# Patient Record
Sex: FEMALE | Race: BLACK OR AFRICAN AMERICAN | Employment: OTHER | URBAN - METROPOLITAN AREA
[De-identification: names, ages, dates, MRNs, and addresses within clinical notes are randomized per-mention and may not be internally consistent; named-entity substitution may affect disease eponyms.]

---

## 2017-05-30 ENCOUNTER — HOSPITAL ENCOUNTER (OUTPATIENT)
Dept: GENERAL RADIOLOGY | Age: 57
Discharge: HOME | End: 2017-05-30
Payer: MEDICAID

## 2017-05-30 DIAGNOSIS — J44.9 COPD (CHRONIC OBSTRUCTIVE PULMONARY DISEASE) (HCC): ICD-10-CM

## 2017-05-30 PROCEDURE — 71020 XR CHEST PA LAT: CPT

## 2017-10-18 ENCOUNTER — HOSPITAL ENCOUNTER (OUTPATIENT)
Dept: GENERAL RADIOLOGY | Age: 57
Discharge: HOME | End: 2017-10-18
Attending: INTERNAL MEDICINE
Payer: MEDICAID

## 2017-10-18 ENCOUNTER — HOSPITAL ENCOUNTER (OUTPATIENT)
Dept: MAMMOGRAPHY | Age: 57
Discharge: HOME | End: 2017-10-18
Attending: INTERNAL MEDICINE
Payer: MEDICAID

## 2017-10-18 DIAGNOSIS — Z12.31 VISIT FOR SCREENING MAMMOGRAM: ICD-10-CM

## 2017-10-18 DIAGNOSIS — Z78.0 MENOPAUSE: ICD-10-CM

## 2017-10-18 PROCEDURE — 77080 DXA BONE DENSITY AXIAL: CPT

## 2017-10-18 PROCEDURE — 77067 SCR MAMMO BI INCL CAD: CPT

## 2018-07-27 ENCOUNTER — HOSPITAL ENCOUNTER (EMERGENCY)
Age: 58
Discharge: HOME | End: 2018-07-27
Attending: EMERGENCY MEDICINE
Payer: MEDICAID

## 2018-07-27 ENCOUNTER — APPOINTMENT (OUTPATIENT)
Dept: GENERAL RADIOLOGY | Age: 58
End: 2018-07-27
Attending: EMERGENCY MEDICINE
Payer: MEDICAID

## 2018-07-27 VITALS
SYSTOLIC BLOOD PRESSURE: 198 MMHG | OXYGEN SATURATION: 98 % | BODY MASS INDEX: 33.86 KG/M2 | TEMPERATURE: 97.8 F | WEIGHT: 184 LBS | DIASTOLIC BLOOD PRESSURE: 97 MMHG | HEIGHT: 62 IN | RESPIRATION RATE: 17 BRPM | HEART RATE: 68 BPM

## 2018-07-27 DIAGNOSIS — M54.42 ACUTE LEFT-SIDED LOW BACK PAIN WITH LEFT-SIDED SCIATICA: Primary | ICD-10-CM

## 2018-07-27 PROCEDURE — 99283 EMERGENCY DEPT VISIT LOW MDM: CPT

## 2018-07-27 PROCEDURE — 72100 X-RAY EXAM L-S SPINE 2/3 VWS: CPT

## 2018-07-27 PROCEDURE — 74011250637 HC RX REV CODE- 250/637: Performed by: EMERGENCY MEDICINE

## 2018-07-27 RX ORDER — GABAPENTIN 100 MG/1
100 CAPSULE ORAL 3 TIMES DAILY
Qty: 30 CAP | Refills: 0 | Status: SHIPPED | OUTPATIENT
Start: 2018-07-27 | End: 2018-08-06

## 2018-07-27 RX ORDER — ACETAMINOPHEN 500 MG
1000 TABLET ORAL
Qty: 50 TAB | Refills: 0 | Status: ON HOLD | OUTPATIENT
Start: 2018-07-27 | End: 2020-08-27 | Stop reason: CLARIF

## 2018-07-27 RX ORDER — NAPROXEN 500 MG/1
500 TABLET ORAL 2 TIMES DAILY WITH MEALS
Qty: 20 TAB | Refills: 0 | Status: SHIPPED | OUTPATIENT
Start: 2018-07-27 | End: 2018-08-06

## 2018-07-27 RX ORDER — GABAPENTIN 100 MG/1
100 CAPSULE ORAL
Status: COMPLETED | OUTPATIENT
Start: 2018-07-27 | End: 2018-07-27

## 2018-07-27 RX ORDER — NAPROXEN 250 MG/1
500 TABLET ORAL
Status: COMPLETED | OUTPATIENT
Start: 2018-07-27 | End: 2018-07-27

## 2018-07-27 RX ORDER — TRAMADOL HYDROCHLORIDE 50 MG/1
50 TABLET ORAL
Qty: 8 TAB | Refills: 0 | Status: SHIPPED | OUTPATIENT
Start: 2018-07-27 | End: 2018-10-28

## 2018-07-27 RX ORDER — HYDROCODONE BITARTRATE AND ACETAMINOPHEN 5; 325 MG/1; MG/1
1 TABLET ORAL
Status: COMPLETED | OUTPATIENT
Start: 2018-07-27 | End: 2018-07-27

## 2018-07-27 RX ADMIN — HYDROCODONE BITARTRATE AND ACETAMINOPHEN 1 TABLET: 5; 325 TABLET ORAL at 13:45

## 2018-07-27 RX ADMIN — GABAPENTIN 100 MG: 100 CAPSULE ORAL at 13:45

## 2018-07-27 RX ADMIN — NAPROXEN 500 MG: 250 TABLET ORAL at 13:45

## 2018-07-27 NOTE — ED PROVIDER NOTES
HPI Comments: Saintclair Lawyer is a 61 y.o. Female with c/o left low back pain that started few days ago and now had progressed to left leg pain worse with sitting, movement. No swelling, fcs, saddle anesthesia, nvd. Did have fall onto back few months ago for which she was not seen. Taking otc meds without relief. Constant sharp stabbing pain The history is provided by the patient. Past Medical History:  
Diagnosis Date  Bipolar disorder ([facility] [address]  Diabetes ([facility] [address]  DJD (degenerative joint disease)  Essential hypertension, benign  GERD (gastroesophageal reflux disease)  Hypertension  RELL (iron deficiency anemia)  Morbid obesity ([address]  RAD (reactive airway disease)  Sleep apnea  SOB (shortness of breath) Past Surgical History:  
Procedure Laterality Date [address] Fibroids Family History:  
Problem Relation Age of Onset  Heart Attack Father  Breast Cancer Maternal Aunt  Heart Disease Mother Social History Social History  Marital status:  Spouse name: N/A  
 Number of children: N/A  
 Years of education: N/A Occupational History  Not on file. Social History Main Topics  Smoking status: Never Smoker  Smokeless tobacco: Never Used  Alcohol use Yes Comment: occ  Drug use: Yes Special: Marijuana Comment: \"every now and then. \"  Sexual activity: Not on file Other Topics Concern  Not on file Social History Narrative ALLERGIES: Review of patient's allergies indicates no known allergies. Review of Systems Constitutional: Negative for fever. HENT: Negative for sore throat. Eyes: Negative for visual disturbance. Respiratory: Negative for shortness of breath. Cardiovascular: Negative for chest pain. Gastrointestinal: Negative for abdominal pain. Endocrine: Negative for polyuria. Genitourinary: Negative for difficulty urinating. Musculoskeletal: Positive for back pain, gait problem and myalgias. Skin: Negative for rash and wound. Allergic/Immunologic: Negative for immunocompromised state. Neurological: Negative for syncope. Psychiatric/Behavioral: Positive for sleep disturbance. Vitals:  
 07/27/18 1302 BP: (!) 198/97 Pulse: 68 Resp: 17 Temp: 97.8 °F (36.6 °C) SpO2: 98% Weight: 83.5 kg (184 lb) Height: 5' 2\" (1.575 m) Physical Exam  
Constitutional: She is oriented to person, place, and time. She appears well-developed and well-nourished. She appears distressed (appears uncomfortable). HENT:  
Head: Normocephalic and atraumatic. Right Ear: External ear normal.  
Left Ear: External ear normal.  
Nose: Nose normal.  
Mouth/Throat: Uvula is midline, oropharynx is clear and moist and mucous membranes are normal.  
Eyes: Conjunctivae are normal. No scleral icterus. Neck: Neck supple. Cardiovascular: Normal rate, regular rhythm, normal heart sounds and intact distal pulses. Pulmonary/Chest: Effort normal and breath sounds normal.  
Abdominal: Soft. There is no tenderness. Musculoskeletal: She exhibits no edema. Lumbar back: She exhibits decreased range of motion, tenderness, pain and spasm. She exhibits no bony tenderness, no swelling, no edema, no deformity, no laceration and normal pulse. Back: 
 
Left leg with no weakness, foot drop. Sensation intact tl. Nl pulses. Neurological: She is alert and oriented to person, place, and time. Gait normal.  
Skin: Skin is warm and dry. She is not diaphoretic. Psychiatric: Her behavior is normal.  
Nursing note and vitals reviewed. [facility] 
 
 
ED Course Procedures Vitals: 
Patient Vitals for the past 12 hrs: 
 Temp Pulse Resp BP SpO2  
07/27/18 1302 97.8 °F (36.6 °C) 68 17 (!) 198/97 98 % Medications ordered:  
Medications HYDROcodone-acetaminophen (NORCO) 5-325 mg per tablet 1 Tab (1 Tab Oral Given 7/27/18 1345) naproxen (NAPROSYN) tablet 500 mg (500 mg Oral Given 7/27/18 1345)  
gabapentin (NEURONTIN) capsule 100 mg (100 mg Oral Given 7/27/18 1345) Lab findings: 
No results found for this or any previous visit (from the past 12 hour(s)). EKG interpretation by ED Physician: X-Ray, CT or other radiology findings or impressions: XR SPINE LUMB 2 OR 3 V    (Results Pending)  
nothing acute per my interp Progress notes, Consult notes or additional Procedure notes:  
Doubt need for other testing. C/w radicular leg pain. Doubt infection, vascular I have discussed with patient and/or family/sig other the results, interpretation of any imaging if performed, suspected diagnosis and treatment plan to include instructions regarding the diagnoses listed to which understanding was expressed with all questions answered Reevaluation of patient:  
stable Disposition: 
Diagnosis: 1. Acute left-sided low back pain with left-sided sciatica Disposition: home Follow-up Information Follow up With Details Comments Contact Info Leandra Chaparro MD Schedule an appointment as soon as possible for a visit  [address] [place] [id number] [phone] [facility] EMERGENCY DEPT  If symptoms worsen [address] 
[phone] Patient's Medications Start Taking ACETAMINOPHEN (TYLENOL EXTRA STRENGTH) 500 MG TABLET    Take 2 Tabs by mouth every six (6) hours as needed for Pain. GABAPENTIN (NEURONTIN) 100 MG CAPSULE    Take 1 Cap by mouth three (3) times daily for 10 days. NAPROXEN (NAPROSYN) 500 MG TABLET    Take 1 Tab by mouth two (2) times daily (with meals) for 10 days. TRAMADOL (ULTRAM) 50 MG TABLET    Take 1 Tab by mouth every six (6) hours as needed for Pain. Max Daily Amount: 200 mg. Continue Taking ALBUTEROL (PROAIR HFA) 90 MCG/ACTUATION INHALER    Take 2 Puffs by inhalation every four (4) hours as needed for Wheezing. ALBUTEROL-IPRATROPIUM (DUO-NEB) 2.5 MG-0.5 MG/3 ML NEBULIZER SOLUTION    3 mL by Nebulization route once. DESLORATADINE (CLARINEX) 5 MG TABLET    Take 5 mg by mouth daily. EPINEPHRINE (EPIPEN) 0.3 MG/0.3 ML (1:1,000) INJECTION    0.3 mg by SubCUTAneous route once as needed. FERROUS SULFATE 325 MG (65 MG IRON) TABLET    Take 325 mg by mouth Daily (before breakfast). FLUTICASONE (FLONASE) 50 MCG/ACTUATION NASAL SPRAY    1 [place] by Both Nostrils route two (2) times a day. FLUTICASONE-SALMETEROL (ADVAIR DISKUS) 250-50 MCG/DOSE DISKUS INHALER    Take 2 Puffs by inhalation every twelve (12) hours. GLIPIZIDE (GLUCOTROL) 10 MG TABLET    Take 10 mg by mouth two (2) times a day. GUAIFENESIN SR (MUCINEX) 600 MG SR TABLET    Take 1 Tab by mouth every twelve (12) hours. HYDROCHLOROTHIAZIDE 12.5 MG TABLET    Take 12.5 mg by mouth daily. METFORMIN (GLUCOPHAGE) 500 MG TABLET    Take 1,000 mg by mouth two (2) times daily (with meals). MONTELUKAST (SINGULAIR) 10 MG TABLET    Take 10 mg by mouth daily. OMEPRAZOLE (PRILOSEC) 20 MG CAPSULE    Take 20 mg by mouth daily. TIOTROPIUM (SPIRIVA WITH HANDIHALER) 18 MCG INHALATION CAPSULE    Take 1 Cap by inhalation daily. VALSARTAN (DIOVAN) 160 MG TABLET    Take 160 mg by mouth daily. These Medications have changed No medications on file Stop Taking DEXTROMETHORPHAN-GUAIFENESIN (ROBITUSSIN-DM)  MG/5 ML SYRUP    Take 10 mL by mouth every six (6) hours as needed for Cough. HYDROCODONE-ACETAMINOPHEN (NORCO) 5-325 MG PER TABLET    Take 1-2 tablets PO every 4-6 hours as needed for pain control. If over the counter ibuprofen or acetaminophen was suggested, then only take the vicodin for pain not well controlled with the over the counter medication. IBUPROFEN (MOTRIN) 800 MG TABLET    Take 1 Tab by mouth every eight (8) hours as needed for Pain.

## 2018-10-28 ENCOUNTER — HOSPITAL ENCOUNTER (EMERGENCY)
Age: 58
Discharge: HOME | End: 2018-10-28
Attending: EMERGENCY MEDICINE | Admitting: EMERGENCY MEDICINE
Payer: MEDICAID

## 2018-10-28 VITALS
SYSTOLIC BLOOD PRESSURE: 160 MMHG | HEIGHT: 62 IN | HEART RATE: 70 BPM | TEMPERATURE: 98.2 F | BODY MASS INDEX: 35.15 KG/M2 | OXYGEN SATURATION: 100 % | DIASTOLIC BLOOD PRESSURE: 66 MMHG | WEIGHT: 191 LBS | RESPIRATION RATE: 18 BRPM

## 2018-10-28 DIAGNOSIS — M54.42 LEFT-SIDED LOW BACK PAIN WITH LEFT-SIDED SCIATICA, UNSPECIFIED CHRONICITY: ICD-10-CM

## 2018-10-28 DIAGNOSIS — M54.50 ACUTE EXACERBATION OF CHRONIC LOW BACK PAIN: Primary | ICD-10-CM

## 2018-10-28 DIAGNOSIS — G89.29 ACUTE EXACERBATION OF CHRONIC LOW BACK PAIN: Primary | ICD-10-CM

## 2018-10-28 PROCEDURE — 74011250637 HC RX REV CODE- 250/637: Performed by: PHYSICIAN ASSISTANT

## 2018-10-28 PROCEDURE — 99283 EMERGENCY DEPT VISIT LOW MDM: CPT

## 2018-10-28 PROCEDURE — 96372 THER/PROPH/DIAG INJ SC/IM: CPT

## 2018-10-28 PROCEDURE — 74011250636 HC RX REV CODE- 250/636: Performed by: PHYSICIAN ASSISTANT

## 2018-10-28 RX ORDER — GABAPENTIN 100 MG/1
100 CAPSULE ORAL 3 TIMES DAILY
Qty: 30 CAP | Refills: 0 | Status: ON HOLD | OUTPATIENT
Start: 2018-10-28 | End: 2020-08-27 | Stop reason: CLARIF

## 2018-10-28 RX ORDER — PREDNISONE 10 MG/1
TABLET ORAL
Qty: 21 TAB | Refills: 0 | Status: ON HOLD | OUTPATIENT
Start: 2018-10-28 | End: 2020-09-04 | Stop reason: CLARIF

## 2018-10-28 RX ORDER — METHOCARBAMOL 500 MG/1
500 TABLET, FILM COATED ORAL 4 TIMES DAILY
Qty: 20 TAB | Refills: 0 | Status: SHIPPED | OUTPATIENT
Start: 2018-10-28 | End: 2018-11-02

## 2018-10-28 RX ORDER — HYDROCODONE BITARTRATE AND ACETAMINOPHEN 5; 325 MG/1; MG/1
1 TABLET ORAL
Status: COMPLETED | OUTPATIENT
Start: 2018-10-28 | End: 2018-10-28

## 2018-10-28 RX ORDER — KETOROLAC TROMETHAMINE 30 MG/ML
15 INJECTION, SOLUTION INTRAMUSCULAR; INTRAVENOUS
Status: COMPLETED | OUTPATIENT
Start: 2018-10-28 | End: 2018-10-28

## 2018-10-28 RX ADMIN — KETOROLAC TROMETHAMINE 15 MG: 30 INJECTION, SOLUTION INTRAMUSCULAR at 15:15

## 2018-10-28 RX ADMIN — HYDROCODONE BITARTRATE AND ACETAMINOPHEN 1 TABLET: 5; 325 TABLET ORAL at 15:15

## 2018-10-28 NOTE — ED NOTES
PT discharged home with family member to drive. Pt educated on new prescriptions and follow up care. Pt stating understanding.

## 2018-10-28 NOTE — ED PROVIDER NOTES
EMERGENCY DEPARTMENT HISTORY AND PHYSICAL EXAM 
 
3:00 PM 
 
 
Date: 10/28/2018 Patient Name: Shelia Diez [facility] History of Presenting Illness Chief Complaint Patient presents with  Back Pain History Provided By: Patient Chief Complaint: Back Pain Duration:  2 days ago Timing:  Progressive Location: Lumbar back Quality: N/A Severity: 8 out of 10 Modifying Factors:  Denies other recent falls or injuries. Reports taking Aleve, Lidocaine patches, and 2 Gabapentin, which mildly alleviated the pain. Associated Symptoms: Reports pain radiating down to the left hip and left lower extremity. Denies other associated symptoms, such as incontinence, and numbness. Additional History (Context): Doug Sebastian is a 61 y.o. female with PMHx of HTN, GERD, RAD, DJD, Shortness of breath, DM, and Iron deficiency anemia who presents with lumbar back pain, radiating down to her Left hip and LLE onset 2 days ago in the evening. Patient states the pain was brought on while lying on the couch. States 2 days ago she also performed some heavy lifting with boxes, and tables. Denies other recent falls or injuries. Reports taking Aleve, Lidocaine patches, and 2 Gabapentin, which mildly alleviated the pain. Denies other associated symptoms, such as incontinence, saddle anesthesia and numbness. Patient states that she recently followed-up with a Neurology, who diagnosed her with Sciatica. States this presentation feels typical of her Sciatica. PCP: Capo Amaya MD 
 
Current Outpatient Medications Medication Sig Dispense Refill  gabapentin (NEURONTIN) 100 mg capsule Take 1 Cap by mouth three (3) times daily. 30 Cap 0  
 methocarbamol (ROBAXIN) 500 mg tablet Take 1 Tab by mouth four (4) times daily for 5 days. 20 Tab 0  predniSONE (STERAPRED DS) 10 mg dose pack Take as prescribed until finished.  21 Tab 0  
 acetaminophen (TYLENOL EXTRA STRENGTH) 500 mg tablet Take 2 Tabs by mouth every six (6) hours as needed for Pain. 50 Tab 0  
 albuterol (PROAIR HFA) 90 mcg/actuation inhaler Take 2 Puffs by inhalation every four (4) hours as needed for Wheezing.  desloratadine (CLARINEX) 5 mg tablet Take 5 mg by mouth daily.  glipiZIDE (GLUCOTROL) 10 mg tablet Take 10 mg by mouth two (2) times a day.  fluticasone (FLONASE) 50 mcg/actuation nasal spray 1 [place] by Both Nostrils route two (2) times a day.  tiotropium (SPIRIVA WITH HANDIHALER) 18 mcg inhalation capsule Take 1 Cap by inhalation daily.  fluticasone-salmeterol (ADVAIR DISKUS) 250-50 mcg/dose diskus inhaler Take 2 Puffs by inhalation every twelve (12) hours.  montelukast (SINGULAIR) 10 mg tablet Take 10 mg by mouth daily.  EPINEPHrine (EPIPEN) 0.3 mg/0.3 mL (1:1,000) injection 0.3 mg by SubCUTAneous route once as needed.  Hydrochlorothiazide 12.5 mg tablet Take 12.5 mg by mouth daily.  metFORMIN (GLUCOPHAGE) 500 mg tablet Take 1,000 mg by mouth two (2) times daily (with meals).  ferrous sulfate 325 mg (65 mg iron) tablet Take 325 mg by mouth Daily (before breakfast).  valsartan (DIOVAN) 160 mg tablet Take 160 mg by mouth daily.  omeprazole (PRILOSEC) 20 mg capsule Take 20 mg by mouth daily.  albuterol-ipratropium (DUO-NEB) 2.5 mg-0.5 mg/3 ml nebulizer solution 3 mL by Nebulization route once. Past History Past Medical History: 
Past Medical History:  
Diagnosis Date  Bipolar disorder ([facility] [address]  Diabetes ([facility] [address]  DJD (degenerative joint disease)  Essential hypertension, benign  GERD (gastroesophageal reflux disease)  Hypertension  RELL (iron deficiency anemia)  Morbid obesity ([facility] [address]  RAD (reactive airway disease)  Sleep apnea  SOB (shortness of breath) Past Surgical History: 
Past Surgical History:  
Procedure Laterality Date [address] Fibroids Family History: 
Family History Problem Relation Age of Onset  Heart Attack Father  Breast Cancer Maternal Aunt  Heart Disease Mother Social History: 
Social History Tobacco Use  Smoking status: Never Smoker  Smokeless tobacco: Never Used Substance Use Topics  Alcohol use: Yes Comment: occ  Drug use: Yes Types: Marijuana Comment: \"every now and then. \" Allergies: 
No Known Allergies Review of Systems Review of Systems Constitutional: Negative for chills and fever. HENT: Negative for ear pain, rhinorrhea and sore throat. Eyes: Negative for pain and redness. Respiratory: Negative for cough and shortness of breath. Cardiovascular: Negative for chest pain. Gastrointestinal: Negative for abdominal pain, constipation, diarrhea, nausea and vomiting. Genitourinary: Negative for dysuria. (-) Incontinence Musculoskeletal: Positive for arthralgias (Left hip pain ), back pain and myalgias (Left lower extremity pain ). Skin: Negative. Neurological: Negative for dizziness, weakness, light-headedness, numbness and headaches. Psychiatric/Behavioral: Negative. All other systems reviewed and are negative. Physical Exam  
 
Visit Vitals /66 (BP 1 Location: Right arm, BP Patient Position: Sitting) Pulse 70 Temp 98.2 °F (36.8 °C) Resp 18 Ht 5' 2\" (1.575 m) Wt 86.6 kg (191 lb) SpO2 100% Breastfeeding? No  
BMI 34.93 kg/m² Physical Exam  
Constitutional: She is oriented to person, place, and time. She appears well-developed and well-nourished. No distress. HENT:  
Head: Normocephalic and atraumatic. Right Ear: Tympanic membrane, external ear and ear canal normal.  
Left Ear: Tympanic membrane, external ear and ear canal normal.  
Nose: Nose normal.  
Mouth/Throat: Oropharynx is clear and moist and mucous membranes are normal.  
Eyes: Conjunctivae and EOM are normal. Pupils are equal, round, and reactive to light. Neck: Normal range of motion. Neck supple. Cardiovascular: Normal rate, regular rhythm, normal heart sounds and intact distal pulses. Exam reveals no gallop and no friction rub. No murmur heard. Pulmonary/Chest: Effort normal and breath sounds normal. No respiratory distress. She has no wheezes. She has no rales. Abdominal: Soft. Bowel sounds are normal. There is no tenderness. Musculoskeletal: Normal range of motion. BACK:   
Lower left paralumbar reproducible tenderness to palpation. +left SLR. 
(-) deformity, swelling, erythema, skin changes, midline tenderness or crepitus. (-) step off. FROM at the waist. Full sensation to deep and light palpation bilaterally. (-) foot drop Neurological: She is alert and oriented to person, place, and time. Skin: Skin is warm and dry. She is not diaphoretic. Psychiatric: She has a normal mood and affect. Her behavior is normal. Judgment and thought content normal.  
Nursing note and vitals reviewed. Diagnostic Study Results Labs - No results found for this or any previous visit (from the past 12 hour(s)). Radiologic Studies - No orders to display Medical Decision Making I am the first provider for this patient. I reviewed the vital signs, available nursing notes, past medical history, past surgical history, family history and social history. Vital Signs-Reviewed the patient's vital signs. Pulse Oximetry Analysis -  100% on room air (Interpretation) Records Reviewed: Nursing Notes, Old Medical Records, Previous Radiology Studies and Triage notes  (Time of Review: 3:00 PM) ED Course: Progress Notes, Reevaluation, and Consults: 
Stable Provider Notes (Medical Decision Making): DDx: muscular pain/spasm, sciatica, spinal stenosis, arthritis, DJD, spondylitis, Fx (traumatic, compression, etc.) Well-appearing pt with c/o low back pain, exam is benign, NVI, sensorimotor intact, stable gait, no trauma, no indicators of back emergencies, no need for imaging, will dc with supportive care. Based upon the patients presentation with noted HPI and PE, along with the work up done in the emergency department, I believe that the patient is having lower paralumbar myofascial strain (lower back strain). PROGRESS: stable and improved Plan/discharge instructions: 
Apply heat or cool compresses (whichever provides better relief). Start doing gentle exercises and stretches as tolerated. Drink plenty of fluids. Finish Prednisone as directed. Take robaxin for muscular discomfort or spasms as needed as directed. Note that this medicine may cause drowsiness. Take the gabapentin as prescribed. Take Tylenol/Acetaminophen (every 4-6 hours) and/or Motrin/Ibuprofen/Advil (every 6-8 hours) or Naprosyn/Naproxyn/Aleve for fever or pain as needed. Return if any concerns or worsening condition(s). Your primary doctor in 2 days. Pt results have been reviewed with the patient and any family present. They have been counseled regarding diagnosis, treatment, and plan. They verbally convey understanding and agreement of the signs, symptoms, diagnosis, treatment and prognosis and additionally agrees to follow up as discussed. They also agree with the care-plan and convey that all of their questions have been answered. I have also provided discharge instructions for them that include: educational information regarding their diagnosis and treatment, and list of reasons why they would want to return to the ED prior to their follow-up appointment, should their condition change. Melody Quiroz PA-C Diagnosis Clinical Impression: 1. Acute exacerbation of chronic low back pain 2. Left-sided low back pain with left-sided sciatica, unspecified chronicity Disposition: Discharge to home Follow-up Information Follow up With Specialties Details Why Contact Info [facility] EMERGENCY DEPT Emergency Medicine  As needed, If symptoms worsen [address] 
[phone] Anand Kaiser MD Internal Medicine Go in 2 days  [facility] [address] [facility] [id number] [phone] Medication List  
  
START taking these medications   
gabapentin 100 mg capsule Commonly known as:  NEURONTIN Take 1 Cap by mouth three (3) times daily. methocarbamol 500 mg tablet Commonly known as:  ROBAXIN Take 1 Tab by mouth four (4) times daily for 5 days. predniSONE 10 mg dose pack Commonly known as:  STERAPRED DS Take as prescribed until finished. CONTINUE taking these medications   
acetaminophen 500 mg tablet Commonly known as:  Any [place] [facility] Take 2 Tabs by mouth every six (6) hours as needed for Pain. ADVAIR DISKUS 250-50 mcg/dose diskus inhaler Generic drug:  fluticasone-salmeterol 
  
albuterol-ipratropium 2.5 mg-0.5 mg/3 ml Nebu Commonly known as:  DUO-NEB 
  
CLARINEX 5 mg tablet Generic drug:  desloratadine DIOVAN 160 mg tablet Generic drug:  valsartan EPINEPHrine 0.3 mg/0.3 mL injection Commonly known as:  EPIPEN 
  
ferrous sulfate 325 mg (65 mg iron) tablet FLONASE 50 mcg/actuation nasal spray Generic drug:  fluticasone 
  
glipiZIDE 10 mg tablet Commonly known as:  GLUCOTROL 
  
hydroCHLOROthiazide 12.5 mg tablet Commonly known as:  HYDRODIURIL 
  
metFORMIN 500 mg tablet Commonly known as:  GLUCOPHAGE PriLOSEC 20 mg capsule Generic drug:  omeprazole PROAIR HFA 90 mcg/actuation inhaler Generic drug:  albuterol SINGULAIR 10 mg tablet Generic drug:  montelukast 
  
SPIRIVA WITH HANDIHALER 18 mcg inhalation capsule Generic drug:  tiotropium STOP taking these medications   
traMADol 50 mg tablet Commonly known as:  ULTRAM 
  
  
  
Where to Get Your Medications Information about where to get these medications is not yet available Ask your nurse or doctor about these medications · gabapentin 100 mg capsule · methocarbamol 500 mg tablet · predniSONE 10 mg dose pack 
  
 
_______________________________ Attestations: 
Scribe Attestation Sergo Lua acting as a scribe for and in the presence of Soheila Jean Baptiste October 28, 2018 at 3:00 PM 
    
Provider Attestation:     
I personally performed the services described in the documentation, reviewed the documentation, as recorded by the scribe in my presence, and it accurately and completely records my words and actions. October 28, 2018 at 3:00 PM - Melba Sylvester PA-C   
_______________________________

## 2019-03-07 ENCOUNTER — HOSPITAL ENCOUNTER (OUTPATIENT)
Dept: LAB | Age: 59
Discharge: HOME | End: 2019-03-07

## 2019-03-07 LAB — XX-LABCORP SPECIMEN COL,LCBCF: NORMAL

## 2019-03-07 PROCEDURE — 99001 SPECIMEN HANDLING PT-LAB: CPT

## 2019-07-22 ENCOUNTER — HOSPITAL ENCOUNTER (OUTPATIENT)
Dept: INFUSION THERAPY | Age: 59
Discharge: HOME | End: 2019-07-22
Payer: MEDICAID

## 2019-07-22 VITALS
RESPIRATION RATE: 18 BRPM | SYSTOLIC BLOOD PRESSURE: 159 MMHG | HEART RATE: 55 BPM | OXYGEN SATURATION: 96 % | DIASTOLIC BLOOD PRESSURE: 63 MMHG | TEMPERATURE: 97.1 F

## 2019-07-22 PROCEDURE — 96372 THER/PROPH/DIAG INJ SC/IM: CPT

## 2019-07-22 PROCEDURE — 74011250636 HC RX REV CODE- 250/636: Performed by: INTERNAL MEDICINE

## 2019-07-22 RX ADMIN — OMALIZUMAB 75 MG: 75 INJECTION, SOLUTION SUBCUTANEOUS at 13:40

## 2019-07-22 RX ADMIN — OMALIZUMAB 150 MG: 150 INJECTION, SOLUTION SUBCUTANEOUS at 13:40

## 2019-07-22 NOTE — PROGRESS NOTES
[facility] Progress Note    Date: 2019    Name: Nico Paredes    MRN: [id number]         : 1960     Xolair Injection every 2 weeks      Ms. Julia Miller arrived to [facility] at 1320. Ms. Julia Miller was assessed and education was provided. Ms. Nasim Snyder vitals were reviewed. Visit Vitals  /63 (BP 1 Location: Right arm, BP Patient Position: Sitting)   Pulse (!) 55   Temp 97.1 °F (36.2 °C)   Resp 18   SpO2 96%   Breastfeeding? No     Xolair 150 mg was administered as ordered SQ in patient's Right upper arm ( right). Xolair 75 mg was administered as ordered SQ in patient's Left upper arm ( left). Total 225 mg    Patient previously received this injection therefore, declined observation. Ms. Julia Miller tolerated well without complaints. Ms. Julia Miller was discharged from [place] [address] in stable condition at 1345. She is to return on 2019 at 0830 for her next appointment.     Martha Mitchell RN  2019

## 2019-08-05 ENCOUNTER — HOSPITAL ENCOUNTER (OUTPATIENT)
Dept: INFUSION THERAPY | Age: 59
Discharge: HOME | End: 2019-08-05
Payer: MEDICAID

## 2019-08-19 ENCOUNTER — HOSPITAL ENCOUNTER (OUTPATIENT)
Dept: INFUSION THERAPY | Age: 59
Discharge: HOME | End: 2019-08-19
Payer: MEDICAID

## 2019-08-22 ENCOUNTER — HOSPITAL ENCOUNTER (OUTPATIENT)
Dept: INFUSION THERAPY | Age: 59
Discharge: HOME | End: 2019-08-22
Payer: MEDICAID

## 2019-08-22 VITALS
HEART RATE: 62 BPM | SYSTOLIC BLOOD PRESSURE: 172 MMHG | TEMPERATURE: 98.1 F | DIASTOLIC BLOOD PRESSURE: 98 MMHG | RESPIRATION RATE: 18 BRPM | OXYGEN SATURATION: 97 %

## 2019-08-22 PROCEDURE — 96372 THER/PROPH/DIAG INJ SC/IM: CPT

## 2019-08-22 PROCEDURE — 74011250636 HC RX REV CODE- 250/636: Performed by: INTERNAL MEDICINE

## 2019-08-22 RX ADMIN — OMALIZUMAB 75 MG: 75 INJECTION, SOLUTION SUBCUTANEOUS at 13:31

## 2019-08-22 RX ADMIN — OMALIZUMAB 150 MG: 150 INJECTION, SOLUTION SUBCUTANEOUS at 13:31

## 2019-09-03 ENCOUNTER — APPOINTMENT (OUTPATIENT)
Dept: INFUSION THERAPY | Age: 59
End: 2019-09-03
Payer: MEDICAID

## 2019-09-05 ENCOUNTER — HOSPITAL ENCOUNTER (OUTPATIENT)
Dept: INFUSION THERAPY | Age: 59
Discharge: HOME | End: 2019-09-05
Payer: MEDICAID

## 2019-09-05 VITALS
HEART RATE: 62 BPM | OXYGEN SATURATION: 96 % | DIASTOLIC BLOOD PRESSURE: 79 MMHG | SYSTOLIC BLOOD PRESSURE: 196 MMHG | RESPIRATION RATE: 18 BRPM | TEMPERATURE: 98 F

## 2019-09-05 PROCEDURE — 96372 THER/PROPH/DIAG INJ SC/IM: CPT

## 2019-09-05 PROCEDURE — 74011250636 HC RX REV CODE- 250/636: Performed by: INTERNAL MEDICINE

## 2019-09-05 RX ADMIN — OMALIZUMAB 75 MG: 75 INJECTION, SOLUTION SUBCUTANEOUS at 10:13

## 2019-09-05 RX ADMIN — OMALIZUMAB 150 MG: 150 INJECTION, SOLUTION SUBCUTANEOUS at 10:13

## 2019-09-05 NOTE — PROGRESS NOTES
[facility]IC Progress Note    Date: 2019    Name: Jhonatan Kowalski    MRN: [id number]         : 1960     Xolair Injection every 2 weeks      Ms. Julia Miller arrived to [facility] at 1005. Ms. Julia Miller was assessed and education was provided. Ms. Tyrone Scales vitals were reviewed. Visit Vitals  Pulse 62   Temp 98 °F (36.7 °C)   Resp 18   SpO2 96%     Xolair 150 mg was administered as ordered SQ in patient's Left upper arm. Xolair 75 mg was administered as ordered SQ in patient's Right upper arm. Total 225 mg    Ms. Mahmood tolerated well without complaints. Ms. Julia Miller was discharged from [place] [address] in stable condition at 1020. She is to return on 2019 at 1400 for her next appointment.     Valerie Conley RN  2019  1020

## 2019-10-03 ENCOUNTER — HOSPITAL ENCOUNTER (OUTPATIENT)
Dept: INFUSION THERAPY | Age: 59
Discharge: HOME | End: 2019-10-03
Payer: MEDICAID

## 2019-10-03 VITALS
RESPIRATION RATE: 18 BRPM | HEART RATE: 59 BPM | DIASTOLIC BLOOD PRESSURE: 68 MMHG | SYSTOLIC BLOOD PRESSURE: 185 MMHG | TEMPERATURE: 96.9 F

## 2019-10-03 PROCEDURE — 96372 THER/PROPH/DIAG INJ SC/IM: CPT

## 2019-10-03 PROCEDURE — 74011250636 HC RX REV CODE- 250/636: Performed by: INTERNAL MEDICINE

## 2019-10-03 RX ADMIN — OMALIZUMAB 150 MG: 150 INJECTION, SOLUTION SUBCUTANEOUS at 14:28

## 2019-10-03 RX ADMIN — OMALIZUMAB 75 MG: 75 INJECTION, SOLUTION SUBCUTANEOUS at 14:28

## 2019-10-17 ENCOUNTER — HOSPITAL ENCOUNTER (OUTPATIENT)
Dept: INFUSION THERAPY | Age: 59
Discharge: HOME | End: 2019-10-17
Payer: MEDICAID

## 2019-10-17 VITALS
TEMPERATURE: 97 F | SYSTOLIC BLOOD PRESSURE: 177 MMHG | DIASTOLIC BLOOD PRESSURE: 72 MMHG | RESPIRATION RATE: 18 BRPM | OXYGEN SATURATION: 99 %

## 2019-10-17 PROCEDURE — 96372 THER/PROPH/DIAG INJ SC/IM: CPT

## 2019-10-17 PROCEDURE — 74011250636 HC RX REV CODE- 250/636: Performed by: INTERNAL MEDICINE

## 2019-10-17 RX ADMIN — OMALIZUMAB 150 MG: 150 INJECTION, SOLUTION SUBCUTANEOUS at 14:07

## 2019-10-17 RX ADMIN — OMALIZUMAB 75 MG: 75 INJECTION, SOLUTION SUBCUTANEOUS at 14:07

## 2019-10-17 NOTE — PROGRESS NOTES
[facility] Progress Note    Date: 2019    Name: Rita Barragan    MRN: [id number]         : 1960     Xolair Injection every 2 weeks      Ms. Julia Miller arrived to [facility] at 1400. Ms. Julia Miller was assessed and education was provided. Ms. Etter Kanner vitals were reviewed. Visit Vitals  /72 (BP 1 Location: Right arm, BP Patient Position: Sitting)   Temp 97 °F (36.1 °C)   Resp 18   SpO2 99%     Xolair 150 mg was administered as ordered SQ in patient's Left upper arm. Xolair 75 mg was administered as ordered SQ in patient's Left upper arm. Total 225 mg    Ms. Mahmood tolerated well without complaints. Ms. Julia Miller was discharged from [place] [address] in stable condition at 1410. She is to return on 10/31/2019 at 1500 for her next appointment.     Dari Roth RN  2019  1410

## 2019-10-31 ENCOUNTER — HOSPITAL ENCOUNTER (OUTPATIENT)
Dept: INFUSION THERAPY | Age: 59
Discharge: HOME | End: 2019-10-31
Payer: MEDICAID

## 2019-11-07 PROBLEM — J45.50 SEVERE PERSISTENT ASTHMA: Status: ACTIVE | Noted: 2019-11-07

## 2019-11-08 DIAGNOSIS — J45.50 SEVERE PERSISTENT ASTHMA, UNSPECIFIED WHETHER COMPLICATED: ICD-10-CM

## 2019-11-22 ENCOUNTER — HOSPITAL ENCOUNTER (OUTPATIENT)
Dept: INFUSION THERAPY | Age: 59
Discharge: HOME | End: 2019-11-22
Payer: MEDICAID

## 2019-11-22 VITALS
SYSTOLIC BLOOD PRESSURE: 187 MMHG | TEMPERATURE: 97 F | DIASTOLIC BLOOD PRESSURE: 82 MMHG | OXYGEN SATURATION: 96 % | RESPIRATION RATE: 18 BRPM | HEART RATE: 61 BPM

## 2019-11-22 DIAGNOSIS — J45.50 SEVERE PERSISTENT ASTHMA, UNSPECIFIED WHETHER COMPLICATED: Primary | ICD-10-CM

## 2019-11-22 DIAGNOSIS — J45.50 SEVERE PERSISTENT ASTHMA, UNSPECIFIED WHETHER COMPLICATED: ICD-10-CM

## 2019-11-22 PROCEDURE — 74011250636 HC RX REV CODE- 250/636: Performed by: NURSE PRACTITIONER

## 2019-11-22 PROCEDURE — 96372 THER/PROPH/DIAG INJ SC/IM: CPT

## 2019-11-22 RX ADMIN — OMALIZUMAB 75 MG: 75 INJECTION, SOLUTION SUBCUTANEOUS at 15:54

## 2019-11-22 RX ADMIN — OMALIZUMAB 150 MG: 150 INJECTION, SOLUTION SUBCUTANEOUS at 15:52

## 2019-11-22 NOTE — PROGRESS NOTES
[facility] Progress Note    Date: 2019    Name: Chico Randolph    MRN: [id number]         : 1960     Xolair Injection every 2 weeks    Ms. Julia Miller arrived to [facility] at [phone]. Ms. Julia Miller was assessed and education was provided. Ms. Kirk Leonard vitals were reviewed. Visit Vitals  /82 (BP 1 Location: Left arm, BP Patient Position: Sitting)   Pulse 61   Temp 97 °F (36.1 °C)   Resp 18   SpO2 96%     Xolair 150 mg was administered as ordered SQ in patient's Right upper arm. Xolair 75 mg was administered as ordered SQ in patient's Left upper arm. Total 225 mg    Ms. Mahmood tolerated well without complaints. Patient armband removed and shredded. Ms. Julia Miller was discharged from [place] [address] in stable condition at 1555. She is to return on 19 at [facility] for her next appointment.     Stefani Lauren RN  2019  1412

## 2019-11-29 DIAGNOSIS — J45.50 SEVERE PERSISTENT ASTHMA, UNSPECIFIED WHETHER COMPLICATED: ICD-10-CM

## 2019-12-11 ENCOUNTER — HOSPITAL ENCOUNTER (OUTPATIENT)
Dept: MAMMOGRAPHY | Age: 59
Discharge: HOME | End: 2019-12-11
Attending: INTERNAL MEDICINE
Payer: MEDICAID

## 2019-12-11 ENCOUNTER — HOSPITAL ENCOUNTER (OUTPATIENT)
Dept: GENERAL RADIOLOGY | Age: 59
Discharge: HOME | End: 2019-12-11
Attending: INTERNAL MEDICINE
Payer: MEDICAID

## 2019-12-11 DIAGNOSIS — Z78.0 MENOPAUSE: ICD-10-CM

## 2019-12-11 DIAGNOSIS — Z12.31 VISIT FOR SCREENING MAMMOGRAM: ICD-10-CM

## 2019-12-11 PROCEDURE — 77080 DXA BONE DENSITY AXIAL: CPT

## 2019-12-11 PROCEDURE — 77063 BREAST TOMOSYNTHESIS BI: CPT

## 2019-12-12 ENCOUNTER — HOSPITAL ENCOUNTER (OUTPATIENT)
Dept: INFUSION THERAPY | Age: 59
Discharge: HOME | End: 2019-12-12
Payer: MEDICAID

## 2019-12-12 VITALS — TEMPERATURE: 97.1 F | HEART RATE: 65 BPM | RESPIRATION RATE: 18 BRPM | OXYGEN SATURATION: 99 %

## 2019-12-12 DIAGNOSIS — J45.50 SEVERE PERSISTENT ASTHMA, UNSPECIFIED WHETHER COMPLICATED: Primary | ICD-10-CM

## 2019-12-12 PROCEDURE — 74011250636 HC RX REV CODE- 250/636: Performed by: NURSE PRACTITIONER

## 2019-12-12 PROCEDURE — 96372 THER/PROPH/DIAG INJ SC/IM: CPT

## 2019-12-12 RX ADMIN — OMALIZUMAB 150 MG: 150 INJECTION, SOLUTION SUBCUTANEOUS at 11:42

## 2019-12-12 RX ADMIN — OMALIZUMAB 75 MG: 75 INJECTION, SOLUTION SUBCUTANEOUS at 11:42

## 2019-12-12 NOTE — PROGRESS NOTES
[facility] Progress Note    Date: 2019    Name: Lyle Auguste    MRN: [id number]         : 1960     Xolair Injection every 2 weeks    Ms. Julia Miller arrived to [facility] at 1135. Ms. Julia Miller was assessed and education was provided. Ms. Yoana Hartman vitals were reviewed. Visit Vitals  Pulse 65   Temp 97.1 °F (36.2 °C)   Resp 18   SpO2 99%     Xolair 150 mg was administered as ordered SQ in patient's Right upper arm. Xolair 75 mg was administered as ordered SQ in patient's Left upper arm. Total 225 mg    Ms. Mahmood tolerated well without complaints. Patient armband removed and shredded. Ms. Julia Miller was discharged from [place] [address] in stable condition at 1150. She is to return on 19 at 1000 for her next appointment.     López Barrera RN  2019  1150

## 2019-12-13 DIAGNOSIS — J45.50 SEVERE PERSISTENT ASTHMA, UNSPECIFIED WHETHER COMPLICATED: ICD-10-CM

## 2019-12-16 ENCOUNTER — HOSPITAL ENCOUNTER (OUTPATIENT)
Dept: MAMMOGRAPHY | Age: 59
Discharge: HOME | End: 2019-12-16
Attending: INTERNAL MEDICINE
Payer: MEDICAID

## 2019-12-16 ENCOUNTER — HOSPITAL ENCOUNTER (OUTPATIENT)
Dept: ULTRASOUND IMAGING | Age: 59
Discharge: HOME | End: 2019-12-16
Attending: INTERNAL MEDICINE
Payer: MEDICAID

## 2019-12-16 DIAGNOSIS — R92.8 ABNORMALITY OF LEFT BREAST ON SCREENING MAMMOGRAM: ICD-10-CM

## 2019-12-16 DIAGNOSIS — R92.2 INCONCLUSIVE MAMMOGRAM: ICD-10-CM

## 2019-12-16 PROCEDURE — 77061 BREAST TOMOSYNTHESIS UNI: CPT

## 2019-12-16 PROCEDURE — 76642 ULTRASOUND BREAST LIMITED: CPT

## 2019-12-26 ENCOUNTER — HOSPITAL ENCOUNTER (OUTPATIENT)
Dept: INFUSION THERAPY | Age: 59
End: 2019-12-26
Payer: MEDICAID

## 2019-12-27 DIAGNOSIS — J45.50 SEVERE PERSISTENT ASTHMA, UNSPECIFIED WHETHER COMPLICATED: ICD-10-CM

## 2020-01-09 ENCOUNTER — HOSPITAL ENCOUNTER (OUTPATIENT)
Dept: INFUSION THERAPY | Age: 60
Discharge: HOME | End: 2020-01-09
Payer: MEDICAID

## 2020-01-09 VITALS — OXYGEN SATURATION: 100 % | TEMPERATURE: 97.2 F | RESPIRATION RATE: 18 BRPM | HEART RATE: 55 BPM

## 2020-01-09 DIAGNOSIS — J45.50 SEVERE PERSISTENT ASTHMA, UNSPECIFIED WHETHER COMPLICATED: Primary | ICD-10-CM

## 2020-01-09 PROCEDURE — 74011250636 HC RX REV CODE- 250/636: Performed by: INTERNAL MEDICINE

## 2020-01-09 PROCEDURE — 96372 THER/PROPH/DIAG INJ SC/IM: CPT

## 2020-01-09 RX ADMIN — OMALIZUMAB 150 MG: 150 INJECTION, SOLUTION SUBCUTANEOUS at 12:19

## 2020-01-09 RX ADMIN — OMALIZUMAB 75 MG: 75 INJECTION, SOLUTION SUBCUTANEOUS at 12:19

## 2020-01-09 NOTE — PROGRESS NOTES
[facility] Progress Note    Date: 2020    Name: Brice Mena    MRN: [id number]         : 1960     Xolair Injection every 2 weeks    Ms. Julia Miller arrived to [facility] at 1210. Ms. Julia Miller was assessed and education was provided. Ms. Chris Tony vitals were reviewed. Visit Vitals  Pulse (!) 55   Temp 97.2 °F (36.2 °C)   Resp 18   SpO2 100%     Xolair 150 mg was administered as ordered SQ in patient's Right upper arm. Xolair 75 mg was administered as ordered SQ in patient's Left upper arm. Total 225 mg    Ms. Mahmood tolerated well without complaints. Patient armband removed and shredded. Ms. Julia Miller was discharged from [place] [address] in stable condition at 1225. She is to return on 2020 at 36 for her next appointment.     Sabrina Sandoval RN  2020  1225

## 2020-01-10 DIAGNOSIS — J45.50 SEVERE PERSISTENT ASTHMA, UNSPECIFIED WHETHER COMPLICATED: ICD-10-CM

## 2020-01-23 ENCOUNTER — HOSPITAL ENCOUNTER (OUTPATIENT)
Dept: INFUSION THERAPY | Age: 60
Discharge: HOME | End: 2020-01-23
Payer: MEDICAID

## 2020-01-23 VITALS
DIASTOLIC BLOOD PRESSURE: 67 MMHG | TEMPERATURE: 96.9 F | RESPIRATION RATE: 18 BRPM | SYSTOLIC BLOOD PRESSURE: 150 MMHG | OXYGEN SATURATION: 100 % | HEART RATE: 61 BPM

## 2020-01-23 DIAGNOSIS — J45.50 SEVERE PERSISTENT ASTHMA, UNSPECIFIED WHETHER COMPLICATED: Primary | ICD-10-CM

## 2020-01-23 PROCEDURE — 96372 THER/PROPH/DIAG INJ SC/IM: CPT

## 2020-01-23 PROCEDURE — 74011250636 HC RX REV CODE- 250/636: Performed by: INTERNAL MEDICINE

## 2020-01-23 RX ADMIN — OMALIZUMAB 75 MG: 75 INJECTION, SOLUTION SUBCUTANEOUS at 11:25

## 2020-01-23 RX ADMIN — OMALIZUMAB 150 MG: 150 INJECTION, SOLUTION SUBCUTANEOUS at 11:25

## 2020-01-23 NOTE — PROGRESS NOTES
[facility] Progress Note    Date: 2020    Name: Tish House    MRN: [id number]         : 1960     Xolair Injection every 2 weeks    Ms. Julia Miller arrived to [facility] at 1120. Ms. Julia Miller was assessed and education was provided. Ms. Lewis Body vitals were reviewed. Visit Vitals  /67 (BP 1 Location: Left arm, BP Patient Position: Sitting)   Pulse 61   Temp 96.9 °F (36.1 °C)   Resp 18   SpO2 100%     Xolair 150 mg was administered as ordered SQ in patient's Right upper arm. Xolair 75 mg was administered as ordered SQ in patient's Left upper arm. Total 225 mg    Ms. Mahmood tolerated well without complaints. Patient armband removed and shredded. Ms. Julia Miller was discharged from [place] [address] in stable condition at 1130. She is to return on 2020 at 36 for her next appointment.     Sharon Nash RN  2020  1130

## 2020-01-24 DIAGNOSIS — J45.50 SEVERE PERSISTENT ASTHMA, UNSPECIFIED WHETHER COMPLICATED: ICD-10-CM

## 2020-02-06 ENCOUNTER — HOSPITAL ENCOUNTER (OUTPATIENT)
Dept: INFUSION THERAPY | Age: 60
Discharge: HOME | End: 2020-02-06
Payer: MEDICAID

## 2020-02-06 VITALS
SYSTOLIC BLOOD PRESSURE: 192 MMHG | HEART RATE: 63 BPM | RESPIRATION RATE: 18 BRPM | OXYGEN SATURATION: 97 % | TEMPERATURE: 97.2 F | DIASTOLIC BLOOD PRESSURE: 77 MMHG

## 2020-02-06 DIAGNOSIS — J45.50 SEVERE PERSISTENT ASTHMA, UNSPECIFIED WHETHER COMPLICATED: Primary | ICD-10-CM

## 2020-02-06 DIAGNOSIS — J45.50 SEVERE PERSISTENT ASTHMA, UNSPECIFIED WHETHER COMPLICATED: ICD-10-CM

## 2020-02-06 PROCEDURE — 74011250636 HC RX REV CODE- 250/636: Performed by: INTERNAL MEDICINE

## 2020-02-06 PROCEDURE — 96372 THER/PROPH/DIAG INJ SC/IM: CPT

## 2020-02-06 RX ADMIN — OMALIZUMAB 75 MG: 75 INJECTION, SOLUTION SUBCUTANEOUS at 11:58

## 2020-02-06 RX ADMIN — OMALIZUMAB 150 MG: 150 INJECTION, SOLUTION SUBCUTANEOUS at 11:58

## 2020-02-06 NOTE — PROGRESS NOTES
[facility] Progress Note    Date: 2020    Name: Rita Barragan    MRN: [id number]         : 1960     Xolair Injection every 2 weeks    Ms. Julia Miller arrived to [facility] at 1150. Ms. Julia Miller was assessed and education was provided. Ms. Etter Kanner vitals were reviewed. Visit Vitals  /77 (BP 1 Location: Right arm, BP Patient Position: Sitting)   Pulse 63   Temp 97.2 °F (36.2 °C)   Resp 18   SpO2 97%     Xolair 150 mg was administered as ordered SQ in patient's Right upper arm. Xolair 75 mg was administered as ordered SQ in patient's Left upper arm. Total 225 mg    Ms. Mahmood tolerated well without complaints. Patient armband removed and shredded. Ms. Julia Miller was discharged from [place] [address] in stable condition at 1200. She is to return on 2020 at 36 for her next appointment.     Dari Roth RN  2020  1200

## 2020-02-20 ENCOUNTER — HOSPITAL ENCOUNTER (OUTPATIENT)
Dept: INFUSION THERAPY | Age: 60
End: 2020-02-20
Payer: MEDICAID

## 2020-02-20 DIAGNOSIS — J45.50 SEVERE PERSISTENT ASTHMA, UNSPECIFIED WHETHER COMPLICATED: ICD-10-CM

## 2020-03-05 ENCOUNTER — HOSPITAL ENCOUNTER (OUTPATIENT)
Dept: INFUSION THERAPY | Age: 60
Discharge: HOME | End: 2020-03-05
Payer: MEDICAID

## 2020-03-05 VITALS — OXYGEN SATURATION: 97 % | TEMPERATURE: 97.1 F | RESPIRATION RATE: 18 BRPM | HEART RATE: 59 BPM

## 2020-03-05 DIAGNOSIS — J45.50 SEVERE PERSISTENT ASTHMA, UNSPECIFIED WHETHER COMPLICATED: Primary | ICD-10-CM

## 2020-03-05 DIAGNOSIS — J45.50 SEVERE PERSISTENT ASTHMA, UNSPECIFIED WHETHER COMPLICATED: ICD-10-CM

## 2020-03-05 PROCEDURE — 74011250636 HC RX REV CODE- 250/636: Performed by: INTERNAL MEDICINE

## 2020-03-05 PROCEDURE — 96372 THER/PROPH/DIAG INJ SC/IM: CPT

## 2020-03-05 RX ADMIN — OMALIZUMAB 75 MG: 75 INJECTION, SOLUTION SUBCUTANEOUS at 11:57

## 2020-03-05 RX ADMIN — OMALIZUMAB 150 MG: 150 INJECTION, SOLUTION SUBCUTANEOUS at 11:57

## 2020-03-05 NOTE — PROGRESS NOTES
[facility] Progress Note    Date: 2020    Name: Maddi Meredith    MRN: [id number]         : 1960     Xolair Injection every 2 weeks    Ms. Julia Miller arrived to [facility] at 1150. Ms. Julia Miller was assessed and education was provided. Ms. Chey Snyder vitals were reviewed. Visit Vitals  Pulse (!) 59   Temp 97.1 °F (36.2 °C)   Resp 18   SpO2 97%     Xolair 150 mg was administered as ordered SQ in patient's Right upper arm. Xolair 75 mg was administered as ordered SQ in patient's Left upper arm. Total 225 mg    Ms. Mahmood tolerated well without complaints. Patient armband removed and shredded. Ms. Julia Miller was discharged from [place] [address] in stable condition at 1200. She is to return on 3/19/2020 at 1100 for her next appointment.     Jennifer Kang RN  2020  1200

## 2020-03-19 ENCOUNTER — HOSPITAL ENCOUNTER (OUTPATIENT)
Dept: INFUSION THERAPY | Age: 60
Discharge: HOME | End: 2020-03-19
Payer: MEDICAID

## 2020-03-19 VITALS
HEART RATE: 62 BPM | OXYGEN SATURATION: 96 % | SYSTOLIC BLOOD PRESSURE: 149 MMHG | RESPIRATION RATE: 18 BRPM | DIASTOLIC BLOOD PRESSURE: 73 MMHG | TEMPERATURE: 99.4 F

## 2020-03-19 DIAGNOSIS — J45.50 SEVERE PERSISTENT ASTHMA, UNSPECIFIED WHETHER COMPLICATED: Primary | ICD-10-CM

## 2020-03-19 DIAGNOSIS — J45.50 SEVERE PERSISTENT ASTHMA, UNSPECIFIED WHETHER COMPLICATED: ICD-10-CM

## 2020-03-19 PROCEDURE — 96372 THER/PROPH/DIAG INJ SC/IM: CPT

## 2020-03-19 PROCEDURE — 74011250636 HC RX REV CODE- 250/636: Performed by: INTERNAL MEDICINE

## 2020-03-19 RX ADMIN — OMALIZUMAB 75 MG: 75 INJECTION, SOLUTION SUBCUTANEOUS at 15:33

## 2020-03-19 RX ADMIN — OMALIZUMAB 150 MG: 150 INJECTION, SOLUTION SUBCUTANEOUS at 15:32

## 2020-03-19 NOTE — PROGRESS NOTES
[facility] Progress Note    Date: 2020    Name: Annette Ko    MRN: [id number]         : 1960     Xolair Injection every 2 weeks    Ms. Jluia Miller arrived to [facility] at 1525. Ms. Julia Miller was assessed and education was provided. Ms. Julita Anderson vitals were reviewed. Visit Vitals  /73 (BP 1 Location: Left arm, BP Patient Position: Sitting)   Pulse 62   Temp 99.4 °F (37.4 °C)   Resp 18   SpO2 96%     Xolair 150 mg was administered as ordered SQ in patient's Right upper arm. Xolair 75 mg was administered as ordered SQ in patient's Left upper arm. Total 225 mg    Ms. Mahmood tolerated well without complaints. Patient armband removed and shredded. Ms. Julia Miller was discharged from [place] [address] in stable condition at 32 61 16. She is to return on 2020 at 1130 for her next appointment.     Deneen Dow  2020  1200

## 2020-04-02 DIAGNOSIS — J45.50 SEVERE PERSISTENT ASTHMA, UNSPECIFIED WHETHER COMPLICATED: ICD-10-CM

## 2020-04-16 ENCOUNTER — HOSPITAL ENCOUNTER (OUTPATIENT)
Dept: INFUSION THERAPY | Age: 60
Discharge: HOME | End: 2020-04-16
Payer: MEDICAID

## 2020-04-16 VITALS
OXYGEN SATURATION: 98 % | HEART RATE: 61 BPM | SYSTOLIC BLOOD PRESSURE: 158 MMHG | DIASTOLIC BLOOD PRESSURE: 79 MMHG | TEMPERATURE: 97.6 F | RESPIRATION RATE: 18 BRPM

## 2020-04-16 DIAGNOSIS — J45.50 SEVERE PERSISTENT ASTHMA, UNSPECIFIED WHETHER COMPLICATED: ICD-10-CM

## 2020-04-16 DIAGNOSIS — J45.50 SEVERE PERSISTENT ASTHMA, UNSPECIFIED WHETHER COMPLICATED: Primary | ICD-10-CM

## 2020-04-16 PROCEDURE — 96372 THER/PROPH/DIAG INJ SC/IM: CPT

## 2020-04-16 PROCEDURE — 74011250636 HC RX REV CODE- 250/636: Performed by: INTERNAL MEDICINE

## 2020-04-16 RX ADMIN — OMALIZUMAB 150 MG: 150 INJECTION, SOLUTION SUBCUTANEOUS at 15:35

## 2020-04-16 RX ADMIN — OMALIZUMAB 75 MG: 75 INJECTION, SOLUTION SUBCUTANEOUS at 15:35

## 2020-04-16 NOTE — PROGRESS NOTES
[facility] Progress Note    Date: 2020    Name: Han Ortiz    MRN: [id number]         : 1960     Xolair Injection every 2 weeks    Ms. Julia Miller arrived to [facility] at 0. Ms. Julia Miller was assessed and education was provided. Ms. Agustina Capps vitals were reviewed. Visit Vitals  /79 (BP 1 Location: Right arm, BP Patient Position: Sitting)   Pulse 61   Temp 97.6 °F (36.4 °C)   Resp 18   SpO2 98%   Breastfeeding No     Xolair 150 mg was administered as ordered SQ in patient's Right upper arm. Xolair 75 mg was administered as ordered SQ in patient's Left upper arm. Total 225 mg    Ms. Mahmood tolerated well without complaints. Patient armband removed and shredded. Ms. Julia Miller was discharged from [place] [address] in stable condition at 1540. She is to return on 2020 at 1130 for her next appointment.     Beryl Parikh RN  2020  1540

## 2020-04-30 DIAGNOSIS — J45.50 SEVERE PERSISTENT ASTHMA, UNSPECIFIED WHETHER COMPLICATED: ICD-10-CM

## 2020-05-14 ENCOUNTER — HOSPITAL ENCOUNTER (OUTPATIENT)
Dept: INFUSION THERAPY | Age: 60
Discharge: HOME | End: 2020-05-14
Payer: MEDICAID

## 2020-05-14 VITALS
OXYGEN SATURATION: 99 % | RESPIRATION RATE: 18 BRPM | TEMPERATURE: 97.5 F | SYSTOLIC BLOOD PRESSURE: 221 MMHG | DIASTOLIC BLOOD PRESSURE: 82 MMHG | HEART RATE: 56 BPM

## 2020-05-14 DIAGNOSIS — J45.50 SEVERE PERSISTENT ASTHMA, UNSPECIFIED WHETHER COMPLICATED: Primary | ICD-10-CM

## 2020-05-14 DIAGNOSIS — J45.50 SEVERE PERSISTENT ASTHMA, UNSPECIFIED WHETHER COMPLICATED: ICD-10-CM

## 2020-05-14 PROCEDURE — 96372 THER/PROPH/DIAG INJ SC/IM: CPT

## 2020-05-14 PROCEDURE — 74011250636 HC RX REV CODE- 250/636: Performed by: NURSE PRACTITIONER

## 2020-05-14 RX ADMIN — OMALIZUMAB 150 MG: 150 INJECTION, SOLUTION SUBCUTANEOUS at 11:38

## 2020-05-14 RX ADMIN — OMALIZUMAB 75 MG: 75 INJECTION, SOLUTION SUBCUTANEOUS at 11:38

## 2020-05-14 NOTE — PROGRESS NOTES
[facility] Progress Note    Date: May 14, 2020    Name: Darylene Gilmore    MRN: [id number]         : 1960     Xolair Injection every 2 weeks    Ms. Julia Miller arrived to [facility] at 1130. Ms. Julia Miller was assessed and education was provided. Ms. Kenyatta Jackson vitals were reviewed. Visit Vitals  BP (!) 221/82 (BP 1 Location: Left arm, BP Patient Position: Sitting)   Pulse (!) 56   Temp 97.5 °F (36.4 °C)   Resp 18   SpO2 99%     Xolair 150 mg was administered as ordered SQ in patient's Right upper arm. Xolair 75 mg was administered as ordered SQ in patient's Left upper arm. Total 225 mg    Ms. Mahmood tolerated well without complaints. Patient armband removed and shredded. Ms. Julia Miller was discharged from [place] [address] in stable condition at 1145. She is to return on 2020 at 1130 for her next appointment.     Cayden Jones RN  May 14, 2020  1143

## 2020-05-28 ENCOUNTER — HOSPITAL ENCOUNTER (OUTPATIENT)
Dept: INFUSION THERAPY | Age: 60
Discharge: HOME | End: 2020-05-28
Payer: MEDICAID

## 2020-05-28 VITALS
HEART RATE: 62 BPM | DIASTOLIC BLOOD PRESSURE: 83 MMHG | RESPIRATION RATE: 18 BRPM | TEMPERATURE: 97.2 F | OXYGEN SATURATION: 99 % | SYSTOLIC BLOOD PRESSURE: 192 MMHG

## 2020-05-28 DIAGNOSIS — J45.50 SEVERE PERSISTENT ASTHMA, UNSPECIFIED WHETHER COMPLICATED: ICD-10-CM

## 2020-05-28 DIAGNOSIS — J45.50 SEVERE PERSISTENT ASTHMA, UNSPECIFIED WHETHER COMPLICATED: Primary | ICD-10-CM

## 2020-05-28 PROCEDURE — 74011250636 HC RX REV CODE- 250/636: Performed by: NURSE PRACTITIONER

## 2020-05-28 PROCEDURE — 96372 THER/PROPH/DIAG INJ SC/IM: CPT

## 2020-05-28 RX ADMIN — OMALIZUMAB 75 MG: 75 INJECTION, SOLUTION SUBCUTANEOUS at 11:38

## 2020-05-28 RX ADMIN — OMALIZUMAB 150 MG: 75 INJECTION, SOLUTION SUBCUTANEOUS at 11:37

## 2020-06-11 ENCOUNTER — HOSPITAL ENCOUNTER (OUTPATIENT)
Dept: INFUSION THERAPY | Age: 60
Discharge: HOME | End: 2020-06-11
Payer: MEDICAID

## 2020-06-11 VITALS
TEMPERATURE: 98.5 F | DIASTOLIC BLOOD PRESSURE: 72 MMHG | RESPIRATION RATE: 18 BRPM | OXYGEN SATURATION: 100 % | HEART RATE: 57 BPM | SYSTOLIC BLOOD PRESSURE: 173 MMHG

## 2020-06-11 DIAGNOSIS — J45.50 SEVERE PERSISTENT ASTHMA, UNSPECIFIED WHETHER COMPLICATED: ICD-10-CM

## 2020-06-11 DIAGNOSIS — J45.50 SEVERE PERSISTENT ASTHMA, UNSPECIFIED WHETHER COMPLICATED: Primary | ICD-10-CM

## 2020-06-11 PROCEDURE — 74011250636 HC RX REV CODE- 250/636: Performed by: NURSE PRACTITIONER

## 2020-06-11 PROCEDURE — 96372 THER/PROPH/DIAG INJ SC/IM: CPT

## 2020-06-11 RX ADMIN — OMALIZUMAB 75 MG: 75 INJECTION, SOLUTION SUBCUTANEOUS at 11:51

## 2020-06-11 RX ADMIN — OMALIZUMAB 150 MG: 150 INJECTION, SOLUTION SUBCUTANEOUS at 11:51

## 2020-06-11 NOTE — PROGRESS NOTES
[facility] Progress Note    Date: 2020    Name: Karen Blackwell    MRN: [id number]         : 1960     Xolair Injection every 2 weeks    Ms. Julia Miller arrived to [facility] at 0911 34 [phone] 33. Ms. Julia Miller was assessed and education was provided. Ms. Sulma Alvarado vitals were reviewed. Visit Vitals  /72 (BP 1 Location: Left arm, BP Patient Position: Sitting)   Pulse (!) 57   Temp 98.5 °F (36.9 °C)   Resp 18   SpO2 100%     Xolair 150 mg was administered as ordered SQ in patient's Right upper arm. Xolair 75 mg was administered as ordered SQ in patient's Left upper arm. Total 225 mg    Ms. Mahmood tolerated well without complaints. Patient armband removed and shredded. Ms. Julia Miller was discharged from [place] [address] in stable condition at 1155. She is to return on 2020 at 1330 for her next appointment.     Abdullahi Carl RN  2020  1155

## 2020-06-25 ENCOUNTER — HOSPITAL ENCOUNTER (OUTPATIENT)
Dept: INFUSION THERAPY | Age: 60
Discharge: HOME | End: 2020-06-25
Payer: MEDICAID

## 2020-06-25 VITALS
TEMPERATURE: 97.2 F | SYSTOLIC BLOOD PRESSURE: 172 MMHG | DIASTOLIC BLOOD PRESSURE: 75 MMHG | OXYGEN SATURATION: 100 % | RESPIRATION RATE: 18 BRPM | HEART RATE: 61 BPM

## 2020-06-25 DIAGNOSIS — J45.50 SEVERE PERSISTENT ASTHMA, UNSPECIFIED WHETHER COMPLICATED: ICD-10-CM

## 2020-06-25 DIAGNOSIS — J45.50 SEVERE PERSISTENT ASTHMA, UNSPECIFIED WHETHER COMPLICATED: Primary | ICD-10-CM

## 2020-06-25 PROCEDURE — 74011250636 HC RX REV CODE- 250/636: Performed by: NURSE PRACTITIONER

## 2020-06-25 PROCEDURE — 96372 THER/PROPH/DIAG INJ SC/IM: CPT

## 2020-06-25 RX ADMIN — OMALIZUMAB 150 MG: 150 INJECTION, SOLUTION SUBCUTANEOUS at 13:31

## 2020-06-25 RX ADMIN — OMALIZUMAB 75 MG: 75 INJECTION, SOLUTION SUBCUTANEOUS at 13:31

## 2020-06-25 NOTE — PROGRESS NOTES
[facility] Progress Note    Date: 2020    Name: Tish House    MRN: [id number]         : 1960     Xolair Injection every 2 weeks    Ms. Julia Miller arrived to [facility] at 1325. Ms. Julia Miller was assessed and education was provided. Ms. Lewis Body vitals were reviewed. Visit Vitals  /75 (BP 1 Location: Left arm, BP Patient Position: Sitting)   Pulse 61   Temp 97.2 °F (36.2 °C)   Resp 18   SpO2 100%     Xolair 150 mg was administered as ordered SQ in patient's Right upper arm. Xolair 75 mg was administered as ordered SQ in patient's Left upper arm. Total 225 mg    Ms. Mahmood tolerated well without complaints. Patient armband removed and shredded. Ms. Julia Miller was discharged from [place] [address] in stable condition at 1335. She is to return on 2020 at 1130 for her next appointment.     Sharon Nash RN  2020  1335

## 2020-07-09 ENCOUNTER — HOSPITAL ENCOUNTER (OUTPATIENT)
Dept: INFUSION THERAPY | Age: 60
Discharge: HOME | End: 2020-07-09
Payer: MEDICAID

## 2020-07-09 VITALS
DIASTOLIC BLOOD PRESSURE: 72 MMHG | HEART RATE: 66 BPM | SYSTOLIC BLOOD PRESSURE: 159 MMHG | TEMPERATURE: 98.3 F | OXYGEN SATURATION: 100 % | RESPIRATION RATE: 18 BRPM

## 2020-07-09 DIAGNOSIS — J45.50 SEVERE PERSISTENT ASTHMA, UNSPECIFIED WHETHER COMPLICATED: ICD-10-CM

## 2020-07-09 DIAGNOSIS — J45.50 SEVERE PERSISTENT ASTHMA, UNSPECIFIED WHETHER COMPLICATED: Primary | ICD-10-CM

## 2020-07-09 PROCEDURE — 96372 THER/PROPH/DIAG INJ SC/IM: CPT

## 2020-07-09 PROCEDURE — 74011250636 HC RX REV CODE- 250/636: Performed by: NURSE PRACTITIONER

## 2020-07-09 RX ADMIN — OMALIZUMAB 150 MG: 150 INJECTION, SOLUTION SUBCUTANEOUS at 11:43

## 2020-07-09 RX ADMIN — OMALIZUMAB 75 MG: 75 INJECTION, SOLUTION SUBCUTANEOUS at 11:43

## 2020-07-09 NOTE — PROGRESS NOTES
[facility] Progress Note    Date: 2020    Name: Jose Angel Hernandez    MRN: [id number]         : 1960     Xolair Injection every 2 weeks    Ms. Julia Miller arrived to [facility] at 1135. Ms. Julia Miller was assessed and education was provided. Ms. Stubbs Cons vitals were reviewed. Visit Vitals  /72 (BP 1 Location: Left arm, BP Patient Position: Sitting)   Pulse 66   Temp 98.3 °F (36.8 °C)   Resp 18   SpO2 100%   Breastfeeding No     Xolair 150 mg was administered as ordered SQ in patient's left upper arm. Xolair 75 mg was administered as ordered SQ in patient's right upper arm. Total 225 mg    Ms. Mahmood tolerated well without complaints. Patient armband removed and shredded. Ms. Julia Miller was discharged from [place] [address] in stable condition at 1145. She is to return on 2020 at 1100 for her next appointment.     Juliana Daugherty RN  2020  1145

## 2020-07-23 ENCOUNTER — HOSPITAL ENCOUNTER (OUTPATIENT)
Dept: INFUSION THERAPY | Age: 60
Discharge: HOME | End: 2020-07-23
Payer: MEDICAID

## 2020-07-23 VITALS
SYSTOLIC BLOOD PRESSURE: 140 MMHG | HEART RATE: 57 BPM | OXYGEN SATURATION: 100 % | TEMPERATURE: 97.5 F | DIASTOLIC BLOOD PRESSURE: 65 MMHG | RESPIRATION RATE: 18 BRPM

## 2020-07-23 DIAGNOSIS — J45.50 SEVERE PERSISTENT ASTHMA, UNSPECIFIED WHETHER COMPLICATED: Primary | ICD-10-CM

## 2020-07-23 PROCEDURE — 96372 THER/PROPH/DIAG INJ SC/IM: CPT

## 2020-07-23 PROCEDURE — 74011250636 HC RX REV CODE- 250/636: Performed by: NURSE PRACTITIONER

## 2020-07-23 RX ADMIN — OMALIZUMAB 75 MG: 75 INJECTION, SOLUTION SUBCUTANEOUS at 13:26

## 2020-07-23 RX ADMIN — OMALIZUMAB 150 MG: 150 INJECTION, SOLUTION SUBCUTANEOUS at 13:26

## 2020-07-23 NOTE — PROGRESS NOTES
[facility] Progress Note    Date: 2020    Name: Wanda Self    MRN: [id number]         : 1960     Xolair Injection every 2 weeks    Ms. Julia Miller arrived to [facility] at 1320. Ms. Julia Miller was assessed and education was provided. Ms. Villareal Stamp vitals were reviewed. Visit Vitals  /65 (BP 1 Location: Left arm, BP Patient Position: Sitting)   Pulse (!) 57   Temp 97.5 °F (36.4 °C)   Resp 18   SpO2 100%     Xolair 150 mg was administered as ordered SQ in patient's left upper arm. Xolair 75 mg was administered as ordered SQ in patient's right upper arm. Total 225 mg    Ms. Mahmood tolerated well without complaints. Patient armband removed and shredded. Ms. Julia Miller was discharged from [place] [address] in stable condition at 1330. She is to return on 2020 at 36 for her next appointment.     Adams Pryor RN  2020  1330

## 2020-08-06 ENCOUNTER — HOSPITAL ENCOUNTER (OUTPATIENT)
Dept: INFUSION THERAPY | Age: 60
Discharge: HOME | End: 2020-08-06
Payer: MEDICAID

## 2020-08-06 VITALS
TEMPERATURE: 97.2 F | OXYGEN SATURATION: 100 % | HEART RATE: 73 BPM | RESPIRATION RATE: 18 BRPM | SYSTOLIC BLOOD PRESSURE: 136 MMHG | DIASTOLIC BLOOD PRESSURE: 72 MMHG

## 2020-08-06 DIAGNOSIS — J45.50 SEVERE PERSISTENT ASTHMA, UNSPECIFIED WHETHER COMPLICATED: Primary | ICD-10-CM

## 2020-08-06 PROCEDURE — 74011250636 HC RX REV CODE- 250/636: Performed by: NURSE PRACTITIONER

## 2020-08-06 PROCEDURE — 96372 THER/PROPH/DIAG INJ SC/IM: CPT

## 2020-08-06 RX ADMIN — OMALIZUMAB 75 MG: 75 INJECTION, SOLUTION SUBCUTANEOUS at 11:49

## 2020-08-06 RX ADMIN — OMALIZUMAB 150 MG: 150 INJECTION, SOLUTION SUBCUTANEOUS at 11:49

## 2020-08-20 ENCOUNTER — HOSPITAL ENCOUNTER (OUTPATIENT)
Dept: INFUSION THERAPY | Age: 60
End: 2020-08-20
Payer: MEDICAID

## 2020-08-26 ENCOUNTER — HOSPITAL ENCOUNTER (INPATIENT)
Age: 60
LOS: 9 days | Discharge: HOME | DRG: 058 | End: 2020-09-04
Attending: INTERNAL MEDICINE | Admitting: INTERNAL MEDICINE
Payer: MEDICAID

## 2020-08-26 DIAGNOSIS — Z20.822 COVID-19 RULED OUT BY LABORATORY TESTING: ICD-10-CM

## 2020-08-26 DIAGNOSIS — Z78.9 IMPAIRED MOBILITY AND ADLS: ICD-10-CM

## 2020-08-26 DIAGNOSIS — G47.33 OBSTRUCTIVE SLEEP APNEA ON CPAP: Chronic | ICD-10-CM

## 2020-08-26 DIAGNOSIS — J30.2 SEASONAL ALLERGIC RHINITIS, UNSPECIFIED TRIGGER: Chronic | ICD-10-CM

## 2020-08-26 DIAGNOSIS — E78.00 PURE HYPERCHOLESTEROLEMIA: Chronic | ICD-10-CM

## 2020-08-26 DIAGNOSIS — D50.9 IRON DEFICIENCY ANEMIA, UNSPECIFIED IRON DEFICIENCY ANEMIA TYPE: Chronic | ICD-10-CM

## 2020-08-26 DIAGNOSIS — M15.9 PRIMARY OSTEOARTHRITIS INVOLVING MULTIPLE JOINTS: Chronic | ICD-10-CM

## 2020-08-26 DIAGNOSIS — Z99.89 OBSTRUCTIVE SLEEP APNEA ON CPAP: Chronic | ICD-10-CM

## 2020-08-26 DIAGNOSIS — Z86.010 HISTORY OF COLON POLYPS: ICD-10-CM

## 2020-08-26 DIAGNOSIS — F32.A DEPRESSION, UNSPECIFIED DEPRESSION TYPE: Chronic | ICD-10-CM

## 2020-08-26 DIAGNOSIS — J44.9 CHRONIC OBSTRUCTIVE PULMONARY DISEASE, UNSPECIFIED COPD TYPE (HCC): Chronic | ICD-10-CM

## 2020-08-26 DIAGNOSIS — I63.9 ACUTE ISCHEMIC STROKE (HCC): Primary | ICD-10-CM

## 2020-08-26 DIAGNOSIS — Z79.899 ON STATIN THERAPY DUE TO RISK OF FUTURE CARDIOVASCULAR EVENT: ICD-10-CM

## 2020-08-26 DIAGNOSIS — Z79.82 CURRENT USE OF ASPIRIN: ICD-10-CM

## 2020-08-26 DIAGNOSIS — M54.32 SCIATICA OF LEFT SIDE: ICD-10-CM

## 2020-08-26 DIAGNOSIS — Z78.0 MENOPAUSE: Chronic | ICD-10-CM

## 2020-08-26 DIAGNOSIS — Z74.09 IMPAIRED MOBILITY AND ADLS: ICD-10-CM

## 2020-08-26 DIAGNOSIS — G81.94 LEFT HEMIPARESIS (HCC): ICD-10-CM

## 2020-08-26 DIAGNOSIS — E11.9 TYPE 2 DIABETES MELLITUS WITHOUT COMPLICATION, WITH LONG-TERM CURRENT USE OF INSULIN (HCC): Chronic | ICD-10-CM

## 2020-08-26 DIAGNOSIS — F31.9 BIPOLAR AFFECTIVE DISORDER, REMISSION STATUS UNSPECIFIED (HCC): Chronic | ICD-10-CM

## 2020-08-26 DIAGNOSIS — I11.9 HYPERTENSIVE HEART DISEASE WITHOUT HEART FAILURE: Chronic | ICD-10-CM

## 2020-08-26 DIAGNOSIS — Z79.4 TYPE 2 DIABETES MELLITUS WITHOUT COMPLICATION, WITH LONG-TERM CURRENT USE OF INSULIN (HCC): Chronic | ICD-10-CM

## 2020-08-26 DIAGNOSIS — Z79.01 ON CLOPIDOGREL THERAPY: ICD-10-CM

## 2020-08-26 DIAGNOSIS — J45.50 SEVERE PERSISTENT ASTHMA, UNSPECIFIED WHETHER COMPLICATED: ICD-10-CM

## 2020-08-26 DIAGNOSIS — K21.9 GASTROESOPHAGEAL REFLUX DISEASE, ESOPHAGITIS PRESENCE NOT SPECIFIED: Chronic | ICD-10-CM

## 2020-08-26 DIAGNOSIS — E66.9 OBESITY, CLASS II, BMI 35-39.9: Chronic | ICD-10-CM

## 2020-08-26 LAB — GLUCOSE BLD STRIP.AUTO-MCNC: 175 MG/DL (ref 70–110)

## 2020-08-26 PROCEDURE — 74011250637 HC RX REV CODE- 250/637: Performed by: INTERNAL MEDICINE

## 2020-08-26 PROCEDURE — 74011636637 HC RX REV CODE- 636/637: Performed by: INTERNAL MEDICINE

## 2020-08-26 PROCEDURE — 82962 GLUCOSE BLOOD TEST: CPT

## 2020-08-26 PROCEDURE — 74011250636 HC RX REV CODE- 250/636: Performed by: INTERNAL MEDICINE

## 2020-08-26 PROCEDURE — 65310000000 HC RM PRIVATE REHAB

## 2020-08-26 PROCEDURE — 74011000250 HC RX REV CODE- 250: Performed by: INTERNAL MEDICINE

## 2020-08-26 RX ORDER — INSULIN GLARGINE 100 [IU]/ML
20 INJECTION, SOLUTION SUBCUTANEOUS
Status: DISCONTINUED | OUTPATIENT
Start: 2020-08-26 | End: 2020-08-30

## 2020-08-26 RX ORDER — INSULIN LISPRO 100 [IU]/ML
INJECTION, SOLUTION INTRAVENOUS; SUBCUTANEOUS
Status: DISCONTINUED | OUTPATIENT
Start: 2020-08-27 | End: 2020-09-04 | Stop reason: HOSPADM

## 2020-08-26 RX ORDER — MONTELUKAST SODIUM 10 MG/1
10 TABLET ORAL
Status: DISCONTINUED | OUTPATIENT
Start: 2020-08-26 | End: 2020-09-04 | Stop reason: HOSPADM

## 2020-08-26 RX ORDER — DEXTROSE 50 % IN WATER (D50W) INTRAVENOUS SYRINGE
25-50 AS NEEDED
Status: DISCONTINUED | OUTPATIENT
Start: 2020-08-26 | End: 2020-09-04 | Stop reason: HOSPADM

## 2020-08-26 RX ORDER — ACETAMINOPHEN 325 MG/1
650 TABLET ORAL
Status: DISCONTINUED | OUTPATIENT
Start: 2020-08-26 | End: 2020-09-04 | Stop reason: HOSPADM

## 2020-08-26 RX ORDER — HEPARIN SODIUM 5000 [USP'U]/ML
5000 INJECTION, SOLUTION INTRAVENOUS; SUBCUTANEOUS EVERY 8 HOURS
Status: DISCONTINUED | OUTPATIENT
Start: 2020-08-26 | End: 2020-09-04 | Stop reason: HOSPADM

## 2020-08-26 RX ORDER — PANTOPRAZOLE SODIUM 20 MG/1
20 TABLET, DELAYED RELEASE ORAL
Status: DISCONTINUED | OUTPATIENT
Start: 2020-08-27 | End: 2020-09-04 | Stop reason: HOSPADM

## 2020-08-26 RX ORDER — FENOFIBRATE 145 MG/1
145 TABLET, COATED ORAL DAILY
Status: DISCONTINUED | OUTPATIENT
Start: 2020-08-27 | End: 2020-09-04 | Stop reason: HOSPADM

## 2020-08-26 RX ORDER — BISACODYL 5 MG
10 TABLET, DELAYED RELEASE (ENTERIC COATED) ORAL
Status: DISCONTINUED | OUTPATIENT
Start: 2020-08-26 | End: 2020-09-04 | Stop reason: HOSPADM

## 2020-08-26 RX ORDER — HYDROCHLOROTHIAZIDE 12.5 MG/1
12.5 CAPSULE ORAL DAILY
Status: DISCONTINUED | OUTPATIENT
Start: 2020-08-27 | End: 2020-09-04 | Stop reason: HOSPADM

## 2020-08-26 RX ORDER — LANOLIN ALCOHOL/MO/W.PET/CERES
1 CREAM (GRAM) TOPICAL
Status: DISCONTINUED | OUTPATIENT
Start: 2020-08-27 | End: 2020-09-04 | Stop reason: HOSPADM

## 2020-08-26 RX ORDER — IPRATROPIUM BROMIDE 0.5 MG/2.5ML
0.5 SOLUTION RESPIRATORY (INHALATION)
Status: DISCONTINUED | OUTPATIENT
Start: 2020-08-26 | End: 2020-09-04 | Stop reason: HOSPADM

## 2020-08-26 RX ORDER — AMLODIPINE BESYLATE 10 MG/1
10 TABLET ORAL DAILY
Status: DISCONTINUED | OUTPATIENT
Start: 2020-08-27 | End: 2020-09-04 | Stop reason: HOSPADM

## 2020-08-26 RX ORDER — MAGNESIUM SULFATE 100 %
4 CRYSTALS MISCELLANEOUS AS NEEDED
Status: DISCONTINUED | OUTPATIENT
Start: 2020-08-26 | End: 2020-09-04 | Stop reason: HOSPADM

## 2020-08-26 RX ORDER — MELATONIN
2000 DAILY
Status: DISCONTINUED | OUTPATIENT
Start: 2020-08-27 | End: 2020-09-04 | Stop reason: HOSPADM

## 2020-08-26 RX ORDER — CLOPIDOGREL BISULFATE 75 MG/1
75 TABLET ORAL
Status: DISCONTINUED | OUTPATIENT
Start: 2020-08-27 | End: 2020-09-04 | Stop reason: HOSPADM

## 2020-08-26 RX ORDER — ATORVASTATIN CALCIUM 40 MG/1
40 TABLET, FILM COATED ORAL DAILY
Status: DISCONTINUED | OUTPATIENT
Start: 2020-08-27 | End: 2020-09-04 | Stop reason: HOSPADM

## 2020-08-26 RX ORDER — VALSARTAN 160 MG/1
320 TABLET ORAL DAILY
Status: DISCONTINUED | OUTPATIENT
Start: 2020-08-27 | End: 2020-09-04 | Stop reason: HOSPADM

## 2020-08-26 RX ORDER — ALBUTEROL SULFATE 0.83 MG/ML
2.5 SOLUTION RESPIRATORY (INHALATION)
Status: DISCONTINUED | OUTPATIENT
Start: 2020-08-26 | End: 2020-09-04 | Stop reason: HOSPADM

## 2020-08-26 RX ORDER — GUAIFENESIN 100 MG/5ML
81 LIQUID (ML) ORAL
Status: DISCONTINUED | OUTPATIENT
Start: 2020-08-27 | End: 2020-09-04 | Stop reason: HOSPADM

## 2020-08-26 RX ADMIN — IPRATROPIUM BROMIDE 0.5 MG: 0.5 SOLUTION RESPIRATORY (INHALATION) at 21:46

## 2020-08-26 RX ADMIN — MONTELUKAST 10 MG: 10 TABLET, FILM COATED ORAL at 21:45

## 2020-08-26 RX ADMIN — INSULIN GLARGINE 20 UNITS: 100 INJECTION, SOLUTION SUBCUTANEOUS at 21:45

## 2020-08-26 RX ADMIN — HEPARIN SODIUM 5000 UNITS: 5000 INJECTION INTRAVENOUS; SUBCUTANEOUS at 21:46

## 2020-08-27 LAB
ANION GAP SERPL CALC-SCNC: 3 MMOL/L (ref 3–18)
BASOPHILS # BLD: 0 K/UL (ref 0–0.1)
BASOPHILS NFR BLD: 0 % (ref 0–2)
BUN SERPL-MCNC: 18 MG/DL (ref 7–18)
BUN/CREAT SERPL: 24 (ref 12–20)
CALCIUM SERPL-MCNC: 9.3 MG/DL (ref 8.5–10.1)
CHLORIDE SERPL-SCNC: 106 MMOL/L (ref 100–111)
CO2 SERPL-SCNC: 32 MMOL/L (ref 21–32)
CREAT SERPL-MCNC: 0.75 MG/DL (ref 0.6–1.3)
DIFFERENTIAL METHOD BLD: ABNORMAL
EOSINOPHIL # BLD: 0.2 K/UL (ref 0–0.4)
EOSINOPHIL NFR BLD: 2 % (ref 0–5)
ERYTHROCYTE [DISTWIDTH] IN BLOOD BY AUTOMATED COUNT: 13.1 % (ref 11.6–14.5)
GLUCOSE BLD STRIP.AUTO-MCNC: 117 MG/DL (ref 70–110)
GLUCOSE BLD STRIP.AUTO-MCNC: 136 MG/DL (ref 70–110)
GLUCOSE BLD STRIP.AUTO-MCNC: 186 MG/DL (ref 70–110)
GLUCOSE BLD STRIP.AUTO-MCNC: 96 MG/DL (ref 70–110)
GLUCOSE SERPL-MCNC: 106 MG/DL (ref 74–99)
HCT VFR BLD AUTO: 35.7 % (ref 35–45)
HGB BLD-MCNC: 11.5 G/DL (ref 12–16)
LYMPHOCYTES # BLD: 1.6 K/UL (ref 0.9–3.6)
LYMPHOCYTES NFR BLD: 22 % (ref 21–52)
MAGNESIUM SERPL-MCNC: 1.7 MG/DL (ref 1.6–2.6)
MCH RBC QN AUTO: 28.6 PG (ref 24–34)
MCHC RBC AUTO-ENTMCNC: 32.2 G/DL (ref 31–37)
MCV RBC AUTO: 88.8 FL (ref 74–97)
MONOCYTES # BLD: 0.8 K/UL (ref 0.05–1.2)
MONOCYTES NFR BLD: 11 % (ref 3–10)
NEUTS SEG # BLD: 4.7 K/UL (ref 1.8–8)
NEUTS SEG NFR BLD: 65 % (ref 40–73)
PLATELET # BLD AUTO: 244 K/UL (ref 135–420)
PMV BLD AUTO: 10.4 FL (ref 9.2–11.8)
POTASSIUM SERPL-SCNC: 3.8 MMOL/L (ref 3.5–5.5)
RBC # BLD AUTO: 4.02 M/UL (ref 4.2–5.3)
SODIUM SERPL-SCNC: 141 MMOL/L (ref 136–145)
WBC # BLD AUTO: 7.3 K/UL (ref 4.6–13.2)

## 2020-08-27 PROCEDURE — 65310000000 HC RM PRIVATE REHAB

## 2020-08-27 PROCEDURE — 82962 GLUCOSE BLOOD TEST: CPT

## 2020-08-27 PROCEDURE — 97112 NEUROMUSCULAR REEDUCATION: CPT

## 2020-08-27 PROCEDURE — 80048 BASIC METABOLIC PNL TOTAL CA: CPT

## 2020-08-27 PROCEDURE — 83735 ASSAY OF MAGNESIUM: CPT

## 2020-08-27 PROCEDURE — 85025 COMPLETE CBC W/AUTO DIFF WBC: CPT

## 2020-08-27 PROCEDURE — 97535 SELF CARE MNGMENT TRAINING: CPT

## 2020-08-27 PROCEDURE — 97530 THERAPEUTIC ACTIVITIES: CPT

## 2020-08-27 PROCEDURE — 74011000250 HC RX REV CODE- 250: Performed by: INTERNAL MEDICINE

## 2020-08-27 PROCEDURE — 97165 OT EVAL LOW COMPLEX 30 MIN: CPT

## 2020-08-27 PROCEDURE — 96125 COGNITIVE TEST BY HC PRO: CPT

## 2020-08-27 PROCEDURE — 96105 ASSESSMENT OF APHASIA: CPT

## 2020-08-27 PROCEDURE — 92522 EVALUATE SPEECH PRODUCTION: CPT

## 2020-08-27 PROCEDURE — 97162 PT EVAL MOD COMPLEX 30 MIN: CPT

## 2020-08-27 PROCEDURE — 36415 COLL VENOUS BLD VENIPUNCTURE: CPT

## 2020-08-27 PROCEDURE — 97116 GAIT TRAINING THERAPY: CPT

## 2020-08-27 PROCEDURE — 74011250637 HC RX REV CODE- 250/637: Performed by: INTERNAL MEDICINE

## 2020-08-27 PROCEDURE — 74011636637 HC RX REV CODE- 636/637: Performed by: INTERNAL MEDICINE

## 2020-08-27 PROCEDURE — 74011250636 HC RX REV CODE- 250/636: Performed by: INTERNAL MEDICINE

## 2020-08-27 RX ADMIN — MONTELUKAST 10 MG: 10 TABLET, FILM COATED ORAL at 21:25

## 2020-08-27 RX ADMIN — IPRATROPIUM BROMIDE 0.5 MG: 0.5 SOLUTION RESPIRATORY (INHALATION) at 09:19

## 2020-08-27 RX ADMIN — IPRATROPIUM BROMIDE 0.5 MG: 0.5 SOLUTION RESPIRATORY (INHALATION) at 16:21

## 2020-08-27 RX ADMIN — PANTOPRAZOLE SODIUM 20 MG: 20 TABLET, DELAYED RELEASE ORAL at 09:14

## 2020-08-27 RX ADMIN — HEPARIN SODIUM 5000 UNITS: 5000 INJECTION INTRAVENOUS; SUBCUTANEOUS at 21:26

## 2020-08-27 RX ADMIN — ATORVASTATIN CALCIUM 40 MG: 40 TABLET, FILM COATED ORAL at 09:15

## 2020-08-27 RX ADMIN — CLOPIDOGREL BISULFATE 75 MG: 75 TABLET ORAL at 16:20

## 2020-08-27 RX ADMIN — INSULIN GLARGINE 20 UNITS: 100 INJECTION, SOLUTION SUBCUTANEOUS at 21:26

## 2020-08-27 RX ADMIN — FERROUS SULFATE TAB 325 MG (65 MG ELEMENTAL FE) 325 MG: 325 (65 FE) TAB at 09:15

## 2020-08-27 RX ADMIN — VALSARTAN 320 MG: 160 TABLET, FILM COATED ORAL at 09:15

## 2020-08-27 RX ADMIN — FENOFIBRATE 145 MG: 145 TABLET ORAL at 11:13

## 2020-08-27 RX ADMIN — CHOLECALCIFEROL (VITAMIN D3) 25 MCG (1,000 UNIT) TABLET 2 TABLET: TABLET at 09:14

## 2020-08-27 RX ADMIN — HYDROCHLOROTHIAZIDE 12.5 MG: 12.5 CAPSULE ORAL at 09:17

## 2020-08-27 RX ADMIN — ASPIRIN 81 MG CHEWABLE TABLET 81 MG: 81 TABLET CHEWABLE at 09:15

## 2020-08-27 RX ADMIN — HEPARIN SODIUM 5000 UNITS: 5000 INJECTION INTRAVENOUS; SUBCUTANEOUS at 06:17

## 2020-08-27 RX ADMIN — IPRATROPIUM BROMIDE 0.5 MG: 0.5 SOLUTION RESPIRATORY (INHALATION) at 13:12

## 2020-08-27 RX ADMIN — IPRATROPIUM BROMIDE 0.5 MG: 0.5 SOLUTION RESPIRATORY (INHALATION) at 21:29

## 2020-08-27 RX ADMIN — HEPARIN SODIUM 5000 UNITS: 5000 INJECTION INTRAVENOUS; SUBCUTANEOUS at 13:59

## 2020-08-27 RX ADMIN — AMLODIPINE BESYLATE 10 MG: 10 TABLET ORAL at 09:14

## 2020-08-27 NOTE — H&P
[facility] PHYSICAL REHABILITATION  [address] [place] [address], [address]     INPATIENT REHABILITATION  HISTORY AND PHYSICAL  (Post Admission Physician Evaluation)    Name: Chico Randolph CSN: [id number]   Age: 60 y.o. MRN: [id number]   Sex: female Admit Date: 8/26/2020     PCP: Dr. Stephany Saleem      Primary Rehab Impairment Category (MARCOS): Stroke    Impairment Group Label: Left body involvement (right brain)    Etiologic Diagnosis: Acute Ischemic Stroke (late acute/early subacute infarcts in the right centrum semiovale extending to the superior frontal lobe cortex) with residual left hemiparesis      Subjective:     Patient seen and examined. History of the Present Illness: The patient is a 53-year-old, right-handed, obese, Black female with multiple medical comorbidities who was admitted to [facility] on 8/21/2020 due to left-sided weakness. The patient was apparently well until the morning of admission, as she woke up and tried to get out of bed, she fell to the floor due to weakness of the left arm and left leg. She denied any head trauma or loss of consciousness. Her fiance called 911 and on 8/21/2020, the patient was brought to the [facility] Emergency Department for further evaluation. The patient was seen and examined by Emergency Medicine (Dr. Kristina Soto). Excerpt (History and Neurological) from the ED Provider Note by Dr. Kp Richard:  \"60year-old female presenting with the chief complaint of leg pain. However, in discussion with the patient, she does not really have any pain -however, she is complaining of paresthesias and weakness of both her left upper and left lower extremity. The patient is a difficult historian, and is not exactly clear when the symptoms began. The patient does endorse some pain and weakness in her left leg last night which she states was her sciatica. It is unclear whether this is typical of her sciatica presentation. However, she does state that she noticed left upper extremity weakness this morning at approximately 930 which is new to her. No changes in vision. No difficulty speaking. No loss of consciousness or altered mental status. Neurological:   Mental Status: She is alert and oriented to person, place, and time. Sensory: Sensation is intact. Motor: Motor function is intact. Comments: Patient alert and oriented x3. Left upper extremity and left lower extremity with 2 out of 5 strength. Unable to elevate on her own and unable to keep elevated, with significant drift and effort. Endorsing intact sensation throughout body. Right side with no focal neuro deficits. Visual fields intact by confrontation. Finger to nose and heel to shin normal on right - not tested on left due to weakness. See below for NIHSS stroke scale. \"    CT scan of the head (8/21/2020) showed no acute intracranial findings; of note, MRI is more sensitive for detecting acute infarct. Acute Stroke Alert [facility] (Dr. Edwige Rust) evaluated the patient and intravenous tPA was not recommended. The patient was admitted under the service of the [address] [place] [address] (Dr. Jeramie Diehl). Excerpt (History of Present Illness and NEURO) from the H&P by Dr. Jeramie Diehl:  Becky Araujo is a 60 y.o. female with PMHx as noted below who is presented with chief complaint of left-sided weakness  According to the patient, she woke up this morning with left upper extremity weakness and also noted left lower extremity weakness last night and she thought this is due to sciatica. Stroke alert was called TPA was not administered due to not in window. Patient denies any obvious chest pain. No visual complaints or speech difficulty    NEURO: Patient have dense hemiplegia on the left side,, with 1 x 5 power at her hand and ankle area.  Gait is not tested no facial droop\"    Patient was started on Aspirin 325 mg PO once daily and Atorvastatin 40 mg PO once daily. Unfractionated heparin was used for DVT prophylaxis. Lipid profile (8/19/2020) showed , , HDL 50,     HbA1c (8/19/2020) = 8.1     HbA1c (8/20/2020) = 8.0    2D echocardiogram (8/22/2020) showed EF 65%; mild left ventricular hypertrophy; normal diastolic function; negative bubble study at rest and after Valsalva. MRI of the brain (8/24/2020) showed a small volume of late acute/early subacute infarcts in the right centrum semiovale extending to the superior frontal lobe cortex. MRA of the head (8/24/2020) showed:  1. Severe A2 RACA stenosis which could be caused be atherosclerotic plaque, focal dissection or nonocclusive thromboembolus. 2. Moderate bilateral PCA stenoses. MRA of the neck (8/24/2020) showed: 1. Limited visualization of certain segments due to motion artifact. 2. Extracranial carotid arteries are patent and without evidence of a hemodynamically significant stenosis. 3. Dominant RVA is patent and without evidence of stenosis. 4. Congenitally hypoplastic but patent LVA. On 8/24/2020, Aspirin dose was decreased from 325 mg to 81 mg PO once daily and patient was started on Clopidogrel 75 mg PO once daily. Blood pressure was controlled with Amlodipine, Hydrochlorothiazide and VAlsartan. Blood glucose was controlled with Humulin 70/30 and an Insulin sldiing scale SC TID AC and q HS. TERRENCE-CoV-2 (Abbott m2000) (collected 8/25/2020, resulted 8/26/2020):  Not detected    Excerpt (Neuro and Hospital Course) from the Discharge Summary by Dr. Greg Balderrama:  \"Neuro: alert, oriented, affect appropriate LLE 1/5    Hospital Course: Debbie Sylvester is a 60 y.o. female with PMHx as noted below who is presented with chief complaint of left-sided weakness  According to the patient, she woke up this morning with left upper extremity weakness and also noted left lower extremity weakness last night and she thought this is due to sciatica. Stroke alert was called TPA was not administered due to not in window. Patient denies any obvious chest pain. No visual complaints or speech difficulty    Pending investigations/labs for PCP to follow: none    Follow up with PCP/specialists for:PCP and neurology    Patient felt to have achieved maximal medical benefit from this hospital stay. Explained the discharge plan in detail. she understands and verbalizes understanding. sh3 agrees with discharge plan. she is clinically stable for discharge. Patient will be discharged in stable condition to IPR with the instructions and medications as noted below which were reviewed with the patient at time of discharge. \"    The patient had remained hemodynamically stable but due to the abovementioned neurologic deficit, the patient was noted to have impaired mobility and ADLs. Patient was felt to be a good candidate for acute inpatient rehabilitation. Upon evaluation by Physical Therapy and Occupational Therapy, the patient was recommended for acute inpatient rehabilitation. The patient was discharged and was subsequently admitted to the [facility] for Physical Rehabilitation for intensive rehabilitation to help recover strength, function and mobility. Past Medical History:  Past Medical History:   Diagnosis Date    Acute ischemic stroke ([address] 8/21/2020    Acute Ischemic Stroke (late acute/early subacute infarcts in the right centrum semiovale extending to the superior frontal lobe cortex) with residual left hemiparesis    Bipolar disorder ([address]     Chronic obstructive pulmonary disease (COPD) ([address]     COVID-19 ruled out by laboratory testing 8/26/2020    SARS-CoV-2 (Abbott m2000) (collected 8/25/2020, resulted 8/26/2020):  Not detected     Current use of aspirin 8/24/2020    Depression     Gastroesophageal reflux disease     History of colon polyps     Hypertensive heart disease without heart failure     2D echocardiogram (8/22/2020) showed EF 65%; mild left ventricular hypertrophy; normal diastolic function; negative bubble study at rest and after Valsalva    Iron deficiency anemia     Left hemiparesis ([address] 2020    Menopause     Obesity, Class II, BMI 35-39.9     Obstructive sleep apnea on CPAP     On clopidogrel therapy 2020    On statin therapy due to risk of future cardiovascular event 2020    On Atorvastatin    Pure hypercholesterolemia 2020    Lipid profile (2020) showed , , HDL 50,     Sciatica of left side     Seasonal allergic rhinitis     Severe persistent asthma 2019    Type 2 diabetes mellitus, with long-term current use of insulin ([facility])     HbA1c (2020) = 8.1; HbA1c (2020) = 8.0       Past Surgical History:  Past Surgical History:   Procedure Laterality Date    HX HYSTERECTOMY  2000    Fibroids       Allergies:  No Known Allergies      Social History: The patient is , lives with her fiance in a 1-story apartment with a 1-step entry in [facility], [facility]. She drinks beer or Lesli on special occasions. She last smoked marijuana on 2020. She denied any tobacco or intravenous drug use. Family History: Both parents are   Family History   Problem Relation Age of Onset    Heart Attack Father     Breast Cancer Maternal Aunt     Heart Disease Mother     Stroke Neg Hx        Home Medications (from the H&P dated 2020 prepared by Dr. Arie Schultz): Not specified    Home Medications ( [x] Verbally confirmed with patient    [] From medication bottles brought by patient     [] From list provided by patient): Insulin Asp Prt-Insulin Aspart (NOVOLOG MIX 70/30 FLEXPEN) 100 unit/mL (70-30) SC insulin pen Inject 10 Units beneath the skin twice daily before meals.     albuterol (PROVENTIL,VENTOLIN,PROAIR) 90 mcg/actuation INH HFAA inhaler Take 1-2 Puffs inhaled by mouth Every 4 Hours As Needed for Wheezing or Shortness of Breath (wheeze). Albuterol Sulfate (PROVENTIL) 0.63 mg/3 mL INH NEBU Take 0.63 mg inhaled by mouth Every 4 Hours As Needed. amLODIPine (NORVASC) 10 mg PO TABS Take 10 mg by Mouth Once a Day. Cholecalciferol, Vitamin D3, (VITAMIN D-3) 2,000 unit PO TABS Take by Mouth Once a Day. cloNIDine HCl (CATAPRESS) 0.1 mg PO TABS TAKE 1 TABLET BY MOUTH TWICE A DAY  Refills: 3     EPINEPHrine (EPIPEN) 0.3 mg/0.3 mL Inj AtIn INJECT 1 PEN INTO MUSCLE AS NEEDED FOR ALLERGIC REACTION     Fenofibrate Nanocrystallized (TRICOR) 145 mg PO TABS Take 145 mg by Mouth Once a Day. ferrous sulfate (FEOSOL) 325 mg (65 mg Iron) PO TABS Take 325 mg by Mouth Once a Day. gabapentin (NEURONTIN) 100 mg PO CAPS TAKE 1 CAPSULE BY MOUTH EVERY 8 HOURS AS NEEDED FOR PAIN     ipratropium (ATROVENT) 42 mcg (0.06 %) NA [place] 2 SPRAY(S) EACH NOSTRIL 3 TIMES A DAY     liraglutide (VICTOZA 3-JORDANA) 0.6 mg/0.1 mL (18 mg/3 mL) SC Pen Injector Inject 1.8 mg beneath the skin Once a Day. [prescribed by Endocrinologist on the day before she was admitted to the hospital so patient states she had not yet had the chance to start it]    montelukast (SINGULAIR) 10 mg PO TABS Take 10 mg by Mouth Every Night at Bedtime. omalizumab (XOLAIR) 150 mg SC SOLR Inject 150 mg beneath the skin Every 14 Days. omeprazole (PRILOSEC) 20 mg PO CPDR Take 20 mg by Mouth. SPIRIVA RESPIMAT 2.5 mcg/actuation INH Mist INHALE 2 PUFFS BY MOUTH DAILY     Valsartan-Hydrochlorothiazide (DIOVAN HCT) 320-12.5 mg PO TABS Take 1 Tab by Mouth Once a Day. BD INSULIN PEN NEEDLE UF 31 gauge x 5/16\" Misc Ndle USE DIRECTED TWICE DAILY  Qty: 200 Each, Refills: 3   Comments: REQUEST REFILLS     !! Blood Sugar Diagnostic (TRUE METRIX GLUCOSE TEST STRIP) Misc STRP 1 Each by Misc. (Non-Drug; Combo Route) route 3 Times Daily. Qty: 100 Strip, Refills: 3   Associated Diagnoses: Uncontrolled type 2 diabetes mellitus with complication ([facility]     ! ! Blood-Glucose Meter (TRUE METRIX GLUCOSE METER) Misc MISC 1 Each by Misc. (Non-Drug; Combo Route) route 3 Times Daily. True metrix blood glucose meter kit  Qty: 1 Each, Refills: 0   Associated Diagnoses: Uncontrolled type 2 diabetes mellitus with complication ([facility]     ! ! Blood-Glucose Meter (TRUE METRIX GLUCOSE METER) Misc MISC 1 Each by Misc. (Non-Drug; Combo Route) route twice daily before meals. Pt to test bid  Qty: 1 Each, Refills: 0   Associated Diagnoses: Uncontrolled type 2 diabetes mellitus with complication, unspecified long term insulin use status     !! Insulin Pen Needles, Disposable, (SARABJIT PEN NEEDLE) 32 gauge x 5/32\" Misc Ndle Inject 1 Each beneath the skin Twice Daily. Qty: 200 Each, Refills: 3   Associated Diagnoses: Uncontrolled type 2 diabetes mellitus with complication ([facility]     ! ! Insulin Pen Needles, Disposable, 32 gauge x 5/32\" Misc Ndle Inject 1 Each beneath the skin 3 Times Daily. Qty: 100 Each, Refills: 3   Associated Diagnoses: Uncontrolled type 2 diabetes mellitus with complication ([facility]    ! ! lancets (TRUEPLUS LANCETS) 33 gauge Misc MISC 1 Each by Misc. (Non-Drug; Combo Route) route Twice Daily. Trueplus lancets 33 gauge  Qty: 200 Each, Refills: 3     !! lancets 33 gauge Misc MISC 1 EACH BY MISC.(NON-DRUG COMBO ROUTE) ROUTE 3 TIMES DAILY. TRUEPLUS LANCETS  Qty: 300 Each, Refills: 3   Associated Diagnoses: Uncontrolled type 2 diabetes mellitus with complication ([facility]      ! ! TRUE METRIX GLUCOSE TEST STRIP Misc STRP 1 EACH BY MISC.(NON-DRUG COMBO ROUTE) ROUTE 3 TIMES DAILY. Qty: 200 Strip, Refills: 3   Associated Diagnoses: Uncontrolled type 2 diabetes mellitus with complication (HCC)       Transfer Medications (from the Discharge Summary prepared by Dr. James Ac):  Current Discharge Medication List     START taking these medications   Details   aspirin 81 mg PO CHEW Take 1 Tab by Mouth Once a Day. Qty:     atorvastatin (LIPITOR) 40 mg PO TABS Take 1 Tab by Mouth Every Night at Bedtime.   Qty:     clopidogreL (PLAVIX) 75 mg PO TABS Take 1 Tab by Mouth Once a Day. Qty:       CONTINUE these medications which have CHANGED   Details   Insulin Asp Prt-Insulin Aspart (NOVOLOG MIX 70/30 FLEXPEN) 100 unit/mL (70-30) SC insulin pen Inject 10 Units beneath the skin twice daily before meals. Qty: 4 Box, Refills: 3   Associated Diagnoses: Uncontrolled type 2 diabetes mellitus with complication ([facility]       CONTINUE these medications which have NOT CHANGED   Details   albuterol (PROVENTIL,VENTOLIN,PROAIR) 90 mcg/actuation INH HFAA inhaler Take 1-2 Puffs inhaled by mouth Every 4 Hours As Needed for Wheezing or Shortness of Breath (wheeze). Qty: 1 Inhaler, Refills: PRN     Albuterol Sulfate (PROVENTIL) 0.63 mg/3 mL INH NEBU Take 0.63 mg inhaled by mouth Every 4 Hours As Needed. amLODIPine (NORVASC) 10 mg PO TABS Take 10 mg by Mouth Once a Day. BD INSULIN PEN NEEDLE UF 31 gauge x 5/16\" Misc Ndle USE DIRECTED TWICE DAILY  Qty: 200 Each, Refills: 3   Comments: REQUEST REFILLS     !! Blood Sugar Diagnostic (TRUE METRIX GLUCOSE TEST STRIP) Misc STRP 1 Each by Misc. (Non-Drug; Combo Route) route 3 Times Daily. Qty: 100 Strip, Refills: 3   Associated Diagnoses: Uncontrolled type 2 diabetes mellitus with complication ([facility]     ! ! Blood-Glucose Meter (TRUE METRIX GLUCOSE METER) Misc MISC 1 Each by Misc. (Non-Drug; Combo Route) route 3 Times Daily. True metrix blood glucose meter kit  Qty: 1 Each, Refills: 0   Associated Diagnoses: Uncontrolled type 2 diabetes mellitus with complication ([facility]     ! ! Blood-Glucose Meter (TRUE METRIX GLUCOSE METER) Misc MISC 1 Each by Misc. (Non-Drug; Combo Route) route twice daily before meals. Pt to test bid  Qty: 1 Each, Refills: 0   Associated Diagnoses: Uncontrolled type 2 diabetes mellitus with complication, unspecified long term insulin use status     Cholecalciferol, Vitamin D3, (VITAMIN D-3) 2,000 unit PO TABS Take by Mouth Once a Day.      cloNIDine HCl (CATAPRESS) 0.1 mg PO TABS TAKE 1 TABLET BY MOUTH TWICE A DAY  Refills: 3     EPINEPHrine (EPIPEN) 0.3 mg/0.3 mL Inj AtIn INJECT 1 PEN INTO MUSCLE AS NEEDED FOR ALLERGIC REACTION     Fenofibrate Nanocrystallized (TRICOR) 145 mg PO TABS Take 145 mg by Mouth Once a Day. ferrous sulfate (FEOSOL) 325 mg (65 mg Iron) PO TABS Take 325 mg by Mouth Once a Day. gabapentin (NEURONTIN) 100 mg PO CAPS TAKE 1 CAPSULE BY MOUTH EVERY 8 HOURS AS NEEDED FOR PAIN     !! Insulin Pen Needles, Disposable, (SARABJIT PEN NEEDLE) 32 gauge x 5/32\" Misc Ndle Inject 1 Each beneath the skin Twice Daily. Qty: 200 Each, Refills: 3   Associated Diagnoses: Uncontrolled type 2 diabetes mellitus with complication ([address]     ! ! Insulin Pen Needles, Disposable, 32 gauge x 5/32\" Misc Ndle Inject 1 Each beneath the skin 3 Times Daily. Qty: 100 Each, Refills: 3   Associated Diagnoses: Uncontrolled type 2 diabetes mellitus with complication (HCC)     ipratropium (ATROVENT) 42 mcg (0.06 %) NA [place] 2 SPRAY(S) EACH NOSTRIL 3 TIMES A DAY     !! lancets (TRUEPLUS LANCETS) 33 gauge Misc MISC 1 Each by Misc. (Non-Drug; Combo Route) route Twice Daily. Trueplus lancets 33 gauge  Qty: 200 Each, Refills: 3     !! lancets 33 gauge Misc MISC 1 EACH BY MISC.(NON-DRUG COMBO ROUTE) ROUTE 3 TIMES DAILY. TRUEPLUS LANCETS  Qty: 300 Each, Refills: 3   Associated Diagnoses: Uncontrolled type 2 diabetes mellitus with complication (HCC)     liraglutide (VICTOZA 3-JORDANA) 0.6 mg/0.1 mL (18 mg/3 mL) SC Pen Injector Inject 1.8 mg beneath the skin Once a Day. Qty: 9 mL, Refills: 3   Associated Diagnoses: Uncontrolled type 2 diabetes mellitus with complication (HCC)     montelukast (SINGULAIR) 10 mg PO TABS Take 10 mg by Mouth Every Night at Bedtime. omalizumab (XOLAIR) 150 mg SC SOLR Inject 150 mg beneath the skin Every 14 Days. omeprazole (PRILOSEC) 20 mg PO CPDR Take 20 mg by Mouth.      SPIRIVA RESPIMAT 2.5 mcg/actuation INH Mist INHALE 2 PUFFS BY MOUTH DAILY     !! TRUE METRIX GLUCOSE TEST STRIP [facility] STRP 1 EACH BY MISC.(NON-DRUG COMBO ROUTE) ROUTE 3 TIMES DAILY. Qty: 200 Strip, Refills: 3   Associated Diagnoses: Uncontrolled type 2 diabetes mellitus with complication (HCC)     Valsartan-Hydrochlorothiazide (DIOVAN HCT) 320-12.5 mg PO TABS Take 1 Tab by Mouth Once a Day. !! - Potential duplicate medications found. Please discuss with provider. Review Of Systems:   CONSTITUTIONAL: No weight loss. EYES: No blurred vision and no eye discharge. ENT: No nasal discharge. No ear pain. CARDIOVASCULAR: No chest pain and no diaphoresis. RESPIRATORY: No cough, no hemoptysis. GI: No vomiting, no diarrhea   : No urinary frequency and no dysuria. MUSCULOSKELETAL: No muscle pains. SKIN: No rashes. NEURO: As above. ENDOCRINE: No polyphagia and no polydipsia. HEMATOLOGY: No bleeding tendencies. Objective:     Vital Signs:  Patient Vitals for the past 24 hrs:   BP Temp Pulse Resp SpO2   08/27/20 0753 140/75 97.1 °F (36.2 °C) 64 20 99 %   08/26/20 2202 155/77 97.3 °F (36.3 °C) 66 18 97 %        There is no height or weight on file to calculate BMI. Physical Examination:  GENERAL SURVEY: Patient is awake, alert, oriented x 3, sitting comfortably on the chair, not in acute respiratory distress. HEENT: pink palpebral conjunctivae, anicteric sclerae, no nasoaural discharge, moist oral mucosa  NECK: supple, no jugular venous distention, no palpable lymph nodes  CHEST/LUNGS: symmetrical chest expansion, good air entry, clear breath sounds  HEART: adynamic precordium, good S1 S2, no S3, regular rhythm, no murmurs  ABDOMEN: obese, bowel sounds appreciated, soft, non-tender  EXTREMITIES: pink nailbeds, no edema, full and equal pulses, no calf tenderness   NEUROLOGICAL EXAM: The patient is awake, alert and oriented x3, able to answer questions fairly appropriately, able to follow 1 and 2 step commands. Able to tell time from the wall clock. Cranial nerves II-XII are grossly intact. No gross sensory deficit. Motor strength is 5/5 on the RUE and RLE, 4- to 4/5 on the LUE, 2+/5 on the LLE.       Current Medications:  Current Facility-Administered Medications   Medication Dose Route Frequency    acetaminophen (TYLENOL) tablet 650 mg  650 mg Oral Q4H PRN    bisacodyL (DULCOLAX) tablet 10 mg  10 mg Oral Q48H PRN    heparin (porcine) injection 5,000 Units  5,000 Units SubCUTAneous Q8H    insulin lispro (HUMALOG) injection   SubCUTAneous TIDAC    aspirin chewable tablet 81 mg  81 mg Oral DAILY WITH BREAKFAST    clopidogreL (PLAVIX) tablet 75 mg  75 mg Oral DAILY WITH DINNER    atorvastatin (LIPITOR) tablet 40 mg  40 mg Oral DAILY    albuterol (PROVENTIL VENTOLIN) nebulizer solution 2.5 mg  2.5 mg Nebulization Q4H PRN    amLODIPine (NORVASC) tablet 10 mg  10 mg Oral DAILY    cholecalciferol (VITAMIN D3) (1000 Units /25 mcg) tablet 2 Tab  2,000 Units Oral DAILY    fenofibrate nanocrystallized (TRICOR) tablet 145 mg  145 mg Oral DAILY    ferrous sulfate tablet 325 mg  1 Tab Oral DAILY WITH BREAKFAST    montelukast (SINGULAIR) tablet 10 mg  10 mg Oral QHS    pantoprazole (PROTONIX) tablet 20 mg  20 mg Oral ACB    ipratropium (ATROVENT) 0.02 % nebulizer solution 0.5 mg  0.5 mg Nebulization QID RT    valsartan (DIOVAN) tablet 320 mg  320 mg Oral DAILY    insulin glargine (LANTUS) injection 20 Units  20 Units SubCUTAneous QHS    glucose chewable tablet 16 g  4 Tab Oral PRN    glucagon (GLUCAGEN) injection 1 mg  1 mg IntraMUSCular PRN    dextrose (D50W) injection syrg 12.5-25 g  25-50 mL IntraVENous PRN    hydroCHLOROthiazide (MICROZIDE) capsule 12.5 mg  12.5 mg Oral DAILY       Functional Assessment:     Occupational Therapy   Prior Level of Function  Pre-Admission Screen  Post-Admission Evaluation   Eating   Independent Eating   Minimal Assist Eating  Functional Level: 5   Grooming   Independent Grooming   Minimal Assist Grooming  Functional Level: 5   Upper Body Dressing   Independent Upper Body Dressing Minimal Assist Upper Body Dressing  Functional Level: 5   Lower Body Dressing   Independent Lower Body Dressing   Maximal Assist Lower Body Dressing  Functional Level: 5   Bladder Management   Independent Bladder Management   Supervision Toileting  Functional Level: 0   Bowel Management   Independent Bowel Management   Supervision      Physical Therapy   Prior Level of Function  Pre-Admission Screen  Post-Admission Evaluation   Ambulation   Independent Ambulation   Dependent Gait  Amount of Assistance: 3 (Moderate assistance)  Distance (ft): 18 Feet (ft)(without AD as per PLOF, 25 ft trial with RW min A)  Assistive Device: (HHA initially, RW subsequent trials)   Bed Mobility   Independent Bed Mobility   Minimal Assist Bed/Mat Mobility  Rolling Right : 5 (Supervision)  Rolling Left : 5 (Supervision)  Supine to Sit : 5 (Supervision)  Sit to Supine : 5 (Supervision)   Supine to Sit   Independent Supine to Sit   Minimal Assist Bed/Mat Mobility  Rolling Right : 5 (Supervision)  Rolling Left : 5 (Supervision)  Supine to Sit : 5 (Supervision)  Sit to Supine : 5 (Supervision)   Sit to Stand   Independent Sit to Stand   Minimal Assist Bed/Mat Mobility  Rolling Right : 5 (Supervision)  Rolling Left : 5 (Supervision)  Supine to Sit : 5 (Supervision)  Sit to Supine : 5 (Supervision)   Bed/Chair Transfers   Independent Bed/Chair Transfers   Minimal Assist Transfers  Transfer Type:  Other  Other: stand step without AD as per PLOF  Transfer Assistance : 4 (Minimal assistance)  Sit to Stand Assistance: Contact guard assistance  Car Transfers: Minimum assistance(without AD as per PLOF)  Car Type: car transfer simulator   Toilet Transfers   Independent Toilet Transfers   Minimal Assist Toilet Transfers  Toilet Transfer Score: 0     Speech and Language Pathology  Post-Admission Evaluation     Comprehension (Native Language)  Primary Mode of Comprehension: Auditory  Score: 6     Expression (Native Language)  Primary Mode of Expression: Verbal  Score: 6     Social Interaction/Pragmatics  Score: 6     Problem Solving  Score: 5     Memory  Score: 5       Legend:   7 - Independent   6 - Modified Independent   5 - Standby Assistance / Supervision / Set-up   4 - Minimum Assistance / Contact Guard Assistance   3 - Moderate Assistance   2 - Maximum Assistance   1 - Total Assistance / Dependent       Labs on Admission:  Recent Results (from the past 24 hour(s))   GLUCOSE, POC    Collection Time: 08/26/20  9:44 PM   Result Value Ref Range    Glucose (POC) 175 (H) 70 - 110 mg/dL   CBC WITH AUTOMATED DIFF    Collection Time: 08/27/20  5:30 AM   Result Value Ref Range    WBC 7.3 4.6 - 13.2 K/uL    RBC 4.02 (L) 4.20 - 5.30 M/uL    HGB 11.5 (L) 12.0 - 16.0 g/dL    HCT 35.7 35.0 - 45.0 %    MCV 88.8 74.0 - 97.0 FL    MCH 28.6 24.0 - 34.0 PG    MCHC 32.2 31.0 - 37.0 g/dL    RDW 13.1 11.6 - 14.5 %    PLATELET 280 807 - 009 K/uL    MPV 10.4 9.2 - 11.8 FL    NEUTROPHILS 65 40 - 73 %    LYMPHOCYTES 22 21 - 52 %    MONOCYTES 11 (H) 3 - 10 %    EOSINOPHILS 2 0 - 5 %    BASOPHILS 0 0 - 2 %    ABS. NEUTROPHILS 4.7 1.8 - 8.0 K/UL    ABS. LYMPHOCYTES 1.6 0.9 - 3.6 K/UL    ABS. MONOCYTES 0.8 0.05 - 1.2 K/UL    ABS. EOSINOPHILS 0.2 0.0 - 0.4 K/UL    ABS.  BASOPHILS 0.0 0.0 - 0.1 K/UL    DF AUTOMATED     MAGNESIUM    Collection Time: 08/27/20  5:30 AM   Result Value Ref Range    Magnesium 1.7 1.6 - 2.6 mg/dL   METABOLIC PANEL, BASIC    Collection Time: 08/27/20  5:30 AM   Result Value Ref Range    Sodium 141 136 - 145 mmol/L    Potassium 3.8 3.5 - 5.5 mmol/L    Chloride 106 100 - 111 mmol/L    CO2 32 21 - 32 mmol/L    Anion gap 3 3.0 - 18 mmol/L    Glucose 106 (H) 74 - 99 mg/dL    BUN 18 7.0 - 18 MG/DL    Creatinine 0.75 0.6 - 1.3 MG/DL    BUN/Creatinine ratio 24 (H) 12 - 20      GFR est AA >60 >60 ml/min/1.73m2    GFR est non-AA >60 >60 ml/min/1.73m2    Calcium 9.3 8.5 - 10.1 MG/DL   GLUCOSE, POC    Collection Time: 08/27/20  7:49 AM   Result Value Ref Range Glucose (POC) 136 (H) 70 - 110 mg/dL   GLUCOSE, POC    Collection Time: 08/27/20 12:10 PM   Result Value Ref Range    Glucose (POC) 117 (H) 70 - 110 mg/dL       Estimated Glomerular Filtration Rate:  On admission, estimated GFR based on a Creatinine of 0.75 mg/dl:   Using CKD-EPI = 100.4 mL/min/1.73m2   Using MDRD = 101.4 mL/min/1.73m2      Assessment:     Primary Rehabilitation Diagnosis  1. Impaired Mobility and ADLs  2. Acute Ischemic Stroke (late acute/early subacute infarcts in the right centrum semiovale extending to the superior frontal lobe cortex) with residual left hemiparesis    Comorbidities   Hypertensive heart disease without heart failure I11.9    Gastroesophageal reflux disease K21.9    Obstructive sleep apnea on CPAP G47.33, Z99.89    Primary osteoarthritis involving multiple joints M89.49    Chronic obstructive pulmonary disease (COPD) (formerly [facility]) J44.9    Severe persistent asthma J45.50    Obesity, Class II, BMI 35-39.9 E66.9    Depression F32.9    Seasonal allergic rhinitis J30.2    History of colon polyps Z86.010    Pure hypercholesterolemia E78.00    Type 2 diabetes mellitus, with long-term current use of insulin (formerly [facility]) E11.9, Z79.4    COVID-19 ruled out by laboratory testing Z03.818    Menopause Z78.0    Iron deficiency anemia D50.9    Bipolar disorder (formerly [facility]) F31.9    Current use of aspirin Z79.82    On clopidogrel therapy Z79.01    On statin therapy due to risk of future cardiovascular event Z79.899    Sciatica of left side M54.32       Willingness to participate in the program: Good      Rehabilitation Potential: Good      Plan:     1. Medical Issues being followed closely:    [x]  Fall and safety precautions     []  Wound Care     [x]  Bowel and Bladder Function     [x]  Fluid Electrolyte and Nutrition Balance     []  Pain Control      2.  Issues that 24 hour rehabilitation nursing is following:    [x]  Fall and safety precautions     []  Wound Care     [x]  Bowel and Bladder Function     [x]  Fluid Electrolyte and Nutrition Balance     []  Pain Control      [x]  Assistance with and education on in-room safety with transfers to and from the bed, wheelchair, toilet and shower. 3. Acute rehabilitation plan of care:    [x]  Patient to be evaluated and treated by:           [x]  Physical Therapy           [x]  Occupational Therapy           [x]  Speech Therapy     []  Hold Rehab until further notice     5. Medications:    [x]  MAR Reviewed     [x]  Continue Present Medications     6. DVT Prophylaxis:      []  Lovenox     [x]  Unfractionated Heparin     []  Coumadin     []  NOAC     []  MARTHA Stockings     []  Sequential Compression Device     []  None     7. Code status    [x]  Full code     []  Partial code     []  Do not intubate     []  Do not resuscitate     8. Rehabilitation program and expectations from patient, as well as medical issues discussed with the patient. REHABILITATION PLAN:    1. The patient is being admitted to a comprehensive acute inpatient rehabilitation program consisting of at least 180 minutes a day, 5 out of 7 days a week of  combined physical, occupational and speech therapy, and close supervision by a physician with special training and experience in rehabilitation medicine. 2. The patient's prognosis for significant practical improvement within a reasonable period of time appears to be good. 3. The estimated length of stay is 14 days. 4. The patient/family has a good understanding of our discharge process. The patient has potential to make improvement and is in need of at least two of the following multidisciplinary therapies including but not limited to physical, occupational, speech, nutritional services, wound care, prosthetics and orthotics. The patient is expected to be able to return to home with home health therapy and family support.   5. Given the patient's multiple co-morbidities and risk for further medical complications, rehabilitation services could not be safely provided at a lower level of care such as a skilled nursing facility. 6. Physical therapy for therapeutic exercise, progressive mobility, gait training, transfer training, bed mobility training, patient and family education, and wheelchair mobility training. Physical therapy goals to address - extremity function, range of motion, balance, safety awareness, independence in transfers, activity tolerance, independence in bed mobility, and independence in ambulation. 7. Occupational therapy for self-care home management, transfer training, therapeutic exercise, activity, wheelchair mobility training. Occupational therapy goals to address - extremity function, cognition, balance, activity tolerance, independence in functional transfers, range of motion, safety awareness, independence in ADL  and independence in home management skills. 8. Speech and Language Pathology for cognitive training, dysphagia therapy, speech and language communication therapy. Goals for speech therapy to address cognition, expression of language, comprehension, safety awareness and safe swallow. 9. Specialized 24 hour rehabilitation nursing care for bowel and bladder retraining, disease management, pain management, pressure ulcer prevention and management per policy, education on pressure relief techniques, embolism prevention, nutrition management, hydration management, transfer training and   medication distribution. 10. Nutrition and Dietary services will be obtained for assessment of adequate calorie needs, hydration and calorie counts as appropriate. 11. Therapeutic recreation for leisure skills. 15. Rehab psychology for coping skills. 15. Social work services for patient and family counseling and safe discharge planning. 14. I will be in charge of the inpatient rehab program. Full details of the inpatient rehab program will be outlined in the initial team conference.        REHABILITATION GOALS: Improve functional and activities of daily living skills in order to return back to independent living with family support. MEDICAL PLAN:  > Acute Ischemic Stroke (late acute/early subacute infarcts in the right centrum semiovale extending to the superior frontal lobe cortex) with residual left hemiparesis   > Dual antiplatelet therapy (DAPT) was started 8/24/2020   > Duration of DAPT not specified on discharge summary   > Will defer duration of DAPT to Neurology upon outpatient follow up    > Aspirin 81 mg PO once daily with breakfast   > Atorvastatin 40 mg PO once daily   > Clopidogrel 75 mg PO once daily with dinner     > Hypertensive heart disease without heart failure   > 2D echocardiogram (8/22/2020) showed EF 65%; mild left ventricular hypertrophy; normal diastolic function; negative bubble study at rest and after Valsalva   > Amlodipine 10 mg PO once daily (9AM)   > Hydrochlorothiazide 12.5 mg PO once daily (9AM)   > Valsartan 320 mg PO once daily (9AM)    > Pure hypercholesterolemia   > Lipid profile (8/19/2020) showed , , HDL 50,    > Atorvastatin 40 mg PO once daily   > Fenofibrate 145 mg PO once daily    > Type 2 diabetes mellitus, with long term current use of insulin   > HbA1c (8/19/2020) = 8.1; HbA1c (8/20/2020) = 8.0   > Insulin glargine 20 units SC q HS   > Insulin lispro sliding scale SC TID AC only    > History of wound culture positive for MRSA (3/27/2012)   > Nasal swab to rule out MRSA colonization   > Contact precautions     > COVID-19 ruled out by laboratory testing   > TERRENCE-CoV-2 ([facility]ott m2000) (collected 8/25/2020, resulted 8/26/2020): Not detected    > Analgesia   > Acetaminophen 650 mg PO q 4 hr PRN for pain level less than 5/10    > Diet:   > Specifications: Cardiac, diabetic, consistent carb 1800 kcal, no concentrated sweets   > Solids (consistency): Regular    > Liquids (consistency): Thin    > Fluid restriction: None      PRECAUTIONS:   1.  Safety/fall precautions. 2. Deep venous thrombosis precautions. 3. Aspiration precautions. POTENTIAL BARRIERS TO DISCHARGE: Risk for falls. RELEVANT CHANGES SINCE PREADMISSION SCREENING: I have compared the patients medical and functional status at the time of the pre-admission screening and on this post-admission evaluation. The preadmission screen and findings from therapy evaluations both support my post admission physician evaluation, deeming this patient to be an appropriate candidate for the IRF. The patient requires multidisciplinary treatment, physician oversight and intensive therapy not provided at a lower level of care. By signing this document, I acknowledge that I have personally performed a full physical examination on this patient within 24 hours of admission to this inpatient rehabilitation facility and have determined the patient to be able to tolerate the above course of treatment at an intensive level for a reasonable period of time. I will be completing a detailed individualized plan of care for this patient by day #4 of the patients stay based upon the Pre-Admission Screen, the Post-Admission Evaluation, and the therapy evaluations.       Signed:    Marcus Haynes MD    August 27, 2020

## 2020-08-27 NOTE — PROGRESS NOTES
Problem: Mobility Impaired (Adult and Pediatric)  Goal: *Therapy Goal (Edit Goal, Insert Text)  Description: Physical Therapy Short Term Goals  Initiated 2020 and to be accomplished within 7 day(s) on 9/3/2020:  1. Patient will move from supine to sit and sit to supine , scoot up and down, and roll side to side in bed with modified independence. 2.  Patient will transfer from bed to chair and chair to bed with supervision/set-up using the least restrictive device. 3.  Patient will perform sit to stand with supervision/set-up. 4.  Patient will ambulate with SBA for 100 feet with the least restrictive device, cues for neutral knee extension during midstance phase of gait <25% of the time. 5.  Patient will ascend/descend 1 curb step with appropriate AD with CGA. Physical Therapy Long Term Goals  Initiated 2020 and to be accomplished within 14 day(s) on 9/10/2020  1. Patient will move from supine to sit and sit to supine , scoot up and down, and roll side to side in bed with independence. 2.  Patient will transfer from bed to chair and chair to bed with modified independence using the least restrictive device. 3.  Patient will perform sit to stand with modified independence. 4.  Patient will ambulate with modified independence for 150 feet with the least restrictive device. 5.  Patient will ascend/descend 1 curb step with SBA, using appropriate AD. Outcome: Progressing Towards Goal   PHYSICAL THERAPY EVALUATION    Patient: Kristy Fish (78 y.o. female)  Date: 2020  Diagnosis: Acute ischemic stroke [facility]) [I63.9] Acute ischemic stroke [facility])  Precautions: Fall  Chart, physical therapy assessment, plan of care and goals were reviewed. Time in:1045  Time out:1215    Patient seen for: Balance activities;Gait training;Patient education;Transfer training; Wheelchair mobility    Pain:  Patient denied pain during evaluation/treatment.      Patient identified with name and : yes    SUBJECTIVE:     Patient stated ok, I have been hopping around on my right foot.  Patient educated on importance of calling for staff to assist/supervise whenever mobilizing to ensure safety with all mobility. Patient verbalized understanding. Patient's Goal for Physical Therapy: Patient indicating that she wants to be able to balance and walk better so that she could be at home by herself when her fiance is at work. OBJECTIVE DATA SUMMARY:     Past Medical History:   Diagnosis Date    Acute ischemic stroke ([facility] [address] 8/21/2020    Acute Ischemic Stroke (late acute/early subacute infarcts in the right centrum semiovale extending to the superior frontal lobe cortex) with residual left hemiparesis    Bipolar disorder ([address]     Chronic obstructive pulmonary disease (COPD) ([facility] [address]     COVID-19 ruled out by laboratory testing 8/26/2020    SARS-CoV-2 ([facility]ott m2000) (collected 8/25/2020, resulted 8/26/2020):  Not detected     Current use of aspirin 8/24/2020    Depression     Gastroesophageal reflux disease     History of colon polyps     Hypertensive heart disease without heart failure     2D echocardiogram (8/22/2020) showed EF 65%; mild left ventricular hypertrophy; normal diastolic function; negative bubble study at rest and after Valsalva    Iron deficiency anemia     Left hemiparesis ([address] 8/21/2020    Menopause     Obesity, Class II, BMI 35-39.9     Obstructive sleep apnea on CPAP     On clopidogrel therapy 8/24/2020    On statin therapy due to risk of future cardiovascular event 8/24/2020    On Atorvastatin    Primary osteoarthritis involving multiple joints     Pure hypercholesterolemia 8/19/2020    Lipid profile (8/19/2020) showed , , HDL 50,     Seasonal allergic rhinitis     Severe persistent asthma 11/07/2019    Type 2 diabetes mellitus, without long-term current use of insulin ([facility])     HbA1c (8/19/2020) = 8.1; HbA1c (8/20/2020) = 8.0     Past Surgical History:   Procedure Laterality Date    HX HYSTERECTOMY  2000    Fibroids       Problem List:    Decreased strength Left LE  [x]     Decreased strength trunk/core  [x]     Decreased AROM   [x]     Decreased PROM  []    Decreased endurance  [x]     Decreased balance sitting  []     Decreased balance standing  [x]     Pain   []     Slow ambulation velocity  [x]    Decreased coordination  [x]    Decreased safety awareness  [x]      Functional Limitations:   Decreased independence with bed mobility  [x]     Decreased independence with functional transfers  [x]     Decreased independence with ambulation  [x]     Decreased independence with stair negotiation  [x]       Previous Functional Level: Patient was independent with bed mobility, transfers and gait prior to her stroke, reporting that she was driving and 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 Lives in a first floor apartment with her fimayelin, who works at [employer] 6 days/week. States that her daughter lives locally and provides support as well.      Home Environment: Home Environment: Apartment  # Steps to Enter: 1  Rails to Enter: No  One/Two Story Residence: One story  Living Alone: No  Support Systems: Patel, daughter  Patient Expects to be Discharged to[de-identified] Apartment  Current DME Used/Available at Home: None  Tub or Shower Type: Tub    Barriers to Learning/Limitations: yes;  physical  Compensate with: visual, verbal, tactile, kinesthetic cues/model         Outcome Measures: Escobar Balance Scale: 27/56     MMT Initial Assessment   Right Lower Extremity Left Lower Extremity   Hip Flexion 4+ 2-   Knee Extension 5 2   Knee Flexion 5 2+   Ankle Dorsiflexion 5 2   0/5 No palpable muscle contraction  1/5 Palpable muscle contraction, no joint movement  2-/5 Less than full range of motion in gravity eliminated position  2/5 Able to complete full range of motion in gravity eliminated position  2+/5 Able to initiate movement against gravity  3-/5 More than half but not full range of motion against gravity  3/5 Able to complete full range of motion against gravity  3+/5 Completes full range of motion against gravity with minimal resistance  4-/5 Completes full range of motion against gravity with minimal-moderate resistance  4/5 Completes full range of motion against gravity with moderate resistance  4+/5 Completes full range of motion against gravity with moderate-maximum resistance  5/5 Completes full range of motion against gravity with maximum resistance        GROSS ASSESSMENT Initial Assessment 8/27/2020   AROM Grossly decreased, non-functional(Right LE WFL, limited left LE due to weakness)   Strength Grossly decreased, non-functional(Right LE WFL, left LE non-functional)   Coordination (left LE limited by weakness)   Tone Abnormal(decreased left LE)   Sensation Impaired(left LE limited at medial lower leg, foot)   PROM Within functional limits       POSTURE Initial Assessment 8/27/2020   Posture (WDL) Exceptions to [facility]   Posture Assessment Forward head;Rounded shoulders in sitting and standing       BALANCE Initial Assessment 8/27/2020    Sitting - Static: Good (unsupported)  Sitting - Dynamic: Good (unsupported)  Standing - Static: Fair  Standing - Dynamic : Impaired  Other (comment): Escobar Balance Scale: 27/56(see separate document for details; high falls risk)       BED/CHAIR/WHEELCHAIR TRANSFERS Initial Assessment 8/27/2020   Rolling Right 5 (Supervision)   Rolling Left 5 (Supervision)   Supine to Sit 5 (Supervision)   Sit to Stand Contact guard assistance   Sit to Supine 5 (Supervision)   Transfer Assist Score 4 (Minimal assistance)   Transfer Type Other   Comments  Patient with decreased time during single limb support left LE due to weakness, observed to have slight knee buckling and loss of balance needing Cg/min A to perform.     Car Transfer Minimum assistance(without AD as per PLOF)   Car Type car transfer simulator       WHEELCHAIR MOBILITY/MANAGEMENT Initial Assessment 8/27/2020   Able to Propel 170 feet   Assist Level Patient performing W/C mobility with moderate assistance due to weaker propulsion with left UE leading to veering of path. Curbs/ramps assistance required 0 (Not tested)   Wheelchair set up assistance required 3 (Moderate assistance)   Wheelchair management Manages left brake, Manages right brake(needing assist with legrests)   Comments Patient using bilat UE to propel, unable to reach floor in standard height chair with bilat feet. WALKING INDEPENDENCE Initial Assessment 8/27/2020   Assistive device (HHA initially, RW subsequent trials)   Ambulation assistance - level surface 3 (Moderate assistance)   Distance 18 Feet (ft)(without AD as per PLOF, 25 ft trial with RW min A)   Comments  Performing 2 additional gait bouts with RW as patient unable to perform safe, functional gait pattern without AD. Needing min A for gait with RW for 25 ft, later in session x 55 ft with min A, verbal and tactile cues for maintaining more neutral knee extension to avoid hyperextension during midstance phase of gait. Ambulation assistance - unlevel surface (not tested due to safety concern)       GAIT Initial Assessment 8/27/2020   Gait Description (WDL) Exceptions to WDL   Gait Abnormalities Decreased step clearance; Step to gait;Trunk sway increased;Hip Hike(left knee hyperextension midstance, slow gait)       STEPS/STAIRS Initial Assessment 8/27/2020   Steps/Stairs ambulated 7   Rail Use Both   Assistance Level 4 (Minimal assistance)(leading with right LE ascending stairs)   Comments  Needing min A to safely negotiate stairs with step to pattern. Curbs/Ramps (not tested due to safety concern)       ASSESSMENT :  Based on the objective data described above, the patient presents with impaired LE strength, particularly left LE, impaired functional endurance, impaired standing balance leading to difficulty performing safe functional mobility.  Escobar Balance Scale score indicating increased potential for falls risk. Patient will benefit from skilled intervention to address the above impairments. Patient's rehabilitation potential is considered to be Excellent  Factors which may influence rehabilitation potential include:   []         None noted  []         Mental ability/status  [x]         Medical condition  [x]         Home/family situation and support systems  [x]         Safety awareness  []         Pain tolerance/management  []         Other:      PLAN :  See STG and LTG above. Order received from MD for physical therapy services and chart reviewed. Pt to be seen 5 times per week for 3 hours of total therapy per day for 2 weeks. Thank you for the referral.    Pt would benefit from skilled physical therapy in order to improve independent functional mobility within the home. Interventions may include range of motion (AROM, PROM B LE/trunk), motor function (B LE/trunk strengthening/coordination), activity tolerance (vitals, oxygen saturation levels), bed mobility training, balance activities, gait training (progressive ambulation program), wheelchair management and functional transfer training. Discharge Recommendations: Home Health vs Outpatient pending progress  Further Equipment Recommendations for Discharge: RW currently required; will continue to assess       Activity Tolerance:   Good, needing intermittent seated rests with standing activities. After treatment:   Patient left seated in wheelchair, call button within reach. COMMUNICATION/EDUCATION:   [x]         Fall prevention education was provided and the patient/caregiver indicated understanding. [x]         Patient/family have participated as able in goal setting and plan of care. [x]         Patient/family agree to work toward stated goals and plan of care. []         Patient understands intent and goals of therapy, but is neutral about his/her participation.   []         Patient is unable to participate in goal setting and plan of care.     Thank you for this referral.  See Ralph, PT  8/27/2020

## 2020-08-27 NOTE — PROGRESS NOTES
Problem: Self Care Deficits Care Plan (Adult)  Goal: *Therapy Goal (Edit Goal, Insert Text)  Description: Occupational Therapy Goals   Short term = Long Term Goals  Initiated 2020 and to be accomplished within 2 week(s) 08/10/2020  1. Pt will perform self-feeding with I.   2. Pt will perform grooming with I.  3. Pt will perform UB bathing with Mod I.  4. Pt will perform LB bathing with Mod I.  5. Pt will perform tub/shower transfer with Mod I.   6. Pt will perform UB dressing with I.  7. Pt will perform LB dressing with I.  8. Pt will perform toileting task with mod I.   9. Pt will perform toilet transfer with mod I. Outcome: Progressing Towards Goal  OCCUPATIONAL THERAPY EVALUATION    Patient: Daniel Russell 60 y.o. Date: 2020  Primary Diagnosis: Acute ischemic stroke [facility]) [I63.9]    Patient identified with name and : yes    Past Medical History:   Past Medical History:   Diagnosis Date    Acute ischemic stroke ([address] 2020    Acute Ischemic Stroke (late acute/early subacute infarcts in the right centrum semiovale extending to the superior frontal lobe cortex) with residual left hemiparesis    Bipolar disorder ([address]     Chronic obstructive pulmonary disease (COPD) ([address]     COVID-19 ruled out by laboratory testing 2020    SARS-CoV-2 (Abbott m2000) (collected 2020, resulted 2020):  Not detected     Current use of aspirin 2020    Depression     Gastroesophageal reflux disease     History of colon polyps     Hypertensive heart disease without heart failure     2D echocardiogram (2020) showed EF 65%; mild left ventricular hypertrophy; normal diastolic function; negative bubble study at rest and after Valsalva    Iron deficiency anemia     Left hemiparesis ([address] 2020    Menopause     Obesity, Class II, BMI 35-39.9     Obstructive sleep apnea on CPAP     On clopidogrel therapy 2020    On statin therapy due to risk of future cardiovascular event 8/24/2020    On Atorvastatin    Primary osteoarthritis involving multiple joints     Pure hypercholesterolemia 8/19/2020    Lipid profile (8/19/2020) showed , , HDL 50,     Seasonal allergic rhinitis     Type 2 diabetes mellitus, without long-term current use of insulin (HCC)     HbA1c (8/19/2020) = 8.1; HbA1c (8/20/2020) = 8.0     Precautions: contact (pending MRSA results), fall    Time In: 0730  Time Out[de-identified] 0900    Pain:  Pt reports 0/10 pain or discomfort prior to treatment. Pt reports 0/10 pain or discomfort post treatment. SUBJECTIVE:   Patient stated I have been working my arm to get back as much as I can.     OBJECTIVE DATA SUMMARY:   9-hole peg test: Right = 22 secs Left = 27    [x]  Right hand dominant   []  Left hand dominant    Therapy Diagnosis:   Difficulty with ADLs  [x]     Difficulty with functional transfers  [x]     Difficulty with ambulation  [x]     Difficulty with IADLs  [x]       Problem List:    Decreased strength B UE  [x]     Decreased strength trunk/core  [x]     Decreased AROM   []     Decreased endurance  [x]     Decreased balance sitting  [x]     Decreased balance standing  [x]     Pain   []     Decreased PROM  []       Functional Limitations:   Decreased independence with ADL  []     Decreased independence with functional transfers  []     Decreased independence with ambulation  []     Decreased independence with IADL  []       Previous Functional Level: Pre-Morbid Level of Function: independent     Home Environment: Home Situation  Home Environment: Private residence  # Steps to Enter: 0  One/Two Story Residence: One story  Living Alone: No  Support Systems: Friends \ neighbors  Patient Expects to be Discharged to[de-identified] Private residence  Current DME Used/Available at Home: None  Tub or Shower Type: Tub    Barriers to Learning/Limitations: None  Compensate with: visual, verbal, tactile, kinesthetic cues/model    Outcome Measures: MMT Initial Assessment   Right Upper Extremity  Left Upper Extremity    UE AROM WFL; MMT 5/5  WFL; MMT 4-/5     45lbs 60lbs   0/5 No palpable muscle contraction  1/5 Palpable muscle contraction, no joint movement  2-/5 Less than full range of motion in gravity eliminated position  2/5 Able to complete full range of motion in gravity eliminated position  2+/5 Able to initiate movement against gravity  3-/5 More than half but not full range of motion against gravity  3/5 Able to complete full range of motion against gravity  3+/5 Completes full range of motion against gravity with minimal resistance  4-/5 Completes full range of motion against gravity with minimal resistance  4/5 Completes full range of motion against gravity with moderate resistance  5/5 Completes full range of motion against gravity with maximum resistance    Sensation: intact  Coordination: intact    FIM SCORES Initial Assessment   Bladder - level of assist     Bladder - accident frequency score     Bowel - level of assist     Bowel - accident frequency score     Please see IRC Interdisciplinary Eval: Coordination/Balance Section for details regarding FIM score description.     COGNITION/PERCEPTION Initial Assessment   Reading Status Literate   Writing Status (literate)   Arousal/Alertness Generalized responses   Orientation Level Oriented X4   Visual Fields (intact)   Praxis Intact   Body Scheme Appears intact     COMPREHENSION MODE Initial Assessment   Primary Mode of Comprehension Auditory   Hearing Aide None   Corrective Lenses Reading glasses   Score 6     EXPRESSION Initial Assessment   Primary Mode of Expression Verbal   Score 6   Comments       SOCIAL INTERACTION/PRAGMATICS Initial Assessment   Score 6   Comments       PROBLEM SOLVING Initial Assessment   Score 5   Comments       MEMORY Initial Assessment   Score 5   Comments       EATING Initial Assessment   Functional Level 5   Comments       GROOMING Initial Assessment Functional Level 5    Oral Hygiene FIM:5   Tasks completed by patient Brushed teeth, Washed face, Washed hands   Comments       BATHING Initial Assessment   Functional Level 4   Body parts patient bathed Abdomen, Arm, left, Arm, right, Buttocks, Chest, Lower leg and foot, left, Lower leg and foot, right, Judith area, Thigh, left, Thigh, right   Comments       TUB/SHOWER TRANSFER INDEPENDENCE Initial Assessment   Score 0   Comments       UPPER BODY DRESSING/UNDRESSING Initial Assessment   Functional Level 5   Items applied/Steps completed Pullover (4 steps)   Comments         LOWER BODY DRESSING/UNDRESSING Initial Assessment   Functional Level 5    Sock and/or Shoe Management FIM:5   Items applied/Steps completed Elastic waist pants (3 steps), Sock, left (1 step), Sock, right (1 step), Underpants (3 steps)   Comments       TOILETING Initial Assessment   Functional Level 0   Comments       TOILET TRANSFER INDEPENDENCE Initial Assessment   Transfer score 0   Comments       INSTRUMENTAL ADL Initial Assessment (PLOF)   Meal preparation Independent   Homemaking Independent   Medicine Management Independent   Financial Management Independent        ASSESSMENT :  Based on the objective data described above, the patient presents with deficits in strength as evidenced by  testing and steadying assist needed for lower body dressing and bathing. Pt would benefit from in depth therapy to facilitate returning to work at [employer]. Pt would benefit from skilled occupational therapy in order to improve independent functional mobility/ADLs,/IADLs within the home. Interventions may include range of motion (AROM, PROM B UE), motor function (B UE/ strengthening/coordination), activity tolerance (vitals, oxygen saturation levels), balance training, ADL/IADL training and functional transfer training. Please see IRC;  Interdisciplinary Eval, Care Plan, and Patient Education for further information regarding occupational therapy examination and plan of care. PLAN :  Recommendations and Planned Interventions:  [x]               Self Care Training                   [x]        Therapeutic Activities  [x]               Functional Mobility Training    []        Cognitive Retraining  [x]               Therapeutic Exercises            [x]        Endurance Activities  [x]               Balance Training                     [x]        Neuromuscular Re-Education  []               Visual/Perceptual Training      [x]   Home Safety Training  [x]               Patient Education                    [x]        Family Training/Education  []               Other (comment):    Frequency/Duration: Patient will be followed by occupational therapy 1-2 times per day/4-7 days per week to address goals. Discharge Recommendations: Outpatient  Further Equipment Recommendations for Discharge: bedside commode, transfer bench and walker     Please refer to the flow sheet for vital signs taken during this treatment. After treatment:   [x] Patient left in no apparent distress sitting up in chair  [] Patient left in no apparent distress in bed  [x] Call bell left within reach  [] Nursing notified  [] Caregiver present  [] Bed alarm activated    COMMUNICATION/EDUCATION:   [x] Home safety education was provided and the patient/caregiver indicated understanding. [] Patient/family have participated as able in goal setting and plan of care. [] Patient/family agree to work toward stated goals and plan of care. [] Patient understands intent and goals of therapy, but is neutral about his/her participation. [] Patient is unable to participate in goal setting and plan of care. Order received from MD for occupational therapy services and chart reviewed. Pt to be seen 5 times per week for 3 hours of total therapy per day for 2 weeks.   Thank you for the referral.    Thank you for this referral.  [facility]

## 2020-08-27 NOTE — CONSULTS
Comprehensive Nutrition Assessment    Type and Reason for Visit: Initial, Consult(general nutrition management and supplements)    Nutrition Recommendations/Plan:   - Update food preferences      Nutrition Assessment:  Pt reported good appetite and meal intake PTA and since admission. Tolerating diet. Food preferences discussed. Denied having any concerns at time of visit    Malnutrition Assessment:  Malnutrition Status:  No malnutrition        Nutrition History and Allergies: Pt reported good appetite and meal intake PTA. No recent changes in weight PTA. No known food allergies      Estimated Daily Nutrient Needs:  Energy (kcal):  2336-5851  Protein (g):  79-99       Fluid (ml/day):  1 mL/kcal    Nutrition Related Findings:  BM 8/26. No edema. taking vitamin D3, ferrous sulfate      Wounds:    None       Current Nutrition Therapies:   DIET CARDIAC Regular; Consistent Carb 1800kcal; No Conc.  Sweets    Anthropometric Measures:  · Height:  5' 2\" (157.5 cm)  · Current Body Wt:  99.3 kg (219 lb)(per pt report - from 8/20/2020)   · Ideal Body Wt:  110 lbs:  199.1 %    · BMI Category:  Obese class 3 (BMI 40.0 or greater)       Nutrition Diagnosis:   No nutrition diagnosis at this time        Nutrition Interventions:   Food and/or Nutrient Delivery: Continue current diet, Vitamin supplement, Mineral supplement  Nutrition Education and Counseling: Education not indicated  Coordination of Nutrition Care: Continued inpatient monitoring    Goals:  PO nutrition intake of >75% of patient's estimated nutrition needs within next 7 days       Nutrition Monitoring and Evaluation:   Food/Nutrient Intake Outcomes: Food and nutrient intake, Vitamin/mineral intake  Physical Signs/Symptoms Outcomes: Meal time behavior, Biochemical data    Discharge Planning:    No discharge needs at this time     Electronically signed by Jackson Benz RD on 8/27/2020 at 4:53 PM    Contact:  Pager [phone]

## 2020-08-27 NOTE — ROUTINE PROCESS
[facility]    Bedside and Verbal shift change report given to Colt (oncoming nurse) by Roxana Bob RN (offgoing nurse). Report included the following information SBAR, Kardex, MAR and Recent Results. Situation:   Code Status: Full Code   Hospital Day: 1   Problem List:   Hospital Problems  Date Reviewed: 8/27/2020          Codes Class Noted POA    Sciatica of left side ICD-10-CM: M54.32  ICD-9-CM: 724.3  Unknown Yes        Type 2 diabetes mellitus, with long-term current use of insulin (HCC) (Chronic) ICD-10-CM: E11.9, Z79.4  ICD-9-CM: 250.00, V58.67  Unknown Yes    Overview Signed 8/26/2020 11:46 PM by Vanesa Xavier MD     HbA1c (8/19/2020) = 8.1; HbA1c (8/20/2020) = 8.0             COVID-19 ruled out by laboratory testing ICD-10-CM: Z03.818  ICD-9-CM: V71.83  8/26/2020 Yes    Overview Signed 8/26/2020 11:47 PM by Vanesa Xavier MD     SARS-CoV-2 ([facility]bbott m2000) (collected 8/25/2020, resulted 8/26/2020):  Not detected              Current use of aspirin ICD-10-CM: Z79.82  ICD-9-CM: V58.66  8/24/2020 Yes        On clopidogrel therapy ICD-10-CM: Z79.01  ICD-9-CM: V58.61  8/24/2020 Yes        On statin therapy due to risk of future cardiovascular event ICD-10-CM: Z79.899  ICD-9-CM: V58.69  8/24/2020 Yes    Overview Signed 8/26/2020 11:50 PM by Vanesa Xavier MD     On Atorvastatin             * (Principal) Acute ischemic stroke [facility]) ICD-10-CM: I63.9  ICD-9-CM: 434.91  8/21/2020 Yes    Overview Signed 8/26/2020 11:35 PM by Vanesa Xavier MD     Acute Ischemic Stroke (late acute/early subacute infarcts in the right centrum semiovale extending to the superior frontal lobe cortex) with residual left hemiparesis             Left hemip[address]is ([facility] [address] ICD-10-CM: G81.94  ICD-9-CM: 342.90  8/21/2020 Yes        Impaired mobility and ADLs ICD-10-CM: Z74.09, Z78.9  ICD-9-CM: V49.89  8/21/2020 Yes        Pure hypercholesterolemia (Chronic) ICD-10-CM: E78.00  ICD-9-CM: 272.0  8/19/2020 Yes Overview Signed 8/26/2020 11:45 PM by Heron Armenta MD     Lipid profile (8/19/2020) showed , , HDL 50,              Hypertensive heart disease without heart failure (Chronic) ICD-10-CM: I11.9  ICD-9-CM: 402.90  Unknown Yes    Overview Signed 8/26/2020 11:38 PM by Heron Armenta MD     2D echocardiogram (8/22/2020) showed EF 65%; mild left ventricular hypertrophy; normal diastolic function; negative bubble study at rest and after Valsalva                   Background:   Past Medical History:   Past Medical History:   Diagnosis Date    Acute ischemic stroke [address] [address] 8/21/2020    Acute Ischemic Stroke (late acute/early subacute infarcts in the right centrum semiovale extending to the superior frontal lobe cortex) with residual left hemiparesis    Bipolar disorder ([facility] [address]     Chronic obstructive pulmonary disease (COPD) ([facility] [address]     COVID-19 ruled out by laboratory testing 8/26/2020    SARS-CoV-2 ([facility]ott m2000) (collected 8/25/2020, resulted 8/26/2020):  Not detected     Current use of aspirin 8/24/2020    Depression     Gastroesophageal reflux disease     History of colon polyps     Hypertensive heart disease without heart failure     2D echocardiogram (8/22/2020) showed EF 65%; mild left ventricular hypertrophy; normal diastolic function; negative bubble study at rest and after Valsalva    Iron deficiency anemia     Left hemiparesis ([facility] [address] 8/21/2020    Menopause     Obesity, Class II, BMI 35-39.9     Obstructive sleep apnea on CPAP     On clopidogrel therapy 8/24/2020    On statin therapy due to risk of future cardiovascular event 8/24/2020    On Atorvastatin    Pure hypercholesterolemia 8/19/2020    Lipid profile (8/19/2020) showed , , HDL 50,     Sciatica of left side     Seasonal allergic rhinitis     Severe persistent asthma 11/07/2019    Type 2 diabetes mellitus, with long-term current use of insulin (HCC)     HbA1c (8/19/2020) = 8.1; HbA1c (8/20/2020) = 8.0 Assessment:   Changes in Assessment throughout shift: none     Patient has central line: no Reasons if yes: n/a  Insertion date:n/a Last dressing date:n/a   Patient has Barbosa Cath: no Reasons if yes: n/a   Insertion date:n/a  Shift barbosa care completed: n/a     Last Vitals:     Vitals:    08/26/20 2202 08/27/20 0753 08/27/20 1514 08/27/20 1645   BP: 155/77 140/75 156/80    Pulse: 66 64 65    Resp: 18 20 18    Temp: 97.3 °F (36.3 °C) 97.1 °F (36.2 °C) 97.7 °F (36.5 °C)    SpO2: 97% 99% 95%    Height:    5' 2\" (1.575 m)        PAIN    Pain Assessment    Pain Intensity 1: 0 (08/27/20 1603) Pain Intensity 1: 2 (12/29/14 1105)      Pain Location 1: Abdomen      Pain Intervention(s) 1: Medication (see MAR)  Patient Stated Pain Goal: 0 Patient Stated Pain Goal: 0  o Intervention effective:N/A no complaints of pain   o Other actions taken for pain: rest     Skin Assessment  Skin color    Condition/Temperature    Integrity    Turgor    Weekly Pressure Ulcer Documentation  Pressure  Injury Documentation: No Pressure Injury Noted-Pressure Ulcer Prevention Initiated  Wound Prevention & Protection Methods  Orientation of wound Orientation of Wound Prevention: Posterior  Location of Prevention Location of Wound Prevention: Sacrum/Coccyx  Dressing Present Dressing Present : No  Dressing Status    Wound Offloading Wound Offloading (Prevention Methods): Bed, pressure reduction mattress     INTAKE/OUPUT  Date 08/26/20 0700 - 08/27/20 0659 08/27/20 0700 - 08/28/20 0659   Shift 5215-9392 7700-1472 24 Hour Total 6147-0936 0475-7110 24 Hour Total   INTAKE   P.O.    480  480     P. O.    480  480   Shift Total    480  480   OUTPUT   Urine           Urine Occurrence(s)  3 x 3 x 2 x  2 x   Stool           Stool Occurrence(s)  0 x 0 x 1 x  1 x   Shift Total         NET    480  480   Weight (kg)             Recommendations:  1. Patient needs and requests: toileting    2. Pending tests/procedures: labs     3.  Functional Level/Equipment: 1 person assist/W/C    HEALS Safety Check    A safety check occurred in the patient's room between off going nurse and oncoming nurse listed above. The safety check included the below items  Area Items   H  High Alert Medications - Verify all high alert medication drips (heparin, PCA, etc.)   E  Equipment - Suction is set up for ALL patients (with dwayne)  - Red plugs utilized for all equipment (IV pumps, etc.)  - WOWs wiped down at end of shift.  - Room stocked with oxygen, suction, and other unit-specific supplies   A  Alarms - Bed alarm is set for fall risk patients  - Ensure chair alarm is in place and activated if patient is up in a chair   L  Lines - Check IV for any infiltration  - Harrison bag is empty if patient has a Harrison   - Tubing and IV bags are labeled   S  Safety   - Room is clean, patient is clean, and equipment is clean. - Hallways are clear from equipment besides carts. - Fall bracelet on for fall risk patients  - Ensure room is clear and free of clutter  - Suction is set up for ALL patients (with dwayne)  - Hallways are clear from equipment besides carts.    - Isolation precautions followed, supplies available outside room, sign posted     Merary Shaikh, RN

## 2020-08-27 NOTE — PROGRESS NOTES
Escobar Balance Scale    Patient: Brice Mena (52 y.o. female)  Date: 8/27/2020  Diagnosis: Acute ischemic stroke [facility]) [I63.9] Acute ischemic stroke [facility])  Precautions:    Evaluator: Deandra Mcclure, PT    1. Sitting to standing  INSTRUCTIONS: Please stand up. Try not to use your hand for support. [x] 4 able to stand without using hands and stabilize independently  [] 3 able to stand independently using hands  [] 2 able to stand using hands after several tries  [] 1 needs minimal aid to stand or stabilize  [] 0 needs moderate or maximal assist to stand    2. Standing unsupported  INSTRUCTIONS: Please stand for two minutes without holding on.    [] 4 able to stand safely for two minutes without holding on  [x] 3 able to stand for two minutes with supervision  [] 2 able to stand 30 seconds unsupported  [] 1 needs several tries to stand 30 seconds unsupported  [] 0 unable to stand 30 seconds unsupported    If a subject is able to stand 2 minutes unsupported, score full points for sitting unsupported. Proceed to item #4.    3. Sitting with back unsupported but feet supported on floor or on a stool  INSTRUCTIONS: Please sit with arms folded for 2 minutes    [x] 4 able to sit safely and securely for 2 minutes  [] 3 able to sit 2 minutes under supervision  [] 2 able to sit 30 seconds  [] 1 able to sit 10 seconds  [] 0 unable to sit without support 10 seconds    4. Standing to sitting  INSTRUCTIONS: Please sit down  [] 4 sits safely with minimal use of hands  [] 3 controls decent by using hands  [x] 2 uses back of legs against chair to control descent  [] 1 sits independently but has uncontrolled descent  [] 0 needs assist to sit    5. Transfers  INSTRUCTIONS: Arrange chair(s) for pivot transfer. Ask subject to transfer one way toward a seat with armrests and one way toward a seat without armrests. You may use two chairs (one with and one without armrests) or a bed and a chair.   [] 4 able to transfer safely with minor use of hands  [] 3 able to transfer safely definite need of hands  [] 2 able to tranfers with verbal cuing and/or supervision  [x] 1 needs one person to assist  [] 0 needs two people to assist or supervise to be safe    6. Standing unsupported with eyes closed  INSTRUCTIONS: Please close your eyes and stand still for 10 seconds. [x] 4 able to stand for 10 seconds safely  [] 3 able to stand for 10 seconds with supervision  [] 2 able to stand 3 seconds  [] 1 unable to keep eyes closed 3 seconds but stays safely  [] 0 needs help to keep from falling    7. Standing unsupported with feet together  INSTRUCTIONS: Place your feet together and stand without holding on  [] 4 able to place feet together independently and stand 1 minute safely  [x] 3 able to place feet together independently and stand 1 minute with supervison  [] 2 able to place feet together independently but unable to hold for 30 seconds  [] 1 needs help to attain position but able to stand 15 seconds feet together  [] 0 needs help to attain position and unable to hold for 15 seconds    8. Reaching forward with outstretched arm while standing  INSTRUCTIONS: Lift arm to 90 degrees. Stretch out your fingers and reach forward as far as you can. (Examiner places a ruler at the end of fingertips when arm is at 90 degrees. Fingers should not touch the ruler while reaching forward. The recorded measure is the distance forward that the fingers reach while the subject is in the most forward lean position. When possible, ask subject to use both arms when reaching to avoid rotation of the trunk.)  [] 4 can reach forward confidently 25cm (10 inches)  [x] 3 can reach forward 12 cm (5 inches)  [] 2 can reach forward 5 cm (2 inches)  [] 1 reaches forward but needs supervision  [] 0 loses balance while trying/requires external support    9.   object from the floor from a standing position  INSTRUCTIONS:  the shoe/slipper, which is place in front of your feet  [] 4 able to  slipper safely and easily  [x] 3 able to  slipper but needs supervision  [] 2 unable to  but reaches 2-5 cm(1-2 inches) from slipper and keeps balance independently   [] 1 unable to  and needs supervision while trying  [] 0 unable to try/needs assist to keep from losing balance or falling    10. Turning to look behind over left and right shoulders while standing  INSTRUCTIONS: Turn to look directly behind you over toward the left shoulder. Repeat to the right. Examiner may pick and object to look at directly behind the subject to encourage a better twist turn. [] 4 looks behind from both sides and weight shifts well  [] 3 looks behind one side only other side shows less weight shift  [] 2 turns sideways only but maintains balance  [x] 1 needs supervision when turning  [] 0 needs assist to keep from losing balance or falling    11. Turn 360 degrees  INSTRUCTIONS: turn complete around in a full [place]. Pause. Then turn a full [place] in the other direction  [] 4 able to turn 360 degrees safely in 4 seconds or less  [] 3 able to turn 360 degrees safely one side on 4 seconds or less  [] 2 able to turn 360 degrees safely but slowly  [] 1 needs close supervision or verbal cuing  [x] 0 needs assistance while turning    12. Place alternate foot on step or stool while standing unsupported  INSTRUCTIONS: Place each foot alternately on the step/stool. Continue until each foot has touch the step/stool four times. [] 4 able to stand independently and safely and complete 8 steps in 20 seconds  [] 3 able to stand independently and complete 8 steps >20 seconds  [] 2 able to complete 4 steps without aid with supervision  [] 1 able to complete >2 steps needs minimal assist  [x] 0 needs assist to keep from falling/unable to try    13. Standing unsupported one foot in front  INSTRUCTIONS: (DEMONSTRATE TO SUBJECT) Place one foot directly in front of the other.  If you feel that you cannot place your foot directly in front, try to stop far enough ahead that the heel of your forward foot is ahead of the toes of the other foot. (To score 3 points, the length of the step should exceed the length of the other foot and the width of the stand should approximate the subject's normal stride width). [] 4 able to place foot tandem independently and hold 30 seconds  [] 3 able to place the foot ahead independently and hold 30 seconds  [x] 2 able to take small step independently and hold 30 seconds  [] 1 needs help to step but can hold 15 seconds  [] 0 loses balance while stepping or standing    14.  Standing on one leg  INSTRUCTIONS: stand on one leg as long as you can without holding on  [] 4 able to lift leg independently and hold >10 seconds  [] 3 able to lift leg independently and hold 5-10 seconds  [] 2 able to lift leg independently and hold for >3 seconds  [] 1 tries to lift leg unable to hold 3 seconds but remains standing independently  [x] 0 unable to try needs assist to prevent fall  __27__ TOTAL SCORE (Maximum =56)

## 2020-08-27 NOTE — PROGRESS NOTES
Speech LAnguage Pathology evaluation    Patient: Michaela Evans (14 y.o. female)  Date: 8/27/2020  Primary Diagnosis: Acute ischemic stroke [facility]) [I63.9]       Precautions:   Fall  Time in: 0930  Time Out:  1030    SUBJECTIVE:   Patient stated I think I am doing just fine\". OBJECTIVE:     Past Medical History:   Diagnosis Date    Acute ischemic stroke ([facility] [address] 8/21/2020    Acute Ischemic Stroke (late acute/early subacute infarcts in the right centrum semiovale extending to the superior frontal lobe cortex) with residual left hemiparesis    Bipolar disorder ([facility] [address]     [facility]     COVID-19 ruled out by laboratory testing 8/26/2020    SARS-CoV-2 ([facility] m2000) (collected 8/25/2020, resulted 8/26/2020):  Not detected     Current use of aspirin 8/24/2020    Depression     Gastroesophageal reflux disease     History of colon polyps     Hypertensive heart disease without heart failure     2D echocardiogram (8/22/2020) showed EF 65%; mild left ventricular hypertrophy; normal diastolic function; negative bubble study at rest and after Valsalva    Iron deficiency anemia     Left hemiparesis ([facility] [address] 8/21/2020    Menopause     Obesity, Class II, BMI 35-39.9     Obstructive sleep apnea on CPAP     On clopidogrel therapy 8/24/2020    On statin therapy due to risk of future cardiovascular event 8/24/2020    On Atorvastatin    Primary osteoarthritis involving multiple joints     Pure hypercholesterolemia 8/19/2020    Lipid profile (8/19/2020) showed , , HDL 50,     Seasonal allergic rhinitis     Severe persistent asthma 11/07/2019    Type 2 diabetes mellitus, without long-term current use of insulin (HCC)     HbA1c (8/19/2020) = 8.1; HbA1c (8/20/2020) = 8.0     Past Surgical History:   Procedure Laterality Date    HX HYSTERECTOMY  2000    Fibroids     Prior Level of Function/Home Situation: Independent  Home Situation  Home Environment: Apartment(first floor)  # Steps to Enter: 1  Rails to Enter: No  One/Two Story Residence: One story  Living Alone: No  Support Systems: Spouse/Significant Other/Partner  Patient Expects to be Discharged to[de-identified] Apartment  Current DME Used/Available at Home: None  Tub or Shower Type: Tub  Mental Status:  Neurologic State: Alert  Orientation Level: Oriented X4  Cognition: Appropriate decision making, Decreased attention/concentration, Follows commands  Perception: Appears intact  Perseveration: No perseveration noted  Safety/Judgement: Fall prevention  Motor Speech:  Oral-Motor Structure/Motor Speech  Face: No impairment  Labial: No impairment  Dentition: Intact  Oral Hygiene: WFL  Lingual: No impairment  Velum: No impairment  Mandible: No impairment  Intelligibility: No impairment  Intonation: WFL  Rate: WFL  Prosody: WFL  Overall Impairment Severity: None  Language Comprehension and Expression:  Auditory Comprehension  Auditory Impairment: No   Hearing Aid: None  Verbal Expression  Primary Mode of Expression: Verbal  Initiation: No impairment  Naming: No impairment  Sentence Formulation: No impairment  Conversation: No impairment  Overall Impairment: None  Reading Comprehension  Visual Impairment: No impairment  Pre-Morbid Reading Status: Literate  Scanning/Tracking : No impairment  Words : Yes  Sentence: No Impairment  Paragraph : No impairment  Oral Reading: No impairment  Overall Impairment Severity: None  Written Expression  Pre-Morbid Dominant Hand: Right  Pre-Morbid Writing Skills: WFL  Legibility : No impairment  Dictation : No impairment  Formulation: No impairment  Overall Impairment Severity: None  Neuro-Linguistics:  Verbal Reasoning Tasks: No Impairment  Verbal Problem Solving: No Impairment  Verbal Organization: No Impairment  Thought Organization: WFL  Mathematical: (DNT)  Memory: No Impairment   Attention : Impaired(Patieneeded occasional repetition of items.)  Pragmatics:  Pragmatics Impairment: No impairment  Voice:  Patient's voice quality is WFL for her age and gender. Pain:  Pain Scale 1: Numeric (0 - 10)  Pain Intensity 1: 0     After treatment:   [x]              Patient left in no apparent distress sitting up in chair  []              Patient left in no apparent distress in bed  [x]              Call bell left within reach  []              Nursing notified  []              Caregiver present  []              Bed alarm activated    ASSESSMENT:   Based on the objective data described below, the patient presents with occasional inattention requiring repetition of stimuli for success. Patient will benefit from skilled intervention to address the above impairments. Patients rehabilitation potential is considered to be Good  Factors which may influence rehabilitation potential include:   []              None noted  []              Mental ability/status  []              Medical condition  [x]              Home/family situation and support systems  [x]              Safety awareness  []              Pain tolerance/management  []              Other:     PLAN:   Recommendations and Planned Interventions:  SLP will not actively follow as patient demonstrates functional speech, language and cognitive skills with the exception of intermittent inattention to task. Frequency/Duration: Patient will be followed by speech-language pathology one time for evaluation. Discharge Recommendations: Outpatient    Estimated LOS: 1 week    COMMUNICATION/EDUCATION:   [x]  Patient/family have participated as able in goal setting and plan of care. []  Patient/family agree to work toward stated goals and plan of care. []  Patient understands intent and goals of therapy, but is neutral about his/her participation. []  Patient is unable to participate in goal setting and plan of care.     Thank you for this referral.  Zeus Bowman SLP  Time Calculation: 60 mins

## 2020-08-27 NOTE — REHAB NOTE
SHIFT CHANGE NOTE FOR [facility]    Bedside and Verbal shift change report given to Reji FloresRN (oncoming nurse) by Sumi Ritchie RN (offgoing nurse). Report included the following information SBAR, Kardex, MAR and Recent Results. Situation:   Code Status: Full Code   Reason for Admission: CVA  Hospital Day: 1   Problem List:   Hospital Problems  Date Reviewed: 4/7/2015          Codes Class Noted POA    Type 2 diabetes mellitus, without long-term current use of insulin ([facility] [address] (Chronic) ICD-10-CM: E11.9  ICD-9-CM: 250.00  Unknown Yes    Overview Signed 8/26/2020 11:46 PM by Zeus Shah MD     HbA1c (8/19/2020) = 8.1; HbA1c (8/20/2020) = 8.0             COVID-19 ruled out by laboratory testing ICD-10-CM: Z03.818  ICD-9-CM: V71.83  8/26/2020 Yes    Overview Signed 8/26/2020 11:47 PM by Zeus Shah MD     SARS-CoV-2 ([facility] m[facility]) (collected 8/25/2020, resulted 8/26/2020):  Not detected              Current use of aspirin ICD-10-CM: Z79.82  ICD-9-CM: V58.66  8/24/2020 Yes        On clopidogrel therapy ICD-10-CM: Z79.01  ICD-9-CM: V58.61  8/24/2020 Yes        On statin therapy due to risk of future cardiovascular event ICD-10-CM: Z79.899  ICD-9-CM: V58.69  8/24/2020 Yes    Overview Signed 8/26/2020 11:50 PM by Zeus Shah MD     On Atorvastatin             * (Principal) Acute ischemic stroke [facility]) ICD-10-CM: I63.9  ICD-9-CM: 434.91  8/21/2020 Yes    Overview Signed 8/26/2020 11:35 PM by Zeus Shah MD     Acute Ischemic Stroke (late acute/early subacute infarcts in the right centrum semiovale extending to the superior frontal lobe cortex) with residual left hemiparesis             Left hemiparesis ([facility] [address] ICD-10-CM: G81.94  ICD-9-CM: 342.90  8/21/2020 Yes        Impaired mobility and ADLs ICD-10-CM: Z74.09, Z78.9  ICD-9-CM: V49.89  8/21/2020 Yes        Pure hypercholesterolemia (Chronic) ICD-10-CM: E78.00  ICD-9-CM: 272.0  8/19/2020 Yes    Overview Signed 8/26/2020 11:45 PM by Zeus Shah MD     Lipid profile (8/19/2020) showed , , HDL 50,              Hypertensive heart disease without heart failure (Chronic) ICD-10-CM: I11.9  ICD-9-CM: 402.90  Unknown Yes    Overview Signed 8/26/2020 11:38 PM by Brooke Snell MD     2D echocardiogram (8/22/2020) showed EF 65%; mild left ventricular hypertrophy; normal diastolic function; negative bubble study at rest and after Valsalva                   Background:   Past Medical History:   Past Medical History:   Diagnosis Date    Acute ischemic stroke ([facility] 8/21/2020    Acute Ischemic Stroke (late acute/early subacute infarcts in the right centrum semiovale extending to the superior frontal lobe cortex) with residual left hemiparesis    Bipolar disorder ([facility]     Chronic obstructive pulmonary disease (COPD) ([facility]     COVID-19 ruled out by laboratory testing 8/26/2020    SARS-CoV-2 ([facility] m[facility]) (collected 8/25/2020, resulted 8/26/2020):  Not detected     Current use of aspirin 8/24/2020    Depression     Gastroesophageal reflux disease     History of colon polyps     Hypertensive heart disease without heart failure     2D echocardiogram (8/22/2020) showed EF 65%; mild left ventricular hypertrophy; normal diastolic function; negative bubble study at rest and after Valsalva    Iron deficiency anemia     Left hemiparesis ([facility] [address] 8/21/2020    Menopause     Obesity, Class II, BMI 35-39.9     Obstructive sleep apnea on CPAP     On clopidogrel therapy 8/24/2020    On statin therapy due to risk of future cardiovascular event 8/24/2020    On Atorvastatin    Primary osteoarthritis involving multiple joints     Pure hypercholesterolemia 8/19/2020    Lipid profile (8/19/2020) showed , , HDL 50,     Seasonal allergic rhinitis     Type 2 diabetes mellitus, without long-term current use of insulin (HCC)     HbA1c (8/19/2020) = 8.1; HbA1c (8/20/2020) = 8.0      Patient taking anticoagulants yes    Patient has a defibrillator: no Assessment:   Changes in Assessment throughout shift: NONE NOTED     Patient has central line: no Reasons if yes: N/A  Insertion date:N/A Last dressing date:N/A   Patient has Harrison Cath: no Reasons if yes: Insertion date:N/A     Last Vitals:     Vitals:    08/26/20 2202   BP: 155/77   Pulse: 66   Resp: 18   Temp: 97.3 °F (36.3 °C)   SpO2: 97%        PAIN    Pain Assessment    Pain Intensity 1: 0 (08/27/20 0400) Pain Intensity 1: 2 (12/29/14 1105)      Pain Location 1: Abdomen      Pain Intervention(s) 1: Medication (see MAR)  Patient Stated Pain Goal: 0 Patient Stated Pain Goal: 0  o Intervention effective: no    o Other actions taken for pain: N/A     Skin Assessment  Skin color    Condition/Temperature    Integrity    Turgor    Weekly Pressure Ulcer Documentation  Pressure  Injury Documentation: No Pressure Injury Noted-Pressure Ulcer Prevention Initiated  Wound Prevention & Protection Methods  Orientation of wound    Location of Prevention    Dressing Present    Dressing Status    Wound Offloading       INTAKE/OUPUT  Date 08/26/20 0700 - 08/27/20 0659 08/27/20 0700 - 08/28/20 0659   Shift 5312-6791 6519-0953 24 Hour Total 6056-9946 9797-0865 24 Hour Total   INTAKE   Shift Total         OUTPUT   Urine           Urine Occurrence(s)  3 x 3 x      Stool           Stool Occurrence(s)  0 x 0 x      Shift Total         NET         Weight (kg)             Recommendations:  1. Patient needs and requests: TOILETNG    2. Diet: Diabetic    3. Pending tests/procedures: labs     4. Functional Level/Equipment: wheelchair    5. Estimated Discharge Date: TBD Posted on Whiteboard in Patients Room: no     HEALS Safety Check    A safety check occurred in the patient's room between off going nurse and oncoming nurse listed above.     The safety check included the below items  Area Items   H  High Alert Medications - Verify all high alert medication drips (heparin, PCA, etc.)   E  Equipment - Suction is set up for ALL patients (with yanker)  - Red plugs utilized for all equipment (IV pumps, etc.)  - WOWs wiped down at end of shift.  - Room stocked with oxygen, suction, and other unit-specific supplies   A  Alarms - Bed alarm is set for fall risk patients  - Ensure chair alarm is in place and activated if patient is up in a chair   L  Lines - Check IV for any infiltration  - Harrison bag is empty if patient has a Harrison   - Tubing and IV bags are labeled   S  Safety   - Room is clean, patient is clean, and equipment is clean. - Hallways are clear from equipment besides carts. - Fall bracelet on for fall risk patients  - Ensure room is clear and free of clutter  - Suction is set up for ALL patients (with dwayne)  - Hallways are clear from equipment besides carts.    - Isolation precautions followed, supplies available outside room, sign posted

## 2020-08-28 LAB
BACTERIA SPEC CULT: NORMAL
BACTERIA SPEC CULT: NORMAL
GLUCOSE BLD STRIP.AUTO-MCNC: 101 MG/DL (ref 70–110)
GLUCOSE BLD STRIP.AUTO-MCNC: 168 MG/DL (ref 70–110)
GLUCOSE BLD STRIP.AUTO-MCNC: 247 MG/DL (ref 70–110)
GLUCOSE BLD STRIP.AUTO-MCNC: 78 MG/DL (ref 70–110)
GLUCOSE BLD STRIP.AUTO-MCNC: 93 MG/DL (ref 70–110)
SERVICE CMNT-IMP: NORMAL

## 2020-08-28 PROCEDURE — 97530 THERAPEUTIC ACTIVITIES: CPT

## 2020-08-28 PROCEDURE — 65310000000 HC RM PRIVATE REHAB

## 2020-08-28 PROCEDURE — 74011250637 HC RX REV CODE- 250/637: Performed by: INTERNAL MEDICINE

## 2020-08-28 PROCEDURE — 97110 THERAPEUTIC EXERCISES: CPT

## 2020-08-28 PROCEDURE — 74011000250 HC RX REV CODE- 250: Performed by: INTERNAL MEDICINE

## 2020-08-28 PROCEDURE — 97542 WHEELCHAIR MNGMENT TRAINING: CPT

## 2020-08-28 PROCEDURE — 74011636637 HC RX REV CODE- 636/637: Performed by: INTERNAL MEDICINE

## 2020-08-28 PROCEDURE — 97535 SELF CARE MNGMENT TRAINING: CPT

## 2020-08-28 PROCEDURE — 82962 GLUCOSE BLOOD TEST: CPT

## 2020-08-28 PROCEDURE — 97112 NEUROMUSCULAR REEDUCATION: CPT

## 2020-08-28 PROCEDURE — 97116 GAIT TRAINING THERAPY: CPT

## 2020-08-28 PROCEDURE — 74011250636 HC RX REV CODE- 250/636: Performed by: INTERNAL MEDICINE

## 2020-08-28 RX ORDER — MINOXIDIL 2.5 MG/1
2.5 TABLET ORAL EVERY 12 HOURS
Status: DISCONTINUED | OUTPATIENT
Start: 2020-08-28 | End: 2020-08-30

## 2020-08-28 RX ORDER — METFORMIN HYDROCHLORIDE 500 MG/1
500 TABLET, EXTENDED RELEASE ORAL
Status: DISCONTINUED | OUTPATIENT
Start: 2020-08-28 | End: 2020-09-04 | Stop reason: HOSPADM

## 2020-08-28 RX ADMIN — MONTELUKAST 10 MG: 10 TABLET, FILM COATED ORAL at 21:22

## 2020-08-28 RX ADMIN — CHOLECALCIFEROL (VITAMIN D3) 25 MCG (1,000 UNIT) TABLET 2 TABLET: TABLET at 08:32

## 2020-08-28 RX ADMIN — HEPARIN SODIUM 5000 UNITS: 5000 INJECTION INTRAVENOUS; SUBCUTANEOUS at 06:32

## 2020-08-28 RX ADMIN — AMLODIPINE BESYLATE 10 MG: 10 TABLET ORAL at 08:33

## 2020-08-28 RX ADMIN — FERROUS SULFATE TAB 325 MG (65 MG ELEMENTAL FE) 325 MG: 325 (65 FE) TAB at 08:33

## 2020-08-28 RX ADMIN — ASPIRIN 81 MG CHEWABLE TABLET 81 MG: 81 TABLET CHEWABLE at 08:32

## 2020-08-28 RX ADMIN — FENOFIBRATE 145 MG: 145 TABLET ORAL at 09:28

## 2020-08-28 RX ADMIN — ATORVASTATIN CALCIUM 40 MG: 40 TABLET, FILM COATED ORAL at 08:32

## 2020-08-28 RX ADMIN — MINOXIDIL 2.5 MG: 2.5 TABLET ORAL at 21:22

## 2020-08-28 RX ADMIN — VALSARTAN 320 MG: 160 TABLET, FILM COATED ORAL at 08:32

## 2020-08-28 RX ADMIN — IPRATROPIUM BROMIDE 0.5 MG: 0.5 SOLUTION RESPIRATORY (INHALATION) at 12:42

## 2020-08-28 RX ADMIN — INSULIN LISPRO 2 UNITS: 100 INJECTION, SOLUTION INTRAVENOUS; SUBCUTANEOUS at 17:07

## 2020-08-28 RX ADMIN — HYDROCHLOROTHIAZIDE 12.5 MG: 12.5 CAPSULE ORAL at 08:33

## 2020-08-28 RX ADMIN — PANTOPRAZOLE SODIUM 20 MG: 20 TABLET, DELAYED RELEASE ORAL at 06:34

## 2020-08-28 RX ADMIN — HEPARIN SODIUM 5000 UNITS: 5000 INJECTION INTRAVENOUS; SUBCUTANEOUS at 14:19

## 2020-08-28 RX ADMIN — INSULIN GLARGINE 20 UNITS: 100 INJECTION, SOLUTION SUBCUTANEOUS at 21:22

## 2020-08-28 RX ADMIN — IPRATROPIUM BROMIDE 0.5 MG: 0.5 SOLUTION RESPIRATORY (INHALATION) at 21:22

## 2020-08-28 RX ADMIN — IPRATROPIUM BROMIDE 0.5 MG: 0.5 SOLUTION RESPIRATORY (INHALATION) at 15:57

## 2020-08-28 RX ADMIN — CLOPIDOGREL BISULFATE 75 MG: 75 TABLET ORAL at 17:07

## 2020-08-28 RX ADMIN — IPRATROPIUM BROMIDE 0.5 MG: 0.5 SOLUTION RESPIRATORY (INHALATION) at 08:42

## 2020-08-28 RX ADMIN — HEPARIN SODIUM 5000 UNITS: 5000 INJECTION INTRAVENOUS; SUBCUTANEOUS at 21:22

## 2020-08-28 NOTE — ROUTINE PROCESS
SHIFT CHANGE NOTE FOR [facility]    Bedside and Verbal shift change report given to Colt SANTACRUZ (oncoming nurse) by Wild Sandoval (offgoing nurse). Report included the following information SBAR, Kardex, MAR and Recent Results. Situation:   Code Status: Full Code   Hospital Day: 2   Problem List:   Hospital Problems  Date Reviewed: 8/28/2020          Codes Class Noted POA    Sciatica of left side ICD-10-CM: M54.32  ICD-9-CM: 724.3  Unknown Yes        Type 2 diabetes mellitus, with long-term current use of insulin (HCC) (Chronic) ICD-10-CM: E11.9, Z79.4  ICD-9-CM: 250.00, V58.67  Unknown Yes    Overview Signed 8/26/2020 11:46 PM by Christian Carpio MD     HbA1c (8/19/2020) = 8.1; HbA1c (8/20/2020) = 8.0             COVID-19 ruled out by laboratory testing ICD-10-CM: Z03.818  ICD-9-CM: V71.83  8/26/2020 Yes    Overview Signed 8/26/2020 11:47 PM by Christian Carpio MD     SARS-CoV-2 (Abbott m2000) (collected 8/25/2020, resulted 8/26/2020):  Not detected              Current use of aspirin ICD-10-CM: Z79.82  ICD-9-CM: V58.66  8/24/2020 Yes        On clopidogrel therapy ICD-10-CM: Z79.01  ICD-9-CM: V58.61  8/24/2020 Yes        On statin therapy due to risk of future cardiovascular event ICD-10-CM: Z79.899  ICD-9-CM: V58.69  8/24/2020 Yes    Overview Signed 8/26/2020 11:50 PM by Christian Carpio MD     On Atorvastatin             * (Principal) Acute ischemic stroke [facility]) ICD-10-CM: I63.9  ICD-9-CM: 434.91  8/21/2020 Yes    Overview Signed 8/26/2020 11:35 PM by Christian Carpio MD     Acute Ischemic Stroke (late acute/early subacute infarcts in the right centrum semiovale extending to the superior frontal lobe cortex) with residual left hemiparesis             Left hemiparesis ([facility] [address] ICD-10-CM: G81.94  ICD-9-CM: 342.90  8/21/2020 Yes        Impaired mobility and ADLs ICD-10-CM: Z74.09, Z78.9  ICD-9-CM: V49.89  8/21/2020 Yes        Pure hypercholesterolemia (Chronic) ICD-10-CM: E78.00  ICD-9-CM: 272.0  8/19/2020 Yes    Overview Signed 8/26/2020 11:45 PM by Lizy Kendrick MD     Lipid profile (8/19/2020) showed , , HDL 50,              Hypertensive heart disease without heart failure (Chronic) ICD-10-CM: I11.9  ICD-9-CM: 402.90  Unknown Yes    Overview Signed 8/26/2020 11:38 PM by Lizy Kendrick MD     2D echocardiogram (8/22/2020) showed EF 65%; mild left ventricular hypertrophy; normal diastolic function; negative bubble study at rest and after Valsalva                   Background:   Past Medical History:   Past Medical History:   Diagnosis Date    Acute ischemic stroke ([facility] [address] 8/21/2020    Acute Ischemic Stroke (late acute/early subacute infarcts in the right centrum semiovale extending to the superior frontal lobe cortex) with residual left hemiparesis    Bipolar disorder ([facility] [address]     [facility]     COVID-19 ruled out by laboratory testing 8/26/2020    SARS-CoV-2 (Abbott m2000) (collected 8/25/2020, resulted 8/26/2020):  Not detected     Current use of aspirin 8/24/2020    Depression     Gastroesophageal reflux disease     History of colon polyps     Hypertensive heart disease without heart failure     2D echocardiogram (8/22/2020) showed EF 65%; mild left ventricular hypertrophy; normal diastolic function; negative bubble study at rest and after Valsalva    Iron deficiency anemia     Left hemiparesis ([facility] [address] 8/21/2020    Menopause     Obesity, Class II, BMI 35-39.9     Obstructive sleep apnea on CPAP     On clopidogrel therapy 8/24/2020    On statin therapy due to risk of future cardiovascular event 8/24/2020    On Atorvastatin    Pure hypercholesterolemia 8/19/2020    Lipid profile (8/19/2020) showed , , HDL 50,     Sciatica of left side     Seasonal allergic rhinitis     Severe persistent asthma 11/07/2019    Type 2 diabetes mellitus, with long-term current use of insulin (HCC)     HbA1c (8/19/2020) = 8.1; HbA1c (8/20/2020) = 8.0 Assessment:   Changes in Assessment throughout shift: none     Patient has central line: no Reasons if yes: n/a  Insertion date:n/a Last dressing date:n/a   Patient has Barbosa Cath: no Reasons if yes: n/a   Insertion date:n/a  Shift barbosa care completed: n/a     Last Vitals:     Vitals:    08/27/20 1645 08/27/20 2130 08/28/20 0801 08/28/20 1601   BP:  171/74 164/86 158/77   Pulse:  60 67 (!) 58   Resp:  18 20 18   Temp:  97 °F (36.1 °C) 98.8 °F (37.1 °C) 97.2 °F (36.2 °C)   SpO2:  96% 98% 97%   Height: 5' 2\" (1.575 m)           PAIN    Pain Assessment    Pain Intensity 1: 0 (08/28/20 1601) Pain Intensity 1: 2 (12/29/14 1105)      Pain Location 1: Abdomen      Pain Intervention(s) 1: Medication (see MAR)  Patient Stated Pain Goal: 0 Patient Stated Pain Goal: 0  o Intervention effective:N/A no complaints of pain   o Other actions taken for pain: rest     Skin Assessment  Skin color    Condition/Temperature    Integrity    Turgor    Weekly Pressure Ulcer Documentation  Pressure  Injury Documentation: No Pressure Injury Noted-Pressure Ulcer Prevention Initiated  Wound Prevention & Protection Methods  Orientation of wound Orientation of Wound Prevention: Posterior  Location of Prevention Location of Wound Prevention: Buttocks, Sacrum/Coccyx  Dressing Present Dressing Present : No  Dressing Status    Wound Offloading Wound Offloading (Prevention Methods): Bed, pressure redistribution/air, Repositioning     INTAKE/OUPUT  Date 08/27/20 1900 - 08/28/20 0659 08/28/20 0700 - 08/29/20 0659   Shift 4331-1208 24 Hour Total 3155-3680 2056-9324 24 Hour Total   INTAKE   P.O.  480        P. O.  480      Shift Total  480      OUTPUT   Urine          Urine Occurrence(s) 2 x 5 x 4 x  4 x   Stool          Stool Occurrence(s) 0 x 1 x 0 x  0 x   Shift Total        NET  480      Weight (kg)            Recommendations:  1. Patient needs and requests: toileting    2. Pending tests/procedures: labs     3.  Functional Level/Equipment: 1 person assist/W/C    HEALS Safety Check    A safety check occurred in the patient's room between off going nurse and oncoming nurse listed above. The safety check included the below items  Area Items   H  High Alert Medications - Verify all high alert medication drips (heparin, PCA, etc.)   E  Equipment - Suction is set up for ALL patients (with dwayne)  - Red plugs utilized for all equipment (IV pumps, etc.)  - WOWs wiped down at end of shift.  - Room stocked with oxygen, suction, and other unit-specific supplies   A  Alarms - Bed alarm is set for fall risk patients  - Ensure chair alarm is in place and activated if patient is up in a chair   L  Lines - Check IV for any infiltration  - Harrison bag is empty if patient has a Harrison   - Tubing and IV bags are labeled   S  Safety   - Room is clean, patient is clean, and equipment is clean. - Hallways are clear from equipment besides carts. - Fall bracelet on for fall risk patients  - Ensure room is clear and free of clutter  - Suction is set up for ALL patients (with dwayne)  - Hallways are clear from equipment besides carts.    - Isolation precautions followed, supplies available outside room, sign posted     Tawanna Francisco

## 2020-08-28 NOTE — PROGRESS NOTES
Spoke with patient and given permission to call her saran Sebastian to schedule caregiver education prior to discharge. Called and left  for Fish to return call.     Matti Santana, LINDSAYS

## 2020-08-28 NOTE — PROGRESS NOTES
Problem: Self Care Deficits Care Plan (Adult)  Goal: *Therapy Goal (Edit Goal, Insert Text)  Description: Occupational Therapy Goals   Short term = Long Term Goals  Initiated 2020 and to be accomplished within 2 week(s) 08/10/2020  1. Pt will perform self-feeding with I.   2. Pt will perform grooming with I.  3. Pt will perform UB bathing with Mod I.  4. Pt will perform LB bathing with Mod I.  5. Pt will perform tub/shower transfer with Mod I.   6. Pt will perform UB dressing with I.  7. Pt will perform LB dressing with I.  8. Pt will perform toileting task with mod I.   9. Pt will perform toilet transfer with mod I. Outcome: Progressing Towards Goal  Goal: Interventions  Outcome: Progressing Towards Goal     Problem: Patient Education: Go to Patient Education Activity  Goal: Patient/Family Education  Outcome: Progressing Towards Goal   OCCUPATIONAL THERAPY TREATMENT    Patient: Sheralyn Gowers   60 y.o. Patient identified with name and : Yes    Date: 2020    First Tx Session  Time In: 36  Time Out:1050     Diagnosis: Acute ischemic stroke [facility]) [I63.9]   Precautions: Fall  Chart, occupational therapy assessment, plan of care, and goals were reviewed. Pain:  Pt reports no pain at this time. SUBJECTIVE:   Patient stated  I ready to go home. \"    OBJECTIVE DATA SUMMARY:     THERAPEUTIC ACTIVITY Daily Assessment    Practiced self-propulsion from patient's room to therapy room in order to increase independence and performance during task. Static standing activity CGA ~ 5 mins x 2 trials with one hand release in order to increase standing balance and tolerance needed for ADLS. Item retrieval within blue T-Putty in order to increase hand dexterity and  strength needed for manipulation of buttons with UB ADLS.        THERAPEUTIC EXERCISE Daily Assessment    UB strengthening with 2#, 4 sets x 20 reps in order to increase functional activity tolerance and UB muscle strength needed for ADLs   UB restorator x 10 mins in order to increase functional activity tolerance needed for ADLS. W/C push ups x 10 with increased time 2/2 fatigue. TOILETING Daily Assessment    Toileting  Toileting Assistance (FIM Score): (CGA)  Cues: Verbal cues provided  Adaptive Equipment: Elevated seat;Grab bars     MOBILITY/TRANSFERS Daily Assessment     STS transfer CGA   Toilet- CGA with grab bar       ASSESSMENT:  Pt demonstrated good activity tolerance and increasing L UE strength for functional mobility and self cares. Pt demonstrated increased (I) with toilet transfer and making steady progress toward goals. Progression toward goals:  [x]          Improving appropriately and progressing toward goals  []          Improving slowly and progressing toward goals  []          Not making progress toward goals and plan of care will be adjusted     PLAN:  Patient continues to benefit from skilled intervention to address the above impairments. Continue treatment per established plan of care. Discharge Recommendations:  HH vs Outpatient   Further Equipment Recommendations for Discharge:  TBA pending progress     Activity Tolerance:  Good, pt is motivated to return home. Estimated LOS:  2 weeks    Please refer to the flowsheet for vital signs taken during this treatment. After treatment:   [x]  Patient left in no apparent distress sitting up in chair awaiting PT   []  Patient left in no apparent distress in bed  []  Call bell left within reach  [x]  Nursing notified  []  Caregiver present  [x]  Chair alarm activated    COMMUNICATION/EDUCATION:   [] Home safety education was provided and the patient/caregiver indicated understanding. [x] Patient/family have participated as able in goal setting and plan of care. [] Patient/family agree to work toward stated goals and plan of care. [] Patient understands intent and goals of therapy, but is neutral about his/her participation.   [] Patient is unable to participate in goal setting and plan of care.       Nico Rodas

## 2020-08-28 NOTE — PROGRESS NOTES
Problem: Mobility Impaired (Adult and Pediatric)  Goal: *Therapy Goal (Edit Goal, Insert Text)  Description: Physical Therapy Short Term Goals  Initiated 8/27/2020 and to be accomplished within 7 day(s) on 9/3/2020:  1. Patient will move from supine to sit and sit to supine , scoot up and down, and roll side to side in bed with modified independence. 2.  Patient will transfer from bed to chair and chair to bed with supervision/set-up using the least restrictive device. 3.  Patient will perform sit to stand with supervision/set-up. 4.  Patient will ambulate with SBA for 100 feet with the least restrictive device, cues for neutral knee extension during midstance phase of gait <25% of the time. 5.  Patient will ascend/descend 1 curb step with appropriate AD with CGA. Physical Therapy Long Term Goals  Initiated 8/27/2020 and to be accomplished within 14 day(s) on 9/10/2020  1. Patient will move from supine to sit and sit to supine , scoot up and down, and roll side to side in bed with independence. 2.  Patient will transfer from bed to chair and chair to bed with modified independence using the least restrictive device. 3.  Patient will perform sit to stand with modified independence. 4.  Patient will ambulate with modified independence for 150 feet with the least restrictive device. 5.  Patient will ascend/descend 1 curb step with SBA, using appropriate AD. Outcome: Progressing Towards Goal   PHYSICAL THERAPY TREATMENT    Patient: Claudeen Aschoff (60 y.o. female)  Date: 8/28/2020  Diagnosis: Acute ischemic stroke [facility]) [I63.9] Acute ischemic stroke [facility])  Precautions: Fall  Chart, physical therapy assessment, plan of care and goals were reviewed. Time In:1100  Time Out:1203  Time In: 1333  Time out: 1400    Patient seen for: Balance activities;Gait training;Patient education; Therapeutic exercise; Wheelchair mobility;Transfer training    Pain:  Patient reporting sharp pain in buttocks intermittently, alleviated with gentle glut/piriformis stretching; treatment modified. Patient identified with name and : yes    SUBJECTIVE:      Patient agreeable to treatment. Motivated to participate; needing cues for slowing down and for consistently calling for staff assist whenever needing to transfer or mobilize. OBJECTIVE DATA SUMMARY:    Objective:     TRANSFERS Daily Assessment     Transfer Type: Other  Other: stand step with RW  Transfer Assistance : 4 (Contact guard assistance)  Sit to Stand Assistance: Contact guard assistance(CG/SBA with intermittent tactile cues for anterior trunk)    Patient performing sit<->stand reps from 18\" height mat, emphasis on using bilat LE, symmetrical WB with bilat UE at distal thighs to improve functional LE strength. GAIT Daily Assessment    Gait Description (WDL) Exceptions to WDL    Gait Abnormalities Decreased step clearance(left LE hyperextension at times )    Assistive device Gait belt;Walker, rolling    Ambulation assistance - level surface 4 (Minimal assistance)    Distance 78 Feet (ft)    Comments Patient performing gait with RW, cues for maintaining neutral knee extension and avoiding hyperextension. Also cues for smaller step length for better ability to control knee hyperextension. Reminders for heel strike at initial contact and staying in middle of RW for safety.        BALANCE Daily Assessment     Sitting - Static: Good (unsupported)  Sitting - Dynamic: Good (unsupported)  Standing - Static: Fair  Standing - Dynamic : Impaired        WHEELCHAIR MOBILITY Daily Assessment     Able to Propel (ft): 180 feet  Functional Level: 4  Curbs/Ramps Assist Required (FIM Score): 0 (Not tested)  Wheelchair Setup Assist Required : 4 (Minimal assistance)  Wheelchair Management: Manages left brake;Manages right brake        Neuro Re-Education:  Performing staggered stance right LE up on 4\" step for increased weight acceptance left LE and for reinforcement of neutral knee extension position. Performing 30-40 sec bouts x 4 reps without UE support of RW. Needing UE support when lifting right LE up/down step. Also performing toe touches to 4\" step left LE 3 x 10 reps to improve hip flex with ankle DF for ease of stairs negotiation. Therapeutic exercise: Performing hamstring curls 3 x 10 reps red theraband to improve knee flexion strength to improve knee control during gait to avoid knee hyperextension. ASSESSMENT:  Patient would continue to benefit from skilled PT to improve ability to perform transfers and gait. Recommend ongoing neuro re-ed and balance training as well as aggressive hamstring strengthening to improve safety with gait and reduce knee hyperextension. Progression toward goals:  [x]      Improving appropriately and progressing toward goals  []      Improving slowly and progressing toward goals  []      Not making progress toward goals and plan of care will be adjusted      PLAN:  Patient continues to benefit from skilled intervention to address the above impairments. Continue treatment per established plan of care. Discharge Recommendations:  Home Health  Further Equipment Recommendations for Discharge:  rolling walker currently needed; to be further assessed      Estimated LOS: 1-2 weeks    Activity Tolerance:   Good, needing intermittent rests. After treatment:   Patient left seated in bathroom after first session with handoff to nurse; left seated edge of bed with call button within reach after second session.        Juan Dela Cruz, PT

## 2020-08-28 NOTE — REHAB NOTE
[facility] PHYSICAL REHABILITATION  [address] [place] [address]  OVERALL PLAN OF CARE    Name: Shannan Gonzalez CSN: [id number]   Age: 60 y.o. MRN: [id number]   Sex: female Admit Date: 8/26/2020     Primary Rehabilitation Diagnosis  1. Impaired Mobility and ADLs  2. Acute Ischemic Stroke (late acute/early subacute infarcts in the right centrum semiovale extending to the superior frontal lobe cortex) with residual left hemiparesis     Comorbidities   Hypertensive heart disease without heart failure I11.9    Gastroesophageal reflux disease K21.9    Obstructive sleep apnea on CPAP G47.33, Z99.89    Primary osteoarthritis involving multiple joints M89.49    Chronic obstructive pulmonary disease (COPD) ([facility]) J44.9    Severe persistent asthma J45.50    Obesity, Class II, BMI 35-39.9 E66.9    Depression F32.9    Seasonal allergic rhinitis J30.2    History of colon polyps Z86.010    Pure hypercholesterolemia E78.00    Type 2 diabetes mellitus, with long-term current use of insulin ([facility]) E11.9, Z79.4    COVID-19 ruled out by laboratory testing Z03.818    Menopause Z78.0    Iron deficiency anemia D50.9    Bipolar disorder ([facility]) F31.9    Current use of aspirin Z79.82    On clopidogrel therapy Z79.01    On statin therapy due to risk of future cardiovascular event Z79.899    Sciatica of left side M54.32        ANTICIPATED INTERVENTIONS THAT SUPPORT THE MEDICAL NECESSITY OF THIS ADMISSION:    I. Physical Therapy              A. Intensity: 1.5 hour per day              B. Frequency: 5 times per week              C. Duration: 2 weeks              D. Long Term Goals:    1. Patient will move from supine to sit and sit to supine , scoot up and down, and roll side to side in bed with independence. 2.  Patient will transfer from bed to chair and chair to bed with modified independence using the least restrictive device.     3.  Patient will perform sit to stand with modified independence. 4.  Patient will ambulate with modified independence for 150 feet with the least restrictive device. 5.  Patient will ascend/descend 1 curb step with SBA, using appropriate AD. E. Interventions: Interventions may include range of motion (AROM, PROM B LE/trunk), motor function (B LE/trunk strengthening/coordination), activity tolerance (vitals, oxygen saturation levels), bed mobility training, balance activities, gait training (progressive ambulation program), wheelchair management and functional transfer training. II. Occupational Therapy  21 . Intensity: 1.5 hour per day              B. Frequency: 5 times per week              C. Duration: 2 weeks              D. Long Term Goals:    1. Pt will perform self-feeding with I.     2. Pt will perform grooming with I.    3. Pt will perform UB bathing with Mod I.    4. Pt will perform LB bathing with Mod I.    5. Pt will perform tub/shower transfer with Mod I.     6. Pt will perform UB dressing with I.    7. Pt will perform LB dressing with I.    8. Pt will perform toileting task with mod I.     9. Pt will perform toilet transfer with mod I.   E. Interventions: Interventions may include range of motion (AROM, PROM B UE), motor function (B UE/ strengthening/coordination), activity tolerance (vitals, oxygen saturation levels), balance training, ADL/IADL training and functional transfer training. PHYSICIAN'S ASSESSMENT OF FINDINGS:    Are the established goals sufficient for achieving the optimal level of function? [x]  Yes      []  No    What changes would you recommend to the goals as written? None      Are the interventions noted sufficient for achieving the optimal level of function? [x]  Yes      []  No    What changes would you recommend to the interventions noted?  If therapy staff is unable to provide 3 hr of total therapy per day in 5 days due to medical issues or decreased patient tolerance, may modify treatment schedule to 15 hr/week.       Estimated length of stay: 2 weeks      Medical rehabilitation prognosis:    []  Excellent     [x]  Good     []  Fair     []  Guarded       Discharge Destination:     [x]  Home     []  [address]      []  Arnaud [facility]auel     []  [facility]     []  [place]     []  Other:       Signed:    Carmen Roach MD    August 28, 2020
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
## 2020-08-28 NOTE — PROGRESS NOTES
Pt is a 60year old female admitted to ARU for a CVA. Pt is alert and oriented, alone in the room. Pt states that she lives with her fiance, Sherlyn Weems, in a first floor apartment with 1 small step to enter without handrails. Pt states that she has a tub shower. Pt states that prior to admission she was able to self care and denies the use of DME. Pt states that she does not have history with home health, outpatient therapy or SNF. Pt states that her daughter, Ellie Bell ([phone]), is her POA and NOK contact. Pt further notes her fiance, Sherlyn Weems ([phone]), and her son, Rahel Lara ([phone]), as contacts. Pt states that her fiance, children and friends are able to assist her 24/7 at dc if needed. Pt states that she has Carla Carey as insurance. Sw reviewed insurance updates, dc planning and team conference. Pt states understanding and denies questions at this time. Sw will follow.

## 2020-08-28 NOTE — PROGRESS NOTES
[x] Psychology  [] Social Work [] Recreational Therapy    INTERVENTION  UNITS/TIME OF SERVICE   Assessment  August 28, 2020   Supportive Counseling    Orientation    Discharge Planning    Resource Linkage              Progress/Current Status    Patient is a very vivacious, once  and now Kamila noonan,\" unemployed (though catering part-time), from full time employment and receiving [address] (both medical and mental health),  female. She resides with saran in [place] in first floor apartment on one level with one step to enter. Patient has four children: two residing in [place], [place], another in [place] and a fourth in [facility]. Patient reports that partner is currently completing last round of chemotherapy for Stage 3 colon cancer and may be limited, in the near term, in his ability to assist, but willing. Patient obviously has limited insight about parameters of stroke occurrence and recovery and will therefore benefit from stroke education, especially in order to identify realistic goals for herself in recovery and thereby minimize frustration. Patient has history of mental health services but is not currently using psychotropic medication for stabilization purposes. She carries diagnosis for Bipolar disorder with Depression and chose to discontinue Seroquel, as last Rx by her PCP in April, 2020. Patient insists that she is highly motivated to participate in therapy program and will be monitored for any emergent, emotional and/or behavioral difficulties while on ARU.     Boom Marie, THE [facility] 8/28/2020 8:35 AM

## 2020-08-28 NOTE — PROGRESS NOTES
[facility] PHYSICAL REHABILITATION  [address] [place] [address], [phone]     INPATIENT REHABILITATION  DAILY PROGRESS NOTE     Date: 8/28/2020    Name: Wanda Self Age / Sex: 60 y.o. / female   CSN: [id number] MRN: [id number]   [facility] Date: 8/26/2020 Length of Stay: 2 days     Primary Rehab Diagnosis: Impaired Mobility and ADLs secondary to Acute Ischemic Stroke (late acute/early subacute infarcts in the right centrum semiovale extending to the superior frontal lobe cortex) with residual left hemiparesis      Subjective:     Patient seen and examined. Blood pressure fairly controlled. Blood glucose controlled. Patient's Complaint:   No significant medical complaints    Pain Control: no current joint or muscle symptoms, essentially pain-free      Objective:     Vital Signs:  Patient Vitals for the past 24 hrs:   BP Temp Pulse Resp SpO2 Height   08/28/20 0801 164/86 98.8 °F (37.1 °C) 67 20 98 % --   08/27/20 2130 171/74 97 °F (36.1 °C) 60 18 96 % --   08/27/20 1645 -- -- -- -- -- 5' 2\" (1.575 m)   08/27/20 1514 156/80 97.7 °F (36.5 °C) 65 18 95 % --        Physical Examination:  GENERAL SURVEY: Patient is awake, alert, oriented x 3, sitting comfortably on the chair, not in acute respiratory distress. HEENT: pink palpebral conjunctivae, anicteric sclerae, no nasoaural discharge, moist oral mucosa  NECK: supple, no jugular venous distention, no palpable lymph nodes  CHEST/LUNGS: symmetrical chest expansion, good air entry, clear breath sounds  HEART: adynamic precordium, good S1 S2, no S3, regular rhythm, no murmurs  ABDOMEN: obese, bowel sounds appreciated, soft, non-tender  EXTREMITIES: pink nailbeds, no edema, full and equal pulses, no calf tenderness   NEUROLOGICAL EXAM: The patient is awake, alert and oriented x3, able to answer questions fairly appropriately, able to follow 1 and 2 step commands. Able to tell time from the wall clock. Cranial nerves II-XII are grossly intact.   No gross sensory deficit.   Motor strength is 5/5 on the RUE and RLE, 4- to 4/5 on the LUE, 2+/5 on the LLE.       Current Medications:  Current Facility-Administered Medications   Medication Dose Route Frequency    acetaminophen (TYLENOL) tablet 650 mg  650 mg Oral Q4H PRN    bisacodyL (DULCOLAX) tablet 10 mg  10 mg Oral Q48H PRN    heparin (porcine) injection 5,000 Units  5,000 Units SubCUTAneous Q8H    insulin lispro (HUMALOG) injection   SubCUTAneous TIDAC    aspirin chewable tablet 81 mg  81 mg Oral DAILY WITH BREAKFAST    clopidogreL (PLAVIX) tablet 75 mg  75 mg Oral DAILY WITH DINNER    atorvastatin (LIPITOR) tablet 40 mg  40 mg Oral DAILY    albuterol (PROVENTIL VENTOLIN) nebulizer solution 2.5 mg  2.5 mg Nebulization Q4H PRN    amLODIPine (NORVASC) tablet 10 mg  10 mg Oral DAILY    cholecalciferol (VITAMIN D3) (1000 Units /25 mcg) tablet 2 Tab  2,000 Units Oral DAILY    fenofibrate nanocrystallized (TRICOR) tablet 145 mg  145 mg Oral DAILY    ferrous sulfate tablet 325 mg  1 Tab Oral DAILY WITH BREAKFAST    montelukast (SINGULAIR) tablet 10 mg  10 mg Oral QHS    pantoprazole (PROTONIX) tablet 20 mg  20 mg Oral ACB    ipratropium (ATROVENT) 0.02 % nebulizer solution 0.5 mg  0.5 mg Nebulization QID RT    valsartan (DIOVAN) tablet 320 mg  320 mg Oral DAILY    insulin glargine (LANTUS) injection 20 Units  20 Units SubCUTAneous QHS    glucose chewable tablet 16 g  4 Tab Oral PRN    glucagon (GLUCAGEN) injection 1 mg  1 mg IntraMUSCular PRN    dextrose (D50W) injection syrg 12.5-25 g  25-50 mL IntraVENous PRN    hydroCHLOROthiazide (MICROZIDE) capsule 12.5 mg  12.5 mg Oral DAILY       Allergies:  No Known Allergies    Lab/Data Review:  Recent Results (from the past 24 hour(s))   GLUCOSE, POC    Collection Time: 08/27/20  4:27 PM   Result Value Ref Range    Glucose (POC) 96 70 - 110 mg/dL   GLUCOSE, POC    Collection Time: 08/27/20  8:53 PM   Result Value Ref Range    Glucose (POC) 186 (H) 70 - 110 mg/dL   GLUCOSE, POC    Collection Time: 08/28/20  7:44 AM   Result Value Ref Range    Glucose (POC) 78 70 - 110 mg/dL   GLUCOSE, POC    Collection Time: 08/28/20  8:07 AM   Result Value Ref Range    Glucose (POC) 101 70 - 110 mg/dL   GLUCOSE, POC    Collection Time: 08/28/20 12:07 PM   Result Value Ref Range    Glucose (POC) 93 70 - 110 mg/dL       Estimated Glomerular Filtration Rate:  On admission, estimated GFR based on a Creatinine of 0.75 mg/dl:              Using CKD-EPI = 100.4 mL/min/1.73m2              Using MDRD = 101.4 mL/min/1.73m2      Assessment:     Primary Rehabilitation Diagnosis  1. Impaired Mobility and ADLs  2. Acute Ischemic Stroke (late acute/early subacute infarcts in the right centrum semiovale extending to the superior frontal lobe cortex) with residual left hemiparesis     Comorbidities   Hypertensive heart disease without heart failure I11.9    Gastroesophageal reflux disease K21.9    Obstructive sleep apnea on CPAP G47.33, Z99.89    Primary osteoarthritis involving multiple joints M89.49    Chronic obstructive pulmonary disease (COPD) ([facility]) J44.9    Severe persistent asthma J45.50    Obesity, Class II, BMI 35-39.9 E66.9    Depression F32.9    Seasonal allergic rhinitis J30.2    History of colon polyps Z86.010    Pure hypercholesterolemia E78.00    Type 2 diabetes mellitus, with long-term current use of insulin ([facility]) E11.9, Z79.4    COVID-19 ruled out by laboratory testing Z03.818    Menopause Z78.0    Iron deficiency anemia D50.9    Bipolar disorder ([facility]) F31.9    Current use of aspirin Z79.82    On clopidogrel therapy Z79.01    On statin therapy due to risk of future cardiovascular event Z79.899    Sciatica of left side M54.32        Plan:     1. Justification for continued stay: Good progression towards established rehabilitation goals.     2. Medical Issues being followed closely:    [x]  Fall and safety precautions     []  Wound Care     [x]  Bowel and Bladder Function     [x]  Fluid Electrolyte and Nutrition Balance     []  Pain Control      3. Issues that 24 hour rehabilitation nursing is following:    [x]  Fall and safety precautions     []  Wound Care     [x]  Bowel and Bladder Function     [x]  Fluid Electrolyte and Nutrition Balance     []  Pain Control      [x]  Assistance with and education on in-room safety with transfers to and from the bed, wheelchair, toilet and shower. 4. Acute rehabilitation plan of care:    [x]  Continue current care and rehab. [x]  Physical Therapy           [x]  Occupational Therapy           [x]  Speech Therapy     []  Hold Rehab until further notice     5. Medications:    [x]  MAR Reviewed     [x]  Continue Present Medications     6. DVT Prophylaxis:      []  Lovenox     [x]  Unfractionated Heparin     []  Coumadin     []  NOAC     []  MARTHA Stockings     []  Sequential Compression Device     []  None     7. Code status    [x]  Full code     []  Partial code     []  Do not intubate     []  Do not resuscitate     8.  Orders:   > Acute Ischemic Stroke (late acute/early subacute infarcts in the right centrum semiovale extending to the superior frontal lobe cortex) with residual left hemiparesis   > Dual antiplatelet therapy (DAPT) was started 8/24/2020   > Duration of DAPT not specified on discharge summary   > Will defer duration of DAPT to Neurology upon outpatient follow up    > Continue:    > Aspirin 81 mg PO once daily with breakfast    > Atorvastatin 40 mg PO once daily    > Clopidogrel 75 mg PO once daily with dinner     > Hypertensive heart disease without heart failure   > 2D echocardiogram (8/22/2020) showed EF 65%; mild left ventricular hypertrophy; normal diastolic function; negative bubble study at rest and after Valsalva   > Start Minoxidil 2.5 mg PO q 12 hr (9AM, 9PM)   > Continue:    > Amlodipine 10 mg PO once daily (9AM)    > Hydrochlorothiazide 12.5 mg PO once daily (9AM)    > Valsartan 320 mg PO once daily (9AM)    > Pure hypercholesterolemia   > Lipid profile (8/19/2020) showed , , HDL 50,    > Continue:    > Atorvastatin 40 mg PO once daily    > Fenofibrate 145 mg PO once daily    > Type 2 diabetes mellitus, with long term current use of insulin   > HbA1c (8/19/2020) = 8.1; HbA1c (8/20/2020) = 8.0   > On 8/26/2020, increased Insulin glargine from 10 units to 20 units SC q HS   > Start Metformin  mg PO once daily with dinner   > Continue:    > Decrease Insulin glargine from 20 units to 15 units SC q HS    > Insulin lispro sliding scale SC TID AC only    > History of wound culture positive for MRSA (3/27/2012)   > Nasal swab to rule out MRSA colonization   > Contact precautions until results comes back negative   > MRSA culture (collected 8/27/2020, resulted 8/28/2020): Not present     > COVID-19 ruled out by laboratory testing   > TERRENCE-CoV-2 (Abbott m2000) (collected 8/25/2020, resulted 8/26/2020): Not detected    > Analgesia   > Continue Acetaminophen 650 mg PO q 4 hr PRN for pain level less than 5/10    > Diet:   > Specifications: Cardiac, diabetic, consistent carb 1800 kcal, no concentrated sweets   > Solids (consistency): Regular    > Liquids (consistency): Thin    > Fluid restriction: None      9. Patient's progress in rehabilitation and medical issues discussed with the patient. All questions answered to the best of my ability. Care plan discussed with patient and nurse.       Signed:    Mo Rutherford MD    August 28, 2020

## 2020-08-28 NOTE — ROUTINE PROCESS
SHIFT CHANGE NOTE FOR [facility]    Bedside and Verbal shift change report given to Sowmya RN (oncoming nurse) by Erasmo Santiago RN (offgoing nurse). Report included the following information SBAR, Kardex, MAR and Recent Results. Situation:   Code Status: Full Code   Hospital Day: 2   Problem List:   Hospital Problems  Date Reviewed: 8/27/2020          Codes Class Noted POA    Sciatica of left side ICD-10-CM: M54.32  ICD-9-CM: 724.3  Unknown Yes        Type 2 diabetes mellitus, with long-term current use of insulin (HCC) (Chronic) ICD-10-CM: E11.9, Z79.4  ICD-9-CM: 250.00, V58.67  Unknown Yes    Overview Signed 8/26/2020 11:46 PM by Ford Tinajero MD     HbA1c (8/19/2020) = 8.1; HbA1c (8/20/2020) = 8.0             COVID-19 ruled out by laboratory testing ICD-10-CM: Z03.818  ICD-9-CM: V71.83  8/26/2020 Yes    Overview Signed 8/26/2020 11:47 PM by Ford Tinajero MD     SARS-CoV-2 (Abbott m2000) (collected 8/25/2020, resulted 8/26/2020):  Not detected              Current use of aspirin ICD-10-CM: Z79.82  ICD-9-CM: V58.66  8/24/2020 Yes        On clopidogrel therapy ICD-10-CM: Z79.01  ICD-9-CM: V58.61  8/24/2020 Yes        On statin therapy due to risk of future cardiovascular event ICD-10-CM: Z79.899  ICD-9-CM: V58.69  8/24/2020 Yes    Overview Signed 8/26/2020 11:50 PM by Ford Tinajero MD     On Atorvastatin             * (Principal) Acute ischemic stroke [facility]) ICD-10-CM: I63.9  ICD-9-CM: 434.91  8/21/2020 Yes    Overview Signed 8/26/2020 11:35 PM by Ford Tinajero MD     Acute Ischemic Stroke (late acute/early subacute infarcts in the right centrum semiovale extending to the superior frontal lobe cortex) with residual left hemiparesis             Left hemiparesis ([facility] [address] ICD-10-CM: G81.94  ICD-9-CM: 342.90  8/21/2020 Yes        Impaired mobility and ADLs ICD-10-CM: Z74.09, Z78.9  ICD-9-CM: V49.89  8/21/2020 Yes        Pure hypercholesterolemia (Chronic) ICD-10-CM: E78.00  ICD-9-CM: 272.0  8/19/2020 Yes Overview Signed 8/26/2020 11:45 PM by Deja Dumas MD     Lipid profile (8/19/2020) showed , , HDL 50,              Hypertensive heart disease without heart failure (Chronic) ICD-10-CM: I11.9  ICD-9-CM: 402.90  Unknown Yes    Overview Signed 8/26/2020 11:38 PM by Deja Dumas MD     2D echocardiogram (8/22/2020) showed EF 65%; mild left ventricular hypertrophy; normal diastolic function; negative bubble study at rest and after Valsalva                   Background:   Past Medical History:   Past Medical History:   Diagnosis Date    Acute ischemic stroke ([facility] [address] 8/21/2020    Acute Ischemic Stroke (late acute/early subacute infarcts in the right centrum semiovale extending to the superior frontal lobe cortex) with residual left hemiparesis    Bipolar disorder ([facility] [address]     Chronic obstructive pulmonary disease (COPD) ([facility] [address]     COVID-19 ruled out by laboratory testing 8/26/2020    SARS-CoV-2 (Abbott m2000) (collected 8/25/2020, resulted 8/26/2020):  Not detected     Current use of aspirin 8/24/2020    Depression     Gastroesophageal reflux disease     History of colon polyps     Hypertensive heart disease without heart failure     2D echocardiogram (8/22/2020) showed EF 65%; mild left ventricular hypertrophy; normal diastolic function; negative bubble study at rest and after Valsalva    Iron deficiency anemia     Left hemiparesis ([facility] [address] 8/21/2020    Menopause     Obesity, Class II, BMI 35-39.9     Obstructive sleep apnea on CPAP     On clopidogrel therapy 8/24/2020    On statin therapy due to risk of future cardiovascular event 8/24/2020    On Atorvastatin    Pure hypercholesterolemia 8/19/2020    Lipid profile (8/19/2020) showed , , HDL 50,     Sciatica of left side     Seasonal allergic rhinitis     Severe persistent asthma 11/07/2019    Type 2 diabetes mellitus, with long-term current use of insulin (HCC)     HbA1c (8/19/2020) = 8.1; HbA1c (8/20/2020) = 8.0 Assessment:   Changes in Assessment throughout shift: none     Patient has central line: no Reasons if yes: n/a  Insertion date:n/a Last dressing date:n/a   Patient has Barbosa Cath: no Reasons if yes: n/a   Insertion date:n/a  Shift barbosa care completed: n/a     Last Vitals:     Vitals:    08/27/20 0753 08/27/20 1514 08/27/20 1645 08/27/20 2130   BP: 140/75 156/80  171/74   Pulse: 64 65  60   Resp: 20 18  18   Temp: 97.1 °F (36.2 °C) 97.7 °F (36.5 °C)  97 °F (36.1 °C)   SpO2: 99% 95%  96%   Height:   5' 2\" (1.575 m)         PAIN    Pain Assessment    Pain Intensity 1: 0 (08/27/20 2022) Pain Intensity 1: 2 (12/29/14 1105)      Pain Location 1: Abdomen      Pain Intervention(s) 1: Medication (see MAR)  Patient Stated Pain Goal: 0 Patient Stated Pain Goal: 0  o Intervention effective:N/A no complaints of pain   o Other actions taken for pain: rest     Skin Assessment  Skin color    Condition/Temperature    Integrity    Turgor    Weekly Pressure Ulcer Documentation  Pressure  Injury Documentation: No Pressure Injury Noted-Pressure Ulcer Prevention Initiated  Wound Prevention & Protection Methods  Orientation of wound Orientation of Wound Prevention: Posterior  Location of Prevention Location of Wound Prevention: Sacrum/Coccyx  Dressing Present Dressing Present : No  Dressing Status    Wound Offloading Wound Offloading (Prevention Methods): Bed, pressure redistribution/air     INTAKE/OUPUT  Date 08/27/20 0700 - 08/28/20 0659 08/28/20 0700 - 08/29/20 0659   Shift 7861-4778 7361-1326 24 Hour Total 7962-6694 9845-9291 24 Hour Total   INTAKE   P.O. 480  480        P. O. 480  480      Shift Total 480  480      OUTPUT   Urine           Urine Occurrence(s) 3 x 2 x 5 x      Stool           Stool Occurrence(s) 1 x 0 x 1 x      Shift Total           480      Weight (kg)             Recommendations:  1. Patient needs and requests: toileting    2. Pending tests/procedures: labs     3.  Functional Level/Equipment: 1 person assist/W/C    HEALS Safety Check    A safety check occurred in the patient's room between off going nurse and oncoming nurse listed above. The safety check included the below items  Area Items   H  High Alert Medications - Verify all high alert medication drips (heparin, PCA, etc.)   E  Equipment - Suction is set up for ALL patients (with dwayne)  - Red plugs utilized for all equipment (IV pumps, etc.)  - WOWs wiped down at end of shift.  - Room stocked with oxygen, suction, and other unit-specific supplies   A  Alarms - Bed alarm is set for fall risk patients  - Ensure chair alarm is in place and activated if patient is up in a chair   L  Lines - Check IV for any infiltration  - Harrison bag is empty if patient has a Harrison   - Tubing and IV bags are labeled   S  Safety   - Room is clean, patient is clean, and equipment is clean. - Hallways are clear from equipment besides carts. - Fall bracelet on for fall risk patients  - Ensure room is clear and free of clutter  - Suction is set up for ALL patients (with dwayne)  - Hallways are clear from equipment besides carts.    - Isolation precautions followed, supplies available outside room, sign posted     Gerardo Comer RN

## 2020-08-29 LAB
GLUCOSE BLD STRIP.AUTO-MCNC: 129 MG/DL (ref 70–110)
GLUCOSE BLD STRIP.AUTO-MCNC: 138 MG/DL (ref 70–110)
GLUCOSE BLD STRIP.AUTO-MCNC: 190 MG/DL (ref 70–110)
GLUCOSE BLD STRIP.AUTO-MCNC: 196 MG/DL (ref 70–110)

## 2020-08-29 PROCEDURE — 97535 SELF CARE MNGMENT TRAINING: CPT

## 2020-08-29 PROCEDURE — 65310000000 HC RM PRIVATE REHAB

## 2020-08-29 PROCEDURE — 74011000250 HC RX REV CODE- 250: Performed by: INTERNAL MEDICINE

## 2020-08-29 PROCEDURE — 82962 GLUCOSE BLOOD TEST: CPT

## 2020-08-29 PROCEDURE — 74011250636 HC RX REV CODE- 250/636: Performed by: INTERNAL MEDICINE

## 2020-08-29 PROCEDURE — 97530 THERAPEUTIC ACTIVITIES: CPT

## 2020-08-29 PROCEDURE — 74011250637 HC RX REV CODE- 250/637: Performed by: INTERNAL MEDICINE

## 2020-08-29 PROCEDURE — 74011636637 HC RX REV CODE- 636/637: Performed by: INTERNAL MEDICINE

## 2020-08-29 RX ADMIN — IPRATROPIUM BROMIDE 0.5 MG: 0.5 SOLUTION RESPIRATORY (INHALATION) at 16:37

## 2020-08-29 RX ADMIN — HYDROCHLOROTHIAZIDE 12.5 MG: 12.5 CAPSULE ORAL at 08:03

## 2020-08-29 RX ADMIN — HEPARIN SODIUM 5000 UNITS: 5000 INJECTION INTRAVENOUS; SUBCUTANEOUS at 06:56

## 2020-08-29 RX ADMIN — METFORMIN HYDROCHLORIDE 500 MG: 500 TABLET, EXTENDED RELEASE ORAL at 16:36

## 2020-08-29 RX ADMIN — HEPARIN SODIUM 5000 UNITS: 5000 INJECTION INTRAVENOUS; SUBCUTANEOUS at 21:28

## 2020-08-29 RX ADMIN — AMLODIPINE BESYLATE 10 MG: 10 TABLET ORAL at 08:02

## 2020-08-29 RX ADMIN — ASPIRIN 81 MG CHEWABLE TABLET 81 MG: 81 TABLET CHEWABLE at 08:02

## 2020-08-29 RX ADMIN — MINOXIDIL 2.5 MG: 2.5 TABLET ORAL at 08:03

## 2020-08-29 RX ADMIN — IPRATROPIUM BROMIDE 0.5 MG: 0.5 SOLUTION RESPIRATORY (INHALATION) at 21:36

## 2020-08-29 RX ADMIN — FENOFIBRATE 145 MG: 145 TABLET ORAL at 08:03

## 2020-08-29 RX ADMIN — FERROUS SULFATE TAB 325 MG (65 MG ELEMENTAL FE) 325 MG: 325 (65 FE) TAB at 08:02

## 2020-08-29 RX ADMIN — INSULIN GLARGINE 20 UNITS: 100 INJECTION, SOLUTION SUBCUTANEOUS at 21:39

## 2020-08-29 RX ADMIN — HEPARIN SODIUM 5000 UNITS: 5000 INJECTION INTRAVENOUS; SUBCUTANEOUS at 14:03

## 2020-08-29 RX ADMIN — MONTELUKAST 10 MG: 10 TABLET, FILM COATED ORAL at 21:28

## 2020-08-29 RX ADMIN — IPRATROPIUM BROMIDE 0.5 MG: 0.5 SOLUTION RESPIRATORY (INHALATION) at 08:00

## 2020-08-29 RX ADMIN — CLOPIDOGREL BISULFATE 75 MG: 75 TABLET ORAL at 16:35

## 2020-08-29 RX ADMIN — PANTOPRAZOLE SODIUM 20 MG: 20 TABLET, DELAYED RELEASE ORAL at 06:57

## 2020-08-29 RX ADMIN — ATORVASTATIN CALCIUM 40 MG: 40 TABLET, FILM COATED ORAL at 08:03

## 2020-08-29 RX ADMIN — IPRATROPIUM BROMIDE 0.5 MG: 0.5 SOLUTION RESPIRATORY (INHALATION) at 13:12

## 2020-08-29 RX ADMIN — VALSARTAN 320 MG: 160 TABLET, FILM COATED ORAL at 08:02

## 2020-08-29 RX ADMIN — CHOLECALCIFEROL (VITAMIN D3) 25 MCG (1,000 UNIT) TABLET 2 TABLET: TABLET at 08:03

## 2020-08-29 RX ADMIN — MINOXIDIL 2.5 MG: 2.5 TABLET ORAL at 21:28

## 2020-08-29 RX ADMIN — INSULIN LISPRO 2 UNITS: 100 INJECTION, SOLUTION INTRAVENOUS; SUBCUTANEOUS at 12:03

## 2020-08-29 NOTE — ROUTINE PROCESS
SHIFT CHANGE NOTE FOR [facility]    Bedside and Verbal shift change report given to Roly Arevalo RN (oncoming nurse) by Rekha Garduno (offgoing nurse). Report included the following information SBAR, Kardex, MAR and Recent Results. Situation:   Code Status: Full Code   Hospital Day: 3   Problem List:   Hospital Problems  Date Reviewed: 8/29/2020          Codes Class Noted POA    Sciatica of left side ICD-10-CM: M54.32  ICD-9-CM: 724.3  Unknown Yes        Type 2 diabetes mellitus, with long-term current use of insulin (HCC) (Chronic) ICD-10-CM: E11.9, Z79.4  ICD-9-CM: 250.00, V58.67  Unknown Yes    Overview Signed 8/26/2020 11:46 PM by Kaye Luis MD     HbA1c (8/19/2020) = 8.1; HbA1c (8/20/2020) = 8.0             COVID-19 ruled out by laboratory testing ICD-10-CM: Z03.818  ICD-9-CM: V71.83  8/26/2020 Yes    Overview Signed 8/26/2020 11:47 PM by Kaye Luis MD     SARS-CoV-2 (Abbott m2000) (collected 8/25/2020, resulted 8/26/2020):  Not detected              Current use of aspirin ICD-10-CM: Z79.82  ICD-9-CM: V58.66  8/24/2020 Yes        On clopidogrel therapy ICD-10-CM: Z79.01  ICD-9-CM: V58.61  8/24/2020 Yes        On statin therapy due to risk of future cardiovascular event ICD-10-CM: Z79.899  ICD-9-CM: V58.69  8/24/2020 Yes    Overview Signed 8/26/2020 11:50 PM by Kaye Luis MD     On Atorvastatin             * (Principal) Acute ischemic stroke [facility]) ICD-10-CM: I63.9  ICD-9-CM: 434.91  8/21/2020 Yes    Overview Signed 8/26/2020 11:35 PM by Kaye Luis MD     Acute Ischemic Stroke (late acute/early subacute infarcts in the right centrum semiovale extending to the superior frontal lobe cortex) with residual left hemiparesis             Left hemiparesis ([facility] [address] ICD-10-CM: G81.94  ICD-9-CM: 342.90  8/21/2020 Yes        Impaired mobility and ADLs ICD-10-CM: Z74.09, Z78.9  ICD-9-CM: V49.89  8/21/2020 Yes        Pure hypercholesterolemia (Chronic) ICD-10-CM: E78.00  ICD-9-CM: 272.0  8/19/2020 Yes    Overview Signed 8/26/2020 11:45 PM by Vanesa Xavier MD     Lipid profile (8/19/2020) showed , , HDL 50,              Hypertensive heart disease without heart failure (Chronic) ICD-10-CM: I11.9  ICD-9-CM: 402.90  Unknown Yes    Overview Signed 8/26/2020 11:38 PM by Vanesa Xavier MD     2D echocardiogram (8/22/2020) showed EF 65%; mild left ventricular hypertrophy; normal diastolic function; negative bubble study at rest and after Valsalva                   Background:   Past Medical History:   Past Medical History:   Diagnosis Date    Acute ischemic stroke ([facility] [address] 8/21/2020    Acute Ischemic Stroke (late acute/early subacute infarcts in the right centrum semiovale extending to the superior frontal lobe cortex) with residual left hemiparesis    Bipolar disorder ([facility] [address]     Chronic obstructive pulmonary disease (COPD) ([facility] [address]     COVID-19 ruled out by laboratory testing 8/26/2020    SARS-CoV-2 (Abbott m2000) (collected 8/25/2020, resulted 8/26/2020):  Not detected     Current use of aspirin 8/24/2020    Depression     Gastroesophageal reflux disease     History of colon polyps     Hypertensive heart disease without heart failure     2D echocardiogram (8/22/2020) showed EF 65%; mild left ventricular hypertrophy; normal diastolic function; negative bubble study at rest and after Valsalva    Iron deficiency anemia     Left hemiparesis ([facility] [address] 8/21/2020    Menopause     Obesity, Class II, BMI 35-39.9     Obstructive sleep apnea on CPAP     On clopidogrel therapy 8/24/2020    On statin therapy due to risk of future cardiovascular event 8/24/2020    On Atorvastatin    Pure hypercholesterolemia 8/19/2020    Lipid profile (8/19/2020) showed , , HDL 50,     Sciatica of left side     Seasonal allergic rhinitis     Severe persistent asthma 11/07/2019    Type 2 diabetes mellitus, with long-term current use of insulin (HCC)     HbA1c (8/19/2020) = 8.1; HbA1c (8/20/2020) = 8.0 Assessment:   Changes in Assessment throughout shift: none     Patient has central line: no Reasons if yes: n/a  Insertion date:n/a Last dressing date:n/a   Patient has Barbosa Cath: no Reasons if yes: n/a   Insertion date:n/a  Shift barbosa care completed: n/a     Last Vitals:     Vitals:    08/28/20 1601 08/28/20 2126 08/29/20 0749 08/29/20 1645   BP: 158/77 118/68 131/71 126/71   Pulse: (!) 58  (!) 58 62   Resp: 18 18 19 19   Temp: 97.2 °F (36.2 °C) 96.9 °F (36.1 °C) 97.1 °F (36.2 °C) (!) 96.7 °F (35.9 °C)   SpO2: 97% 96% 93% 94%   Height:            PAIN    Pain Assessment    Pain Intensity 1: 0 (08/29/20 1600) Pain Intensity 1: 2 (12/29/14 1105)      Pain Location 1: Abdomen      Pain Intervention(s) 1: Medication (see MAR)  Patient Stated Pain Goal: 0 Patient Stated Pain Goal: 0  o Intervention effective:N/A no complaints of pain   o Other actions taken for pain: rest     Skin Assessment  Skin color    Condition/Temperature    Integrity    Turgor    Weekly Pressure Ulcer Documentation  Pressure  Injury Documentation: No Pressure Injury Noted-Pressure Ulcer Prevention Initiated  Wound Prevention & Protection Methods  Orientation of wound Orientation of Wound Prevention: Posterior  Location of Prevention Location of Wound Prevention: Buttocks, Sacrum/Coccyx  Dressing Present Dressing Present : No  Dressing Status    Wound Offloading Wound Offloading (Prevention Methods): Bed, pressure redistribution/air, Repositioning     INTAKE/OUPUT  Date 08/28/20 1900 - 08/29/20 0659 08/29/20 0700 - 08/30/20 0659   Shift 2214-3018 24 Hour Total 1980-8215 7862-3736 24 Hour Total   INTAKE   Shift Total        OUTPUT   Urine          Urine Occurrence(s) 3 x 7 x 2 x  2 x   Stool          Stool Occurrence(s) 0 x 0 x 0 x  0 x   Shift Total        NET        Weight (kg)            Recommendations:  1. Patient needs and requests: toileting    2. Pending tests/procedures: labs     3.  Functional Level/Equipment: 1 person assist/W/C    HEALS Safety Check    A safety check occurred in the patient's room between off going nurse and oncoming nurse listed above. The safety check included the below items  Area Items   H  High Alert Medications - Verify all high alert medication drips (heparin, PCA, etc.)   E  Equipment - Suction is set up for ALL patients (with dwayne)  - Red plugs utilized for all equipment (IV pumps, etc.)  - WOWs wiped down at end of shift.  - Room stocked with oxygen, suction, and other unit-specific supplies   A  Alarms - Bed alarm is set for fall risk patients  - Ensure chair alarm is in place and activated if patient is up in a chair   L  Lines - Check IV for any infiltration  - Harrison bag is empty if patient has a Harrison   - Tubing and IV bags are labeled   S  Safety   - Room is clean, patient is clean, and equipment is clean. - Hallways are clear from equipment besides carts. - Fall bracelet on for fall risk patients  - Ensure room is clear and free of clutter  - Suction is set up for ALL patients (with dwayne)  - Hallways are clear from equipment besides carts.    - Isolation precautions followed, supplies available outside room, sign posted     Bria Jimenez

## 2020-08-29 NOTE — ROUTINE PROCESS
SHIFT CHANGE NOTE FOR [facility]    Bedside and Verbal shift change report given to [address] [place] [address]. RN (oncoming nurse) by Erasmo Santiago RN (offgoing nurse). Report included the following information SBAR, Kardex, MAR and Recent Results. Situation:   Code Status: Full Code   Hospital Day: 3   Problem List:   Hospital Problems  Date Reviewed: 8/28/2020          Codes Class Noted POA    Sciatica of left side ICD-10-CM: M54.32  ICD-9-CM: 724.3  Unknown Yes        Type 2 diabetes mellitus, with long-term current use of insulin (HCC) (Chronic) ICD-10-CM: E11.9, Z79.4  ICD-9-CM: 250.00, V58.67  Unknown Yes    Overview Signed 8/26/2020 11:46 PM by Ford Tinajero MD     HbA1c (8/19/2020) = 8.1; HbA1c (8/20/2020) = 8.0             COVID-19 ruled out by laboratory testing ICD-10-CM: Z03.818  ICD-9-CM: V71.83  8/26/2020 Yes    Overview Signed 8/26/2020 11:47 PM by Ford Tinajero MD     SARS-CoV-2 (Abbott m2000) (collected 8/25/2020, resulted 8/26/2020):  Not detected              Current use of aspirin ICD-10-CM: Z79.82  ICD-9-CM: V58.66  8/24/2020 Yes        On clopidogrel therapy ICD-10-CM: Z79.01  ICD-9-CM: V58.61  8/24/2020 Yes        On statin therapy due to risk of future cardiovascular event ICD-10-CM: Z79.899  ICD-9-CM: V58.69  8/24/2020 Yes    Overview Signed 8/26/2020 11:50 PM by Ford Tinajero MD     On Atorvastatin             * (Principal) Acute ischemic stroke [facility]) ICD-10-CM: I63.9  ICD-9-CM: 434.91  8/21/2020 Yes    Overview Signed 8/26/2020 11:35 PM by Ford Tinajero MD     Acute Ischemic Stroke (late acute/early subacute infarcts in the right centrum semiovale extending to the superior frontal lobe cortex) with residual left hemiparesis             Left hemiparesis ([facility] [address] ICD-10-CM: G81.94  ICD-9-CM: 342.90  8/21/2020 Yes        Impaired mobility and ADLs ICD-10-CM: Z74.09, Z78.9  ICD-9-CM: V49.89  8/21/2020 Yes        Pure hypercholesterolemia (Chronic) ICD-10-CM: E78.00  ICD-9-CM: 272.0  8/19/2020 Yes Overview Signed 8/26/2020 11:45 PM by Miguelangel Khan MD     Lipid profile (8/19/2020) showed , , HDL 50,              Hypertensive heart disease without heart failure (Chronic) ICD-10-CM: I11.9  ICD-9-CM: 402.90  Unknown Yes    Overview Signed 8/26/2020 11:38 PM by Miguelangel Khan MD     2D echocardiogram (8/22/2020) showed EF 65%; mild left ventricular hypertrophy; normal diastolic function; negative bubble study at rest and after Valsalva                   Background:   Past Medical History:   Past Medical History:   Diagnosis Date    Acute ischemic stroke ([facility] [address] 8/21/2020    Acute Ischemic Stroke (late acute/early subacute infarcts in the right centrum semiovale extending to the superior frontal lobe cortex) with residual left hemiparesis    Bipolar disorder ([facility] [address]     [facility]     COVID-19 ruled out by laboratory testing 8/26/2020    SARS-CoV-2 (Abbott m2000) (collected 8/25/2020, resulted 8/26/2020):  Not detected     Current use of aspirin 8/24/2020    Depression     Gastroesophageal reflux disease     History of colon polyps     Hypertensive heart disease without heart failure     2D echocardiogram (8/22/2020) showed EF 65%; mild left ventricular hypertrophy; normal diastolic function; negative bubble study at rest and after Valsalva    Iron deficiency anemia     Left hemiparesis ([facility] [address] 8/21/2020    Menopause     Obesity, Class II, BMI 35-39.9     Obstructive sleep apnea on CPAP     On clopidogrel therapy 8/24/2020    On statin therapy due to risk of future cardiovascular event 8/24/2020    On Atorvastatin    Pure hypercholesterolemia 8/19/2020    Lipid profile (8/19/2020) showed , , HDL 50,     Sciatica of left side     Seasonal allergic rhinitis     Severe persistent asthma 11/07/2019    Type 2 diabetes mellitus, with long-term current use of insulin (HCC)     HbA1c (8/19/2020) = 8.1; HbA1c (8/20/2020) = 8.0 Assessment:   Changes in Assessment throughout shift: none     Patient has central line: no Reasons if yes: n/a  Insertion date:n/a Last dressing date:n/a   Patient has Barbosa Cath: no Reasons if yes: n/a   Insertion date:n/a  Shift barbosa care completed: n/a     Last Vitals:     Vitals:    08/27/20 2130 08/28/20 0801 08/28/20 1601 08/28/20 2126   BP: 171/74 164/86 158/77 118/68   Pulse: 60 67 (!) 58    Resp: 18 20 18 18   Temp: 97 °F (36.1 °C) 98.8 °F (37.1 °C) 97.2 °F (36.2 °C) 96.9 °F (36.1 °C)   SpO2: 96% 98% 97% 96%   Height:            PAIN    Pain Assessment    Pain Intensity 1: 0 (08/29/20 0400) Pain Intensity 1: 2 (12/29/14 1105)      Pain Location 1: Abdomen      Pain Intervention(s) 1: Medication (see MAR)  Patient Stated Pain Goal: 0 Patient Stated Pain Goal: 0  o Intervention effective:N/A no complaints of pain   o Other actions taken for pain: rest     Skin Assessment  Skin color    Condition/Temperature    Integrity    Turgor    Weekly Pressure Ulcer Documentation  Pressure  Injury Documentation: No Pressure Injury Noted-Pressure Ulcer Prevention Initiated  Wound Prevention & Protection Methods  Orientation of wound Orientation of Wound Prevention: Posterior  Location of Prevention Location of Wound Prevention: Buttocks, Sacrum/Coccyx  Dressing Present Dressing Present : No  Dressing Status    Wound Offloading Wound Offloading (Prevention Methods): Bed, pressure redistribution/air     INTAKE/OUPUT  Date 08/28/20 0700 - 08/29/20 0659 08/29/20 0700 - 08/30/20 0659   Shift 7371-5552 3797-0547 24 Hour Total 8255-6572 5340-2781 24 Hour Total   INTAKE   Shift Total         OUTPUT   Urine           Urine Occurrence(s) 4 x 2 x 6 x      Stool           Stool Occurrence(s) 0 x 0 x 0 x      Shift Total         NET         Weight (kg)             Recommendations:  1. Patient needs and requests: toileting    2. Pending tests/procedures: labs     3.  Functional Level/Equipment: 1 person assist/W/C    HEALS Safety Check    A safety check occurred in the patient's room between off going nurse and oncoming nurse listed above. The safety check included the below items  Area Items   H  High Alert Medications - Verify all high alert medication drips (heparin, PCA, etc.)   E  Equipment - Suction is set up for ALL patients (with dwayne)  - Red plugs utilized for all equipment (IV pumps, etc.)  - WOWs wiped down at end of shift.  - Room stocked with oxygen, suction, and other unit-specific supplies   A  Alarms - Bed alarm is set for fall risk patients  - Ensure chair alarm is in place and activated if patient is up in a chair   L  Lines - Check IV for any infiltration  - Harrison bag is empty if patient has a Harrison   - Tubing and IV bags are labeled   S  Safety   - Room is clean, patient is clean, and equipment is clean. - Hallways are clear from equipment besides carts. - Fall bracelet on for fall risk patients  - Ensure room is clear and free of clutter  - Suction is set up for ALL patients (with dwayne)  - Hallways are clear from equipment besides carts.    - Isolation precautions followed, supplies available outside room, sign posted     Brittany Aguilar RN

## 2020-08-29 NOTE — PROGRESS NOTES
Problem: Self Care Deficits Care Plan (Adult)  Goal: *Therapy Goal (Edit Goal, Insert Text)  Description: Occupational Therapy Goals   Short term = Long Term Goals  Initiated 2020 and to be accomplished within 2 week(s) 08/10/2020  1. Pt will perform self-feeding with I.   2. Pt will perform grooming with I.  3. Pt will perform UB bathing with Mod I.  4. Pt will perform LB bathing with Mod I.  5. Pt will perform tub/shower transfer with Mod I.   6. Pt will perform UB dressing with I.  7. Pt will perform LB dressing with I.  8. Pt will perform toileting task with mod I.   9. Pt will perform toilet transfer with mod I. Outcome: Progressing Towards Goal  Goal: Interventions  Outcome: Progressing Towards Goal   OCCUPATIONAL THERAPY TREATMENT    Patient: Wanda Self   60 y.o. Patient identified with name and : yes    Date: 2020    First Tx Session  Time In: 1329  Time Out[de-identified] 7422    Second Tx Session  Time In:   Time Out[de-identified]     Diagnosis: Acute ischemic stroke [facility]) [I63.9]   Precautions: Fall  Chart, occupational therapy assessment, plan of care, and goals were reviewed. Pain:  Pt reports 0/10 pain or discomfort prior to treatment. Pt reports 0/10 pain or discomfort post treatment. 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 IADL Daily Assessment    Pt performed laundry tasks and loaded washer with Elizabeth to pour laundry soap into cup.      UPPER BODY BATHING Daily Assessment    Upper Body Bathing  Bathing Assistance, Upper: 5 (Stand-by assistance)  Upper Body : Compensatory technique training  Position Performed: Seated in chair  Adaptive Equipment: Shower chair  Comments: OTR provided compensatory strategies to wash back     LOWER BODY BATHING Daily Assessment    Lower Body Bathing  Bathing Assistance, Lower : 5 (Stand-by assistance)  Position Performed: Seated in chair  Adaptive Equipment: Grab bar  Comments: Pt performed LB bathing seated on bench with min VC's     UPPER BODY DRESSING Daily Assessment    Upper Body Dressing   Dressing Assistance : 5 (Supervision)  Comments: (setup)     LOWER BODY DRESSING Daily Assessment    Lower Body Dressing   Dressing Assistance : 4 (Contact guard assistance)  Position Performed: Standing;Seated in chair     MOBILITY/TRANSFERS Daily Assessment     Pt performed functional mobility from pt room to therapy gym with BUE and BLE and supervision to perform laundry tasks       ASSESSMENT:  Pt demonstrates good safety awareness and is making steady progress toward goals. Pt reported performing desensitization techniques while at rest in pt room and have been very helpful and effective. Progression toward goals:  [x]          Improving appropriately and progressing toward goals  []          Improving slowly and progressing toward goals  []          Not making progress toward goals and plan of care will be adjusted     PLAN:  Patient continues to benefit from skilled intervention to address the above impairments. Continue treatment per established plan of care. Discharge Recommendations:  Home Health  Further Equipment Recommendations for Discharge:  N/A     Activity Tolerance:  good      Estimated LOS:TBD    Please refer to the flowsheet for vital signs taken during this treatment. After treatment:   [x]  Patient left in no apparent distress sitting up in chair   []  Patient left in no apparent distress in bed  [x]  Call bell left within reach  []  Nursing notified  []  Caregiver present  []  Bed alarm activated    COMMUNICATION/EDUCATION:   [x] Home safety education was provided and the patient/caregiver indicated understanding.   [] Patient/family have participated as able in goal setting and plan of care. [x] Patient/family agree to work toward stated goals and plan of care. [] Patient understands intent and goals of therapy, but is neutral about his/her participation. [] Patient is unable to participate in goal setting and plan of care.       Terrance Conception, OT

## 2020-08-29 NOTE — PROGRESS NOTES
[facility] PHYSICAL REHABILITATION  [address] [place] [address] [address]     INPATIENT REHABILITATION  DAILY PROGRESS NOTE     Date: 8/29/2020    Name: Annette Ko Age / Sex: 60 y.o. / female   CSN: [id number] MRN: [id number]   [facility] Date: 8/26/2020 Length of Stay: 3 days     Primary Rehab Diagnosis: Impaired Mobility and ADLs secondary to Acute Ischemic Stroke (late acute/early subacute infarcts in the right centrum semiovale extending to the superior frontal lobe cortex) with residual left hemiparesis      Subjective:     No new issues or problems reported. Blood pressure better controlled. Blood glucose controlled.       Objective:     Vital Signs:  Patient Vitals for the past 24 hrs:   BP Temp Pulse Resp SpO2   08/29/20 0749 131/71 97.1 °F (36.2 °C) (!) 58 19 93 %   08/28/20 2126 118/68 96.9 °F (36.1 °C) -- 18 96 %   08/28/20 1601 158/77 97.2 °F (36.2 °C) (!) 58 18 97 %        Current Medications:  Current Facility-Administered Medications   Medication Dose Route Frequency    minoxidiL (LONITEN) tablet 2.5 mg  2.5 mg Oral Q12H    metFORMIN ER (GLUCOPHAGE XR) tablet 500 mg  500 mg Oral DAILY WITH DINNER    acetaminophen (TYLENOL) tablet 650 mg  650 mg Oral Q4H PRN    bisacodyL (DULCOLAX) tablet 10 mg  10 mg Oral Q48H PRN    heparin (porcine) injection 5,000 Units  5,000 Units SubCUTAneous Q8H    insulin lispro (HUMALOG) injection   SubCUTAneous TIDAC    aspirin chewable tablet 81 mg  81 mg Oral DAILY WITH BREAKFAST    clopidogreL (PLAVIX) tablet 75 mg  75 mg Oral DAILY WITH DINNER    atorvastatin (LIPITOR) tablet 40 mg  40 mg Oral DAILY    albuterol (PROVENTIL VENTOLIN) nebulizer solution 2.5 mg  2.5 mg Nebulization Q4H PRN    amLODIPine (NORVASC) tablet 10 mg  10 mg Oral DAILY    cholecalciferol (VITAMIN D3) (1000 Units /25 mcg) tablet 2 Tab  2,000 Units Oral DAILY    fenofibrate nanocrystallized (TRICOR) tablet 145 mg  145 mg Oral DAILY    ferrous sulfate tablet 325 mg 1 Tab Oral DAILY WITH BREAKFAST    montelukast (SINGULAIR) tablet 10 mg  10 mg Oral QHS    pantoprazole (PROTONIX) tablet 20 mg  20 mg Oral ACB    ipratropium (ATROVENT) 0.02 % nebulizer solution 0.5 mg  0.5 mg Nebulization QID RT    valsartan (DIOVAN) tablet 320 mg  320 mg Oral DAILY    insulin glargine (LANTUS) injection 20 Units  20 Units SubCUTAneous QHS    glucose chewable tablet 16 g  4 Tab Oral PRN    glucagon (GLUCAGEN) injection 1 mg  1 mg IntraMUSCular PRN    dextrose (D50W) injection syrg 12.5-25 g  25-50 mL IntraVENous PRN    hydroCHLOROthiazide (MICROZIDE) capsule 12.5 mg  12.5 mg Oral DAILY       Allergies:  No Known Allergies      Lab/Data Review:  Recent Results (from the past 24 hour(s))   GLUCOSE, POC    Collection Time: 08/28/20 12:07 PM   Result Value Ref Range    Glucose (POC) 93 70 - 110 mg/dL   GLUCOSE, POC    Collection Time: 08/28/20  4:35 PM   Result Value Ref Range    Glucose (POC) 168 (H) 70 - 110 mg/dL   GLUCOSE, POC    Collection Time: 08/28/20  9:20 PM   Result Value Ref Range    Glucose (POC) 247 (H) 70 - 110 mg/dL   GLUCOSE, POC    Collection Time: 08/29/20  7:32 AM   Result Value Ref Range    Glucose (POC) 138 (H) 70 - 110 mg/dL       Estimated Glomerular Filtration Rate:  On admission, estimated GFR based on a Creatinine of 0.75 mg/dl:              Using CKD-EPI = 100.4 mL/min/1.73m2              Using MDRD = 101.4 mL/min/1.73m2      Assessment:     Primary Rehabilitation Diagnosis  1. Impaired Mobility and ADLs  2.  Acute Ischemic Stroke (late acute/early subacute infarcts in the right centrum semiovale extending to the superior frontal lobe cortex) with residual left hemiparesis     Comorbidities   Hypertensive heart disease without heart failure I11.9    Gastroesophageal reflux disease K21.9    Obstructive sleep apnea on CPAP G47.33, Z99.89    Primary osteoarthritis involving multiple joints M89.49    Chronic obstructive pulmonary disease (COPD) ([facility] [address] J44.9    Severe persistent asthma J45.50    Obesity, Class II, BMI 35-39.9 E66.9    Depression F32.9    Seasonal allergic rhinitis J30.2    History of colon polyps Z86.010    Pure hypercholesterolemia E78.00    Type 2 diabetes mellitus, with long-term current use of insulin (HCC) E11.9, Z79.4    COVID-19 ruled out by laboratory testing Z03.818    Menopause Z78.0    Iron deficiency anemia D50.9    Bipolar disorder (HCC) F31.9    Current use of aspirin Z79.82    On clopidogrel therapy Z79.01    On statin therapy due to risk of future cardiovascular event Z79.899    Sciatica of left side M54.32        Plan:     1. Justification for continued stay: Good progression towards established rehabilitation goals. 2. Medical Issues being followed closely:    [x]  Fall and safety precautions     []  Wound Care     [x]  Bowel and Bladder Function     [x]  Fluid Electrolyte and Nutrition Balance     []  Pain Control      3. Issues that 24 hour rehabilitation nursing is following:    [x]  Fall and safety precautions     []  Wound Care     [x]  Bowel and Bladder Function     [x]  Fluid Electrolyte and Nutrition Balance     []  Pain Control      [x]  Assistance with and education on in-room safety with transfers to and from the bed, wheelchair, toilet and shower. 4. Acute rehabilitation plan of care:    [x]  Continue current care and rehab. [x]  Physical Therapy           [x]  Occupational Therapy           [x]  Speech Therapy     []  Hold Rehab until further notice     5. Medications:    [x]  MAR Reviewed     [x]  Continue Present Medications     6. DVT Prophylaxis:      []  Lovenox     [x]  Unfractionated Heparin     []  Coumadin     []  NOAC     []  MARTHA Stockings     []  Sequential Compression Device     []  None     7. Code status    [x]  Full code     []  Partial code     []  Do not intubate     []  Do not resuscitate     8.  Orders:   > Acute Ischemic Stroke (late acute/early subacute infarcts in the right centrum semiovale extending to the superior frontal lobe cortex) with residual left hemiparesis   > Dual antiplatelet therapy (DAPT) was started 8/24/2020   > Duration of DAPT not specified on discharge summary   > Will defer duration of DAPT to Neurology upon outpatient follow up    > Continue:    > Aspirin 81 mg PO once daily with breakfast    > Atorvastatin 40 mg PO once daily    > Clopidogrel 75 mg PO once daily with dinner     > Hypertensive heart disease without heart failure   > 2D echocardiogram (8/22/2020) showed EF 65%; mild left ventricular hypertrophy; normal diastolic function; negative bubble study at rest and after Valsalva   > On 8/28/2020, started Minoxidil 2.5 mg PO q 12 hr (9AM, 9PM)   > Continue:    > Amlodipine 10 mg PO once daily (9AM)    > Hydrochlorothiazide 12.5 mg PO once daily (9AM)    > Minoxidil 2.5 mg PO q 12 hr (9AM, 9PM)    > Valsartan 320 mg PO once daily (9AM)    > Pure hypercholesterolemia   > Lipid profile (8/19/2020) showed , , HDL 50,    > Continue:    > Atorvastatin 40 mg PO once daily    > Fenofibrate 145 mg PO once daily    > Type 2 diabetes mellitus, with long term current use of insulin   > HbA1c (8/19/2020) = 8.1; HbA1c (8/20/2020) = 8.0   > On 8/26/2020, increased Insulin glargine from 10 units to 20 units SC q HS   > On 8/28/2020:    > Started Metformin  mg PO once daily with dinner    > Decreased Insulin glargine from 20 units to 15 units SC q HS   > Continue:    > Insulin glargine 15 units SC q HS    > Insulin lispro sliding scale SC TID AC only    > History of wound culture positive for MRSA (3/27/2012)   > Nasal swab to rule out MRSA colonization   > Contact precautions until results comes back negative   > MRSA culture (collected 8/27/2020, resulted 8/28/2020): Not present     > COVID-19 ruled out by laboratory testing   > TERRENCE-CoV-2 (Abbott m2000) (collected 8/25/2020, resulted 8/26/2020):  Not detected    > Analgesia   > Continue Acetaminophen 650 mg PO q 4 hr PRN for pain level less than 5/10    > Diet:   > Specifications: Cardiac, diabetic, consistent carb 1800 kcal, no concentrated sweets   > Solids (consistency): Regular    > Liquids (consistency):  Thin    > Fluid restriction: None      Signed:    Yoselin Valerio MD    August 29, 2020

## 2020-08-29 NOTE — PROGRESS NOTES
Problem: Falls - Risk of  Goal: *Absence of Falls  Description: Document Michael Shipley Fall Risk and appropriate interventions in the flowsheet. Outcome: Progressing Towards Goal  Note: Fall Risk Interventions:  Mobility Interventions: Patient to call before getting OOB, Bed/chair exit alarm, Utilize walker, cane, or other assistive device              Elimination Interventions: Patient to call for help with toileting needs, Call light in reach, Bed/chair exit alarm    History of Falls Interventions: Door open when patient unattended, Bed/chair exit alarm         Problem: Patient Education: Go to Patient Education Activity  Goal: Patient/Family Education  Outcome: Progressing Towards Goal     Problem: Pressure Injury - Risk of  Goal: *Prevention of pressure injury  Description: Document Filippo Scale and appropriate interventions in the flowsheet. Outcome: Progressing Towards Goal  Note: Pressure Injury Interventions:  Sensory Interventions: Minimize linen layers, Pressure redistribution bed/mattress (bed type), Keep linens dry and wrinkle-free, Assess changes in LOC, Maintain/enhance activity level         Activity Interventions: Pressure redistribution bed/mattress(bed type)    Mobility Interventions: Pressure redistribution bed/mattress (bed type), HOB 30 degrees or less    Nutrition Interventions: Document food/fluid/supplement intake                     Problem: Patient Education: Go to Patient Education Activity  Goal: Patient/Family Education  Outcome: Progressing Towards Goal     Problem: Risk for Spread of Infection  Goal: Prevent transmission of infectious organism to others  Description: Prevent the transmission of infectious organisms to other patients, staff members, and visitors.   Outcome: Progressing Towards Goal     Problem: Patient Education:  Go to Education Activity  Goal: Patient/Family Education  Outcome: Progressing Towards Goal

## 2020-08-30 LAB
GLUCOSE BLD STRIP.AUTO-MCNC: 129 MG/DL (ref 70–110)
GLUCOSE BLD STRIP.AUTO-MCNC: 139 MG/DL (ref 70–110)
GLUCOSE BLD STRIP.AUTO-MCNC: 189 MG/DL (ref 70–110)
GLUCOSE BLD STRIP.AUTO-MCNC: 98 MG/DL (ref 70–110)

## 2020-08-30 PROCEDURE — 74011250637 HC RX REV CODE- 250/637: Performed by: INTERNAL MEDICINE

## 2020-08-30 PROCEDURE — 74011636637 HC RX REV CODE- 636/637: Performed by: INTERNAL MEDICINE

## 2020-08-30 PROCEDURE — 74011000250 HC RX REV CODE- 250: Performed by: INTERNAL MEDICINE

## 2020-08-30 PROCEDURE — 94640 AIRWAY INHALATION TREATMENT: CPT

## 2020-08-30 PROCEDURE — 74011250636 HC RX REV CODE- 250/636: Performed by: INTERNAL MEDICINE

## 2020-08-30 PROCEDURE — 97530 THERAPEUTIC ACTIVITIES: CPT

## 2020-08-30 PROCEDURE — 65310000000 HC RM PRIVATE REHAB

## 2020-08-30 PROCEDURE — 82962 GLUCOSE BLOOD TEST: CPT

## 2020-08-30 PROCEDURE — 97535 SELF CARE MNGMENT TRAINING: CPT

## 2020-08-30 RX ORDER — MINOXIDIL 2.5 MG/1
5 TABLET ORAL EVERY 12 HOURS
Status: DISCONTINUED | OUTPATIENT
Start: 2020-08-30 | End: 2020-09-04 | Stop reason: HOSPADM

## 2020-08-30 RX ORDER — INSULIN GLARGINE 100 [IU]/ML
22 INJECTION, SOLUTION SUBCUTANEOUS
Status: DISCONTINUED | OUTPATIENT
Start: 2020-08-30 | End: 2020-09-04 | Stop reason: HOSPADM

## 2020-08-30 RX ADMIN — MINOXIDIL 2.5 MG: 2.5 TABLET ORAL at 08:21

## 2020-08-30 RX ADMIN — AMLODIPINE BESYLATE 10 MG: 10 TABLET ORAL at 08:21

## 2020-08-30 RX ADMIN — MONTELUKAST 10 MG: 10 TABLET, FILM COATED ORAL at 21:19

## 2020-08-30 RX ADMIN — IPRATROPIUM BROMIDE 0.5 MG: 0.5 SOLUTION RESPIRATORY (INHALATION) at 07:57

## 2020-08-30 RX ADMIN — FERROUS SULFATE TAB 325 MG (65 MG ELEMENTAL FE) 325 MG: 325 (65 FE) TAB at 08:21

## 2020-08-30 RX ADMIN — INSULIN GLARGINE 22 UNITS: 100 INJECTION, SOLUTION SUBCUTANEOUS at 21:21

## 2020-08-30 RX ADMIN — HEPARIN SODIUM 5000 UNITS: 5000 INJECTION INTRAVENOUS; SUBCUTANEOUS at 06:11

## 2020-08-30 RX ADMIN — IPRATROPIUM BROMIDE 0.5 MG: 0.5 SOLUTION RESPIRATORY (INHALATION) at 21:19

## 2020-08-30 RX ADMIN — VALSARTAN 320 MG: 160 TABLET, FILM COATED ORAL at 08:21

## 2020-08-30 RX ADMIN — HEPARIN SODIUM 5000 UNITS: 5000 INJECTION INTRAVENOUS; SUBCUTANEOUS at 21:19

## 2020-08-30 RX ADMIN — HYDROCHLOROTHIAZIDE 12.5 MG: 12.5 CAPSULE ORAL at 08:21

## 2020-08-30 RX ADMIN — ATORVASTATIN CALCIUM 40 MG: 40 TABLET, FILM COATED ORAL at 08:21

## 2020-08-30 RX ADMIN — HEPARIN SODIUM 5000 UNITS: 5000 INJECTION INTRAVENOUS; SUBCUTANEOUS at 14:34

## 2020-08-30 RX ADMIN — IPRATROPIUM BROMIDE 0.5 MG: 0.5 SOLUTION RESPIRATORY (INHALATION) at 16:02

## 2020-08-30 RX ADMIN — MINOXIDIL 5 MG: 2.5 TABLET ORAL at 21:19

## 2020-08-30 RX ADMIN — FENOFIBRATE 145 MG: 145 TABLET ORAL at 08:21

## 2020-08-30 RX ADMIN — CLOPIDOGREL BISULFATE 75 MG: 75 TABLET ORAL at 17:09

## 2020-08-30 RX ADMIN — IPRATROPIUM BROMIDE 0.5 MG: 0.5 SOLUTION RESPIRATORY (INHALATION) at 11:55

## 2020-08-30 RX ADMIN — PANTOPRAZOLE SODIUM 20 MG: 20 TABLET, DELAYED RELEASE ORAL at 06:11

## 2020-08-30 RX ADMIN — CHOLECALCIFEROL (VITAMIN D3) 25 MCG (1,000 UNIT) TABLET 2 TABLET: TABLET at 08:21

## 2020-08-30 RX ADMIN — ASPIRIN 81 MG CHEWABLE TABLET 81 MG: 81 TABLET CHEWABLE at 08:21

## 2020-08-30 RX ADMIN — METFORMIN HYDROCHLORIDE 500 MG: 500 TABLET, EXTENDED RELEASE ORAL at 17:09

## 2020-08-30 NOTE — PROGRESS NOTES
Problem: Self Care Deficits Care Plan (Adult)  Goal: *Therapy Goal (Edit Goal, Insert Text)  Description: Occupational Therapy Goals   Short term = Long Term Goals  Initiated 2020 and to be accomplished within 2 week(s) 08/10/2020  1. Pt will perform self-feeding with I.   2. Pt will perform grooming with I.  3. Pt will perform UB bathing with Mod I.  4. Pt will perform LB bathing with Mod I.  5. Pt will perform tub/shower transfer with Mod I.   6. Pt will perform UB dressing with I.  7. Pt will perform LB dressing with I.  8. Pt will perform toileting task with mod I.   9. Pt will perform toilet transfer with mod I. Outcome: Progressing Towards Goal  Goal: Interventions  Outcome: Progressing Towards Goal   OCCUPATIONAL THERAPY TREATMENT    Patient: Hakeem Hi   60 y.o. Patient identified with name and : yes    Date: 2020    First Tx Session  Time In: 0854  Time Out[de-identified] 3166    Second Tx Session  Time In:   Time Out[de-identified]     Diagnosis: Acute ischemic stroke [facility]) [I63.9]   Precautions: Fall  Chart, occupational therapy assessment, plan of care, and goals were reviewed. Pain:  Pt reports 0/10 pain or discomfort prior to treatment. Pt reports 0/10 pain or discomfort post treatment. Intervention Provided:       SUBJECTIVE:   Patient stated I don't why I haven't been able to have a bowel movement.    Pt reported feeling constipated and feeling like needing to use the bathroom however   Unable to have a bowel movement. RN notified and reported pt receiiving stool softener however  Unable to administer anything else at this time.   OBJECTIVE DATA SUMMARY:       UPPER BODY BATHING Daily Assessment    Upper Body Bathing  Bathing Assistance, Upper: 5 (Stand-by assistance)  Upper Body : Compensatory technique training  Position Performed: Seated in chair  Adaptive Equipment: Shower chair     LOWER BODY BATHING Daily Assessment    Lower Body [address] [facility], Lower : 5 (Stand-by assistance)  Adaptive Equipment: Grab bar  Position Performed: Seated in chair  Adaptive Equipment: Grab bar  Comments: use of compesatory strategies weight shifting to increase independence and safety with no VC's     TOILETING Daily Assessment    Toileting  Toileting Assistance (FIM Score): 5 (Supervision)  Cues: Verbal cues provided  Adaptive Equipment: Grab bars; Walker     UPPER BODY DRESSING Daily Assessment    Upper Body Dressing   Dressing Assistance : 5 (Supervision)     LOWER BODY DRESSING Daily Assessment    Lower Body Dressing   Dressing Assistance : 5 (Stand-by assistance)  Leg Crossed Method Used: Yes  Position Performed: Standing;Seated in chair  Adaptive Equipment Used: Reacher  Comments: Provided education for long handled reacher and issued for increased independence with LB dressing and reaching from floor to grasp items  Pt required Elizabeth with use of sock aid to pull out from sock     MOBILITY/TRANSFERS Daily Assessment    Functional Transfers  Toilet Transfer : Grab bars;Elevated seat  Amount of Assistance Required: 5 (stand-by assistance)  Amount of Assistance Required: 5 (Supervision/setup)  Adaptive Equipment: Grab bars; Walker (comment)       ASSESSMENT:  Pt is making good progress toward goals however concerned of being constipated. Pt reported drinking tea may help and steeped detox tea with setup. Progression toward goals:  [x]          Improving appropriately and progressing toward goals  []          Improving slowly and progressing toward goals  []          Not making progress toward goals and plan of care will be adjusted     PLAN:  Patient continues to benefit from skilled intervention to address the above impairments. Continue treatment per established plan of care.   Discharge Recommendations:  Home Health  Further Equipment Recommendations for Discharge:  N/A     Activity Tolerance:  good      Estimated LOS:TBD    Please refer to the flowsheet for vital signs taken during this treatment. After treatment:   []  Patient left in no apparent distress sitting up in chair   [x]  Patient left in no apparent distress in bed  [x]  Call bell left within reach  [x]  Nursing notified  []  Caregiver present  []  Bed alarm activated    COMMUNICATION/EDUCATION:   [] Home safety education was provided and the patient/caregiver indicated understanding. [] Patient/family have participated as able in goal setting and plan of care. [x] Patient/family agree to work toward stated goals and plan of care. [] Patient understands intent and goals of therapy, but is neutral about his/her participation. [] Patient is unable to participate in goal setting and plan of care.       Shirin Gonzalez, OT

## 2020-08-30 NOTE — PROGRESS NOTES
[facility] PHYSICAL [address]     INPATIENT REHABILITATION  DAILY PROGRESS NOTE     Date: 8/30/2020    Name: Cruz Macedo Age / Sex: 60 y.o. / female   CSN: [id number] MRN: [id number]   [facility] Date: 8/26/2020 Length of Stay: 4 days     Primary Rehab Diagnosis: Impaired Mobility and ADLs secondary to Acute Ischemic Stroke (late acute/early subacute infarcts in the right centrum semiovale extending to the superior frontal lobe cortex) with residual left hemiparesis      Subjective:     No new issues or problems reported. Blood pressure better controlled. Blood glucose controlled.       Objective:     Vital Signs:  Patient Vitals for the past 24 hrs:   BP Temp Pulse Resp SpO2   08/30/20 0757 -- -- -- -- 99 %   08/30/20 0751 142/74 97 °F (36.1 °C) 67 17 97 %   08/29/20 2128 130/70 98.2 °F (36.8 °C) 77 18 98 %   08/29/20 1645 126/71 (!) 96.7 °F (35.9 °C) 62 19 94 %        Current Medications:  Current Facility-Administered Medications   Medication Dose Route Frequency    minoxidiL (LONITEN) tablet 2.5 mg  2.5 mg Oral Q12H    metFORMIN ER (GLUCOPHAGE XR) tablet 500 mg  500 mg Oral DAILY WITH DINNER    acetaminophen (TYLENOL) tablet 650 mg  650 mg Oral Q4H PRN    bisacodyL (DULCOLAX) tablet 10 mg  10 mg Oral Q48H PRN    heparin (porcine) injection 5,000 Units  5,000 Units SubCUTAneous Q8H    insulin lispro (HUMALOG) injection   SubCUTAneous TIDAC    aspirin chewable tablet 81 mg  81 mg Oral DAILY WITH BREAKFAST    clopidogreL (PLAVIX) tablet 75 mg  75 mg Oral DAILY WITH DINNER    atorvastatin (LIPITOR) tablet 40 mg  40 mg Oral DAILY    albuterol (PROVENTIL VENTOLIN) nebulizer solution 2.5 mg  2.5 mg Nebulization Q4H PRN    amLODIPine (NORVASC) tablet 10 mg  10 mg Oral DAILY    cholecalciferol (VITAMIN D3) (1000 Units /25 mcg) tablet 2 Tab  2,000 Units Oral DAILY    fenofibrate nanocrystallized (TRICOR) tablet 145 mg  145 mg Oral DAILY    ferrous sulfate tablet 325 mg  1 Tab Oral DAILY WITH BREAKFAST    montelukast (SINGULAIR) tablet 10 mg  10 mg Oral QHS    pantoprazole (PROTONIX) tablet 20 mg  20 mg Oral ACB    ipratropium (ATROVENT) 0.02 % nebulizer solution 0.5 mg  0.5 mg Nebulization QID RT    valsartan (DIOVAN) tablet 320 mg  320 mg Oral DAILY    insulin glargine (LANTUS) injection 20 Units  20 Units SubCUTAneous QHS    glucose chewable tablet 16 g  4 Tab Oral PRN    glucagon (GLUCAGEN) injection 1 mg  1 mg IntraMUSCular PRN    dextrose (D50W) injection syrg 12.5-25 g  25-50 mL IntraVENous PRN    hydroCHLOROthiazide (MICROZIDE) capsule 12.5 mg  12.5 mg Oral DAILY       Allergies:  No Known Allergies      Lab/Data Review:  Recent Results (from the past 24 hour(s))   GLUCOSE, POC    Collection Time: 08/29/20 11:30 AM   Result Value Ref Range    Glucose (POC) 190 (H) 70 - 110 mg/dL   GLUCOSE, POC    Collection Time: 08/29/20  4:14 PM   Result Value Ref Range    Glucose (POC) 129 (H) 70 - 110 mg/dL   GLUCOSE, POC    Collection Time: 08/29/20  9:27 PM   Result Value Ref Range    Glucose (POC) 196 (H) 70 - 110 mg/dL   GLUCOSE, POC    Collection Time: 08/30/20  7:59 AM   Result Value Ref Range    Glucose (POC) 129 (H) 70 - 110 mg/dL       Estimated Glomerular Filtration Rate:  On admission, estimated GFR based on a Creatinine of 0.75 mg/dl:              Using CKD-EPI = 100.4 mL/min/1.73m2              Using MDRD = 101.4 mL/min/1.73m2      Assessment:     Primary Rehabilitation Diagnosis  1. Impaired Mobility and ADLs  2.  Acute Ischemic Stroke (late acute/early subacute infarcts in the right centrum semiovale extending to the superior frontal lobe cortex) with residual left hemiparesis     Comorbidities   Hypertensive heart disease without heart failure I11.9    Gastroesophageal reflux disease K21.9    Obstructive sleep apnea on CPAP G47.33, Z99.89    Primary osteoarthritis involving multiple joints M89.49    Chronic obstructive pulmonary disease (COPD) ([facility] [address] J44.9    Severe persistent asthma J45.50    Obesity, Class II, BMI 35-39.9 E66.9    Depression F32.9    Seasonal allergic rhinitis J30.2    History of colon polyps Z86.010    Pure hypercholesterolemia E78.00    Type 2 diabetes mellitus, with long-term current use of insulin (HCC) E11.9, Z79.4    COVID-19 ruled out by laboratory testing Z03.818    Menopause Z78.0    Iron deficiency anemia D50.9    Bipolar disorder ([facility]) F31.9    Current use of aspirin Z79.82    On clopidogrel therapy Z79.01    On statin therapy due to risk of future cardiovascular event Z79.899    Sciatica of left side M54.32        Plan:     1. Justification for continued stay: Good progression towards established rehabilitation goals. 2. Medical Issues being followed closely:    [x]  Fall and safety precautions     []  Wound Care     [x]  Bowel and Bladder Function     [x]  Fluid Electrolyte and Nutrition Balance     []  Pain Control      3. Issues that 24 hour rehabilitation nursing is following:    [x]  Fall and safety precautions     []  Wound Care     [x]  Bowel and Bladder Function     [x]  Fluid Electrolyte and Nutrition Balance     []  Pain Control      [x]  Assistance with and education on in-room safety with transfers to and from the bed, wheelchair, toilet and shower. 4. Acute rehabilitation plan of care:    [x]  Continue current care and rehab. [x]  Physical Therapy           [x]  Occupational Therapy           [x]  Speech Therapy     []  Hold Rehab until further notice     5. Medications:    [x]  MAR Reviewed     [x]  Continue Present Medications     6. DVT Prophylaxis:      []  Lovenox     [x]  Unfractionated Heparin     []  Coumadin     []  NOAC     []  MARTHA Stockings     []  Sequential Compression Device     []  None     7. Code status    [x]  Full code     []  Partial code     []  Do not intubate     []  Do not resuscitate     8.  Orders:   > Acute Ischemic Stroke (late acute/early subacute infarcts in the right centrum semiovale extending to the superior frontal lobe cortex) with residual left hemiparesis   > Dual antiplatelet therapy (DAPT) was started 8/24/2020   > Duration of DAPT not specified on discharge summary   > Will defer duration of DAPT to Neurology upon outpatient follow up    > Continue:    > Aspirin 81 mg PO once daily with breakfast    > Atorvastatin 40 mg PO once daily    > Clopidogrel 75 mg PO once daily with dinner     > Hypertensive heart disease without heart failure   > 2D echocardiogram (8/22/2020) showed EF 65%; mild left ventricular hypertrophy; normal diastolic function; negative bubble study at rest and after Valsalva   > On 8/28/2020, started Minoxidil 2.5 mg PO q 12 hr (9AM, 9PM)   > Continue:    > Amlodipine 10 mg PO once daily (9AM)    > Hydrochlorothiazide 12.5 mg PO once daily (9AM)    > Increase Minoxidil from 2.5 mg to 5 mg PO q 12 hr (9AM, 9PM)    > Valsartan 320 mg PO once daily (9AM)    > Pure hypercholesterolemia   > Lipid profile (8/19/2020) showed , , HDL 50,    > Continue:    > Atorvastatin 40 mg PO once daily    > Fenofibrate 145 mg PO once daily    > Type 2 diabetes mellitus, with long term current use of insulin   > HbA1c (8/19/2020) = 8.1; HbA1c (8/20/2020) = 8.0   > On 8/26/2020, increased Insulin glargine from 10 units to 20 units SC q HS   > On 8/28/2020, started Metformin  mg PO once daily with dinner   > Continue:    > Increase Insulin glargine from 20 units to 22 units SC q HS    > Insulin lispro sliding scale SC TID AC only    > History of wound culture positive for MRSA (3/27/2012)   > Nasal swab done to rule out MRSA colonization   > MRSA culture (collected 8/27/2020, resulted 8/28/2020): Not present     > COVID-19 ruled out by laboratory testing   > SARS-CoV-2 (Abbott m2000) (collected 8/25/2020, resulted 8/26/2020):  Not detected    > Analgesia   > Continue Acetaminophen 650 mg PO q 4 hr PRN for pain level less than 5/10    > Diet:   > Specifications: Cardiac, diabetic, consistent carb 1800 kcal, no concentrated sweets   > Solids (consistency): Regular    > Liquids (consistency):  Thin    > Fluid restriction: None      Signed:    Sue Mcintyre MD    August 30, 2020

## 2020-08-30 NOTE — ROUTINE PROCESS
SHIFT CHANGE NOTE FOR [facility]    Bedside and Verbal shift change report given to Digna Johnson RN (oncoming nurse) by Abdoul Castro RN (offgoing nurse). Report included the following information SBAR, Kardex, MAR and Recent Results. Situation:   Code Status: Full Code   Hospital Day: 4   Problem List:   Hospital Problems  Date Reviewed: 8/29/2020          Codes Class Noted POA    Sciatica of left side ICD-10-CM: M54.32  ICD-9-CM: 724.3  Unknown Yes        Type 2 diabetes mellitus, with long-term current use of insulin (HCC) (Chronic) ICD-10-CM: E11.9, Z79.4  ICD-9-CM: 250.00, V58.67  Unknown Yes    Overview Signed 8/26/2020 11:46 PM by Bella Reyes MD     HbA1c (8/19/2020) = 8.1; HbA1c (8/20/2020) = 8.0             COVID-19 ruled out by laboratory testing ICD-10-CM: Z03.818  ICD-9-CM: V71.83  8/26/2020 Yes    Overview Signed 8/26/2020 11:47 PM by Bella Reyes MD     SARS-CoV-2 ([facility]ott m2000) (collected 8/25/2020, resulted 8/26/2020):  Not detected              Current use of aspirin ICD-10-CM: Z79.82  ICD-9-CM: V58.66  8/24/2020 Yes        On clopidogrel therapy ICD-10-CM: Z79.01  ICD-9-CM: V58.61  8/24/2020 Yes        On statin therapy due to risk of future cardiovascular event ICD-10-CM: Z79.899  ICD-9-CM: V58.69  8/24/2020 Yes    Overview Signed 8/26/2020 11:50 PM by Bella Reyes MD     On Atorvastatin             * (Principal) Acute ischemic stroke [facility]) ICD-10-CM: I63.9  ICD-9-CM: 434.91  8/21/2020 Yes    Overview Signed 8/26/2020 11:35 PM by Bella Reyes MD     Acute Ischemic Stroke (late acute/early subacute infarcts in the right centrum semiovale extending to the superior frontal lobe cortex) with residual left hemiparesis             Left hemiparesis ([facility] [address] ICD-10-CM: G81.94  ICD-9-CM: 342.90  8/21/2020 Yes        Impaired mobility and ADLs ICD-10-CM: Z74.09, Z78.9  ICD-9-CM: V49.89  8/21/2020 Yes        Pure hypercholesterolemia (Chronic) ICD-10-CM: E78.00  ICD-9-CM: 272.0  8/19/2020 Yes Overview Signed 8/26/2020 11:45 PM by Emory Mejia MD     Lipid profile (8/19/2020) showed , , HDL 50,              Hypertensive heart disease without heart failure (Chronic) ICD-10-CM: I11.9  ICD-9-CM: 402.90  Unknown Yes    Overview Signed 8/26/2020 11:38 PM by Emory Mejia MD     2D echocardiogram (8/22/2020) showed EF 65%; mild left ventricular hypertrophy; normal diastolic function; negative bubble study at rest and after Valsalva                   Background:   Past Medical History:   Past Medical History:   Diagnosis Date    Acute ischemic stroke ([facility] [address] 8/21/2020    Acute Ischemic Stroke (late acute/early subacute infarcts in the right centrum semiovale extending to the superior frontal lobe cortex) with residual left hemiparesis    Bipolar disorder ([facility] [address]     Chronic obstructive pulmonary disease (COPD) ([facility] [address]     COVID-19 ruled out by laboratory testing 8/26/2020    SARS-CoV-2 ([facility] m2000) (collected 8/25/2020, resulted 8/26/2020):  Not detected     Current use of aspirin 8/24/2020    Depression     Gastroesophageal reflux disease     History of colon polyps     Hypertensive heart disease without heart failure     2D echocardiogram (8/22/2020) showed EF 65%; mild left ventricular hypertrophy; normal diastolic function; negative bubble study at rest and after Valsalva    Iron deficiency anemia     Left hemiparesis ([facility] [address] 8/21/2020    Menopause     Obesity, Class II, BMI 35-39.9     Obstructive sleep apnea on CPAP     On clopidogrel therapy 8/24/2020    On statin therapy due to risk of future cardiovascular event 8/24/2020    On Atorvastatin    Pure hypercholesterolemia 8/19/2020    Lipid profile (8/19/2020) showed , , HDL 50,     Sciatica of left side     Seasonal allergic rhinitis     Severe persistent asthma 11/07/2019    Type 2 diabetes mellitus, with long-term current use of insulin (HCC)     HbA1c (8/19/2020) = 8.1; HbA1c (8/20/2020) = 8.0        Assessment:   Changes in Assessment throughout shift: none     Patient has central line: no Reasons if yes: n/a  Insertion date:n/a Last dressing date:n/a   Patient has Barbosa Cath: no Reasons if yes: n/a   Insertion date:n/a  Shift barbosa care completed: n/a     Last Vitals:     Vitals:    08/29/20 1645 08/29/20 2128 08/30/20 0751 08/30/20 0757   BP: 126/71 130/70 142/74    Pulse: 62 77 67    Resp: 19 18 17    Temp: (!) 96.7 °F (35.9 °C) 98.2 °F (36.8 °C) 97 °F (36.1 °C)    SpO2: 94% 98% 97% 99%   Height:            PAIN    Pain Assessment    Pain Intensity 1: 0 (08/30/20 0400) Pain Intensity 1: 2 (12/29/14 1105)      Pain Location 1: Abdomen      Pain Intervention(s) 1: Medication (see MAR)  Patient Stated Pain Goal: 0 Patient Stated Pain Goal: 0  o Intervention effective:N/A no complaints of pain   o Other actions taken for pain: rest     Skin Assessment  Skin color    Condition/Temperature    Integrity    Turgor    Weekly Pressure Ulcer Documentation  Pressure  Injury Documentation: No Pressure Injury Noted-Pressure Ulcer Prevention Initiated  Wound Prevention & Protection Methods  Orientation of wound Orientation of Wound Prevention: Posterior  Location of Prevention Location of Wound Prevention: Buttocks, Sacrum/Coccyx  Dressing Present Dressing Present : No  Dressing Status    Wound Offloading Wound Offloading (Prevention Methods): Bed, pressure redistribution/air, Repositioning     INTAKE/OUPUT  Date 08/29/20 0700 - 08/30/20 0659 08/30/20 0700 - 08/31/20 0659   Shift 7885-8282 5446-9617 24 Hour Total 7721-0120 2454-2381 24 Hour Total   INTAKE   Shift Total         OUTPUT   Urine           Urine Occurrence(s) 2 x 4 x 6 x      Emesis/NG output           Emesis Occurrence(s)  0 x 0 x      Stool           Stool Occurrence(s) 0 x 0 x 0 x      Shift Total         NET         Weight (kg)             Recommendations:  1. Patient needs and requests: toileting    2.  Pending tests/procedures: labs 3. Functional Level/Equipment: 1 person assist/W/C    HEALS Safety Check    A safety check occurred in the patient's room between off going nurse and oncoming nurse listed above. The safety check included the below items  Area Items   H  High Alert Medications - Verify all high alert medication drips (heparin, PCA, etc.)   E  Equipment - Suction is set up for ALL patients (with dwayne)  - Red plugs utilized for all equipment (IV pumps, etc.)  - WOWs wiped down at end of shift.  - Room stocked with oxygen, suction, and other unit-specific supplies   A  Alarms - Bed alarm is set for fall risk patients  - Ensure chair alarm is in place and activated if patient is up in a chair   L  Lines - Check IV for any infiltration  - Harrison bag is empty if patient has a Harrison   - Tubing and IV bags are labeled   S  Safety   - Room is clean, patient is clean, and equipment is clean. - Hallways are clear from equipment besides carts. - Fall bracelet on for fall risk patients  - Ensure room is clear and free of clutter  - Suction is set up for ALL patients (with dwayne)  - Hallways are clear from equipment besides carts.    - Isolation precautions followed, supplies available outside room, sign posted     Razia Arrieta RN

## 2020-08-30 NOTE — ROUTINE PROCESS
[facility]    Bedside and Verbal shift change report given to Jolene RN (oncoming nurse) by Wesly Clemons RN (offgoing nurse). Report included the following information SBAR, Kardex, MAR and Recent Results. Situation:   Code Status: Full Code   Hospital Day: 4   Problem List:   Hospital Problems  Date Reviewed: 8/30/2020          Codes Class Noted POA    Sciatica of left side ICD-10-CM: M54.32  ICD-9-CM: 724.3  Unknown Yes        Type 2 diabetes mellitus, with long-term current use of insulin (HCC) (Chronic) ICD-10-CM: E11.9, Z79.4  ICD-9-CM: 250.00, V58.67  Unknown Yes    Overview Signed 8/26/2020 11:46 PM by Bella Reyes MD     HbA1c (8/19/2020) = 8.1; HbA1c (8/20/2020) = 8.0             COVID-19 ruled out by laboratory testing ICD-10-CM: Z03.818  ICD-9-CM: V71.83  8/26/2020 Yes    Overview Signed 8/26/2020 11:47 PM by Bella Reyes MD     SARS-CoV-2 ([facility] m2000) (collected 8/25/2020, resulted 8/26/2020):  Not detected              Current use of aspirin ICD-10-CM: Z79.82  ICD-9-CM: V58.66  8/24/2020 Yes        On clopidogrel therapy ICD-10-CM: Z79.01  ICD-9-CM: V58.61  8/24/2020 Yes        On statin therapy due to risk of future cardiovascular event ICD-10-CM: Z79.899  ICD-9-CM: V58.69  8/24/2020 Yes    Overview Signed 8/26/2020 11:50 PM by Bella Reyes MD     On Atorvastatin             * (Principal) Acute ischemic stroke [facility]) ICD-10-CM: I63.9  ICD-9-CM: 434.91  8/21/2020 Yes    Overview Signed 8/26/2020 11:35 PM by Bella Reyes MD     Acute Ischemic Stroke (late acute/early subacute infarcts in the right centrum semiovale extending to the superior frontal lobe cortex) with residual left hemiparesis             Left hemiparesis ([facility] [address] ICD-10-CM: G81.94  ICD-9-CM: 342.90  8/21/2020 Yes        Impaired mobility and ADLs ICD-10-CM: Z74.09, Z78.9  ICD-9-CM: V49.89  8/21/2020 Yes        Pure hypercholesterolemia (Chronic) ICD-10-CM: E78.00  ICD-9-CM: 272.0  8/19/2020 Yes Overview Signed 8/26/2020 11:45 PM by Sebastian Garcia MD     Lipid profile (8/19/2020) showed , , HDL 50,              Hypertensive heart disease without heart failure (Chronic) ICD-10-CM: I11.9  ICD-9-CM: 402.90  Unknown Yes    Overview Signed 8/26/2020 11:38 PM by Sebastian Garcia MD     2D echocardiogram (8/22/2020) showed EF 65%; mild left ventricular hypertrophy; normal diastolic function; negative bubble study at rest and after Valsalva                   Background:   Past Medical History:   Past Medical History:   Diagnosis Date    Acute ischemic stroke ([facility] [address] 8/21/2020    Acute Ischemic Stroke (late acute/early subacute infarcts in the right centrum semiovale extending to the superior frontal lobe cortex) with residual left hemiparesis    Bipolar disorder ([facility] [address]     Chronic obstructive pulmonary disease (COPD) ([facility] [address]     COVID-19 ruled out by laboratory testing 8/26/2020    SARS-CoV-2 ([facility] m2000) (collected 8/25/2020, resulted 8/26/2020):  Not detected     Current use of aspirin 8/24/2020    Depression     Gastroesophageal reflux disease     History of colon polyps     Hypertensive heart disease without heart failure     2D echocardiogram (8/22/2020) showed EF 65%; mild left ventricular hypertrophy; normal diastolic function; negative bubble study at rest and after Valsalva    Iron deficiency anemia     Left hemiparesis ([facility] [address] 8/21/2020    Menopause     Obesity, Class II, BMI 35-39.9     Obstructive sleep apnea on CPAP     On clopidogrel therapy 8/24/2020    On statin therapy due to risk of future cardiovascular event 8/24/2020    On Atorvastatin    Pure hypercholesterolemia 8/19/2020    Lipid profile (8/19/2020) showed , , HDL 50,     Sciatica of left side     Seasonal allergic rhinitis     Severe persistent asthma 11/07/2019    Type 2 diabetes mellitus, with long-term current use of insulin (HCC)     HbA1c (8/19/2020) = 8.1; HbA1c (8/20/2020) = 8.0 Assessment:   Changes in Assessment throughout shift: none     Patient has central line: no Reasons if yes: n/a  Insertion date:n/a Last dressing date:n/a   Patient has Barbosa Cath: no Reasons if yes: n/a   Insertion date:n/a  Shift barbosa care completed: n/a     Last Vitals:     Vitals:    08/30/20 0757 08/30/20 1155 08/30/20 1602 08/30/20 1641   BP:    135/65   Pulse:    68   Resp:    17   Temp:    97.3 °F (36.3 °C)   SpO2: 99% 99% 99% 97%   Height:            PAIN    Pain Assessment    Pain Intensity 1: 0 (08/30/20 1642) Pain Intensity 1: 2 (12/29/14 1105)      Pain Location 1: Abdomen      Pain Intervention(s) 1: Medication (see MAR)  Patient Stated Pain Goal: 0 Patient Stated Pain Goal: 0  o Intervention effective:N/A no complaints of pain   o Other actions taken for pain: rest     Skin Assessment  Skin color    Condition/Temperature    Integrity    Turgor    Weekly Pressure Ulcer Documentation  Pressure  Injury Documentation: No Pressure Injury Noted-Pressure Ulcer Prevention Initiated  Wound Prevention & Protection Methods  Orientation of wound Orientation of Wound Prevention: Posterior  Location of Prevention Location of Wound Prevention: Sacrum/Coccyx  Dressing Present Dressing Present : No  Dressing Status    Wound Offloading Wound Offloading (Prevention Methods): Bed, pressure reduction mattress     INTAKE/OUPUT  Date 08/29/20 1900 - 08/30/20 0659 08/30/20 0700 - 08/31/20 0659   Shift 3134-8212 24 Hour Total 8745-1641 9334-1531 24 Hour Total   INTAKE   P.O.   240  240     P. O.   240  240   Shift Total   240  240   OUTPUT   Urine   2  2     Urine Voided   2  2     Urine Occurrence(s) 4 x 6 x 3 x  3 x   Emesis/NG output   0  0     Emesis   0  0     Emesis Occurrence(s) 0 x 0 x      Stool          Stool Occurrence(s) 0 x 0 x 0 x  0 x   Shift Total   2  2   NET   238  238   Weight (kg)            Recommendations:  1. Patient needs and requests: toileting    2.  Pending tests/procedures: labs 3. Functional Level/Equipment: 1 person assist/W/C    HEALS Safety Check    A safety check occurred in the patient's room between off going nurse and oncoming nurse listed above. The safety check included the below items  Area Items   H  High Alert Medications - Verify all high alert medication drips (heparin, PCA, etc.)   E  Equipment - Suction is set up for ALL patients (with dwayne)  - Red plugs utilized for all equipment (IV pumps, etc.)  - WOWs wiped down at end of shift.  - Room stocked with oxygen, suction, and other unit-specific supplies   A  Alarms - Bed alarm is set for fall risk patients  - Ensure chair alarm is in place and activated if patient is up in a chair   L  Lines - Check IV for any infiltration  - Harrison bag is empty if patient has a Harrison   - Tubing and IV bags are labeled   S  Safety   - Room is clean, patient is clean, and equipment is clean. - Hallways are clear from equipment besides carts. - Fall bracelet on for fall risk patients  - Ensure room is clear and free of clutter  - Suction is set up for ALL patients (with dwayne)  - Hallways are clear from equipment besides carts.    - Isolation precautions followed, supplies available outside room, sign posted     Va Solorio RN

## 2020-08-30 NOTE — PROGRESS NOTES
Problem: Falls - Risk of  Goal: *Absence of Falls  Description: Document Michael Shipley Fall Risk and appropriate interventions in the flowsheet. Outcome: Progressing Towards Goal  Note: Fall Risk Interventions:  Mobility Interventions: Bed/chair exit alarm, Patient to call before getting OOB         Medication Interventions: Bed/chair exit alarm, Evaluate medications/consider consulting pharmacy, Patient to call before getting OOB    Elimination Interventions: Call light in reach, Bed/chair exit alarm, Patient to call for help with toileting needs    History of Falls Interventions: Bed/chair exit alarm, Evaluate medications/consider consulting pharmacy         Problem: Patient Education: Go to Patient Education Activity  Goal: Patient/Family Education  Outcome: Progressing Towards Goal     Problem: Pressure Injury - Risk of  Goal: *Prevention of pressure injury  Description: Document Filippo Scale and appropriate interventions in the flowsheet. Outcome: Progressing Towards Goal  Note: Pressure Injury Interventions:  Sensory Interventions: Assess changes in LOC    Moisture Interventions: Absorbent underpads    Activity Interventions: Chair cushion, Increase time out of bed, Pressure redistribution bed/mattress(bed type)    Mobility Interventions: Chair cushion, HOB 30 degrees or less, Pressure redistribution bed/mattress (bed type)    Nutrition Interventions: Document food/fluid/supplement intake    Friction and Shear Interventions: Foam dressings/transparent film/skin sealants, HOB 30 degrees or less                Problem: Patient Education: Go to Patient Education Activity  Goal: Patient/Family Education  Outcome: Progressing Towards Goal     Problem: Risk for Spread of Infection  Goal: Prevent transmission of infectious organism to others  Description: Prevent the transmission of infectious organisms to other patients, staff members, and visitors.   Outcome: Progressing Towards Goal     Problem: Patient Education:  Go to Education Activity  Goal: Patient/Family Education  Outcome: Progressing Towards Goal     Problem: Patient Education: Go to Patient Education Activity  Goal: Patient/Family Education  Outcome: Progressing Towards Goal     Problem: Patient Education: Go to Patient Education Activity  Goal: Patient/Family Education  Outcome: Progressing Towards Goal

## 2020-08-31 LAB
ALBUMIN SERPL-MCNC: 3.3 G/DL (ref 3.4–5)
ALBUMIN/GLOB SERPL: 0.9 {RATIO} (ref 0.8–1.7)
ALP SERPL-CCNC: 65 U/L (ref 45–117)
ALT SERPL-CCNC: 60 U/L (ref 13–56)
ANION GAP SERPL CALC-SCNC: 5 MMOL/L (ref 3–18)
AST SERPL-CCNC: 20 U/L (ref 10–38)
BILIRUB DIRECT SERPL-MCNC: <0.1 MG/DL (ref 0–0.2)
BILIRUB SERPL-MCNC: 0.9 MG/DL (ref 0.2–1)
BUN SERPL-MCNC: 25 MG/DL (ref 7–18)
BUN/CREAT SERPL: 28 (ref 12–20)
CALCIUM SERPL-MCNC: 9.6 MG/DL (ref 8.5–10.1)
CHLORIDE SERPL-SCNC: 106 MMOL/L (ref 100–111)
CO2 SERPL-SCNC: 29 MMOL/L (ref 21–32)
CREAT SERPL-MCNC: 0.89 MG/DL (ref 0.6–1.3)
GLOBULIN SER CALC-MCNC: 3.8 G/DL (ref 2–4)
GLUCOSE BLD STRIP.AUTO-MCNC: 161 MG/DL (ref 70–110)
GLUCOSE BLD STRIP.AUTO-MCNC: 163 MG/DL (ref 70–110)
GLUCOSE BLD STRIP.AUTO-MCNC: 86 MG/DL (ref 70–110)
GLUCOSE BLD STRIP.AUTO-MCNC: 91 MG/DL (ref 70–110)
GLUCOSE SERPL-MCNC: 81 MG/DL (ref 74–99)
HCT VFR BLD AUTO: 33.8 % (ref 35–45)
HGB BLD-MCNC: 11.2 G/DL (ref 12–16)
PLATELET # BLD AUTO: 264 K/UL (ref 135–420)
POTASSIUM SERPL-SCNC: 3.9 MMOL/L (ref 3.5–5.5)
PROT SERPL-MCNC: 7.1 G/DL (ref 6.4–8.2)
SODIUM SERPL-SCNC: 140 MMOL/L (ref 136–145)

## 2020-08-31 PROCEDURE — 97535 SELF CARE MNGMENT TRAINING: CPT

## 2020-08-31 PROCEDURE — 80076 HEPATIC FUNCTION PANEL: CPT

## 2020-08-31 PROCEDURE — 82962 GLUCOSE BLOOD TEST: CPT

## 2020-08-31 PROCEDURE — 80048 BASIC METABOLIC PNL TOTAL CA: CPT

## 2020-08-31 PROCEDURE — 97110 THERAPEUTIC EXERCISES: CPT

## 2020-08-31 PROCEDURE — 74011000250 HC RX REV CODE- 250: Performed by: INTERNAL MEDICINE

## 2020-08-31 PROCEDURE — 74011250636 HC RX REV CODE- 250/636: Performed by: INTERNAL MEDICINE

## 2020-08-31 PROCEDURE — 97116 GAIT TRAINING THERAPY: CPT

## 2020-08-31 PROCEDURE — 65310000000 HC RM PRIVATE REHAB

## 2020-08-31 PROCEDURE — 74011636637 HC RX REV CODE- 636/637: Performed by: INTERNAL MEDICINE

## 2020-08-31 PROCEDURE — 36415 COLL VENOUS BLD VENIPUNCTURE: CPT

## 2020-08-31 PROCEDURE — 97530 THERAPEUTIC ACTIVITIES: CPT

## 2020-08-31 PROCEDURE — 74011250637 HC RX REV CODE- 250/637: Performed by: INTERNAL MEDICINE

## 2020-08-31 PROCEDURE — 97112 NEUROMUSCULAR REEDUCATION: CPT

## 2020-08-31 PROCEDURE — 85049 AUTOMATED PLATELET COUNT: CPT

## 2020-08-31 PROCEDURE — 85018 HEMOGLOBIN: CPT

## 2020-08-31 RX ORDER — DOCUSATE SODIUM 100 MG/1
100 CAPSULE, LIQUID FILLED ORAL
Status: DISCONTINUED | OUTPATIENT
Start: 2020-09-01 | End: 2020-09-04 | Stop reason: HOSPADM

## 2020-08-31 RX ORDER — AMOXICILLIN 250 MG
2 CAPSULE ORAL
Status: DISCONTINUED | OUTPATIENT
Start: 2020-08-31 | End: 2020-09-04 | Stop reason: HOSPADM

## 2020-08-31 RX ADMIN — PANTOPRAZOLE SODIUM 20 MG: 20 TABLET, DELAYED RELEASE ORAL at 06:31

## 2020-08-31 RX ADMIN — VALSARTAN 320 MG: 160 TABLET, FILM COATED ORAL at 09:16

## 2020-08-31 RX ADMIN — INSULIN LISPRO 2 UNITS: 100 INJECTION, SOLUTION INTRAVENOUS; SUBCUTANEOUS at 11:59

## 2020-08-31 RX ADMIN — AMLODIPINE BESYLATE 10 MG: 10 TABLET ORAL at 09:16

## 2020-08-31 RX ADMIN — HYDROCHLOROTHIAZIDE 12.5 MG: 12.5 CAPSULE ORAL at 09:17

## 2020-08-31 RX ADMIN — CLOPIDOGREL BISULFATE 75 MG: 75 TABLET ORAL at 17:21

## 2020-08-31 RX ADMIN — DOCUSATE SODIUM 50 MG AND SENNOSIDES 8.6 MG 2 TABLET: 8.6; 5 TABLET, FILM COATED ORAL at 17:20

## 2020-08-31 RX ADMIN — BISACODYL 10 MG: 5 TABLET, COATED ORAL at 06:05

## 2020-08-31 RX ADMIN — IPRATROPIUM BROMIDE 0.5 MG: 0.5 SOLUTION RESPIRATORY (INHALATION) at 15:51

## 2020-08-31 RX ADMIN — HEPARIN SODIUM 5000 UNITS: 5000 INJECTION INTRAVENOUS; SUBCUTANEOUS at 06:05

## 2020-08-31 RX ADMIN — CHOLECALCIFEROL (VITAMIN D3) 25 MCG (1,000 UNIT) TABLET 2 TABLET: TABLET at 09:17

## 2020-08-31 RX ADMIN — FERROUS SULFATE TAB 325 MG (65 MG ELEMENTAL FE) 325 MG: 325 (65 FE) TAB at 09:20

## 2020-08-31 RX ADMIN — ATORVASTATIN CALCIUM 40 MG: 40 TABLET, FILM COATED ORAL at 09:20

## 2020-08-31 RX ADMIN — ASPIRIN 81 MG CHEWABLE TABLET 81 MG: 81 TABLET CHEWABLE at 09:20

## 2020-08-31 RX ADMIN — IPRATROPIUM BROMIDE 0.5 MG: 0.5 SOLUTION RESPIRATORY (INHALATION) at 09:46

## 2020-08-31 RX ADMIN — INSULIN GLARGINE 22 UNITS: 100 INJECTION, SOLUTION SUBCUTANEOUS at 21:27

## 2020-08-31 RX ADMIN — MONTELUKAST 10 MG: 10 TABLET, FILM COATED ORAL at 21:16

## 2020-08-31 RX ADMIN — FENOFIBRATE 145 MG: 145 TABLET ORAL at 09:00

## 2020-08-31 RX ADMIN — MINOXIDIL 5 MG: 2.5 TABLET ORAL at 21:16

## 2020-08-31 RX ADMIN — MINOXIDIL 5 MG: 2.5 TABLET ORAL at 09:17

## 2020-08-31 RX ADMIN — HEPARIN SODIUM 5000 UNITS: 5000 INJECTION INTRAVENOUS; SUBCUTANEOUS at 13:47

## 2020-08-31 RX ADMIN — HEPARIN SODIUM 5000 UNITS: 5000 INJECTION INTRAVENOUS; SUBCUTANEOUS at 21:16

## 2020-08-31 RX ADMIN — METFORMIN HYDROCHLORIDE 500 MG: 500 TABLET, EXTENDED RELEASE ORAL at 17:21

## 2020-08-31 RX ADMIN — IPRATROPIUM BROMIDE 0.5 MG: 0.5 SOLUTION RESPIRATORY (INHALATION) at 11:59

## 2020-08-31 NOTE — INTERDISCIPLINARY ROUNDS
[facility] PHYSICAL REHABILITATION  [address] [place] [address], [address]    INPATIENT REHABILITATION  PRE-TEAM CONFERENCE SUMMARY     Date of Conference: 09/01/2020    Patient Information:        Name: Wanda Self Age / Sex: 60 y.o. / female   CSN: [id number] MRN: [id number]   [address] [place] [address] Date: 8/26/2020 Length of Stay: 5 days     Primary Rehabilitation Diagnosis  1. Impaired Mobility and ADLs  2.  Acute Ischemic Stroke (late acute/early subacute infarcts in the right centrum semiovale extending to the superior frontal lobe cortex) with residual left hemiparesis     Comorbidities   Hypertensive heart disease without heart failure I11.9    Gastroesophageal reflux disease K21.9    Obstructive sleep apnea on CPAP G47.33, Z99.89    Primary osteoarthritis involving multiple joints M89.49    Chronic obstructive pulmonary disease (COPD) ([facility]) J44.9    Severe persistent asthma J45.50    Obesity, Class II, BMI 35-39.9 E66.9    Depression F32.9    Seasonal allergic rhinitis J30.2    History of colon polyps Z86.010    Pure hypercholesterolemia E78.00    Type 2 diabetes mellitus, with long-term current use of insulin ([facility]) E11.9, Z79.4    COVID-19 ruled out by laboratory testing Z03.818    Menopause Z78.0    Iron deficiency anemia D50.9    Bipolar disorder ([facility]) F31.9    Current use of aspirin Z79.82    On clopidogrel therapy Z79.01    On statin therapy due to risk of future cardiovascular event Z79.899    Sciatica of left side M54.32          Therapy:     FIM SCORES Initial Assessment Weekly Progress Assessment 8/31/2020   Eating Functional Level: 5     Swallowing     Grooming 5     Bathing 4        Upper Body Dressing Functional Level: 5  Items Applied/Steps Completed: Pullover (4 steps)     Lower Body Dressing Functional Level: 5  Items Applied/Steps Completed: Elastic waist pants (3 steps), Sock, left (1 step), Sock, right (1 step), Underpants (3 steps)     Toileting Functional Level: 0 Bladder - level of assist 5 5   Bladder - accident frequency score   5   Bowel - level of assist 5 5   Bowel - accident frequency score 5     Toilet Transfer Turner Toilet Transfer Score: 0     Tub/Shower Transfer Turner Tub or Shower Type: Tub  Tub/Shower Transfer Score: 0        Comprehension Primary Mode of Comprehension: Auditory  Score: 6 Auditory  6   Expression Primary Mode of Expression: Verbal  Score: 6 Verbal  6   Social Interaction Score: 6 6   Problem Solving Score: 5 5   Memory Score: 5 5     FIM SCORES Initial Assessment Weekly Progress Assessment 8/31/2020   Bed/Chair/Wheelchair Transfers Transfer Type: Other  Other: stand step without AD as per PLOF  Transfer Assistance : 4 (Minimal assistance)  Sit to Stand Assistance: Contact guard assistance  Car Transfers: Minimum assistance(without AD as per PLOF)  Car Type: car transfer simulator Transfer Type:  Other  Other: stand step with RW  Transfer Assistance : 5 (Stand-by assistance)  Sit to Stand Assistance: Stand-by assistance   Bed Mobility Rolling Right 5 (Supervision)   Rolling Left 5 (Supervision)   Supine to Sit 5 (Supervision)   Sit to Stand Contact guard assistance   Sit to Supine 5 (Supervision)    Rolling Right       Rolling Left       Supine to Sit       Sit to Stand   Stand-by assistance   Sit to Supine          Locomotion (W/C) Able to Propel (ft): 170 feet  Functional Level: 3(mod A for steering due to veering observed)  Curbs/Ramps Assist Required (FIM Score): 0 (Not tested)  Wheelchair Setup Assist Required : 3 (Moderate assistance)  Wheelchair Management: Manages left brake, Manages right brake(needing assist with legrests) Function 5  Setup Assistance  5 (Stand-by assistance)      Locomotion (W/C distance)   200 feet(using bilat UE, occasionally bilat LE to propel)   Locomotion (Walk) 3 (Moderate assistance) 4 (Contact guard assistance)  Gait belt;Walker, rolling(also trialing 55 ft with quad cane with increased deviations)   Locomotion (Walk dist.) 18 Feet (ft)(without AD as per PLOF, 25 ft trial with RW min A) 78 Feet (ft)   Steps/Stairs Steps/Stairs Ambulated (#): 7  Level of Assist : 4 (Minimal assistance)(leading with right LE ascending stairs)  Rail Use: Both           Nursing:     Neuro:   AAA&O x   4         Respiratory:   [x] WNL   [] O2 LPM:   Other:  Peripheral Vascular:   [] TEDS present   [] Edema present ____ Grade   Cardiac:   [x] WNL   [] Other  Genitourinary:   [x] continent   [] incontinent   [] barbosa  Abdominal  8/31/20  LBM  GI: _Cardiac/NCS_ Diet _Reg_ Liquids _____ tube feeds  Musculoskeletal: ____ ROM Transfers _Reg_ Assistive Device Used  _CGA_ Level of Assistance  Skin Integumentary:   [x] Intact   [] Not Intact   _Fall/Aspiration/Skin/Infection_Preventative Measures. Details: Fall prec. No falls. Skin intact. Pain: [x] Controlled   [] Not Controlled   Pain Meds:   [] Scheduled   [x] PRN        Registered Dietitian / Nutrition:     Patient Vitals for the past 100 hrs:   % Diet Eaten   08/30/20 1434 100 %     Pt reported good appetite and meal intake. Tolerating diet. Likes the food. Preferences discussed. Denied having any concerns at time of visit. Supplements:          [] Yes   [x] No      Amount of supplement consumed:  Not applicable       Intake/Output Summary (Last 24 hours) at 8/31/2020 1634  Last data filed at 8/31/2020 0610  Gross per 24 hour   Intake 240 ml   Output --   Net 240 ml                                Last bowel movement: 8/31      Interdisciplinary Team Goals:     1. Discipline  Physical Therapy    Goal  Patient will perform mobility with RW at mod I level, demonstrating appropriate neutral left knee extension during midstance phase of gait 100% of the time    Barrier  Decreased strength, endurance, balance, slightly impulsive at times    Intervention  Education, therex, theract, neuro re-ed, gait training    Goal written by:   Cricket Pardo, PT, DPT     2.  Discipline Occupational Therapy    Goal  progress from close supervision to distant supervision or mod I with ADLs and cooking tasks. Barrier  balance and strength    Intervention  TE, TA, SC training, EDU    Goal written by:  YUMIKO Avilez     3. Discipline  Speech Therapy    Goal      Barrier      Intervention      Goal written by:       4. Discipline  Nursing    Goal Patient will verbalize uses, frequency and most common side effects of current meds    Barrier  cognition/memory/insight    Intervention Patient education    Goal written by: Ricardo Alfredo RN BSN     5. Discipline  Clinical Psychology    Goal  Increase insight about stroke recovery and reduction of risk for recurrence    Barrier  Limited insight    Intervention  Patient/stroke education and reinforcement    Goal written by:  Duncan Carr, PhD     6. Discipline  Nutrition / Dietetics    Goal  - PO nutrition intake will continue to meet >75% of patients estimated nutritional needs over the next 7 days. Barrier  none known     Intervention  continue po diet. Update food preferences    Goal written by:  Lizzy Leblanc RD        Disposition / Discharge Planning:      Follow-up services:  [x] Physical Therapy             [x] Occupational Therapy       [] Speech Therapy           [] Skilled Nursing      [] Medical Social Worker   [] Aide        [x] Outpatient      [] vs   [] Home Health  [] vs       [] to progress to outpatient       [] with 24-hour supervision       [] with 24-hour assistance   [] Arnadu ROE recommendations:  TTB, Bedside milana, JIM   Estimated discharge date:  09/04/2020   Discharge Location:  [x] Home  [] versus    [] [place]    [] [address]   [] Other:           Electronic Signatures:      Signature Date Signed   Physical Therapist    Madhav Russell PT, DPT  8/31/2020   Occupational Therapist    YUMIKO Avilez 8/31/2020   Speech Therapist         Nancy Therapist    Krissy Avery, CTRS 8/31/2020   Nursing    Serge Rivero, RN BSN 8/31/2020   Dietitian    Kel Alegre, JOSE CARLOS  8/31/2020   Clinical Psychologist    Boom Lin, PhD  8/31/2020    Physician    Eda Vázquez MD   8/31/2020        Aviva Silver, MSW 08/31/2020     Opportunity to share with family/caregiver[] YES [] NO    Relationship to patient____________________________________________________      The above information has been reviewed with the patient in a language that they can understand. Opportunity for comments and questions has been provided and a signed attestation has been scanned into the \"media tab\" of the EMR.       Patient Signature: ______________________________________________________    Date Signed: __________________________________________________________

## 2020-08-31 NOTE — PROGRESS NOTES
[facility] PHYSICAL REHABILITATION  [address] [place] [address], [address]     INPATIENT REHABILITATION  DAILY PROGRESS NOTE     Date: 8/31/2020    Name: Karen Blackwell Age / Sex: 60 y.o. / female   CSN: [id number] MRN: [id number]   [facility] Date: 8/26/2020 Length of Stay: 5 days     Primary Rehab Diagnosis: Impaired Mobility and ADLs secondary to Acute Ischemic Stroke (late acute/early subacute infarcts in the right centrum semiovale extending to the superior frontal lobe cortex) with residual left hemiparesis      Subjective:     Patient seen and examined. Blood pressure fairly controlled. Blood glucose controlled. Patient's Complaint:   Constipation    Pain Control: no current joint or muscle symptoms, essentially pain-free      Objective:     Vital Signs:  Patient Vitals for the past 24 hrs:   BP Temp Pulse Resp SpO2   08/31/20 0757 145/58 97 °F (36.1 °C) (!) 59 14 97 %   08/30/20 2059 142/65 97.5 °F (36.4 °C) 70 18 97 %   08/30/20 1641 135/65 97.3 °F (36.3 °C) 68 17 97 %   08/30/20 1602 -- -- -- -- 99 %   08/30/20 1155 -- -- -- -- 99 %        Physical Examination:  GENERAL SURVEY: Patient is awake, alert, oriented x 3, sitting comfortably on the chair, not in acute respiratory distress. HEENT: pink palpebral conjunctivae, anicteric sclerae, no nasoaural discharge, moist oral mucosa  NECK: supple, no jugular venous distention, no palpable lymph nodes  CHEST/LUNGS: symmetrical chest expansion, good air entry, clear breath sounds  HEART: adynamic precordium, good S1 S2, no S3, regular rhythm, no murmurs  ABDOMEN: obese, bowel sounds appreciated, soft, non-tender  EXTREMITIES: pink nailbeds, no edema, full and equal pulses, no calf tenderness   NEUROLOGICAL EXAM: The patient is awake, alert and oriented x3, able to answer questions fairly appropriately, able to follow 1 and 2 step commands. Able to tell time from the wall clock. Cranial nerves II-XII are grossly intact.   No gross sensory deficit.   Motor strength is 5/5 on the RUE and RLE, 4- to 4/5 on the LUE, 2+/5 on the LLE.       Current Medications:  Current Facility-Administered Medications   Medication Dose Route Frequency    insulin glargine (LANTUS) injection 22 Units  22 Units SubCUTAneous QHS    minoxidiL (LONITEN) tablet 5 mg  5 mg Oral Q12H    metFORMIN ER (GLUCOPHAGE XR) tablet 500 mg  500 mg Oral DAILY WITH DINNER    acetaminophen (TYLENOL) tablet 650 mg  650 mg Oral Q4H PRN    bisacodyL (DULCOLAX) tablet 10 mg  10 mg Oral Q48H PRN    heparin (porcine) injection 5,000 Units  5,000 Units SubCUTAneous Q8H    insulin lispro (HUMALOG) injection   SubCUTAneous TIDAC    aspirin chewable tablet 81 mg  81 mg Oral DAILY WITH BREAKFAST    clopidogreL (PLAVIX) tablet 75 mg  75 mg Oral DAILY WITH DINNER    atorvastatin (LIPITOR) tablet 40 mg  40 mg Oral DAILY    albuterol (PROVENTIL VENTOLIN) nebulizer solution 2.5 mg  2.5 mg Nebulization Q4H PRN    amLODIPine (NORVASC) tablet 10 mg  10 mg Oral DAILY    cholecalciferol (VITAMIN D3) (1000 Units /25 mcg) tablet 2 Tab  2,000 Units Oral DAILY    fenofibrate nanocrystallized (TRICOR) tablet 145 mg  145 mg Oral DAILY    ferrous sulfate tablet 325 mg  1 Tab Oral DAILY WITH BREAKFAST    montelukast (SINGULAIR) tablet 10 mg  10 mg Oral QHS    pantoprazole (PROTONIX) tablet 20 mg  20 mg Oral ACB    ipratropium (ATROVENT) 0.02 % nebulizer solution 0.5 mg  0.5 mg Nebulization QID RT    valsartan (DIOVAN) tablet 320 mg  320 mg Oral DAILY    glucose chewable tablet 16 g  4 Tab Oral PRN    glucagon (GLUCAGEN) injection 1 mg  1 mg IntraMUSCular PRN    dextrose (D50W) injection syrg 12.5-25 g  25-50 mL IntraVENous PRN    hydroCHLOROthiazide (MICROZIDE) capsule 12.5 mg  12.5 mg Oral DAILY       Allergies:  No Known Allergies      Lab/Data Review:  Recent Results (from the past 24 hour(s))   GLUCOSE, POC    Collection Time: 08/30/20 11:44 AM   Result Value Ref Range    Glucose (POC) 98 70 - 110 mg/dL   GLUCOSE, POC    Collection Time: 08/30/20  4:38 PM   Result Value Ref Range    Glucose (POC) 139 (H) 70 - 110 mg/dL   GLUCOSE, POC    Collection Time: 08/30/20  8:40 PM   Result Value Ref Range    Glucose (POC) 189 (H) 70 - 110 mg/dL   HGB & HCT    Collection Time: 08/31/20  6:18 AM   Result Value Ref Range    HGB 11.2 (L) 12.0 - 16.0 g/dL    HCT 33.8 (L) 35.0 - 45.0 %   PLATELET COUNT    Collection Time: 08/31/20  6:18 AM   Result Value Ref Range    PLATELET 803 192 - 914 K/uL   HEPATIC FUNCTION PANEL    Collection Time: 08/31/20  6:18 AM   Result Value Ref Range    Protein, total 7.1 6.4 - 8.2 g/dL    Albumin 3.3 (L) 3.4 - 5.0 g/dL    Globulin 3.8 2.0 - 4.0 g/dL    A-G Ratio 0.9 0.8 - 1.7      Bilirubin, total 0.9 0.2 - 1.0 MG/DL    Bilirubin, direct <0.1 0.0 - 0.2 MG/DL    Alk. phosphatase 65 45 - 117 U/L    AST (SGOT) 20 10 - 38 U/L    ALT (SGPT) 60 (H) 13 - 56 U/L   METABOLIC PANEL, BASIC    Collection Time: 08/31/20  6:18 AM   Result Value Ref Range    Sodium 140 136 - 145 mmol/L    Potassium 3.9 3.5 - 5.5 mmol/L    Chloride 106 100 - 111 mmol/L    CO2 29 21 - 32 mmol/L    Anion gap 5 3.0 - 18 mmol/L    Glucose 81 74 - 99 mg/dL    BUN 25 (H) 7.0 - 18 MG/DL    Creatinine 0.89 0.6 - 1.3 MG/DL    BUN/Creatinine ratio 28 (H) 12 - 20      GFR est AA >60 >60 ml/min/1.73m2    GFR est non-AA >60 >60 ml/min/1.73m2    Calcium 9.6 8.5 - 10.1 MG/DL   GLUCOSE, POC    Collection Time: 08/31/20  7:32 AM   Result Value Ref Range    Glucose (POC) 86 70 - 110 mg/dL       Estimated Glomerular Filtration Rate:  On admission, estimated GFR based on a Creatinine of 0.75 mg/dl:              Using CKD-EPI = 100.4 mL/min/1.73m2              Using MDRD = 101.4 mL/min/1.73m2  Most recent estimated GFR, based on a Creatinine of 0.89 mg/dl on 8/31/2020:   Using CKD-EPI = 81.6 mL/min/1.73m2   Using MDRD = 83.2 mL/min/1.73m2       Assessment:     Primary Rehabilitation Diagnosis  1. Impaired Mobility and ADLs  2.  Acute Ischemic Stroke (late acute/early subacute infarcts in the right centrum semiovale extending to the superior frontal lobe cortex) with residual left hemiparesis     Comorbidities   Hypertensive heart disease without heart failure I11.9    Gastroesophageal reflux disease K21.9    Obstructive sleep apnea on CPAP G47.33, Z99.89    Primary osteoarthritis involving multiple joints M89.49    Chronic obstructive pulmonary disease (COPD) ([facility]) J44.9    Severe persistent asthma J45.50    Obesity, Class II, BMI 35-39.9 E66.9    Depression F32.9    Seasonal allergic rhinitis J30.2    History of colon polyps Z86.010    Pure hypercholesterolemia E78.00    Type 2 diabetes mellitus, with long-term current use of insulin ([facility]) E11.9, Z79.4    COVID-19 ruled out by laboratory testing Z03.818    Menopause Z78.0    Iron deficiency anemia D50.9    Bipolar disorder ([facility]) F31.9    Current use of aspirin Z79.82    On clopidogrel therapy Z79.01    On statin therapy due to risk of future cardiovascular event Z79.899    Sciatica of left side M54.32        Plan:     1. Justification for continued stay: Good progression towards established rehabilitation goals. 2. Medical Issues being followed closely:    [x]  Fall and safety precautions     []  Wound Care     [x]  Bowel and Bladder Function     [x]  Fluid Electrolyte and Nutrition Balance     []  Pain Control      3. Issues that 24 hour rehabilitation nursing is following:    [x]  Fall and safety precautions     []  Wound Care     [x]  Bowel and Bladder Function     [x]  Fluid Electrolyte and Nutrition Balance     []  Pain Control      [x]  Assistance with and education on in-room safety with transfers to and from the bed, wheelchair, toilet and shower. 4. Acute rehabilitation plan of care:    [x]  Continue current care and rehab. [x]  Physical Therapy           [x]  Occupational Therapy           [x]  Speech Therapy     []  Hold Rehab until further notice     5. Medications:    [x]  MAR Reviewed     [x]  Continue Present Medications     6. DVT Prophylaxis:      []  Lovenox     [x]  Unfractionated Heparin     []  Coumadin     []  NOAC     []  MARTHA Stockings     []  Sequential Compression Device     []  None     7. Code status    [x]  Full code     []  Partial code     []  Do not intubate     []  Do not resuscitate     8.  Orders:   > Acute Ischemic Stroke (late acute/early subacute infarcts in the right centrum semiovale extending to the superior frontal lobe cortex) with residual left hemiparesis   > Dual antiplatelet therapy (DAPT) was started 8/24/2020   > Duration of DAPT not specified on discharge summary   > Will defer duration of DAPT to Neurology upon outpatient follow up    > Continue:    > Aspirin 81 mg PO once daily with breakfast    > Atorvastatin 40 mg PO once daily    > Clopidogrel 75 mg PO once daily with dinner     > Hypertensive heart disease without heart failure   > 2D echocardiogram (8/22/2020) showed EF 65%; mild left ventricular hypertrophy; normal diastolic function; negative bubble study at rest and after Valsalva   > On 8/28/2020, started Minoxidil 2.5 mg PO q 12 hr (9AM, 9PM)   > On 8/30/2020, increased Minoxidil from 2.5 mg to 5 mg PO q 12 hr (9AM, 9PM)   > Continue:    > Amlodipine 10 mg PO once daily (9AM)    > Hydrochlorothiazide 12.5 mg PO once daily (9AM)    > Minoxidil 5 mg PO q 12 hr (9AM, 9PM)    > Valsartan 320 mg PO once daily (9AM)    > Pure hypercholesterolemia   > Lipid profile (8/19/2020) showed , , HDL 50,    > Continue:    > Atorvastatin 40 mg PO once daily    > Fenofibrate 145 mg PO once daily    > Type 2 diabetes mellitus, with long term current use of insulin   > HbA1c (8/19/2020) = 8.1; HbA1c (8/20/2020) = 8.0   > On 8/26/2020, increased Insulin glargine from 10 units to 20 units SC q HS   > On 8/28/2020, started Metformin  mg PO once daily with dinner   > On 8/30/2020, increased Insulin glargine from 20 units to 22 units SC q HS   > Continue:    > Insulin glargine 22 units SC q HS    > Insulin lispro sliding scale SC TID AC only    > History of wound culture positive for MRSA (3/27/2012)   > Nasal swab done to rule out MRSA colonization   > MRSA culture (collected 8/27/2020, resulted 8/28/2020): Not present     > Constipation   > Start:    > Docusate sodium 100 mg PO once daily after breakfast    > Pericolace 2 tabs PO once daily after dinner    > COVID-19 ruled out by laboratory testing   > SARS-CoV-2 ([facility] m2000) (collected 8/25/2020, resulted 8/26/2020): Not detected    > Analgesia   > Continue Acetaminophen 650 mg PO q 4 hr PRN for pain level less than 5/10    > Diet:   > Specifications: Cardiac, diabetic, consistent carb 1800 kcal, no concentrated sweets   > Solids (consistency): Regular    > Liquids (consistency): Thin    > Fluid restriction: None      9. Patient's progress in rehabilitation and medical issues discussed with the patient. All questions answered to the best of my ability. Care plan discussed with patient and nurse.       Signed:    Rosa Carbajal MD    August 31, 2020

## 2020-08-31 NOTE — PROGRESS NOTES
Problem: Self Care Deficits Care Plan (Adult)  Goal: *Therapy Goal (Edit Goal, Insert Text)  Description: Occupational Therapy Goals   Short term = Long Term Goals  Initiated 2020 and to be accomplished within 2 week(s) 08/10/2020  1. Pt will perform self-feeding with I.   2. Pt will perform grooming with I.  3. Pt will perform UB bathing with Mod I.  4. Pt will perform LB bathing with Mod I.  5. Pt will perform tub/shower transfer with Mod I.   6. Pt will perform UB dressing with I.  7. Pt will perform LB dressing with I.  8. Pt will perform toileting task with mod I.   9. Pt will perform toilet transfer with mod I. Outcome: Progressing Towards Goal  OCCUPATIONAL THERAPY TREATMENT    Patient: Sheralyn Gowers   60 y.o. Patient identified with name and : yes    Date: 2020    First Tx Session  Time In: 56  Time Out[de-identified] 1100      Diagnosis: Acute ischemic stroke [facility]) [I63.9]   Precautions: Fall  Chart, occupational therapy assessment, plan of care, and goals were reviewed. Pain:  Pt reports 0/10 pain or discomfort prior to treatment. Pt reports 0/10 pain or discomfort post treatment. Intervention Provided: N/A      SUBJECTIVE:   Patient stated I think I need to use the restroom.     OBJECTIVE DATA SUMMARY:     THERAPEUTIC ACTIVITY Daily Assessment    Dynamic reaching activities (sitting)- pt played connect 4 game sitting unsupported at edge of mat tablet. Pt used LUE only reaching from right to left to work on improving core strength needed to maintain balance. Additionally, pt reached for cones and transferred cone to opposite hand behind back to further improve dynamic sitting balance to improve safety with ADLs.      Dynamic reaching activities (standing)- pt completed resistive clothespin tree while standing x2 to with BUE to improve standing balance needed to complete grooming tasks and cooking tasks with mod I.      THERAPEUTIC EXERCISE Daily Assessment    Recumbent bike with arms- pt completed 15 mins without rest breaks to improve activity tolerance needed to perform cooking tasks safely. TOILETING Daily Assessment     Pt required close supervision and cues for safety when performing toileting including wiping, and managing clothes. MOBILITY/TRANSFERS Daily Assessment     CGA from w/c to toilet and toilet to w/c for safety and cues to pay attention to foot placement. ASSESSMENT:  Pt progressing appropriately and improving in regards to sitting and standing balance. Balance and LE strength limit patient from progressing from close supervision with activities to distant supervision or Mod I. With continued improvements with balance pt will progress to distant supervision/ mod I needed to be home alone while fiance is at work. Progression toward goals:  [x]          Improving appropriately and progressing toward goals  []          Improving slowly and progressing toward goals  []          Not making progress toward goals and plan of care will be adjusted     PLAN:  Patient continues to benefit from skilled intervention to address the above impairments. Continue treatment per established plan of care. Discharge Recommendations:  Outpatient  Further Equipment Recommendations for Discharge:  transfer bench and grab bars around toilet     Activity Tolerance:  good      Estimated LOS: 10 days    Please refer to the flowsheet for vital signs taken during this treatment. After treatment:   [x]  Patient left in no apparent distress sitting up in chair   []  Patient left in no apparent distress in bed  [x]  Call bell left within reach  []  Nursing notified  []  Caregiver present  []  Bed alarm activated    COMMUNICATION/EDUCATION:   [x] Home safety education was provided and the patient/caregiver indicated understanding. [] Patient/family have participated as able in goal setting and plan of care.   [] Patient/family agree to work toward stated goals and plan of care.  [] Patient understands intent and goals of therapy, but is neutral about his/her participation. [] Patient is unable to participate in goal setting and plan of care.       Ana Evans

## 2020-08-31 NOTE — PROGRESS NOTES
Problem: Falls - Risk of  Goal: *Absence of Falls  Description: Document Jocelyn Redd Fall Risk and appropriate interventions in the flowsheet. Outcome: Progressing Towards Goal  Note: Fall Risk Interventions:  Mobility Interventions: Bed/chair exit alarm, Communicate number of staff needed for ambulation/transfer, Assess mobility with egress test, OT consult for ADLs, Patient to call before getting OOB, PT Consult for mobility concerns, PT Consult for assist device competence, Strengthening exercises (ROM-active/passive), Utilize walker, cane, or other assistive device, Utilize gait belt for transfers/ambulation         Medication Interventions: Bed/chair exit alarm, Evaluate medications/consider consulting pharmacy    Elimination Interventions: Call light in reach    History of Falls Interventions: Bed/chair exit alarm, Consult care management for discharge planning, Door open when patient unattended, Evaluate medications/consider consulting pharmacy, Room close to nurse's station         Problem: Pressure Injury - Risk of  Goal: *Prevention of pressure injury  Description: Document Filippo Scale and appropriate interventions in the flowsheet.   Outcome: Progressing Towards Goal  Note: Pressure Injury Interventions:  Sensory Interventions: Assess need for specialty bed, Avoid rigorous massage over bony prominences, Discuss PT/OT consult with provider, Assess changes in LOC, Maintain/enhance activity level, Keep linens dry and wrinkle-free, Minimize linen layers, Pressure redistribution bed/mattress (bed type)    Moisture Interventions: Absorbent underpads, Maintain skin hydration (lotion/cream)    Activity Interventions: Pressure redistribution bed/mattress(bed type), PT/OT evaluation, Increase time out of bed    Mobility Interventions: Pressure redistribution bed/mattress (bed type), PT/OT evaluation, HOB 30 degrees or less    Nutrition Interventions: Document food/fluid/supplement intake    Friction and Shear Interventions: HOB 30 degrees or less

## 2020-08-31 NOTE — INTERDISCIPLINARY ROUNDS
[facility] PHYSICAL REHABILITATION  [address] [place] [address], [address]     INPATIENT REHABILITATION  DISCHARGE RECOMMENDATION SHEET    Date: 8/31/2020     Name: Mauricio Wilson Age / Sex: 60 y.o. / female   CSN: [id number] MRN: [id number]   [facility] Date: 8/26/2020 Discharge Date: 09/04/2020        [facility] WALKING AIDS FOLLOW-UP SERVICES      Height  []  Straight cane  [] DMV Handicap Placard    []  #14 ½ pedro height  []  Forearm crutches OUTPATIENT    []  Pedro height  []  Axillary crutches  [x]  PT    []  Standard height  []  LBQC  [x]  OT    []  Reclining high back  []  SBQC  []  ST       Weight  HOME HEALTH    []  Standard weight WALKERS  []  PT    []  Lightweight  []  Standard height  []  OT    []  High-strength lightweight  []  Small adult  []  ST    []  Heavy Duty   []  Tall walker  []  Nursing    []  Patient is unable to self-propel a standard weight wheelchair  [x]  Rolling walker with 5 single fixed wheels  []  Wound care  Location of wound:  Specify needs:      []  Anticoagulation management       Width  []  Bariatric walker  []  Diabetes management    []  Narrow (16)   []  Insulin management    []  Standard (18) ACCESSORIES  []  No insulin management    []  Wide (20)  []  Rear sure glide brakes  []  BP management    []  Other  []  10 rear wheel brake  []  CHF Telehealth       Arms  []  Tall leg extensions  []  Post-CABG care/monitoring    []  Standard  []  Other:  []  Colostomy care    []  Desk/Tray   []  Tube feeding    []  Removable BATHROOM EQUIPMENT  []  PICC line care      Foot/Leg Rests  [x]  Bedside commode  []  Harrison catheter care    []  Removable  []  Extra wide bedside commode  []  Other:     []  Elevating  []  3:1 commode WITH drop arms  []  Medical Social Worker      Other  [x]  Tub transfer bench (not required but would benefit)   []  Aide    []  Brake extensions  []  Shower chair     []  Padded gloves       []  Antitippers           CUSHIONS OTHER     []  Foam cushion  []  Seat belt     []  Gel cushion  []  Gait belt     []  Radha Diss II  []  Transfer board - Size:     []  Roho  []  Oxygen     []  Other  []  CPM      []  [address]  []  Ramp     []  Hospital bed  []  Hip kit     []  Special mattress  []  Reacher     []  Trapeze bar       Preferred Local Pharmacy (not mail-order): [facility] at [address] PT, DPT   Occupational Therapy Pee Mckinnon, MOTR/L   [address] Digna Lion, MSW   Nursing Jill Oneil, RN BSN

## 2020-08-31 NOTE — ROUTINE PROCESS
SHIFT CHANGE NOTE FOR [facility]    Bedside and Verbal shift change report given to Deonte Lang, RN (oncoming nurse) by Lisa Conklin RN (offgoing nurse). Report included the following information SBAR, Kardex, MAR and Recent Results. Situation:   Code Status: Full Code   Hospital Day: 5   Problem List:   Hospital Problems  Date Reviewed: 8/31/2020          Codes Class Noted POA    Sciatica of left side ICD-10-CM: M54.32  ICD-9-CM: 724.3  Unknown Yes        Type 2 diabetes mellitus, with long-term current use of insulin (HCC) (Chronic) ICD-10-CM: E11.9, Z79.4  ICD-9-CM: 250.00, V58.67  Unknown Yes    Overview Signed 8/26/2020 11:46 PM by Manasa Armendariz MD     HbA1c (8/19/2020) = 8.1; HbA1c (8/20/2020) = 8.0             COVID-19 ruled out by laboratory testing ICD-10-CM: Z03.818  ICD-9-CM: V71.83  8/26/2020 Yes    Overview Signed 8/26/2020 11:47 PM by Manasa Armendariz MD     SARS-CoV-2 (Abbott m2000) (collected 8/25/2020, resulted 8/26/2020):  Not detected              Current use of aspirin ICD-10-CM: Z79.82  ICD-9-CM: V58.66  8/24/2020 Yes        On clopidogrel therapy ICD-10-CM: Z79.01  ICD-9-CM: V58.61  8/24/2020 Yes        On statin therapy due to risk of future cardiovascular event ICD-10-CM: Z79.899  ICD-9-CM: V58.69  8/24/2020 Yes    Overview Signed 8/26/2020 11:50 PM by Manasa Armendariz MD     On Atorvastatin             * (Principal) Acute ischemic stroke [facility]) ICD-10-CM: I63.9  ICD-9-CM: 434.91  8/21/2020 Yes    Overview Signed 8/26/2020 11:35 PM by Manasa Armendariz MD     Acute Ischemic Stroke (late acute/early subacute infarcts in the right centrum semiovale extending to the superior frontal lobe cortex) with residual left hemiparesis             Left hemiparesis ([address] ICD-10-CM: G81.94  ICD-9-CM: 342.90  8/21/2020 Yes        Impaired mobility and ADLs ICD-10-CM: Z74.09, Z78.9  ICD-9-CM: V49.89  8/21/2020 Yes        Pure hypercholesterolemia (Chronic) ICD-10-CM: E78.00  ICD-9-CM: 272.0  8/19/2020 Yes    Overview Signed 8/26/2020 11:45 PM by Macarena Cano MD     Lipid profile (8/19/2020) showed , , HDL 50,              Hypertensive heart disease without heart failure (Chronic) ICD-10-CM: I11.9  ICD-9-CM: 402.90  Unknown Yes    Overview Signed 8/26/2020 11:38 PM by Macarena Cano MD     2D echocardiogram (8/22/2020) showed EF 65%; mild left ventricular hypertrophy; normal diastolic function; negative bubble study at rest and after Valsalva                   Background:   Past Medical History:   Past Medical History:   Diagnosis Date    Acute ischemic stroke ([facility] [address] 8/21/2020    Acute Ischemic Stroke (late acute/early subacute infarcts in the right centrum semiovale extending to the superior frontal lobe cortex) with residual left hemiparesis    Bipolar disorder ([facility] [address]     Chronic obstructive pulmonary disease (COPD) ([facility] [address] [facility]     COVID-19 ruled out by laboratory testing 8/26/2020    SARS-CoV-2 ([facility]ott m2000) (collected 8/25/2020, resulted 8/26/2020):  Not detected     Current use of aspirin 8/24/2020    Depression     Gastroesophageal reflux disease     History of colon polyps     Hypertensive heart disease without heart failure     2D echocardiogram (8/22/2020) showed EF 65%; mild left ventricular hypertrophy; normal diastolic function; negative bubble study at rest and after Valsalva    Iron deficiency anemia     Left hemiparesis ([facility] [address] 8/21/2020    Menopause     Obesity, Class II, BMI 35-39.9     Obstructive sleep apnea on CPAP     On clopidogrel therapy 8/24/2020    On statin therapy due to risk of future cardiovascular event 8/24/2020    On Atorvastatin    Pure hypercholesterolemia 8/19/2020    Lipid profile (8/19/2020) showed , , HDL 50,     Sciatica of left side     Seasonal allergic rhinitis     Severe persistent asthma 11/07/2019    Type 2 diabetes mellitus, with long-term current use of insulin (HCC)     HbA1c (8/19/2020) = 8.1; HbA1c (8/20/2020) = 8.0        Assessment:   Changes in Assessment throughout shift: none     Patient has central line: no Reasons if yes: n/a  Insertion date:n/a Last dressing date:n/a   Patient has Barbosa Cath: no Reasons if yes: n/a   Insertion date:n/a  Shift barbosa care completed: n/a      Last Vitals:     Vitals:    08/30/20 1641 08/30/20 2059 08/31/20 0757 08/31/20 1615   BP: 135/65 142/65 145/58 138/65   Pulse: 68 70 (!) 59 62   Resp: 17 18 14 18   Temp: 97.3 °F (36.3 °C) 97.5 °F (36.4 °C) 97 °F (36.1 °C) 97.4 °F (36.3 °C)   SpO2: 97% 97% 97% 98%   Height:            PAIN    Pain Assessment    Pain Intensity 1: 6 (08/31/20 1543) Pain Intensity 1: 2 (12/29/14 1105)      Pain Location 1: Abdomen      Pain Intervention(s) 1: Medication (see MAR)  Patient Stated Pain Goal: 0 Patient Stated Pain Goal: 0  o Intervention effective: yes   o Other actions taken for pain: rest/position/food     Skin Assessment  Skin color    Condition/Temperature    Integrity    Turgor    Weekly Pressure Ulcer Documentation  Pressure  Injury Documentation: No Pressure Injury Noted-Pressure Ulcer Prevention Initiated  Wound Prevention & Protection Methods  Orientation of wound Orientation of Wound Prevention: Posterior  Location of Prevention Location of Wound Prevention: Heel, Sacrum/Coccyx  Dressing Present Dressing Present : No  Dressing Status    Wound Offloading Wound Offloading (Prevention Methods): Adaptive equipment, Bed, pressure reduction mattress, Chair cushion, Elevate heels, Pillows, Repositioning, Turning, Wheelchair     INTAKE/OUPUT  Date 08/30/20 0700 - 08/31/20 0659 08/31/20 0700 - 09/01/20 0659   Shift [id number] 1900-0659 24 Hour Total 0700-1859 1900-0659 24 Hour Total   INTAKE   P.O. 240 240 480        P. O. 240 240 480      Shift Total 240 240 480      OUTPUT   Urine 2  2        Urine Voided 2  2        Urine Occurrence(s) 3 x 4 x 7 x 3 x  3 x   Emesis/NG output 0  0        Emesis 0  0      Stool           Stool Occurrence(s) 0 x 1 x 1 x 2 x  2 x   Shift Total 2  2       240 478      Weight (kg)             Recommendations:  1. Patient needs and requests: toileting    2. Pending tests/procedures: labs     3. Functional Level/Equipment: 1 person assist/w/c    HEALS Safety Check    A safety check occurred in the patient's room between off going nurse and oncoming nurse listed above. The safety check included the below items  Area Items   H  High Alert Medications - Verify all high alert medication drips (heparin, PCA, etc.)   E  Equipment - Suction is set up for ALL patients (with dwayne)  - Red plugs utilized for all equipment (IV pumps, etc.)  - WOWs wiped down at end of shift.  - Room stocked with oxygen, suction, and other unit-specific supplies   A  Alarms - Bed alarm is set for fall risk patients  - Ensure chair alarm is in place and activated if patient is up in a chair   L  Lines - Check IV for any infiltration  - Harrison bag is empty if patient has a Harrison   - Tubing and IV bags are labeled   S  Safety   - Room is clean, patient is clean, and equipment is clean. - Hallways are clear from equipment besides carts. - Fall bracelet on for fall risk patients  - Ensure room is clear and free of clutter  - Suction is set up for ALL patients (with dwayne)  - Hallways are clear from equipment besides carts.    - Isolation precautions followed, supplies available outside room, sign posted     Kassie Sinclair RN

## 2020-08-31 NOTE — PROGRESS NOTES
Problem: Mobility Impaired (Adult and Pediatric)  Goal: *Therapy Goal (Edit Goal, Insert Text)  Description: Physical Therapy Short Term Goals  Initiated 8/27/2020 and to be accomplished within 7 day(s) on 9/3/2020:  1. Patient will move from supine to sit and sit to supine , scoot up and down, and roll side to side in bed with modified independence. 2.  Patient will transfer from bed to chair and chair to bed with supervision/set-up using the least restrictive device. 3.  Patient will perform sit to stand with supervision/set-up. 4.  Patient will ambulate with SBA for 100 feet with the least restrictive device, cues for neutral knee extension during midstance phase of gait <25% of the time. 5.  Patient will ascend/descend 1 curb step with appropriate AD with CGA. Physical Therapy Long Term Goals  Initiated 8/27/2020 and to be accomplished within 14 day(s) on 9/10/2020  1. Patient will move from supine to sit and sit to supine , scoot up and down, and roll side to side in bed with independence. 2.  Patient will transfer from bed to chair and chair to bed with modified independence using the least restrictive device. 3.  Patient will perform sit to stand with modified independence. 4.  Patient will ambulate with modified independence for 150 feet with the least restrictive device. 5.  Patient will ascend/descend 1 curb step with SBA, using appropriate AD. Outcome: Progressing Towards Goal   PHYSICAL THERAPY TREATMENT    Patient: Dayami Russo (43 y.o. female)  Date: 8/31/2020  Diagnosis: Acute ischemic stroke [facility]) [I63.9] Acute ischemic stroke [facility])  Precautions: Fall  Chart, physical therapy assessment, plan of care and goals were reviewed. Time In: 0830  Time Out: 4261  Time FH:4843  Time Out:1540    Patient seen for: Patient education;Transfer training; Wheelchair mobility;Balance activities    Pain:  Patient reporting discomfort related to constipation today; nurse aware. Patient identified with name and : yes    SUBJECTIVE:      Patient agreeable to treatment. Reporting that she has noticed ongoing improvement with her left LE strength. OBJECTIVE DATA SUMMARY:    Objective:     TRANSFERS Daily Assessment     Transfer Type: Other  Other: stand step with RW  Transfer Assistance : 5 (Stand-by assistance)  Sit to Stand Assistance: Stand-by assistance    Sit<->stand 3 x 10 reps from 21\" height surface, emphasis on bilat UE at distal thighs to improve LE functional strength and neuromuscular control. SBA with cues for technique. GAIT Daily Assessment    Gait Description (WDL) Exceptions to WDL    Gait Abnormalities Decreased step clearance(genu recurvatum left LE unless cued)    Assistive device Gait belt;Walker, rolling(also trialing 55 ft with quad cane with increased deviations)    Ambulation assistance - level surface 4 (Contact guard assistance)    Distance 78 Feet (ft)    Comments Observed to have intermittent left knee hyperextension with use of RW; when trialed with [place] patient observed to have significant increased difficulty with sequencing, maintaining neutral knee extension during midstance. Educated patient on continued use of RW for endurance and increased gait quality. BALANCE Daily Assessment     Sitting - Static: Good (unsupported)  Sitting - Dynamic: Good (unsupported)  Standing - Static: Good;Occasional;Fair  Standing - Dynamic : Impaired        WHEELCHAIR MOBILITY Daily Assessment     Able to Propel (ft): 200 feet(using bilat UE, occasionally bilat LE to propel)  Functional Level: 5  Curbs/Ramps Assist Required (FIM Score): 0 (Not tested)  Wheelchair Setup Assist Required : 5 (Stand-by assistance)  Wheelchair Management: Manages left brake;Manages right brake        THERAPEUTIC EXERCISES Daily Assessment       Hamstring curls red Tband 3 x 15 reps bilaterally for improved knee flexion control to reduce knee hyperextension during gait. Neuro Re-Education:  Staggered stance with alternating each LE up on 6\" step 4 x 30 sec bouts without UE support, needing intermittent seated rests due to fatigue and increased stomach cramping. Emphasis on maintaining neutral left knee extension when in stance position. Also performing alternating toe touches 3 x 10 reps with seated rests in between bouts, cues for \"soft\" knee left LE with slowing down pace to ensure maintaining neutral knee extension. ASSESSMENT:  Patient continues to demonstrate improved transfers, gait using RW; recommend ongoing balance training, neuromuscular re-education to improve safety with mobility. Progression toward goals:  [x]      Improving appropriately and progressing toward goals  []      Improving slowly and progressing toward goals  []      Not making progress toward goals and plan of care will be adjusted      PLAN:  Patient continues to benefit from skilled intervention to address the above impairments. Continue treatment per established plan of care. Discharge Recommendations:  Outpatient  Further Equipment Recommendations for Discharge:  rolling walker      Estimated LOS: 1-2 weeks    Activity Tolerance:   Good, needing intermittent rests. After treatment:   Patient left seated in wheelchair in room, call button within reach.        Bushra Hurtado, PT

## 2020-08-31 NOTE — ROUTINE PROCESS
[facility]    Bedside and Verbal shift change report given to Kalyan Valdez (oncoming nurse) by Halima Marsh RN (offgoing nurse). Report included the following information SBAR, Kardex, MAR and Recent Results. Situation:   Code Status: Full Code   Hospital Day: 5   Problem List:   Hospital Problems  Date Reviewed: 8/30/2020          Codes Class Noted POA    Sciatica of left side ICD-10-CM: M54.32  ICD-9-CM: 724.3  Unknown Yes        Type 2 diabetes mellitus, with long-term current use of insulin (HCC) (Chronic) ICD-10-CM: E11.9, Z79.4  ICD-9-CM: 250.00, V58.67  Unknown Yes    Overview Signed 8/26/2020 11:46 PM by Miguelangel Khan MD     HbA1c (8/19/2020) = 8.1; HbA1c (8/20/2020) = 8.0             COVID-19 ruled out by laboratory testing ICD-10-CM: Z03.818  ICD-9-CM: V71.83  8/26/2020 Yes    Overview Signed 8/26/2020 11:47 PM by Miguelangel Khan MD     SARS-CoV-2 (Abbott m2000) (collected 8/25/2020, resulted 8/26/2020):  Not detected              Current use of aspirin ICD-10-CM: Z79.82  ICD-9-CM: V58.66  8/24/2020 Yes        On clopidogrel therapy ICD-10-CM: Z79.01  ICD-9-CM: V58.61  8/24/2020 Yes        On statin therapy due to risk of future cardiovascular event ICD-10-CM: Z79.899  ICD-9-CM: V58.69  8/24/2020 Yes    Overview Signed 8/26/2020 11:50 PM by Miguelangel Khan MD     On Atorvastatin             * (Principal) Acute ischemic stroke [facility]) ICD-10-CM: I63.9  ICD-9-CM: 434.91  8/21/2020 Yes    Overview Signed 8/26/2020 11:35 PM by Miguelangel Khan MD     Acute Ischemic Stroke (late acute/early subacute infarcts in the right centrum semiovale extending to the superior frontal lobe cortex) with residual left hemiparesis             Left hemiparesis ([facility] [address] ICD-10-CM: G81.94  ICD-9-CM: 342.90  8/21/2020 Yes        Impaired mobility and ADLs ICD-10-CM: Z74.09, Z78.9  ICD-9-CM: V49.89  8/21/2020 Yes        Pure hypercholesterolemia (Chronic) ICD-10-CM: E78.00  ICD-9-CM: 272.0  8/19/2020 Yes Overview Signed 8/26/2020 11:45 PM by Mervin Murguia MD     Lipid profile (8/19/2020) showed , , HDL 50,              Hypertensive heart disease without heart failure (Chronic) ICD-10-CM: I11.9  ICD-9-CM: 402.90  Unknown Yes    Overview Signed 8/26/2020 11:38 PM by Mervin Murguia MD     2D echocardiogram (8/22/2020) showed EF 65%; mild left ventricular hypertrophy; normal diastolic function; negative bubble study at rest and after Valsalva                   Background:   Past Medical History:   Past Medical History:   Diagnosis Date    Acute ischemic stroke ([facility] [address] 8/21/2020    Acute Ischemic Stroke (late acute/early subacute infarcts in the right centrum semiovale extending to the superior frontal lobe cortex) with residual left hemiparesis    Bipolar disorder ([facility] [address]     Chronic obstructive pulmonary disease (COPD) ([facility] [address]     COVID-19 ruled out by laboratory testing 8/26/2020    SARS-CoV-2 (Abbott m2000) (collected 8/25/2020, resulted 8/26/2020):  Not detected     Current use of aspirin 8/24/2020    Depression     Gastroesophageal reflux disease     History of colon polyps     Hypertensive heart disease without heart failure     2D echocardiogram (8/22/2020) showed EF 65%; mild left ventricular hypertrophy; normal diastolic function; negative bubble study at rest and after Valsalva    Iron deficiency anemia     Left hemiparesis ([facility] [address] 8/21/2020    Menopause     Obesity, Class II, BMI 35-39.9     Obstructive sleep apnea on CPAP     On clopidogrel therapy 8/24/2020    On statin therapy due to risk of future cardiovascular event 8/24/2020    On Atorvastatin    Pure hypercholesterolemia 8/19/2020    Lipid profile (8/19/2020) showed , , HDL 50,     Sciatica of left side     Seasonal allergic rhinitis     Severe persistent asthma 11/07/2019    Type 2 diabetes mellitus, with long-term current use of insulin (HCC)     HbA1c (8/19/2020) = 8.1; HbA1c (8/20/2020) = 8.0        Assessment:   Changes in Assessment throughout shift: none     Patient has central line: no Reasons if yes: n/a  Insertion date:n/a Last dressing date:n/a   Patient has Barbosa Cath: no Reasons if yes: n/a   Insertion date:n/a  Shift barbosa care completed: n/a      Last Vitals:     Vitals:    08/30/20 1155 08/30/20 1602 08/30/20 1641 08/30/20 2059   BP:   135/65 142/65   Pulse:   68 70   Resp:   17 18   Temp:   97.3 °F (36.3 °C) 97.5 °F (36.4 °C)   SpO2: 99% 99% 97% 97%   Height:            PAIN    Pain Assessment    Pain Intensity 1: 0 (08/31/20 0402) Pain Intensity 1: 2 (12/29/14 1105)      Pain Location 1: Abdomen      Pain Intervention(s) 1: Medication (see MAR)  Patient Stated Pain Goal: Unable to verbalize/indicate pain(asleep) Patient Stated Pain Goal: 0  o Intervention effective: yes   o Other actions taken for pain: rest/position/food     Skin Assessment  Skin color    Condition/Temperature    Integrity    Turgor    Weekly Pressure Ulcer Documentation  Pressure  Injury Documentation: No Pressure Injury Noted-Pressure Ulcer Prevention Initiated  Wound Prevention & Protection Methods  Orientation of wound Orientation of Wound Prevention: Posterior  Location of Prevention Location of Wound Prevention: Sacrum/Coccyx  Dressing Present Dressing Present : No  Dressing Status    Wound Offloading Wound Offloading (Prevention Methods): Bed, pressure reduction mattress     INTAKE/OUPUT  Date 08/30/20 0700 - 08/31/20 0659 08/31/20 0700 - 09/01/20 0659   Shift [id number] 1900-0659 24 Hour Total 9782-7222 1837-5000 24 Hour Total   INTAKE   P.O. 240 240 480        P. O. 240 240 480      Shift Total 240 240 480      OUTPUT   Urine 2  2        Urine Voided 2  2        Urine Occurrence(s) 3 x 4 x 7 x      Emesis/NG output 0  0        Emesis 0  0      Stool           Stool Occurrence(s) 0 x 1 x 1 x      Shift Total 2  2       240 478      Weight (kg)             Recommendations:  1.  Patient needs and requests: toileting    2. Pending tests/procedures: labs     3. Functional Level/Equipment: 1 person assist/w/c    HEALS Safety Check    A safety check occurred in the patient's room between off going nurse and oncoming nurse listed above. The safety check included the below items  Area Items   H  High Alert Medications - Verify all high alert medication drips (heparin, PCA, etc.)   E  Equipment - Suction is set up for ALL patients (with dwayne)  - Red plugs utilized for all equipment (IV pumps, etc.)  - WOWs wiped down at end of shift.  - Room stocked with oxygen, suction, and other unit-specific supplies   A  Alarms - Bed alarm is set for fall risk patients  - Ensure chair alarm is in place and activated if patient is up in a chair   L  Lines - Check IV for any infiltration  - Harrison bag is empty if patient has a Harrison   - Tubing and IV bags are labeled   S  Safety   - Room is clean, patient is clean, and equipment is clean. - Hallways are clear from equipment besides carts. - Fall bracelet on for fall risk patients  - Ensure room is clear and free of clutter  - Suction is set up for ALL patients (with dwayne)  - Hallways are clear from equipment besides carts.    - Isolation precautions followed, supplies available outside room, sign posted     You Angeles RN

## 2020-09-01 LAB
GLUCOSE BLD STRIP.AUTO-MCNC: 116 MG/DL (ref 70–110)
GLUCOSE BLD STRIP.AUTO-MCNC: 127 MG/DL (ref 70–110)
GLUCOSE BLD STRIP.AUTO-MCNC: 159 MG/DL (ref 70–110)
GLUCOSE BLD STRIP.AUTO-MCNC: 82 MG/DL (ref 70–110)

## 2020-09-01 PROCEDURE — 82962 GLUCOSE BLOOD TEST: CPT

## 2020-09-01 PROCEDURE — 74011250636 HC RX REV CODE- 250/636: Performed by: INTERNAL MEDICINE

## 2020-09-01 PROCEDURE — 74011636637 HC RX REV CODE- 636/637: Performed by: INTERNAL MEDICINE

## 2020-09-01 PROCEDURE — 74011000250 HC RX REV CODE- 250: Performed by: INTERNAL MEDICINE

## 2020-09-01 PROCEDURE — 97530 THERAPEUTIC ACTIVITIES: CPT

## 2020-09-01 PROCEDURE — 97110 THERAPEUTIC EXERCISES: CPT

## 2020-09-01 PROCEDURE — 74011250637 HC RX REV CODE- 250/637: Performed by: INTERNAL MEDICINE

## 2020-09-01 PROCEDURE — 65310000000 HC RM PRIVATE REHAB

## 2020-09-01 PROCEDURE — 97535 SELF CARE MNGMENT TRAINING: CPT

## 2020-09-01 PROCEDURE — 97112 NEUROMUSCULAR REEDUCATION: CPT

## 2020-09-01 PROCEDURE — 97116 GAIT TRAINING THERAPY: CPT

## 2020-09-01 RX ADMIN — HEPARIN SODIUM 5000 UNITS: 5000 INJECTION INTRAVENOUS; SUBCUTANEOUS at 13:35

## 2020-09-01 RX ADMIN — METFORMIN HYDROCHLORIDE 500 MG: 500 TABLET, EXTENDED RELEASE ORAL at 17:15

## 2020-09-01 RX ADMIN — ATORVASTATIN CALCIUM 40 MG: 40 TABLET, FILM COATED ORAL at 08:02

## 2020-09-01 RX ADMIN — MINOXIDIL 5 MG: 2.5 TABLET ORAL at 08:02

## 2020-09-01 RX ADMIN — MONTELUKAST 10 MG: 10 TABLET, FILM COATED ORAL at 22:01

## 2020-09-01 RX ADMIN — VALSARTAN 320 MG: 160 TABLET, FILM COATED ORAL at 08:01

## 2020-09-01 RX ADMIN — IPRATROPIUM BROMIDE 0.5 MG: 0.5 SOLUTION RESPIRATORY (INHALATION) at 22:16

## 2020-09-01 RX ADMIN — IPRATROPIUM BROMIDE 0.5 MG: 0.5 SOLUTION RESPIRATORY (INHALATION) at 13:30

## 2020-09-01 RX ADMIN — IPRATROPIUM BROMIDE 0.5 MG: 0.5 SOLUTION RESPIRATORY (INHALATION) at 16:17

## 2020-09-01 RX ADMIN — INSULIN GLARGINE 22 UNITS: 100 INJECTION, SOLUTION SUBCUTANEOUS at 22:02

## 2020-09-01 RX ADMIN — DOCUSATE SODIUM 50 MG AND SENNOSIDES 8.6 MG 2 TABLET: 8.6; 5 TABLET, FILM COATED ORAL at 17:15

## 2020-09-01 RX ADMIN — ASPIRIN 81 MG CHEWABLE TABLET 81 MG: 81 TABLET CHEWABLE at 07:56

## 2020-09-01 RX ADMIN — HYDROCHLOROTHIAZIDE 12.5 MG: 12.5 CAPSULE ORAL at 08:01

## 2020-09-01 RX ADMIN — FERROUS SULFATE TAB 325 MG (65 MG ELEMENTAL FE) 325 MG: 325 (65 FE) TAB at 07:57

## 2020-09-01 RX ADMIN — FENOFIBRATE 145 MG: 145 TABLET ORAL at 08:00

## 2020-09-01 RX ADMIN — PANTOPRAZOLE SODIUM 20 MG: 20 TABLET, DELAYED RELEASE ORAL at 06:45

## 2020-09-01 RX ADMIN — DOCUSATE SODIUM 100 MG: 100 CAPSULE, LIQUID FILLED ORAL at 08:07

## 2020-09-01 RX ADMIN — HEPARIN SODIUM 5000 UNITS: 5000 INJECTION INTRAVENOUS; SUBCUTANEOUS at 06:45

## 2020-09-01 RX ADMIN — IPRATROPIUM BROMIDE 0.5 MG: 0.5 SOLUTION RESPIRATORY (INHALATION) at 07:54

## 2020-09-01 RX ADMIN — CLOPIDOGREL BISULFATE 75 MG: 75 TABLET ORAL at 17:16

## 2020-09-01 RX ADMIN — HEPARIN SODIUM 5000 UNITS: 5000 INJECTION INTRAVENOUS; SUBCUTANEOUS at 22:02

## 2020-09-01 RX ADMIN — AMLODIPINE BESYLATE 10 MG: 10 TABLET ORAL at 08:03

## 2020-09-01 RX ADMIN — CHOLECALCIFEROL (VITAMIN D3) 25 MCG (1,000 UNIT) TABLET 2 TABLET: TABLET at 08:03

## 2020-09-01 RX ADMIN — MINOXIDIL 5 MG: 2.5 TABLET ORAL at 22:01

## 2020-09-01 NOTE — INTERDISCIPLINARY ROUNDS
[facility] PHYSICAL REHABILITATION  [address] [place] [address], [address]    INPATIENT REHABILITATION  TEAM CONFERENCE SUMMARY     Date of Conference: 09/01/2020    Patient Information:        Name: Kristy Decker Age / Sex: 60 y.o. / female   CSN: [id number] MRN: [id number]   [facility] Date: 8/26/2020 Length of Stay: 6 days     Primary Rehabilitation Diagnosis  1. Impaired Mobility and ADLs  2.  Acute Ischemic Stroke (late acute/early subacute infarcts in the right centrum semiovale extending to the superior frontal lobe cortex) with residual left hemiparesis     Comorbidities   Hypertensive heart disease without heart failure I11.9    Gastroesophageal reflux disease K21.9    Obstructive sleep apnea on CPAP G47.33, Z99.89    Primary osteoarthritis involving multiple joints M89.49    Chronic obstructive pulmonary disease (COPD) ([facility]) J44.9    Severe persistent asthma J45.50    Obesity, Class II, BMI 35-39.9 E66.9    Depression F32.9    Seasonal allergic rhinitis J30.2    History of colon polyps Z86.010    Pure hypercholesterolemia E78.00    Type 2 diabetes mellitus, with long-term current use of insulin ([facility]) E11.9, Z79.4    COVID-19 ruled out by laboratory testing Z03.818    Menopause Z78.0    Iron deficiency anemia D50.9    Bipolar disorder ([facility]) F31.9    Current use of aspirin Z79.82    On clopidogrel therapy Z79.01    On statin therapy due to risk of future cardiovascular event Z79.899    Sciatica of left side M54.32          Therapy:     FIM SCORES Initial Assessment Weekly Progress Assessment 9/1/2020   Eating Functional Level: 5     Swallowing     Grooming 5     Bathing 4        Upper Body Dressing Functional Level: 5  Items Applied/Steps Completed: Pullover (4 steps)     Lower Body Dressing Functional Level: 5  Items Applied/Steps Completed: Elastic waist pants (3 steps), Sock, left (1 step), Sock, right (1 step), Underpants (3 steps)     Toileting Functional Level: 0 Bladder - level of assist 5 5   Bladder - accident frequency score 7 5   Bowel - level of assist 5 5   Bowel - accident frequency score 5     Toilet Transfer [place] Toilet Transfer Score: 0     Tub/Shower Transfer [place] Tub or Shower Type: Tub  Tub/Shower Transfer Score: 0        Comprehension Primary Mode of Comprehension: Auditory  Score: 6 Auditory  6   Expression Primary Mode of Expression: Verbal  Score: 6 Verbal  6   Social Interaction Score: 6 6   Problem Solving Score: 5 5   Memory Score: 5 5     FIM SCORES Initial Assessment Weekly Progress Assessment 9/1/2020   Bed/Chair/Wheelchair Transfers Transfer Type:  Other  Other: stand step without AD as per PLOF  Transfer Assistance : 4 (Minimal assistance)  Sit to Stand Assistance: Contact guard assistance  Car Transfers: Minimum assistance(without AD as per PLOF)  Car Type: car transfer simulator     Bed Mobility Rolling Right 5 (Supervision)   Rolling Left 5 (Supervision)   Supine to Sit 5 (Supervision)   Sit to Stand Contact guard assistance   Sit to Supine 5 (Supervision)    Rolling Right       Rolling Left       Supine to Sit       Sit to Stand       Sit to Supine          Locomotion (W/C) Able to Propel (ft): 170 feet  Functional Level: 3(mod A for steering due to veering observed)  Curbs/Ramps Assist Required (FIM Score): 0 (Not tested)  Wheelchair Setup Assist Required : 3 (Moderate assistance)  Wheelchair Management: Manages left brake, Manages right brake(needing assist with legrests) Function    Setup Assistance         Locomotion (W/C distance)       Locomotion (Walk) 3 (Moderate assistance)        Locomotion (Walk dist.) 18 Feet (ft)(without AD as per PLOF, 25 ft trial with RW min A)     Steps/Stairs Steps/Stairs Ambulated (#): 7  Level of Assist : 4 (Minimal assistance)(leading with right LE ascending stairs)  Rail Use: Both           Nursing:     Neuro:   AAA&O x   4         Respiratory:   [x] WNL   [] O2 LPM:   Other:  Peripheral Vascular:   [] TEDS present   [] Edema present ____ Grade   Cardiac:   [x] WNL   [] Other  Genitourinary:   [x] continent   [] incontinent   [] barbosa  Abdominal  8/31/20  LBM  GI: _Cardiac/NCS_ Diet _Reg_ Liquids _____ tube feeds  Musculoskeletal: ____ ROM Transfers _Reg_ Assistive Device Used  _CGA_ Level of Assistance  Skin Integumentary:   [x] Intact   [] Not Intact   _Fall/Aspiration/Skin/Infection_Preventative Measures. Details: Fall prec. No falls. Skin intact. Pain: [x] Controlled   [] Not Controlled   Pain Meds:   [] Scheduled   [x] PRN        Registered Dietitian / Nutrition:     Patient Vitals for the past 100 hrs:   % Diet Eaten   08/30/20 1434 100 %     Pt reported good appetite and meal intake. Tolerating diet. Likes the food. Preferences discussed. Denied having any concerns at time of visit. Supplements:          [] Yes   [x] No      Amount of supplement consumed:  Not applicable     No intake or output data in the 24 hours ending 09/01/20 1035                             Last bowel movement: 8/31      Interdisciplinary Team Goals:     1. Discipline  Physical Therapy    Goal  Patient will perform mobility with RW at mod I level, demonstrating appropriate neutral left knee extension during midstance phase of gait 100% of the time    Barrier  Decreased strength, endurance, balance, slightly impulsive at times    Intervention  Education, therex, theract, neuro re-ed, gait training    Goal written by:   Meenu Alicia, PT, DPT     2. Discipline  Occupational Therapy    Goal  progress from close supervision to distant supervision or mod I with ADLs and cooking tasks. Barrier  balance and strength    Intervention  TE, TA, SC training, EDU    Goal written by:  BASHIR Wagner/L     3. Discipline  Speech Therapy    Goal      Barrier      Intervention      Goal written by:       4.  Discipline  Nursing    Goal Patient will verbalize uses, frequency and most common side effects of current meds Barrier  cognition/memory/insight    Intervention Patient education    Goal written by: Elvia Cobos, RN BSN     5. Discipline  Clinical Psychology    Goal  Increase insight about stroke recovery and reduction of risk for recurrence    Barrier  Limited insight    Intervention  Patient/stroke education and reinforcement    Goal written by:  Jeovany Lopes, PhD     6. Discipline  Nutrition / Dietetics    Goal  - PO nutrition intake will continue to meet >75% of patients estimated nutritional needs over the next 7 days. Barrier  none known     Intervention  continue po diet. Update food preferences    Goal written by:  Gisell Thomas RD        Disposition / Discharge Planning:      Follow-up services:  [x] Physical Therapy             [x] Occupational Therapy       [] Speech Therapy           [] Skilled Nursing      [] Medical Social Worker   [] Aide        [x] Outpatient      [] vs   [] Home Health  [] vs       [] to progress to outpatient       [] with 24-hour supervision       [] with 24-hour assistance   [] Arnaud ROE recommendations:  TTB, Bedside commode, RW   Estimated discharge date:  09/04/2020   Discharge Location:  [x] Home  [] versus    [] Arnaud [place]    [] [address]   [] Other:           Interdisciplinary team rounds were held this PM with the following team members:       Name   Physical Therapist    Erica Bennett, PT, DPT   Occupational Therapist    ALONA RibeiroR/L   Recreational Therapist    Srinivasa Lopez, [address]appell RN BSN   Physician    Jonathan Rojo MD        Juliana Oppenheim, MSW       Signed:  Jonathan Rojo MD    September 1, 2020

## 2020-09-01 NOTE — PROGRESS NOTES
Problem: Falls - Risk of  Goal: *Absence of Falls  Description: Document Dominick Payton Fall Risk and appropriate interventions in the flowsheet. Outcome: Progressing Towards Goal  Note: Fall Risk Interventions:  Mobility Interventions: Bed/chair exit alarm, Communicate number of staff needed for ambulation/transfer, Patient to call before getting OOB, Utilize walker, cane, or other assistive device         Medication Interventions: Bed/chair exit alarm, Patient to call before getting OOB, Teach patient to arise slowly    Elimination Interventions: Bed/chair exit alarm, Call light in reach, Patient to call for help with toileting needs    History of Falls Interventions: Bed/chair exit alarm, Door open when patient unattended         Problem: Pressure Injury - Risk of  Goal: *Prevention of pressure injury  Description: Document Filippo Scale and appropriate interventions in the flowsheet.   Outcome: Progressing Towards Goal  Note: Pressure Injury Interventions:  Sensory Interventions: Assess changes in LOC, Assess need for specialty bed, Chair cushion, Float heels, Maintain/enhance activity level, Minimize linen layers, Pressure redistribution bed/mattress (bed type)    Moisture Interventions: Absorbent underpads, Assess need for specialty bed, Maintain skin hydration (lotion/cream), Minimize layers    Activity Interventions: Assess need for specialty bed, Chair cushion, Increase time out of bed, Pressure redistribution bed/mattress(bed type)    Mobility Interventions: Assess need for specialty bed, Chair cushion, HOB 30 degrees or less, Pressure redistribution bed/mattress (bed type)    Nutrition Interventions: Document food/fluid/supplement intake    Friction and Shear Interventions: HOB 30 degrees or less, Minimize layers, Transferring/repositioning devices

## 2020-09-01 NOTE — ROUTINE PROCESS
[facility]    Bedside and Verbal shift change report given to Cuca Harrison, RN (oncoming nurse) by Shena Pearce RN (offgoing nurse). Report included the following information SBAR, Kardex, MAR and Recent Results. Situation:   Code Status: Full Code   Hospital Day: 6   Problem List:   Hospital Problems  Date Reviewed: 9/1/2020          Codes Class Noted POA    Sciatica of left side ICD-10-CM: M54.32  ICD-9-CM: 724.3  Unknown Yes        Type 2 diabetes mellitus, with long-term current use of insulin (HCC) (Chronic) ICD-10-CM: E11.9, Z79.4  ICD-9-CM: 250.00, V58.67  Unknown Yes    Overview Signed 8/26/2020 11:46 PM by Mervin Murguia MD     HbA1c (8/19/2020) = 8.1; HbA1c (8/20/2020) = 8.0             COVID-19 ruled out by laboratory testing ICD-10-CM: Z03.818  ICD-9-CM: V71.83  8/26/2020 Yes    Overview Signed 8/26/2020 11:47 PM by Mervin Murguia MD     SARS-CoV-2 (Abbott m2000) (collected 8/25/2020, resulted 8/26/2020):  Not detected              Current use of aspirin ICD-10-CM: Z79.82  ICD-9-CM: V58.66  8/24/2020 Yes        On clopidogrel therapy ICD-10-CM: Z79.01  ICD-9-CM: V58.61  8/24/2020 Yes        On statin therapy due to risk of future cardiovascular event ICD-10-CM: Z79.899  ICD-9-CM: V58.69  8/24/2020 Yes    Overview Signed 8/26/2020 11:50 PM by Mervin Murguia MD     On Atorvastatin             * (Principal) Acute ischemic stroke [facility]) ICD-10-CM: I63.9  ICD-9-CM: 434.91  8/21/2020 Yes    Overview Signed 8/26/2020 11:35 PM by Mervin Murguia MD     Acute Ischemic Stroke (late acute/early subacute infarcts in the right centrum semiovale extending to the superior frontal lobe cortex) with residual left hemiparesis             Left hemiparesis ([facility] [address] ICD-10-CM: G81.94  ICD-9-CM: 342.90  8/21/2020 Yes        Impaired mobility and ADLs ICD-10-CM: Z74.09, Z78.9  ICD-9-CM: V49.89  8/21/2020 Yes        Pure hypercholesterolemia (Chronic) ICD-10-CM: E78.00  ICD-9-CM: 272.0  8/19/2020 Yes    Overview Signed 8/26/2020 11:45 PM by Batsheva Wolf MD     Lipid profile (8/19/2020) showed , , HDL 50,              Hypertensive heart disease without heart failure (Chronic) ICD-10-CM: I11.9  ICD-9-CM: 402.90  Unknown Yes    Overview Signed 8/26/2020 11:38 PM by Batsheva Wolf MD     2D echocardiogram (8/22/2020) showed EF 65%; mild left ventricular hypertrophy; normal diastolic function; negative bubble study at rest and after Valsalva                   Background:   Past Medical History:   Past Medical History:   Diagnosis Date    Acute ischemic stroke ([facility] [address] 8/21/2020    Acute Ischemic Stroke (late acute/early subacute infarcts in the right centrum semiovale extending to the superior frontal lobe cortex) with residual left hemiparesis    Bipolar disorder ([facility] [address]     [facility]     COVID-19 ruled out by laboratory testing 8/26/2020    SARS-CoV-2 (Abbott m2000) (collected 8/25/2020, resulted 8/26/2020):  Not detected     Current use of aspirin 8/24/2020    Depression     Gastroesophageal reflux disease     History of colon polyps     Hypertensive heart disease without heart failure     2D echocardiogram (8/22/2020) showed EF 65%; mild left ventricular hypertrophy; normal diastolic function; negative bubble study at rest and after Valsalva    Iron deficiency anemia     Left hemiparesis ([facility] [address] 8/21/2020    Menopause     Obesity, Class II, BMI 35-39.9     Obstructive sleep apnea on CPAP     On clopidogrel therapy 8/24/2020    On statin therapy due to risk of future cardiovascular event 8/24/2020    On Atorvastatin    Pure hypercholesterolemia 8/19/2020    Lipid profile (8/19/2020) showed , , HDL 50,     Sciatica of left side     Seasonal allergic rhinitis     Severe persistent asthma 11/07/2019    Type 2 diabetes mellitus, with long-term current use of insulin (HCC)     HbA1c (8/19/2020) = 8.1; HbA1c (8/20/2020) = 8.0        Assessment:   Changes in Assessment throughout shift: none     Patient has central line: no Reasons if yes: n/a  Insertion date:n/a Last dressing date:n/a   Patient has Barbosa Cath: no Reasons if yes: n/a   Insertion date:n/a  Shift barbosa care completed: n/a      Last Vitals:     Vitals:    08/31/20 2100 08/31/20 2116 09/01/20 0737 09/01/20 1619   BP: 133/89 133/89 149/64 169/69   Pulse: 65 65 66 70   Resp: 18  18 20   Temp: 97.7 °F (36.5 °C)  97.9 °F (36.6 °C) (!) 96.6 °F (35.9 °C)   SpO2: 98%  100% 99%   Height:            PAIN    Pain Assessment    Pain Intensity 1: 0 (09/01/20 1532) Pain Intensity 1: 2 (12/29/14 1105)      Pain Location 1: Abdomen      Pain Intervention(s) 1: Medication (see MAR)  Patient Stated Pain Goal: 0 Patient Stated Pain Goal: 0  o Intervention effective: yes   o Other actions taken for pain: rest/position/food     Skin Assessment  Skin color    Condition/Temperature    Integrity    Turgor    Weekly Pressure Ulcer Documentation  Pressure  Injury Documentation: No Pressure Injury Noted-Pressure Ulcer Prevention Initiated  Wound Prevention & Protection Methods  Orientation of wound Orientation of Wound Prevention: Posterior  Location of Prevention Location of Wound Prevention: Buttocks, Heel, Sacrum/Coccyx  Dressing Present Dressing Present : No  Dressing Status    Wound Offloading Wound Offloading (Prevention Methods): Adaptive equipment, Bed, pressure reduction mattress, Pillows, Repositioning, Elevate heels, Turning, Wheelchair, Walker     INTAKE/OUPUT  Date 08/31/20 0700 - 09/01/20 0659 09/01/20 0700 - 09/02/20 0659   Shift 0525-5466 7799-8299 24 Hour Total 0700-1859 1900-0659 24 Hour Total   INTAKE   Shift Total         OUTPUT   Urine           Urine Occurrence(s) 3 x 3 x 6 x 0 x  0 x   Stool           Stool Occurrence(s) 2 x 1 x 3 x 0 x  0 x   Shift Total         NET         Weight (kg)             Recommendations:  1.  Patient needs and requests: toileting    2. Pending tests/procedures: None at this time. 3. Functional Level/Equipment: 1 person assist w/c    HEALS Safety Check    A safety check occurred in the patient's room between off going nurse and oncoming nurse listed above. The safety check included the below items  Area Items   H  High Alert Medications - Verify all high alert medication drips (heparin, PCA, etc.)   E  Equipment - Suction is set up for ALL patients (with dwayne)  - Red plugs utilized for all equipment (IV pumps, etc.)  - WOWs wiped down at end of shift.  - Room stocked with oxygen, suction, and other unit-specific supplies   A  Alarms - Bed alarm is set for fall risk patients  - Ensure chair alarm is in place and activated if patient is up in a chair   L  Lines - Check IV for any infiltration  - Harrison bag is empty if patient has a Harrison   - Tubing and IV bags are labeled   S  Safety   - Room is clean, patient is clean, and equipment is clean. - Hallways are clear from equipment besides carts. - Fall bracelet on for fall risk patients  - Ensure room is clear and free of clutter  - Suction is set up for ALL patients (with dwayne)  - Hallways are clear from equipment besides carts.    - Isolation precautions followed, supplies available outside room, sign posted     Lilian Rivera RN

## 2020-09-01 NOTE — PROGRESS NOTES
Spoke with patient and given permission to call her daughter and fiance to schedule family education. Spoke with saran Collazo and he referred to calling daughter as he was at an appointment. Spoke with daughter Rocío Gamez and scheduled family education for Thursday, Sept 3rd @1pm.     Will follow up with Franklyn Collazo to confirm appointment time.     Feng Díaz, CTRS

## 2020-09-01 NOTE — ROUTINE PROCESS
[facility]    Bedside and Verbal shift change report given to Selwyn Camacho, RN (oncoming nurse) by Anu Gibbons RN (offgoing nurse). Report included the following information SBAR, Kardex, MAR and Recent Results. Situation:   Code Status: Full Code   Hospital Day: 6   Problem List:   Hospital Problems  Date Reviewed: 8/31/2020          Codes Class Noted POA    Sciatica of left side ICD-10-CM: M54.32  ICD-9-CM: 724.3  Unknown Yes        Type 2 diabetes mellitus, with long-term current use of insulin (HCC) (Chronic) ICD-10-CM: E11.9, Z79.4  ICD-9-CM: 250.00, V58.67  Unknown Yes    Overview Signed 8/26/2020 11:46 PM by Stuart Menjivar MD     HbA1c (8/19/2020) = 8.1; HbA1c (8/20/2020) = 8.0             COVID-19 ruled out by laboratory testing ICD-10-CM: Z03.818  ICD-9-CM: V71.83  8/26/2020 Yes    Overview Signed 8/26/2020 11:47 PM by Stuart Menjivar MD     SARS-CoV-2 (Abbott m2000) (collected 8/25/2020, resulted 8/26/2020):  Not detected              Current use of aspirin ICD-10-CM: Z79.82  ICD-9-CM: V58.66  8/24/2020 Yes        On clopidogrel therapy ICD-10-CM: Z79.01  ICD-9-CM: V58.61  8/24/2020 Yes        On statin therapy due to risk of future cardiovascular event ICD-10-CM: Z79.899  ICD-9-CM: V58.69  8/24/2020 Yes    Overview Signed 8/26/2020 11:50 PM by Stuart Menjivar MD     On Atorvastatin             * (Principal) Acute ischemic stroke [facility]) ICD-10-CM: I63.9  ICD-9-CM: 434.91  8/21/2020 Yes    Overview Signed 8/26/2020 11:35 PM by Stuart Menjivar MD     Acute Ischemic Stroke (late acute/early subacute infarcts in the right centrum semiovale extending to the superior frontal lobe cortex) with residual left hemiparesis             Left hemiparesis ([facility] [address] ICD-10-CM: G81.94  ICD-9-CM: 342.90  8/21/2020 Yes        Impaired mobility and ADLs ICD-10-CM: Z74.09, Z78.9  ICD-9-CM: V49.89  8/21/2020 Yes        Pure hypercholesterolemia (Chronic) ICD-10-CM: E78.00  ICD-9-CM: 272.0  8/19/2020 Yes Overview Signed 8/26/2020 11:45 PM by Macarena Cano MD     Lipid profile (8/19/2020) showed , , HDL 50,              Hypertensive heart disease without heart failure (Chronic) ICD-10-CM: I11.9  ICD-9-CM: 402.90  Unknown Yes    Overview Signed 8/26/2020 11:38 PM by Macarena Cano MD     2D echocardiogram (8/22/2020) showed EF 65%; mild left ventricular hypertrophy; normal diastolic function; negative bubble study at rest and after Valsalva                   Background:   Past Medical History:   Past Medical History:   Diagnosis Date    Acute ischemic stroke ([facility] [address] 8/21/2020    Acute Ischemic Stroke (late acute/early subacute infarcts in the right centrum semiovale extending to the superior frontal lobe cortex) with residual left hemiparesis    Bipolar disorder ([facility] [address]     Chronic obstructive pulmonary disease (COPD) ([facility] [address]     COVID-19 ruled out by laboratory testing 8/26/2020    SARS-CoV-2 (Abbott m2000) (collected 8/25/2020, resulted 8/26/2020):  Not detected     Current use of aspirin 8/24/2020    Depression     Gastroesophageal reflux disease     History of colon polyps     Hypertensive heart disease without heart failure     2D echocardiogram (8/22/2020) showed EF 65%; mild left ventricular hypertrophy; normal diastolic function; negative bubble study at rest and after Valsalva    Iron deficiency anemia     Left hemiparesis ([facility] [address] 8/21/2020    Menopause     Obesity, Class II, BMI 35-39.9     Obstructive sleep apnea on CPAP     On clopidogrel therapy 8/24/2020    On statin therapy due to risk of future cardiovascular event 8/24/2020    On Atorvastatin    Pure hypercholesterolemia 8/19/2020    Lipid profile (8/19/2020) showed , , HDL 50,     Sciatica of left side     Seasonal allergic rhinitis     Severe persistent asthma 11/07/2019    Type 2 diabetes mellitus, with long-term current use of insulin (HCC)     HbA1c (8/19/2020) = 8.1; HbA1c (8/20/2020) = 8.0 Assessment:   Changes in Assessment throughout shift: none     Patient has central line: no Reasons if yes: n/a  Insertion date:n/a Last dressing date:n/a   Patient has Barbosa Cath: no Reasons if yes: n/a   Insertion date:n/a  Shift barbosa care completed: n/a      Last Vitals:     Vitals:    08/31/20 0757 08/31/20 1615 08/31/20 2100 08/31/20 2116   BP: 145/58 138/65 133/89 133/89   Pulse: (!) 59 62 65 65   Resp: 14 18 18    Temp: 97 °F (36.1 °C) 97.4 °F (36.3 °C) 97.7 °F (36.5 °C)    SpO2: 97% 98% 98%    Height:            PAIN    Pain Assessment    Pain Intensity 1: 0 (09/01/20 0400) Pain Intensity 1: 2 (12/29/14 1105)      Pain Location 1: Abdomen      Pain Intervention(s) 1: Medication (see MAR)  Patient Stated Pain Goal: 0 Patient Stated Pain Goal: 0  o Intervention effective: yes   o Other actions taken for pain: rest/position/food     Skin Assessment  Skin color    Condition/Temperature    Integrity    Turgor    Weekly Pressure Ulcer Documentation  Pressure  Injury Documentation: No Pressure Injury Noted-Pressure Ulcer Prevention Initiated  Wound Prevention & Protection Methods  Orientation of wound Orientation of Wound Prevention: Posterior  Location of Prevention Location of Wound Prevention: Buttocks, Heel, Sacrum/Coccyx  Dressing Present Dressing Present : No  Dressing Status    Wound Offloading Wound Offloading (Prevention Methods): Adaptive equipment, Bed, pressure redistribution/air, Bed, pressure reduction mattress, Repositioning, Walker, Wheelchair     INTAKE/OUPUT  Date 08/31/20 0700 - 09/01/20 0659 09/01/20 0700 - 09/02/20 0659   Shift 1834-6345 5217-6250 24 Hour Total 4194-1216 4561-5147 24 Hour Total   INTAKE   Shift Total         OUTPUT   Urine           Urine Occurrence(s) 3 x 3 x 6 x      Stool           Stool Occurrence(s) 2 x 1 x 3 x      Shift Total         NET         Weight (kg)             Recommendations:  1. Patient needs and requests: toileting    2.  Pending tests/procedures: None at this time. 3. Functional Level/Equipment: 1 person assist w/c    HEALS Safety Check    A safety check occurred in the patient's room between off going nurse and oncoming nurse listed above. The safety check included the below items  Area Items   H  High Alert Medications - Verify all high alert medication drips (heparin, PCA, etc.)   E  Equipment - Suction is set up for ALL patients (with dwayne)  - Red plugs utilized for all equipment (IV pumps, etc.)  - WOWs wiped down at end of shift.  - Room stocked with oxygen, suction, and other unit-specific supplies   A  Alarms - Bed alarm is set for fall risk patients  - Ensure chair alarm is in place and activated if patient is up in a chair   L  Lines - Check IV for any infiltration  - Harrison bag is empty if patient has a Harrison   - Tubing and IV bags are labeled   S  Safety   - Room is clean, patient is clean, and equipment is clean. - Hallways are clear from equipment besides carts. - Fall bracelet on for fall risk patients  - Ensure room is clear and free of clutter  - Suction is set up for ALL patients (with dwayne)  - Hallways are clear from equipment besides carts.    - Isolation precautions followed, supplies available outside room, sign posted     Cristel Thrasher RN

## 2020-09-01 NOTE — PROGRESS NOTES
[x] Psychology  [] Social Work [] Recreational Therapy    INTERVENTION  UNITS/TIME OF SERVICE   Assessment    Supportive Counseling  August 31, 2020   Orientation    Discharge Planning    Resource Linkage              Progress/Current Status    Patient seen for individual support  on ARU this date. She was immediately effusive in her engagement with me and thoroughly willing to discuss her therapy effort and progress. Patient is aware of change in discharge date and is please to have same scheduled for later this week, earlier than expected. Patient is very gratified with her gains and detailed some of the specific improvement for herself, including with gait. Her satisfaction is certainly matched by her very animated and even \"happy go aj\" mood and demeanor. But, importantly, patient was educated about issues related to stroke occurrence and recovery, with emphasis on risk factors for recurrence and health maintenance for long term stability. Patient seemed very interested in same and discussed some of her need to alter lifestyle choices and thereby improve her overall health stability. Finally, patient encouraged to further dialogue with any and all team members to fully understand safety and pace issues as she ponders her return to home environment and not having so much support available to her.     Estelita Araujo, PHD 9/1/2020 8:59 AM

## 2020-09-01 NOTE — PROGRESS NOTES
[facility] PHYSICAL REHABILITATION  [address] [place] [address], [address]     INPATIENT REHABILITATION  DAILY PROGRESS NOTE     Date: 9/1/2020    Name: Wanda Self Age / Sex: 60 y.o. / female   CSN: [id number] MRN: [id number]   [facility] Date: 8/26/2020 Length of Stay: 6 days     Primary Rehab Diagnosis: Impaired Mobility and ADLs secondary to Acute Ischemic Stroke (late acute/early subacute infarcts in the right centrum semiovale extending to the superior frontal lobe cortex) with residual left hemiparesis      Subjective:     Patient seen and examined. Blood pressure better controlled. Blood glucose controlled. Team conference was held at bedside this PM.     Patient's Complaint:   No significant medical complaints    Pain Control: no current joint or muscle symptoms, essentially pain-free      Objective:     Vital Signs:  Patient Vitals for the past 24 hrs:   BP Temp Pulse Resp SpO2   09/01/20 0737 149/64 97.9 °F (36.6 °C) 66 18 100 %   08/31/20 2116 133/89 -- 65 -- --   08/31/20 2100 133/89 97.7 °F (36.5 °C) 65 18 98 %   08/31/20 1615 138/65 97.4 °F (36.3 °C) 62 18 98 %        Physical Examination:  GENERAL SURVEY: Patient is awake, alert, oriented x 3, sitting comfortably on the chair, not in acute respiratory distress. HEENT: pink palpebral conjunctivae, anicteric sclerae, no nasoaural discharge, moist oral mucosa  NECK: supple, no jugular venous distention, no palpable lymph nodes  CHEST/LUNGS: symmetrical chest expansion, good air entry, clear breath sounds  HEART: adynamic precordium, good S1 S2, no S3, regular rhythm, no murmurs  ABDOMEN: obese, bowel sounds appreciated, soft, non-tender  EXTREMITIES: pink nailbeds, no edema, full and equal pulses, no calf tenderness   NEUROLOGICAL EXAM: The patient is awake, alert and oriented x3, able to answer questions fairly appropriately, able to follow 1 and 2 step commands. Able to tell time from the wall clock.   Cranial nerves II-XII are grossly intact. No gross sensory deficit.   Motor strength is 5/5 on the RUE and RLE, 4- to 4/5 on the LUE, 2+/5 on the LLE.       Current Medications:  Current Facility-Administered Medications   Medication Dose Route Frequency    docusate sodium (COLACE) capsule 100 mg  100 mg Oral DAILY AFTER BREAKFAST    senna-docusate (PERICOLACE) 8.6-50 mg per tablet 2 Tab  2 Tab Oral PCD    insulin glargine (LANTUS) injection 22 Units  22 Units SubCUTAneous QHS    minoxidiL (LONITEN) tablet 5 mg  5 mg Oral Q12H    metFORMIN ER (GLUCOPHAGE XR) tablet 500 mg  500 mg Oral DAILY WITH DINNER    acetaminophen (TYLENOL) tablet 650 mg  650 mg Oral Q4H PRN    bisacodyL (DULCOLAX) tablet 10 mg  10 mg Oral Q48H PRN    heparin (porcine) injection 5,000 Units  5,000 Units SubCUTAneous Q8H    insulin lispro (HUMALOG) injection   SubCUTAneous TIDAC    aspirin chewable tablet 81 mg  81 mg Oral DAILY WITH BREAKFAST    clopidogreL (PLAVIX) tablet 75 mg  75 mg Oral DAILY WITH DINNER    atorvastatin (LIPITOR) tablet 40 mg  40 mg Oral DAILY    albuterol (PROVENTIL VENTOLIN) nebulizer solution 2.5 mg  2.5 mg Nebulization Q4H PRN    amLODIPine (NORVASC) tablet 10 mg  10 mg Oral DAILY    cholecalciferol (VITAMIN D3) (1000 Units /25 mcg) tablet 2 Tab  2,000 Units Oral DAILY    fenofibrate nanocrystallized (TRICOR) tablet 145 mg  145 mg Oral DAILY    ferrous sulfate tablet 325 mg  1 Tab Oral DAILY WITH BREAKFAST    montelukast (SINGULAIR) tablet 10 mg  10 mg Oral QHS    pantoprazole (PROTONIX) tablet 20 mg  20 mg Oral ACB    ipratropium (ATROVENT) 0.02 % nebulizer solution 0.5 mg  0.5 mg Nebulization QID RT    valsartan (DIOVAN) tablet 320 mg  320 mg Oral DAILY    glucose chewable tablet 16 g  4 Tab Oral PRN    glucagon (GLUCAGEN) injection 1 mg  1 mg IntraMUSCular PRN    dextrose (D50W) injection syrg 12.5-25 g  25-50 mL IntraVENous PRN    hydroCHLOROthiazide (MICROZIDE) capsule 12.5 mg  12.5 mg Oral DAILY Allergies:  No Known Allergies      Functional Progress:    OCCUPATIONAL THERAPY    ON ADMISSION MOST RECENT   Eating  Functional Level: 5   Eating  Functional Level: 5     Grooming  Functional Level: 5   Grooming  Functional Level: 5     Bathing  Functional Level: 4   Bathing  Functional Level: 4     Upper Body Dressing  Functional Level: 5   Upper Body Dressing  Functional Level: 5     Lower Body Dressing  Functional Level: 5   Lower Body Dressing  Functional Level: 5     Toileting  Functional Level: 0   Toileting  Functional Level: 5     Toilet Transfers  Toilet Transfer Score: 0   Toilet Transfers  Toilet Transfer Score: 0     Tub /Shower Transfers  Tub/Shower Transfer Score: 0   Tub/Shower Transfers  Tub/Shower Transfer Score: 0       Legend:   7 - Independent   6 - Modified Independent   5 - Standby Assistance / Supervision / Set-up   4 - Minimum Assistance / Contact Guard Assistance   3 - Moderate Assistance   2 - Maximum Assistance   1 - Total Assistance / Dependent       Lab/Data Review:  Recent Results (from the past 24 hour(s))   GLUCOSE, POC    Collection Time: 08/31/20 11:50 AM   Result Value Ref Range    Glucose (POC) 163 (H) 70 - 110 mg/dL   GLUCOSE, POC    Collection Time: 08/31/20  4:37 PM   Result Value Ref Range    Glucose (POC) 91 70 - 110 mg/dL   GLUCOSE, POC    Collection Time: 08/31/20  8:26 PM   Result Value Ref Range    Glucose (POC) 161 (H) 70 - 110 mg/dL   GLUCOSE, POC    Collection Time: 09/01/20  7:38 AM   Result Value Ref Range    Glucose (POC) 82 70 - 110 mg/dL       Estimated Glomerular Filtration Rate:  On admission, estimated GFR based on a Creatinine of 0.75 mg/dl:              Using CKD-EPI = 100.4 mL/min/1.73m2              Using MDRD = 101.4 mL/min/1.73m2  Most recent estimated GFR, based on a Creatinine of 0.89 mg/dl on 8/31/2020:   Using CKD-EPI = 81.6 mL/min/1.73m2   Using MDRD = 83.2 mL/min/1.73m2       Assessment:     Primary Rehabilitation Diagnosis  1.  Impaired Mobility and ADLs  2. Acute Ischemic Stroke (late acute/early subacute infarcts in the right centrum semiovale extending to the superior frontal lobe cortex) with residual left hemiparesis     Comorbidities   Hypertensive heart disease without heart failure I11.9    Gastroesophageal reflux disease K21.9    Obstructive sleep apnea on CPAP G47.33, Z99.89    Primary osteoarthritis involving multiple joints M89.49    Chronic obstructive pulmonary disease (COPD) ([facility]) J44.9    Severe persistent asthma J45.50    Obesity, Class II, BMI 35-39.9 E66.9    Depression F32.9    Seasonal allergic rhinitis J30.2    History of colon polyps Z86.010    Pure hypercholesterolemia E78.00    Type 2 diabetes mellitus, with long-term current use of insulin ([facility]) E11.9, Z79.4    COVID-19 ruled out by laboratory testing Z03.818    Menopause Z78.0    Iron deficiency anemia D50.9    Bipolar disorder ([facility]) F31.9    Current use of aspirin Z79.82    On clopidogrel therapy Z79.01    On statin therapy due to risk of future cardiovascular event Z79.899    Sciatica of left side M54.32        Plan:     1. Justification for continued stay: Good progression towards established rehabilitation goals. 2. Medical Issues being followed closely:    [x]  Fall and safety precautions     []  Wound Care     [x]  Bowel and Bladder Function     [x]  Fluid Electrolyte and Nutrition Balance     []  Pain Control      3. Issues that 24 hour rehabilitation nursing is following:    [x]  Fall and safety precautions     []  Wound Care     [x]  Bowel and Bladder Function     [x]  Fluid Electrolyte and Nutrition Balance     []  Pain Control      [x]  Assistance with and education on in-room safety with transfers to and from the bed, wheelchair, toilet and shower. 4. Acute rehabilitation plan of care:    [x]  Continue current care and rehab.            [x]  Physical Therapy           [x]  Occupational Therapy           [x]  Speech Therapy     [] Hold Rehab until further notice     5. Medications:    [x]  MAR Reviewed     [x]  Continue Present Medications     6. DVT Prophylaxis:      []  Lovenox     [x]  Unfractionated Heparin     []  Coumadin     []  NOAC     []  MARTHA Stockings     []  Sequential Compression Device     []  None     7. Code status    [x]  Full code     []  Partial code     []  Do not intubate     []  Do not resuscitate     8.  Orders:   > Acute Ischemic Stroke (late acute/early subacute infarcts in the right centrum semiovale extending to the superior frontal lobe cortex) with residual left hemiparesis   > Dual antiplatelet therapy (DAPT) was started 8/24/2020   > Duration of DAPT not specified on discharge summary   > Will defer duration of DAPT to Neurology upon outpatient follow up    > Continue:    > Aspirin 81 mg PO once daily with breakfast    > Atorvastatin 40 mg PO once daily    > Clopidogrel 75 mg PO once daily with dinner     > Hypertensive heart disease without heart failure   > 2D echocardiogram (8/22/2020) showed EF 65%; mild left ventricular hypertrophy; normal diastolic function; negative bubble study at rest and after Valsalva   > On 8/28/2020, started Minoxidil 2.5 mg PO q 12 hr (9AM, 9PM)   > On 8/30/2020, increased Minoxidil from 2.5 mg to 5 mg PO q 12 hr (9AM, 9PM)   > Continue:    > Amlodipine 10 mg PO once daily (9AM)    > Hydrochlorothiazide 12.5 mg PO once daily (9AM)    > Minoxidil 5 mg PO q 12 hr (9AM, 9PM)    > Valsartan 320 mg PO once daily (9AM)    > Pure hypercholesterolemia   > Lipid profile (8/19/2020) showed , , HDL 50,    > Continue:    > Atorvastatin 40 mg PO once daily    > Fenofibrate 145 mg PO once daily    > Type 2 diabetes mellitus, with long term current use of insulin   > HbA1c (8/19/2020) = 8.1; HbA1c (8/20/2020) = 8.0   > On 8/26/2020, increased Insulin glargine from 10 units to 20 units SC q HS   > On 8/28/2020, started Metformin  mg PO once daily with dinner   > On 8/30/2020, increased Insulin glargine from 20 units to 22 units SC q HS   > Continue:    > Insulin glargine 22 units SC q HS    > Insulin lispro sliding scale SC TID AC only    > History of wound culture positive for MRSA (3/27/2012)   > Nasal swab done to rule out MRSA colonization   > MRSA culture (collected 8/27/2020, resulted 8/28/2020): Not present     > Constipation   > On 8/31/2020, started:    > Docusate sodium 100 mg PO once daily after breakfast    > Pericolace 2 tabs PO once daily after dinner   > Continue:    > Docusate sodium 100 mg PO once daily after breakfast    > Pericolace 2 tabs PO once daily after dinner    > COVID-19 ruled out by laboratory testing   > SARS-CoV-2 ([facility] m2000) (collected 8/25/2020, resulted 8/26/2020): Not detected    > Analgesia   > Continue Acetaminophen 650 mg PO q 4 hr PRN for pain level less than 5/10    > Diet:   > Specifications: Cardiac, diabetic, consistent carb 1800 kcal, no concentrated sweets   > Solids (consistency): Regular    > Liquids (consistency): Thin    > Fluid restriction: None      9. Patient's progress in rehabilitation and medical issues discussed with the patient. All questions answered to the best of my ability. Care plan discussed with patient and nurse.       Signed:    Chu Meng MD    September 1, 2020

## 2020-09-01 NOTE — PROGRESS NOTES
Problem: Mobility Impaired (Adult and Pediatric)  Goal: *Therapy Goal (Edit Goal, Insert Text)  Description: Physical Therapy Short Term Goals  Initiated 8/27/2020 and to be accomplished within 7 day(s) on 9/3/2020:  1. Patient will move from supine to sit and sit to supine , scoot up and down, and roll side to side in bed with modified independence. 2.  Patient will transfer from bed to chair and chair to bed with supervision/set-up using the least restrictive device. 3.  Patient will perform sit to stand with supervision/set-up. 4.  Patient will ambulate with SBA for 100 feet with the least restrictive device, cues for neutral knee extension during midstance phase of gait <25% of the time. 5.  Patient will ascend/descend 1 curb step with appropriate AD with CGA. Physical Therapy Long Term Goals  Initiated 8/27/2020 and to be accomplished within 14 day(s) on 9/10/2020  1. Patient will move from supine to sit and sit to supine , scoot up and down, and roll side to side in bed with independence. 2.  Patient will transfer from bed to chair and chair to bed with modified independence using the least restrictive device. 3.  Patient will perform sit to stand with modified independence. 4.  Patient will ambulate with modified independence for 150 feet with the least restrictive device. 5.  Patient will ascend/descend 1 curb step with SBA, using appropriate AD. Outcome: Progressing Towards Goal   PHYSICAL THERAPY TREATMENT    Patient: Daniel Russell (78 y.o. female)  Date: 9/1/2020  Diagnosis: Acute ischemic stroke [facility]) [I63.9] Acute ischemic stroke [facility])  Precautions: Fall  Chart, physical therapy assessment, plan of care and goals were reviewed. Time In:1105  Time [facility]:9094    Patient seen for: Balance activities;Gait training;Patient education; Therapeutic exercise;Transfer training; Wheelchair mobility    Pain:  Patient did not indicate pain during session today.      Patient identified with name and : yes    SUBJECTIVE:      Patient agreeable to session. Reporting that she feels much better today vs yesterday. OBJECTIVE DATA SUMMARY:    Objective:     BED/MAT MOBILITY Daily Assessment     Rolling Right : 6 (Modified independent)  Rolling Left : 6 (Modified independent)  Supine to Sit : 6 (Modified independent)  Sit to Supine : 6 (Modified independent)    Mod I for rolling/supine<>sit and for achieving prone lying. TRANSFERS Daily Assessment     Transfer Type: Other  Other: stand step with RW  Transfer Assistance : 5 (Stand-by assistance)  Sit to Stand Assistance: Stand-by assistance    Using RW for safety, occasionally needing cues for safe hand placement. GAIT Daily Assessment    Gait Description (WDL) Exceptions to WDL    Gait Abnormalities Decreased step clearance(occasional knee hyperextension L LE in midstance)    Assistive device Gait belt;Walker, rolling    Ambulation assistance - level surface 4 (Contact guard assistance)    Distance 130 Feet (ft)    Comments Gait bouts x 3 with seated rests in between bouts. Emphasis on upright gaze and posture, maintaining knee extension in neutral.         STEPS/STAIRS Daily Assessment     Comments  Emphasis on stepping up/down curb step safely, cues for sequencing. Curbs/Ramps Contact guard assistance(using RW)        BALANCE Daily Assessment     Sitting - Static: Good (unsupported)  Sitting - Dynamic: Good (unsupported)  Standing - Static: Good;Occasional;Fair        WHEELCHAIR MOBILITY Daily Assessment     Able to Propel (ft): 200 feet  Functional Level: 5  Curbs/Ramps Assist Required (FIM Score): 0 (Not tested)  Wheelchair Setup Assist Required : 5 (Stand-by assistance)  Wheelchair Management: Manages left brake;Manages right brake        THERAPEUTIC EXERCISES Daily Assessment       Prone: hamstring curls against gravity, 2 x 10-12 reps without shoe donned, emphasis on eccentric control.      Seated: hamstring curls 3 x 15 reps red Theraband to improve knee flexion strength to reduce knee hyperextension with standing. Neuro Re-Education:  Patient performing static standing balance 4 x 30 sec each LE up on 6\" step. Progressed to alternating toe touches to 6\" step with cues for maintaining neutral left knee during midstance. ASSESSMENT:  Patient would continue to benefit from skilled PT to improve balance and gait. Recommend outpatient PT upon discharge. Progression toward goals:  []      Improving appropriately and progressing toward goals  [x]      Improving slowly and progressing toward goals  []      Not making progress toward goals and plan of care will be adjusted      PLAN:  Patient continues to benefit from skilled intervention to address the above impairments. Continue treatment per established plan of care. Discharge Recommendations:  Outpatient  Further Equipment Recommendations for Discharge:  rolling walker      Estimated LOS: D/C planned for 9/4/2020    Activity Tolerance:   Good, needing intermittent rests. After treatment:   Patient left seated in wheelchair for lunch, call button within reach.        Cheri Kate, PT

## 2020-09-01 NOTE — PROGRESS NOTES
Problem: Self Care Deficits Care Plan (Adult)  Goal: *Therapy Goal (Edit Goal, Insert Text)  Description: Occupational Therapy Goals   Short term = Long Term Goals  Initiated 2020 and to be accomplished within 2 week(s) 08/10/2020  1. Pt will perform self-feeding with I.   2. Pt will perform grooming with I.  3. Pt will perform UB bathing with Mod I.  4. Pt will perform LB bathing with Mod I.  5. Pt will perform tub/shower transfer with Mod I.   6. Pt will perform UB dressing with I.  7. Pt will perform LB dressing with I.  8. Pt will perform toileting task with mod I.   9. Pt will perform toilet transfer with mod I. Outcome: Progressing Towards Goal  OCCUPATIONAL THERAPY TREATMENT    Patient: Claudeen Aschoff   60 y.o. Patient identified with name and : yes    Date: 2020    First Tx Session  Time In: 0930  Time Out[de-identified] 1000    Second Tx Session  Time In: 1345  Time Out[de-identified] 6288    Diagnosis: Acute ischemic stroke [facility]) [I63.9]   Precautions: Fall  Chart, occupational therapy assessment, plan of care, and goals were reviewed. Pain:  Pt reports 0/10 pain or discomfort prior to treatment. Pt reports 0/10 pain or discomfort post treatment. Intervention Provided: N/A      SUBJECTIVE:   Patient stated Lj Judah, you have no idea what muscles you use daily when they are weak.     OBJECTIVE DATA SUMMARY:     THERAPEUTIC ACTIVITY Daily Assessment    Peg pattern activity- pt completed activity with LUE for 2 peg design working on finger to palm and palm to finger translation to improve fine motor and coordination skills. Pt reports fatigue throughout task. Resistive clothespin foam - to improve pinch strength needed to manipulate dishes in the kitchen to improve independence and reduce fatigue during tasks.       THERAPEUTIC EXERCISE Daily Assessment    Recumbent bike with arms- 8 mins x2 with 3 min rest break between to improve activity tolerance and BUE strength to facilitate independence with home management such as cleaning. Pt reported fatigue with shoulder musculature  during activity. 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 IADL Daily Assessment    Kitchen task- patient maneuvered through kitchen demonstrating safe techniques with wheeled walker while in the kitchen during item retrieval and task completion (making cup of tea). Pt operated electric stove, handled boiling water safely, and added desired condiments. Pt able to notice water spill and grab necessary items to complete task. Pt will complete more in depth cooking task at later session to ensure patient able to cook meal independently while fiance is at work. MOBILITY/TRANSFERS Daily Assessment     Maneuver throughout kitchen with wheeled walker- with close supervision and cues on technique to transport items when using a walker. ASSESSMENT:  Pt able to tolerate BUE strengthening to facilitate improvements with safety during ADLs and IADLs. Pt continues to progress with function requiring less assistance for completion. Future therapy session will assess ability to complete bare minimum ADLs and IADLs required to be home alone while  is at work. Progression toward goals:  [x]          Improving appropriately and progressing toward goals  []          Improving slowly and progressing toward goals  []          Not making progress toward goals and plan of care will be adjusted     PLAN:  Patient continues to benefit from skilled intervention to address the above impairments. Continue treatment per established plan of care.   Discharge Recommendations:  Outpatient  Further Equipment Recommendations for Discharge:  transfer bench     Activity Tolerance:  good      Estimated LOS: 10 days    Please refer to the flowsheet for vital signs taken during this treatment. After treatment:   [x]  Patient left in no apparent distress sitting up in chair   []  Patient left in no apparent distress in bed  [x]  Call bell left within reach  []  Nursing notified  []  Caregiver present  []  Bed alarm activated    COMMUNICATION/EDUCATION:   [x] Home safety education was provided and the patient/caregiver indicated understanding. [] Patient/family have participated as able in goal setting and plan of care. [] Patient/family agree to work toward stated goals and plan of care. [] Patient understands intent and goals of therapy, but is neutral about his/her participation. [] Patient is unable to participate in goal setting and plan of care.       Carla Corcoran

## 2020-09-02 LAB
GLUCOSE BLD STRIP.AUTO-MCNC: 129 MG/DL (ref 70–110)
GLUCOSE BLD STRIP.AUTO-MCNC: 168 MG/DL (ref 70–110)
GLUCOSE BLD STRIP.AUTO-MCNC: 85 MG/DL (ref 70–110)
GLUCOSE BLD STRIP.AUTO-MCNC: 96 MG/DL (ref 70–110)
HCT VFR BLD AUTO: 34.4 % (ref 35–45)
HGB BLD-MCNC: 11.2 G/DL (ref 12–16)

## 2020-09-02 PROCEDURE — 97530 THERAPEUTIC ACTIVITIES: CPT

## 2020-09-02 PROCEDURE — 74011636637 HC RX REV CODE- 636/637: Performed by: INTERNAL MEDICINE

## 2020-09-02 PROCEDURE — 74011250636 HC RX REV CODE- 250/636: Performed by: INTERNAL MEDICINE

## 2020-09-02 PROCEDURE — 85018 HEMOGLOBIN: CPT

## 2020-09-02 PROCEDURE — 82962 GLUCOSE BLOOD TEST: CPT

## 2020-09-02 PROCEDURE — 74011250637 HC RX REV CODE- 250/637: Performed by: INTERNAL MEDICINE

## 2020-09-02 PROCEDURE — 97535 SELF CARE MNGMENT TRAINING: CPT

## 2020-09-02 PROCEDURE — 74011000250 HC RX REV CODE- 250: Performed by: INTERNAL MEDICINE

## 2020-09-02 PROCEDURE — 36415 COLL VENOUS BLD VENIPUNCTURE: CPT

## 2020-09-02 PROCEDURE — 97112 NEUROMUSCULAR REEDUCATION: CPT

## 2020-09-02 PROCEDURE — 65310000000 HC RM PRIVATE REHAB

## 2020-09-02 PROCEDURE — 97116 GAIT TRAINING THERAPY: CPT

## 2020-09-02 RX ADMIN — IPRATROPIUM BROMIDE 0.5 MG: 0.5 SOLUTION RESPIRATORY (INHALATION) at 22:07

## 2020-09-02 RX ADMIN — AMLODIPINE BESYLATE 10 MG: 10 TABLET ORAL at 09:07

## 2020-09-02 RX ADMIN — IPRATROPIUM BROMIDE 0.5 MG: 0.5 SOLUTION RESPIRATORY (INHALATION) at 17:15

## 2020-09-02 RX ADMIN — IPRATROPIUM BROMIDE 0.5 MG: 0.5 SOLUTION RESPIRATORY (INHALATION) at 13:00

## 2020-09-02 RX ADMIN — METFORMIN HYDROCHLORIDE 500 MG: 500 TABLET, EXTENDED RELEASE ORAL at 17:15

## 2020-09-02 RX ADMIN — CHOLECALCIFEROL (VITAMIN D3) 25 MCG (1,000 UNIT) TABLET 2 TABLET: TABLET at 09:08

## 2020-09-02 RX ADMIN — INSULIN GLARGINE 22 UNITS: 100 INJECTION, SOLUTION SUBCUTANEOUS at 22:06

## 2020-09-02 RX ADMIN — IPRATROPIUM BROMIDE 0.5 MG: 0.5 SOLUTION RESPIRATORY (INHALATION) at 09:07

## 2020-09-02 RX ADMIN — ATORVASTATIN CALCIUM 40 MG: 40 TABLET, FILM COATED ORAL at 09:07

## 2020-09-02 RX ADMIN — ASPIRIN 81 MG CHEWABLE TABLET 81 MG: 81 TABLET CHEWABLE at 09:07

## 2020-09-02 RX ADMIN — FENOFIBRATE 145 MG: 145 TABLET ORAL at 09:07

## 2020-09-02 RX ADMIN — FERROUS SULFATE TAB 325 MG (65 MG ELEMENTAL FE) 325 MG: 325 (65 FE) TAB at 09:07

## 2020-09-02 RX ADMIN — MINOXIDIL 5 MG: 2.5 TABLET ORAL at 21:58

## 2020-09-02 RX ADMIN — HEPARIN SODIUM 5000 UNITS: 5000 INJECTION INTRAVENOUS; SUBCUTANEOUS at 06:57

## 2020-09-02 RX ADMIN — CLOPIDOGREL BISULFATE 75 MG: 75 TABLET ORAL at 17:17

## 2020-09-02 RX ADMIN — MINOXIDIL 5 MG: 2.5 TABLET ORAL at 09:07

## 2020-09-02 RX ADMIN — MONTELUKAST 10 MG: 10 TABLET, FILM COATED ORAL at 21:58

## 2020-09-02 RX ADMIN — VALSARTAN 320 MG: 160 TABLET, FILM COATED ORAL at 09:08

## 2020-09-02 RX ADMIN — INSULIN LISPRO 2 UNITS: 100 INJECTION, SOLUTION INTRAVENOUS; SUBCUTANEOUS at 12:52

## 2020-09-02 RX ADMIN — DOCUSATE SODIUM 100 MG: 100 CAPSULE, LIQUID FILLED ORAL at 09:07

## 2020-09-02 RX ADMIN — PANTOPRAZOLE SODIUM 20 MG: 20 TABLET, DELAYED RELEASE ORAL at 06:57

## 2020-09-02 RX ADMIN — HYDROCHLOROTHIAZIDE 12.5 MG: 12.5 CAPSULE ORAL at 09:08

## 2020-09-02 RX ADMIN — DOCUSATE SODIUM 50 MG AND SENNOSIDES 8.6 MG 2 TABLET: 8.6; 5 TABLET, FILM COATED ORAL at 17:15

## 2020-09-02 NOTE — PROGRESS NOTES
Problem: Mobility Impaired (Adult and Pediatric)  Goal: *Therapy Goal (Edit Goal, Insert Text)  Description: Physical Therapy Short Term Goals  Initiated 8/27/2020 and to be accomplished within 7 day(s) on 9/3/2020:  1. Patient will move from supine to sit and sit to supine , scoot up and down, and roll side to side in bed with modified independence. 2.  Patient will transfer from bed to chair and chair to bed with supervision/set-up using the least restrictive device. 3.  Patient will perform sit to stand with supervision/set-up. 4.  Patient will ambulate with SBA for 100 feet with the least restrictive device, cues for neutral knee extension during midstance phase of gait <25% of the time. 5.  Patient will ascend/descend 1 curb step with appropriate AD with CGA. Physical Therapy Long Term Goals  Initiated 8/27/2020 and to be accomplished within 14 day(s) on 9/10/2020  1. Patient will move from supine to sit and sit to supine , scoot up and down, and roll side to side in bed with independence. 2.  Patient will transfer from bed to chair and chair to bed with modified independence using the least restrictive device. 3.  Patient will perform sit to stand with modified independence. 4.  Patient will ambulate with modified independence for 150 feet with the least restrictive device. 5.  Patient will ascend/descend 1 curb step with SBA, using appropriate AD. Outcome: Progressing Towards Goal   PHYSICAL THERAPY TREATMENT    Patient: Francisco Kelly (16 y.o. female)  Date: 9/2/2020  Diagnosis: Acute ischemic stroke [facility]) [I63.9] Acute ischemic stroke [facility])  Precautions: Fall  Chart, physical therapy assessment, plan of care and goals were reviewed. Time In: 0804  Time Out: 0906  Time In:1205  Time HWX:[id number]    Patient seen for: Balance activities; Wheelchair mobility;Transfer training;Patient education;Gait training    Pain:  Patient did not indicate pain during session.      Patient identified with name and : yes    SUBJECTIVE:      Patient agreeable to treatment, motivated to participate. Reporting that she performed prone hamstring curls in bed last night, stating that she feels \"aching\" in her muscles today but not having pain. OBJECTIVE DATA SUMMARY:    Objective:     TRANSFERS Daily Assessment     Transfer Type: Other  Other: stand step with RW and with cane  Transfer Assistance : 5 (Stand-by assistance)  Sit to Stand Assistance: Stand-by assistance    Intermittent reminders for safe hand placement. Trialed cane during treatment for increased balance challenge and to challenge maintaining neutral knee extension throughout transfers when in stance left LE. GAIT Daily Assessment    Gait Description (WDL) Exceptions to WDL    Gait Abnormalities Decreased step clearance(intermittent left knee hyperextension midstance)    Assistive device Gait belt;Walker, rolling(also trials 75 ft with SBQC and straight cane)    Ambulation assistance - level surface 4 (Minimal assistance)(SBA with RW, CG/min A with cane)    Distance 160 Feet (ft)(160 ft in AM, 220 ft bout in PM w/ RW)    Comments Gait training emphasis on maintaining upright posture and gaze, neutral knee extension on left during midstance consistently. With RW, able to maintain this neutral knee position 90% of the time. With InventicS and cane observed to have difficulty with sequencing consistently but patient able to self-correct ~75% of the time. However, fatiguing quickly with use of each type of cane with worsening gait quality, occasional losses of balance observed and difficulty controlling extension of left knee during midstance. Also performing gait over outside surface x 120 ft with RW, cues for increased foot clearance on left.          BALANCE Daily Assessment     Sitting - Static: Good (unsupported)  Sitting - Dynamic: Good (unsupported)  Standing - Static: Good;Occasional;Fair  Standing - Dynamic : Impaired WHEELCHAIR MOBILITY Daily Assessment     Able to Propel (ft): 200 feet  Functional Level: 6  Curbs/Ramps Assist Required (FIM Score): 0 (Not tested)  Wheelchair Setup Assist Required : 5 (Supervision/setup)  Wheelchair Management: Manages left brake;Manages right brake    Using bilat UE and LE to propel. Neuro Re-Education:  Step ups on 4\" step leading with left LE, bilat UE support of handrails for balance; 2 x 10-12 reps for improved neuromuscular control and functional knee strength. Performing static and dynamic standing balance: standing eyes open narrow ZAINAB (feet together) 1 min supervision; staggered stance alternating each LE x 30-40 sec bouts; alternating toe touches to stepping stones 3 x 10 reps with right UE support of steadying surface (needing at least one UE support to maintain neutral knee extension during stance left LE). Needing seated rest breaks in between bouts. ASSESSMENT:  Patient would continue to benefit from skilled PT to improve ability to perform safe functional mobility. Recommend balance and functional strengthening tasks to improve ability to safely perform standing and gait activities. Currently demonstrating safest gait and best gait quality using RW. Progression toward goals:  [x]      Improving appropriately and progressing toward goals  []      Improving slowly and progressing toward goals  []      Not making progress toward goals and plan of care will be adjusted      PLAN:  Patient continues to benefit from skilled intervention to address the above impairments. Continue treatment per established plan of care. Discharge Recommendations:  Outpatient  Further Equipment Recommendations for Discharge:  rolling walker      Estimated LOS: D/C planned for 9/4/2020    Activity Tolerance:   Good, needing intermittent rests.      After treatment:   Patient left seated in wheelchair, call button within reach after first session; left seated edge of bed with call button within reach after second session.        Cheri Kate, PT

## 2020-09-02 NOTE — ROUTINE PROCESS
SHIFT CHANGE NOTE FOR [facility]    Bedside and Verbal shift change report given to Cynthia Shannon RN (oncoming nurse) by Carlos Elaine, NEETA (offgoing nurse). Report included the following information SBAR, Kardex, MAR and Recent Results. Situation:   Code Status: Full Code   Hospital Day: 7   Problem List:   Hospital Problems  Date Reviewed: 9/1/2020          Codes Class Noted POA    Sciatica of left side ICD-10-CM: M54.32  ICD-9-CM: 724.3  Unknown Yes        Type 2 diabetes mellitus, with long-term current use of insulin (HCC) (Chronic) ICD-10-CM: E11.9, Z79.4  ICD-9-CM: 250.00, V58.67  Unknown Yes    Overview Signed 8/26/2020 11:46 PM by Kendall Todd MD     HbA1c (8/19/2020) = 8.1; HbA1c (8/20/2020) = 8.0             COVID-19 ruled out by laboratory testing ICD-10-CM: Z03.818  ICD-9-CM: V71.83  8/26/2020 Yes    Overview Signed 8/26/2020 11:47 PM by Kendall Todd MD     SARS-CoV-2 ([facility]bbott m2000) (collected 8/25/2020, resulted 8/26/2020):  Not detected              Current use of aspirin ICD-10-CM: Z79.82  ICD-9-CM: V58.66  8/24/2020 Yes        On clopidogrel therapy ICD-10-CM: Z79.01  ICD-9-CM: V58.61  8/24/2020 Yes        On statin therapy due to risk of future cardiovascular event ICD-10-CM: Z79.899  ICD-9-CM: V58.69  8/24/2020 Yes    Overview Signed 8/26/2020 11:50 PM by Kendall Todd MD     On Atorvastatin             * (Principal) Acute ischemic stroke [facility]) ICD-10-CM: I63.9  ICD-9-CM: 434.91  8/21/2020 Yes    Overview Signed 8/26/2020 11:35 PM by Kendall Todd MD     Acute Ischemic Stroke (late acute/early subacute infarcts in the right centrum semiovale extending to the superior frontal lobe cortex) with residual left hemiparesis             Left hemiparesis ([facility] [address] ICD-10-CM: G81.94  ICD-9-CM: 342.90  8/21/2020 Yes        Impaired mobility and ADLs ICD-10-CM: Z74.09, Z78.9  ICD-9-CM: V49.89  8/21/2020 Yes        Pure hypercholesterolemia (Chronic) ICD-10-CM: E78.00  ICD-9-CM: 272.0  8/19/2020 Yes Overview Signed 8/26/2020 11:45 PM by Kalyan Myrick MD     Lipid profile (8/19/2020) showed , , HDL 50,              Hypertensive heart disease without heart failure (Chronic) ICD-10-CM: I11.9  ICD-9-CM: 402.90  Unknown Yes    Overview Signed 8/26/2020 11:38 PM by Kalyan Myrick MD     2D echocardiogram (8/22/2020) showed EF 65%; mild left ventricular hypertrophy; normal diastolic function; negative bubble study at rest and after Valsalva                   Background:   Past Medical History:   Past Medical History:   Diagnosis Date    Acute ischemic stroke ([facility] [address] 8/21/2020    Acute Ischemic Stroke (late acute/early subacute infarcts in the right centrum semiovale extending to the superior frontal lobe cortex) with residual left hemiparesis    Bipolar disorder ([facility] [address]     Chronic obstructive pulmonary disease (COPD) ([facility] [address]     COVID-19 ruled out by laboratory testing 8/26/2020    SARS-CoV-2 ([facility] m2000) (collected 8/25/2020, resulted 8/26/2020):  Not detected     Current use of aspirin 8/24/2020    Depression     Gastroesophageal reflux disease     History of colon polyps     Hypertensive heart disease without heart failure     2D echocardiogram (8/22/2020) showed EF 65%; mild left ventricular hypertrophy; normal diastolic function; negative bubble study at rest and after Valsalva    Iron deficiency anemia     Left hemiparesis ([facility] [address] 8/21/2020    Menopause     Obesity, Class II, BMI 35-39.9     Obstructive sleep apnea on CPAP     On clopidogrel therapy 8/24/2020    On statin therapy due to risk of future cardiovascular event 8/24/2020    On Atorvastatin    Pure hypercholesterolemia 8/19/2020    Lipid profile (8/19/2020) showed , , HDL 50,     Sciatica of left side     Seasonal allergic rhinitis     Severe persistent asthma 11/07/2019    Type 2 diabetes mellitus, with long-term current use of insulin (HCC)     HbA1c (8/19/2020) = 8.1; HbA1c (8/20/2020) = 8.0        Assessment:   Changes in Assessment throughout shift: none     Patient has central line: no Reasons if yes: n/a  Insertion date:n/a Last dressing date:n/a   Patient has Barbosa Cath: no Reasons if yes: n/a   Insertion date:n/a  Shift barbosa care completed: n/a      Last Vitals:     Vitals:    09/01/20 0737 09/01/20 1619 09/01/20 2200 09/02/20 0756   BP: 149/64 169/69 138/72 137/65   Pulse: 66 70 72 65   Resp: 18 20 18 18   Temp: 97.9 °F (36.6 °C) (!) 96.6 °F (35.9 °C) 97.9 °F (36.6 °C) 97.3 °F (36.3 °C)   SpO2: 100% 99% 100% 100%   Height:            PAIN    Pain Assessment    Pain Intensity 1: 0 (09/02/20 0742) Pain Intensity 1: 2 (12/29/14 1105)      Pain Location 1: Abdomen      Pain Intervention(s) 1: Medication (see MAR)  Patient Stated Pain Goal: 0 Patient Stated Pain Goal: 0  o Intervention effective: yes   o Other actions taken for pain: rest/position/food     Skin Assessment  Skin color    Condition/Temperature    Integrity    Turgor    Weekly Pressure Ulcer Documentation  Pressure  Injury Documentation: No Pressure Injury Noted-Pressure Ulcer Prevention Initiated  Wound Prevention & Protection Methods  Orientation of wound Orientation of Wound Prevention: Posterior  Location of Prevention Location of Wound Prevention: Buttocks, Sacrum/Coccyx  Dressing Present Dressing Present : No  Dressing Status    Wound Offloading Wound Offloading (Prevention Methods): Adaptive equipment, Bed, pressure redistribution/air, Chair cushion     INTAKE/OUPUT  Date 09/01/20 0700 - 09/02/20 0659 09/02/20 0700 - 09/03/20 0659   Shift 2545-7688 1798-9285 24 Hour Total 0700-1859 1900-0659 24 Hour Total   INTAKE   Shift Total         OUTPUT   Urine           Urine Occurrence(s) 0 x 5 x 5 x 0 x  0 x   Emesis/NG output           Emesis Occurrence(s)  0 x 0 x 0 x  0 x   Stool           Stool Occurrence(s) 0 x 1 x 1 x 0 x  0 x   Shift Total         NET         Weight (kg)             Recommendations:  1.  Patient needs and requests: toileting    2. Pending tests/procedures: None at this time. 3. Functional Level/Equipment: 1 person assist w/c    HEALS Safety Check    A safety check occurred in the patient's room between off going nurse and oncoming nurse listed above. The safety check included the below items  Area Items   H  High Alert Medications - Verify all high alert medication drips (heparin, PCA, etc.)   E  Equipment - Suction is set up for ALL patients (with dwayne)  - Red plugs utilized for all equipment (IV pumps, etc.)  - WOWs wiped down at end of shift.  - Room stocked with oxygen, suction, and other unit-specific supplies   A  Alarms - Bed alarm is set for fall risk patients  - Ensure chair alarm is in place and activated if patient is up in a chair   L  Lines - Check IV for any infiltration  - Harrison bag is empty if patient has a Harrison   - Tubing and IV bags are labeled   S  Safety   - Room is clean, patient is clean, and equipment is clean. - Hallways are clear from equipment besides carts. - Fall bracelet on for fall risk patients  - Ensure room is clear and free of clutter  - Suction is set up for ALL patients (with dwayne)  - Hallways are clear from equipment besides carts.    - Isolation precautions followed, supplies available outside room, sign posted     Yvonne Robles RN

## 2020-09-02 NOTE — ROUTINE PROCESS
SHIFT CH[facility] FOR [facility]    Bedside and Verbal shift change report given to [address] Ericvance (oncoming nurse) by Shawna Jang RN (offgoing nurse). Report included the following information SBAR, Kardex, MAR and Recent Results. Situation:   Code Status: Full Code   Hospital Day: 7   Problem List:   Hospital Problems  Date Reviewed: 9/2/2020          Codes Class Noted POA    Sciatica of left side ICD-10-CM: M54.32  ICD-9-CM: 724.3  Unknown Yes        Type 2 diabetes mellitus, with long-term current use of insulin (HCC) (Chronic) ICD-10-CM: E11.9, Z79.4  ICD-9-CM: 250.00, V58.67  Unknown Yes    Overview Signed 8/26/2020 11:46 PM by Kaden Bunch MD     HbA1c (8/19/2020) = 8.1; HbA1c (8/20/2020) = 8.0             COVID-19 ruled out by laboratory testing ICD-10-CM: Z03.818  ICD-9-CM: V71.83  8/26/2020 Yes    Overview Signed 8/26/2020 11:47 PM by Kaden Bunch MD     SARS-CoV-2 ([facility] m2000) (collected 8/25/2020, resulted 8/26/2020):  Not detected              Current use of aspirin ICD-10-CM: Z79.82  ICD-9-CM: V58.66  8/24/2020 Yes        On clopidogrel therapy ICD-10-CM: Z79.01  ICD-9-CM: V58.61  8/24/2020 Yes        On statin therapy due to risk of future cardiovascular event ICD-10-CM: Z79.899  ICD-9-CM: V58.69  8/24/2020 Yes    Overview Signed 8/26/2020 11:50 PM by Kaden Bunch MD     On Atorvastatin             * (Principal) Acute ischemic stroke [facility]) ICD-10-CM: I63.9  ICD-9-CM: 434.91  8/21/2020 Yes    Overview Signed 8/26/2020 11:35 PM by Kaden Bunch MD     Acute Ischemic Stroke (late acute/early subacute infarcts in the right centrum semiovale extending to the superior frontal lobe cortex) with residual left hemiparesis             Left hemiparesis ([address] ICD-10-CM: G81.94  ICD-9-CM: 342.90  8/21/2020 Yes        Impaired mobility and ADLs ICD-10-CM: Z74.09, Z78.9  ICD-9-CM: V49.89  8/21/2020 Yes        Pure hypercholesterolemia (Chronic) ICD-10-CM: E78.00  ICD-9-CM: 272.0  8/19/2020 Yes Overview Signed 8/26/2020 11:45 PM by Deja Dumas MD     Lipid profile (8/19/2020) showed , , HDL 50,              Hypertensive heart disease without heart failure (Chronic) ICD-10-CM: I11.9  ICD-9-CM: 402.90  Unknown Yes    Overview Signed 8/26/2020 11:38 PM by Deja Dumas MD     2D echocardiogram (8/22/2020) showed EF 65%; mild left ventricular hypertrophy; normal diastolic function; negative bubble study at rest and after Valsalva                   Background:   Past Medical History:   Past Medical History:   Diagnosis Date    Acute ischemic stroke ([facility] [address] 8/21/2020    Acute Ischemic Stroke (late acute/early subacute infarcts in the right centrum semiovale extending to the superior frontal lobe cortex) with residual left hemiparesis    Bipolar disorder ([facility] [address]     Chronic obstructive pulmonary disease (COPD) ([facility] [address]     COVID-19 ruled out by laboratory testing 8/26/2020    SARS-CoV-2 ([facility] m2000) (collected 8/25/2020, resulted 8/26/2020):  Not detected     Current use of aspirin 8/24/2020    Depression     Gastroesophageal reflux disease     History of colon polyps     Hypertensive heart disease without heart failure     2D echocardiogram (8/22/2020) showed EF 65%; mild left ventricular hypertrophy; normal diastolic function; negative bubble study at rest and after Valsalva    Iron deficiency anemia     Left hemiparesis ([facility] [address] 8/21/2020    Menopause     Obesity, Class II, BMI 35-39.9     Obstructive sleep apnea on CPAP     On clopidogrel therapy 8/24/2020    On statin therapy due to risk of future cardiovascular event 8/24/2020    On Atorvastatin    Pure hypercholesterolemia 8/19/2020    Lipid profile (8/19/2020) showed , , HDL 50,     Sciatica of left side     Seasonal allergic rhinitis     Severe persistent asthma 11/07/2019    Type 2 diabetes mellitus, with long-term current use of insulin (HCC)     HbA1c (8/19/2020) = 8.1; HbA1c (8/20/2020) = 8.0 Assessment:   Changes in Assessment throughout shift: none     Patient has central line: no Reasons if yes: n/a  Insertion date:n/a Last dressing date:n/a   Patient has Barbosa Cath: no Reasons if yes: n/a   Insertion date:n/a  Shift barbosa care completed: n/a      Last Vitals:     Vitals:    09/01/20 1619 09/01/20 2200 09/02/20 0756 09/02/20 1548   BP: 169/69 138/72 137/65 124/56   Pulse: 70 72 65 66   Resp: 20 18 18 18   Temp: (!) 96.6 °F (35.9 °C) 97.9 °F (36.6 °C) 97.3 °F (36.3 °C) 97.2 °F (36.2 °C)   SpO2: 99% 100% 100% 98%   Height:            PAIN    Pain Assessment    Pain Intensity 1: 0 (09/02/20 1600) Pain Intensity 1: 2 (12/29/14 1105)      Pain Location 1: Abdomen      Pain Intervention(s) 1: Medication (see MAR)  Patient Stated Pain Goal: 0 Patient Stated Pain Goal: 0  o Intervention effective: yes   o Other actions taken for pain: rest/position/food     Skin Assessment  Skin color    Condition/Temperature    Integrity    Turgor    Weekly Pressure Ulcer Documentation  Pressure  Injury Documentation: No Pressure Injury Noted-Pressure Ulcer Prevention Initiated  Wound Prevention & Protection Methods  Orientation of wound Orientation of Wound Prevention: Posterior  Location of Prevention Location of Wound Prevention: Sacrum/Coccyx  Dressing Present Dressing Present : No  Dressing Status    Wound Offloading Wound Offloading (Prevention Methods): Bed, pressure reduction mattress     INTAKE/OUPUT  Date 09/01/20 1900 - 09/02/20 0659 09/02/20 0700 - 09/03/20 0659   Shift 1225-4920 24 Hour Total 4310-0624 0234-8339 24 Hour Total   INTAKE   Shift Total        OUTPUT   Urine          Urine Occurrence(s) 5 x 5 x 3 x 0 x 3 x   Emesis/NG output          Emesis Occurrence(s) 0 x 0 x 0 x  0 x   Stool          Stool Occurrence(s) 1 x 1 x 1 x 0 x 1 x   Shift Total        NET        Weight (kg)            Recommendations:  1. Patient needs and requests: toileting    2.  Pending tests/procedures: None at this time.     3. Functional Level/Equipment: 1 person assist w/c    HEALS Safety Check    A safety check occurred in the patient's room between off going nurse and oncoming nurse listed above. The safety check included the below items  Area Items   H  High Alert Medications - Verify all high alert medication drips (heparin, PCA, etc.)   E  Equipment - Suction is set up for ALL patients (with dwayne)  - Red plugs utilized for all equipment (IV pumps, etc.)  - WOWs wiped down at end of shift.  - Room stocked with oxygen, suction, and other unit-specific supplies   A  Alarms - Bed alarm is set for fall risk patients  - Ensure chair alarm is in place and activated if patient is up in a chair   L  Lines - Check IV for any infiltration  - Harrison bag is empty if patient has a Harrison   - Tubing and IV bags are labeled   S  Safety   - Room is clean, patient is clean, and equipment is clean. - Hallways are clear from equipment besides carts. - Fall bracelet on for fall risk patients  - Ensure room is clear and free of clutter  - Suction is set up for ALL patients (with dwayne)  - Hallways are clear from equipment besides carts.    - Isolation precautions followed, supplies available outside room, sign posted     Tiffanie Fuller RN

## 2020-09-02 NOTE — PROGRESS NOTES
[facility] PHYSICAL REHABILITATION  [address] [place] [address], [address]     INPATIENT REHABILITATION  DAILY PROGRESS NOTE     Date: 9/2/2020    Name: Ashli Pena Age / Sex: 60 y.o. / female   CSN: [id number] MRN: [id number]   [facility] Date: 8/26/2020 Length of Stay: 7 days     Primary Rehab Diagnosis: Impaired Mobility and ADLs secondary to Acute Ischemic Stroke (late acute/early subacute infarcts in the right centrum semiovale extending to the superior frontal lobe cortex) with residual left hemiparesis      Subjective:     Patient seen and examined. Blood pressure better controlled. Blood glucose controlled. Patient's Complaint:   Blood upon wiping rectal area   Patient stated she has had hemorrhoids and when she gets constipated, she notices blood in her rectal area after defecation    Pain Control: no current joint or muscle symptoms, essentially pain-free      Objective:     Vital Signs:  Patient Vitals for the past 24 hrs:   BP Temp Pulse Resp SpO2   09/02/20 0756 137/65 97.3 °F (36.3 °C) 65 18 100 %   09/01/20 2200 138/72 97.9 °F (36.6 °C) 72 18 100 %   09/01/20 1619 169/69 (!) 96.6 °F (35.9 °C) 70 20 99 %        Physical Examination:  GENERAL SURVEY: Patient is awake, alert, oriented x 3, sitting comfortably on the chair, not in acute respiratory distress. HEENT: pink palpebral conjunctivae, anicteric sclerae, no nasoaural discharge, moist oral mucosa  NECK: supple, no jugular venous distention, no palpable lymph nodes  CHEST/LUNGS: symmetrical chest expansion, good air entry, clear breath sounds  HEART: adynamic precordium, good S1 S2, no S3, regular rhythm, no murmurs  ABDOMEN: obese, bowel sounds appreciated, soft, non-tender  EXTREMITIES: pink nailbeds, no edema, full and equal pulses, no calf tenderness   NEUROLOGICAL EXAM: The patient is awake, alert and oriented x3, able to answer questions fairly appropriately, able to follow 1 and 2 step commands.   Able to tell time from the wall clock. Cranial nerves II-XII are grossly intact. No gross sensory deficit.   Motor strength is 5/5 on the RUE and RLE, 4- to 4/5 on the LUE, 2+/5 on the LLE.       Current Medications:  Current Facility-Administered Medications   Medication Dose Route Frequency    docusate sodium (COLACE) capsule 100 mg  100 mg Oral DAILY AFTER BREAKFAST    senna-docusate (PERICOLACE) 8.6-50 mg per tablet 2 Tab  2 Tab Oral PCD    insulin glargine (LANTUS) injection 22 Units  22 Units SubCUTAneous QHS    minoxidiL (LONITEN) tablet 5 mg  5 mg Oral Q12H    metFORMIN ER (GLUCOPHAGE XR) tablet 500 mg  500 mg Oral DAILY WITH DINNER    acetaminophen (TYLENOL) tablet 650 mg  650 mg Oral Q4H PRN    bisacodyL (DULCOLAX) tablet 10 mg  10 mg Oral Q48H PRN    heparin (porcine) injection 5,000 Units  5,000 Units SubCUTAneous Q8H    insulin lispro (HUMALOG) injection   SubCUTAneous TIDAC    aspirin chewable tablet 81 mg  81 mg Oral DAILY WITH BREAKFAST    clopidogreL (PLAVIX) tablet 75 mg  75 mg Oral DAILY WITH DINNER    atorvastatin (LIPITOR) tablet 40 mg  40 mg Oral DAILY    albuterol (PROVENTIL VENTOLIN) nebulizer solution 2.5 mg  2.5 mg Nebulization Q4H PRN    amLODIPine (NORVASC) tablet 10 mg  10 mg Oral DAILY    cholecalciferol (VITAMIN D3) (1000 Units /25 mcg) tablet 2 Tab  2,000 Units Oral DAILY    fenofibrate nanocrystallized (TRICOR) tablet 145 mg  145 mg Oral DAILY    ferrous sulfate tablet 325 mg  1 Tab Oral DAILY WITH BREAKFAST    montelukast (SINGULAIR) tablet 10 mg  10 mg Oral QHS    pantoprazole (PROTONIX) tablet 20 mg  20 mg Oral ACB    ipratropium (ATROVENT) 0.02 % nebulizer solution 0.5 mg  0.5 mg Nebulization QID RT    valsartan (DIOVAN) tablet 320 mg  320 mg Oral DAILY    glucose chewable tablet 16 g  4 Tab Oral PRN    glucagon (GLUCAGEN) injection 1 mg  1 mg IntraMUSCular PRN    dextrose (D50W) injection syrg 12.5-25 g  25-50 mL IntraVENous PRN    hydroCHLOROthiazide (MICROZIDE) capsule 12.5 mg  12.5 mg Oral DAILY       Allergies:  No Known Allergies      Functional Progress:    PHYSICAL THERAPY    ON ADMISSION MOST RECENT   Wheelchair Mobility/Management  Able to Propel (ft): 170 feet  Functional Level: 3(mod A for steering due to veering observed)  Curbs/Ramps Assist Required (FIM Score): 0 (Not tested)  Wheelchair Setup Assist Required : 3 (Moderate assistance)  Wheelchair Management: Manages left brake, Manages right brake(needing assist with legrests) Wheelchair Mobility/Management  Able to Propel (ft): 200 feet  Functional Level: 6  Curbs/Ramps Assist Required (FIM Score): 0 (Not tested)  Wheelchair Setup Assist Required : 5 (Supervision/setup)  Wheelchair Management: Manages left brake, Manages right brake     Gait  Amount of Assistance: 3 (Moderate assistance)  Distance (ft): 18 Feet (ft)(without AD as per PLOF, 25 ft trial with RW min A)  Assistive Device: (HHA initially, RW subsequent trials) Gait  Amount of Assistance: 4 (Minimal assistance)(SBA with RW, CG/min A with cane)  Distance (ft): 160 Feet (ft)  Assistive Device: Gait belt, Walker, rolling(also trials 75 ft with SBQC and straight cane)     Balance-Sitting/Standing  Sitting - Static: Good (unsupported)  Sitting - Dynamic: Good (unsupported)  Standing - Static: Fair  Standing - Dynamic : Impaired  Other (comment): Escobar Balance Scale: 27/56(see separate document for details; high falls risk) Balance-Sitting/Standing  Sitting - Static: Good (unsupported)  Sitting - Dynamic: Good (unsupported)  Standing - Static: Good, Occasional, Fair  Standing - Dynamic : Impaired  Other (comment): Escobar Balance Scale: 27/56(see separate document for details; high falls risk)     Bed/Mat Mobility  Rolling Right : 5 (Supervision)  Rolling Left : 5 (Supervision)  Supine to Sit : 5 (Supervision)  Sit to Supine : 5 (Supervision) Bed/Mat Mobility  Rolling Right : 6 (Modified independent)  Rolling Left : 6 (Modified independent)  Supine to Sit : 6 (Modified independent)  Sit to Supine : 6 (Modified independent)     Transfers  Transfer Type: Other  Other: stand step without AD as per PLOF  Transfer Assistance : 4 (Minimal assistance)  Sit to Stand Assistance: Contact guard assistance  Car Transfers: Minimum assistance(without AD as per PLOF)  Car Type: car transfer simulator Transfers  Transfer Type: Other  Other: stand step with RW and with cane  Transfer Assistance : 5 (Stand-by assistance)  Sit to Stand Assistance: Stand-by assistance  Car Transfers: Minimum assistance(without AD as per PLOF)  Car Type: car transfer simulator     Steps or Stairs  Steps/Stairs Ambulated (#): 7  Level of Assist : 4 (Minimal assistance)(leading with right LE ascending stairs)  Rail Use: Both Steps or Stairs  Steps/Stairs Ambulated (#): 7  Level of Assist : 4 (Minimal assistance)(leading with right LE ascending stairs)  Rail Use: Both         Lab/Data Review:  Recent Results (from the past 24 hour(s))   GLUCOSE, POC    Collection Time: 09/01/20 12:23 PM   Result Value Ref Range    Glucose (POC) 127 (H) 70 - 110 mg/dL   GLUCOSE, POC    Collection Time: 09/01/20  4:18 PM   Result Value Ref Range    Glucose (POC) 116 (H) 70 - 110 mg/dL   GLUCOSE, POC    Collection Time: 09/01/20 10:01 PM   Result Value Ref Range    Glucose (POC) 159 (H) 70 - 110 mg/dL   GLUCOSE, POC    Collection Time: 09/02/20  7:58 AM   Result Value Ref Range    Glucose (POC) 96 70 - 110 mg/dL       Estimated Glomerular Filtration Rate:  On admission, estimated GFR based on a Creatinine of 0.75 mg/dl:              Using CKD-EPI = 100.4 mL/min/1.73m2              Using MDRD = 101.4 mL/min/1.73m2  Most recent estimated GFR, based on a Creatinine of 0.89 mg/dl on 8/31/2020:   Using CKD-EPI = 81.6 mL/min/1.73m2   Using MDRD = 83.2 mL/min/1.73m2       Assessment:     Primary Rehabilitation Diagnosis  1. Impaired Mobility and ADLs  2.  Acute Ischemic Stroke (late acute/early subacute infarcts in the right centrum semiovale extending to the superior frontal lobe cortex) with residual left hemiparesis     Comorbidities   Hypertensive heart disease without heart failure I11.9    Gastroesophageal reflux disease K21.9    Obstructive sleep apnea on CPAP G47.33, Z99.89    Primary osteoarthritis involving multiple joints M89.49    Chronic obstructive pulmonary disease (COPD) ([facility]) J44.9    Severe persistent asthma J45.50    Obesity, Class II, BMI 35-39.9 E66.9    Depression F32.9    Seasonal allergic rhinitis J30.2    History of colon polyps Z86.010    Pure hypercholesterolemia E78.00    Type 2 diabetes mellitus, with long-term current use of insulin ([facility]) E11.9, Z79.4    COVID-19 ruled out by laboratory testing Z03.818    Menopause Z78.0    Iron deficiency anemia D50.9    Bipolar disorder ([facility]) F31.9    Current use of aspirin Z79.82    On clopidogrel therapy Z79.01    On statin therapy due to risk of future cardiovascular event Z79.899    Sciatica of left side M54.32        Plan:     1. Justification for continued stay: Good progression towards established rehabilitation goals. 2. Medical Issues being followed closely:    [x]  Fall and safety precautions     []  Wound Care     [x]  Bowel and Bladder Function     [x]  Fluid Electrolyte and Nutrition Balance     []  Pain Control      3. Issues that 24 hour rehabilitation nursing is following:    [x]  Fall and safety precautions     []  Wound Care     [x]  Bowel and Bladder Function     [x]  Fluid Electrolyte and Nutrition Balance     []  Pain Control      [x]  Assistance with and education on in-room safety with transfers to and from the bed, wheelchair, toilet and shower. 4. Acute rehabilitation plan of care:    [x]  Continue current care and rehab. [x]  Physical Therapy           [x]  Occupational Therapy           [x]  Speech Therapy     []  Hold Rehab until further notice     5.  Medications:    [x]  MAR Reviewed     [x]  Continue Present Medications 6. DVT Prophylaxis:      []  Lovenox     [x]  Unfractionated Heparin     []  Coumadin     []  NOAC     []  MARTHA Stockings     []  Sequential Compression Device     []  None     7. Code status    [x]  Full code     []  Partial code     []  Do not intubate     []  Do not resuscitate     8.  Orders:   > Acute Ischemic Stroke (late acute/early subacute infarcts in the right centrum semiovale extending to the superior frontal lobe cortex) with residual left hemiparesis   > Dual antiplatelet therapy (DAPT) was started 8/24/2020   > Duration of DAPT not specified on discharge summary   > Will defer duration of DAPT to Neurology upon outpatient follow up    > Continue:    > Aspirin 81 mg PO once daily with breakfast    > Atorvastatin 40 mg PO once daily    > Clopidogrel 75 mg PO once daily with dinner     > Hypertensive heart disease without heart failure   > 2D echocardiogram (8/22/2020) showed EF 65%; mild left ventricular hypertrophy; normal diastolic function; negative bubble study at rest and after Valsalva   > On 8/28/2020, started Minoxidil 2.5 mg PO q 12 hr (9AM, 9PM)   > On 8/30/2020, increased Minoxidil from 2.5 mg to 5 mg PO q 12 hr (9AM, 9PM)   > Continue:    > Amlodipine 10 mg PO once daily (9AM)    > Hydrochlorothiazide 12.5 mg PO once daily (9AM)    > Minoxidil 5 mg PO q 12 hr (9AM, 9PM)    > Valsartan 320 mg PO once daily (9AM)    > Pure hypercholesterolemia   > Lipid profile (8/19/2020) showed , , HDL 50,    > Continue:    > Atorvastatin 40 mg PO once daily    > Fenofibrate 145 mg PO once daily    > Type 2 diabetes mellitus, with long term current use of insulin   > HbA1c (8/19/2020) = 8.1; HbA1c (8/20/2020) = 8.0   > On 8/26/2020, increased Insulin glargine from 10 units to 20 units SC q HS   > On 8/28/2020, started Metformin  mg PO once daily with dinner   > On 8/30/2020, increased Insulin glargine from 20 units to 22 units SC q HS   > Continue:    > Insulin glargine 22 units SC q HS    > Insulin lispro sliding scale SC TID AC only    > History of wound culture positive for MRSA (3/27/2012)   > Nasal swab done to rule out MRSA colonization   > MRSA culture (collected 8/27/2020, resulted 8/28/2020): Not present     > Constipation   > On 8/31/2020, started:    > Docusate sodium 100 mg PO once daily after breakfast    > Pericolace 2 tabs PO once daily after dinner   > Continue:    > Docusate sodium 100 mg PO once daily after breakfast    > Pericolace 2 tabs PO once daily after dinner    > COVID-19 ruled out by laboratory testing   > SARS-CoV-2 ([facility]ott [id number]) (collected 8/25/2020, resulted 8/26/2020): Not detected    > Analgesia   > Continue Acetaminophen 650 mg PO q 4 hr PRN for pain level less than 5/10    > Diet:   > Specifications: Cardiac, diabetic, consistent carb 1800 kcal, no concentrated sweets   > Solids (consistency): Regular    > Liquids (consistency): Thin    > Fluid restriction: None      9. Patient's progress in rehabilitation and medical issues discussed with the patient. All questions answered to the best of my ability. Care plan discussed with patient and nurse.       Signed:    Anahi Loja MD    September 2, 2020

## 2020-09-03 LAB
ANION GAP SERPL CALC-SCNC: 7 MMOL/L (ref 3–18)
BUN SERPL-MCNC: 25 MG/DL (ref 7–18)
BUN/CREAT SERPL: 22 (ref 12–20)
CALCIUM SERPL-MCNC: 9.5 MG/DL (ref 8.5–10.1)
CHLORIDE SERPL-SCNC: 109 MMOL/L (ref 100–111)
CO2 SERPL-SCNC: 27 MMOL/L (ref 21–32)
CREAT SERPL-MCNC: 1.15 MG/DL (ref 0.6–1.3)
GLUCOSE BLD STRIP.AUTO-MCNC: 121 MG/DL (ref 70–110)
GLUCOSE BLD STRIP.AUTO-MCNC: 134 MG/DL (ref 70–110)
GLUCOSE BLD STRIP.AUTO-MCNC: 161 MG/DL (ref 70–110)
GLUCOSE BLD STRIP.AUTO-MCNC: 97 MG/DL (ref 70–110)
GLUCOSE SERPL-MCNC: 110 MG/DL (ref 74–99)
HCT VFR BLD AUTO: 33.4 % (ref 35–45)
HGB BLD-MCNC: 10.8 G/DL (ref 12–16)
PLATELET # BLD AUTO: 275 K/UL (ref 135–420)
POTASSIUM SERPL-SCNC: 4 MMOL/L (ref 3.5–5.5)
SODIUM SERPL-SCNC: 143 MMOL/L (ref 136–145)

## 2020-09-03 PROCEDURE — 97530 THERAPEUTIC ACTIVITIES: CPT

## 2020-09-03 PROCEDURE — 82962 GLUCOSE BLOOD TEST: CPT

## 2020-09-03 PROCEDURE — 97116 GAIT TRAINING THERAPY: CPT

## 2020-09-03 PROCEDURE — 74011250636 HC RX REV CODE- 250/636: Performed by: INTERNAL MEDICINE

## 2020-09-03 PROCEDURE — 85018 HEMOGLOBIN: CPT

## 2020-09-03 PROCEDURE — 74011636637 HC RX REV CODE- 636/637: Performed by: INTERNAL MEDICINE

## 2020-09-03 PROCEDURE — 80048 BASIC METABOLIC PNL TOTAL CA: CPT

## 2020-09-03 PROCEDURE — 85049 AUTOMATED PLATELET COUNT: CPT

## 2020-09-03 PROCEDURE — 74011000250 HC RX REV CODE- 250: Performed by: INTERNAL MEDICINE

## 2020-09-03 PROCEDURE — 65310000000 HC RM PRIVATE REHAB

## 2020-09-03 PROCEDURE — 97112 NEUROMUSCULAR REEDUCATION: CPT

## 2020-09-03 PROCEDURE — 74011250637 HC RX REV CODE- 250/637: Performed by: INTERNAL MEDICINE

## 2020-09-03 PROCEDURE — 36415 COLL VENOUS BLD VENIPUNCTURE: CPT

## 2020-09-03 PROCEDURE — 97535 SELF CARE MNGMENT TRAINING: CPT

## 2020-09-03 RX ADMIN — DOCUSATE SODIUM 100 MG: 100 CAPSULE, LIQUID FILLED ORAL at 09:04

## 2020-09-03 RX ADMIN — MINOXIDIL 5 MG: 2.5 TABLET ORAL at 21:38

## 2020-09-03 RX ADMIN — FERROUS SULFATE TAB 325 MG (65 MG ELEMENTAL FE) 325 MG: 325 (65 FE) TAB at 09:04

## 2020-09-03 RX ADMIN — VALSARTAN 320 MG: 160 TABLET, FILM COATED ORAL at 09:04

## 2020-09-03 RX ADMIN — IPRATROPIUM BROMIDE 0.5 MG: 0.5 SOLUTION RESPIRATORY (INHALATION) at 15:18

## 2020-09-03 RX ADMIN — IPRATROPIUM BROMIDE 0.5 MG: 0.5 SOLUTION RESPIRATORY (INHALATION) at 20:19

## 2020-09-03 RX ADMIN — HEPARIN SODIUM 5000 UNITS: 5000 INJECTION INTRAVENOUS; SUBCUTANEOUS at 21:39

## 2020-09-03 RX ADMIN — CLOPIDOGREL BISULFATE 75 MG: 75 TABLET ORAL at 18:51

## 2020-09-03 RX ADMIN — MINOXIDIL 5 MG: 2.5 TABLET ORAL at 09:04

## 2020-09-03 RX ADMIN — DOCUSATE SODIUM 50 MG AND SENNOSIDES 8.6 MG 2 TABLET: 8.6; 5 TABLET, FILM COATED ORAL at 18:51

## 2020-09-03 RX ADMIN — IPRATROPIUM BROMIDE 0.5 MG: 0.5 SOLUTION RESPIRATORY (INHALATION) at 12:18

## 2020-09-03 RX ADMIN — IPRATROPIUM BROMIDE 0.5 MG: 0.5 SOLUTION RESPIRATORY (INHALATION) at 09:04

## 2020-09-03 RX ADMIN — CHOLECALCIFEROL (VITAMIN D3) 25 MCG (1,000 UNIT) TABLET 2 TABLET: TABLET at 09:04

## 2020-09-03 RX ADMIN — FENOFIBRATE 145 MG: 145 TABLET ORAL at 09:04

## 2020-09-03 RX ADMIN — HYDROCHLOROTHIAZIDE 12.5 MG: 12.5 CAPSULE ORAL at 09:04

## 2020-09-03 RX ADMIN — MONTELUKAST 10 MG: 10 TABLET, FILM COATED ORAL at 21:38

## 2020-09-03 RX ADMIN — PANTOPRAZOLE SODIUM 20 MG: 20 TABLET, DELAYED RELEASE ORAL at 06:45

## 2020-09-03 RX ADMIN — AMLODIPINE BESYLATE 10 MG: 10 TABLET ORAL at 09:04

## 2020-09-03 RX ADMIN — METFORMIN HYDROCHLORIDE 500 MG: 500 TABLET, EXTENDED RELEASE ORAL at 18:51

## 2020-09-03 RX ADMIN — ATORVASTATIN CALCIUM 40 MG: 40 TABLET, FILM COATED ORAL at 09:04

## 2020-09-03 RX ADMIN — ASPIRIN 81 MG CHEWABLE TABLET 81 MG: 81 TABLET CHEWABLE at 09:04

## 2020-09-03 RX ADMIN — INSULIN GLARGINE 22 UNITS: 100 INJECTION, SOLUTION SUBCUTANEOUS at 21:37

## 2020-09-03 NOTE — PROGRESS NOTES
[facility] PHYSICAL REHABILITATION  [address] [place] [address], [address]     INPATIENT REHABILITATION  DAILY PROGRESS NOTE     Date: 9/3/2020    Name: Radha Wright Age / Sex: 60 y.o. / female   CSN: [id number] MRN: [id number]   [facility] Date: 8/26/2020 Length of Stay: 8 days     Primary Rehab Diagnosis: Impaired Mobility and ADLs secondary to Acute Ischemic Stroke (late acute/early subacute infarcts in the right centrum semiovale extending to the superior frontal lobe cortex) with residual left hemiparesis      Subjective:     Patient seen and examined. Blood pressure controlled. Blood glucose controlled. Patient's Complaint:   No significant medical complaints     Pain Control: no current joint or muscle symptoms, essentially pain-free      Objective:     Vital Signs:  Patient Vitals for the past 24 hrs:   BP Temp Pulse Resp SpO2   09/03/20 0743 131/78 97.7 °F (36.5 °C) 67 18 99 %   09/02/20 2149 124/69 97 °F (36.1 °C) 72 18 94 %   09/02/20 1548 124/56 97.2 °F (36.2 °C) 66 18 98 %        Physical Examination:  GENERAL SURVEY: Patient is awake, alert, oriented x 3, sitting comfortably on the chair, not in acute respiratory distress. HEENT: pink palpebral conjunctivae, anicteric sclerae, no nasoaural discharge, moist oral mucosa  NECK: supple, no jugular venous distention, no palpable lymph nodes  CHEST/LUNGS: symmetrical chest expansion, good air entry, clear breath sounds  HEART: adynamic precordium, good S1 S2, no S3, regular rhythm, no murmurs  ABDOMEN: obese, bowel sounds appreciated, soft, non-tender  EXTREMITIES: pink nailbeds, no edema, full and equal pulses, no calf tenderness   NEUROLOGICAL EXAM: The patient is awake, alert and oriented x3, able to answer questions fairly appropriately, able to follow 1 and 2 step commands. Able to tell time from the wall clock. Cranial nerves II-XII are grossly intact. No gross sensory deficit.   Motor strength is 5/5 on the RUE and RLE, 4- to 4/5 on the LUE, 2+/5 on the LLE.       Current Medications:  Current Facility-Administered Medications   Medication Dose Route Frequency    docusate sodium (COLACE) capsule 100 mg  100 mg Oral DAILY AFTER BREAKFAST    senna-docusate (PERICOLACE) 8.6-50 mg per tablet 2 Tab  2 Tab Oral PCD    insulin glargine (LANTUS) injection 22 Units  22 Units SubCUTAneous QHS    minoxidiL (LONITEN) tablet 5 mg  5 mg Oral Q12H    metFORMIN ER (GLUCOPHAGE XR) tablet 500 mg  500 mg Oral DAILY WITH DINNER    acetaminophen (TYLENOL) tablet 650 mg  650 mg Oral Q4H PRN    bisacodyL (DULCOLAX) tablet 10 mg  10 mg Oral Q48H PRN    heparin (porcine) injection 5,000 Units  5,000 Units SubCUTAneous Q8H    insulin lispro (HUMALOG) injection   SubCUTAneous TIDAC    aspirin chewable tablet 81 mg  81 mg Oral DAILY WITH BREAKFAST    clopidogreL (PLAVIX) tablet 75 mg  75 mg Oral DAILY WITH DINNER    atorvastatin (LIPITOR) tablet 40 mg  40 mg Oral DAILY    albuterol (PROVENTIL VENTOLIN) nebulizer solution 2.5 mg  2.5 mg Nebulization Q4H PRN    amLODIPine (NORVASC) tablet 10 mg  10 mg Oral DAILY    cholecalciferol (VITAMIN D3) (1000 Units /25 mcg) tablet 2 Tab  2,000 Units Oral DAILY    fenofibrate nanocrystallized (TRICOR) tablet 145 mg  145 mg Oral DAILY    ferrous sulfate tablet 325 mg  1 Tab Oral DAILY WITH BREAKFAST    montelukast (SINGULAIR) tablet 10 mg  10 mg Oral QHS    pantoprazole (PROTONIX) tablet 20 mg  20 mg Oral ACB    ipratropium (ATROVENT) 0.02 % nebulizer solution 0.5 mg  0.5 mg Nebulization QID RT    valsartan (DIOVAN) tablet 320 mg  320 mg Oral DAILY    glucose chewable tablet 16 g  4 Tab Oral PRN    glucagon (GLUCAGEN) injection 1 mg  1 mg IntraMUSCular PRN    dextrose (D50W) injection syrg 12.5-25 g  25-50 mL IntraVENous PRN    hydroCHLOROthiazide (MICROZIDE) capsule 12.5 mg  12.5 mg Oral DAILY       Allergies:  No Known Allergies      Functional Progress:    OCCUPATIONAL THERAPY    ON ADMISSION MOST RECENT   Eating  Functional Level: 5   Eating  Functional Level: 5     Grooming  Functional Level: 5   Grooming  Functional Level: 5     Bathing  Functional Level: 4   Bathing  Functional Level: 4     Upper Body Dressing  Functional Level: 5   Upper Body Dressing  Functional Level: 5     Lower Body Dressing  Functional Level: 5   Lower Body Dressing  Functional Level: 5     Toileting  Functional Level: 0   Toileting  Functional Level: 5     Toilet Transfers  Toilet Transfer Score: 0   Toilet Transfers  Toilet Transfer Score: 5     Tub /Shower Transfers  Tub/Shower Transfer Score: 0   Tub/Shower Transfers  Tub/Shower Transfer Score: 0       Legend:   7 - Independent   6 - Modified Independent   5 - Standby Assistance / Supervision / Set-up   4 - Minimum Assistance / Contact Guard Assistance   3 - Moderate Assistance   2 - Maximum Assistance   1 - Total Assistance / Dependent       Lab/Data Review:  Recent Results (from the past 24 hour(s))   GLUCOSE, POC    Collection Time: 09/02/20  4:22 PM   Result Value Ref Range    Glucose (POC) 85 70 - 110 mg/dL   HGB & HCT    Collection Time: 09/02/20  4:31 PM   Result Value Ref Range    HGB 11.2 (L) 12.0 - 16.0 g/dL    HCT 34.4 (L) 35.0 - 45.0 %   GLUCOSE, POC    Collection Time: 09/02/20  9:00 PM   Result Value Ref Range    Glucose (POC) 129 (H) 70 - 110 mg/dL   HGB & HCT    Collection Time: 09/03/20  5:34 AM   Result Value Ref Range    HGB 10.8 (L) 12.0 - 16.0 g/dL    HCT 33.4 (L) 35.0 - 45.0 %   PLATELET COUNT    Collection Time: 09/03/20  5:34 AM   Result Value Ref Range    PLATELET 193 637 - 715 K/uL   METABOLIC PANEL, BASIC    Collection Time: 09/03/20  5:34 AM   Result Value Ref Range    Sodium 143 136 - 145 mmol/L    Potassium 4.0 3.5 - 5.5 mmol/L    Chloride 109 100 - 111 mmol/L    CO2 27 21 - 32 mmol/L    Anion gap 7 3.0 - 18 mmol/L    Glucose 110 (H) 74 - 99 mg/dL    BUN 25 (H) 7.0 - 18 MG/DL    Creatinine 1.15 0.6 - 1.3 MG/DL    BUN/Creatinine ratio 22 (H) 12 - 20 GFR est AA 58 (L) >60 ml/min/1.73m2    GFR est non-AA 48 (L) >60 ml/min/1.73m2    Calcium 9.5 8.5 - 10.1 MG/DL   GLUCOSE, POC    Collection Time: 09/03/20  7:45 AM   Result Value Ref Range    Glucose (POC) 97 70 - 110 mg/dL   GLUCOSE, POC    Collection Time: 09/03/20 11:37 AM   Result Value Ref Range    Glucose (POC) 134 (H) 70 - 110 mg/dL       Estimated Glomerular Filtration Rate:  On admission, estimated GFR based on a Creatinine of 0.75 mg/dl:              Using CKD-EPI = 100.4 mL/min/1.73m2              Using MDRD = 101.4 mL/min/1.73m2  Most recent estimated GFR, based on a Creatinine of 0.89 mg/dl on 8/31/2020:   Using CKD-EPI = 81.6 mL/min/1.73m2   Using MDRD = 83.2 mL/min/1.73m2       Assessment:     Primary Rehabilitation Diagnosis  1. Impaired Mobility and ADLs  2. Acute Ischemic Stroke (late acute/early subacute infarcts in the right centrum semiovale extending to the superior frontal lobe cortex) with residual left hemiparesis     Comorbidities   Hypertensive heart disease without heart failure I11.9    Gastroesophageal reflux disease K21.9    Obstructive sleep apnea on CPAP G47.33, Z99.89    Primary osteoarthritis involving multiple joints M89.49    Chronic obstructive pulmonary disease (COPD) ([facility]) J44.9    Severe persistent asthma J45.50    Obesity, Class II, BMI 35-39.9 E66.9    Depression F32.9    Seasonal allergic rhinitis J30.2    History of colon polyps Z86.010    Pure hypercholesterolemia E78.00    Type 2 diabetes mellitus, with long-term current use of insulin ([facility]) E11.9, Z79.4    COVID-19 ruled out by laboratory testing Z03.818    Menopause Z78.0    Iron deficiency anemia D50.9    Bipolar disorder ([facility]) F31.9    Current use of aspirin Z79.82    On clopidogrel therapy Z79.01    On statin therapy due to risk of future cardiovascular event Z79.899    Sciatica of left side M54.32        Plan:     1.  Justification for continued stay: Good progression towards established rehabilitation goals. 2. Medical Issues being followed closely:    [x]  Fall and safety precautions     []  Wound Care     [x]  Bowel and Bladder Function     [x]  Fluid Electrolyte and Nutrition Balance     []  Pain Control      3. Issues that 24 hour rehabilitation nursing is following:    [x]  Fall and safety precautions     []  Wound Care     [x]  Bowel and Bladder Function     [x]  Fluid Electrolyte and Nutrition Balance     []  Pain Control      [x]  Assistance with and education on in-room safety with transfers to and from the bed, wheelchair, toilet and shower. 4. Acute rehabilitation plan of care:    [x]  Continue current care and rehab. [x]  Physical Therapy           [x]  Occupational Therapy           [x]  Speech Therapy     []  Hold Rehab until further notice     5. Medications:    [x]  MAR Reviewed     [x]  Continue Present Medications     6. DVT Prophylaxis:      []  Lovenox     [x]  Unfractionated Heparin     []  Coumadin     []  NOAC     []  MARTHA Stockings     []  Sequential Compression Device     []  None     7. Code status    [x]  Full code     []  Partial code     []  Do not intubate     []  Do not resuscitate     8.  Orders:   > Acute Ischemic Stroke (late acute/early subacute infarcts in the right centrum semiovale extending to the superior frontal lobe cortex) with residual left hemiparesis   > Dual antiplatelet therapy (DAPT) was started 8/24/2020   > Duration of DAPT not specified on discharge summary   > Will defer duration of DAPT to Neurology upon outpatient follow up    > Continue:    > Aspirin 81 mg PO once daily with breakfast    > Atorvastatin 40 mg PO once daily    > Clopidogrel 75 mg PO once daily with dinner     > Hypertensive heart disease without heart failure   > 2D echocardiogram (8/22/2020) showed EF 65%; mild left ventricular hypertrophy; normal diastolic function; negative bubble study at rest and after Valsalva   > On 8/28/2020, started Minoxidil 2.5 mg PO q 12 hr (9AM, 9PM)   > On 8/30/2020, increased Minoxidil from 2.5 mg to 5 mg PO q 12 hr (9AM, 9PM)   > Continue:    > Amlodipine 10 mg PO once daily (9AM)    > Hydrochlorothiazide 12.5 mg PO once daily (9AM)    > Minoxidil 5 mg PO q 12 hr (9AM, 9PM)    > Valsartan 320 mg PO once daily (9AM)    > Pure hypercholesterolemia   > Lipid profile (8/19/2020) showed , , HDL 50,    > Continue:    > Atorvastatin 40 mg PO once daily    > Fenofibrate 145 mg PO once daily    > Type 2 diabetes mellitus, with long term current use of insulin   > HbA1c (8/19/2020) = 8.1; HbA1c (8/20/2020) = 8.0   > On 8/26/2020, increased Insulin glargine from 10 units to 20 units SC q HS   > On 8/28/2020, started Metformin  mg PO once daily with dinner   > On 8/30/2020, increased Insulin glargine from 20 units to 22 units SC q HS   > Continue:    > Insulin glargine 22 units SC q HS    > Insulin lispro sliding scale SC TID AC only    > History of wound culture positive for MRSA (3/27/2012)   > Nasal swab done to rule out MRSA colonization   > MRSA culture (collected 8/27/2020, resulted 8/28/2020): Not present     > Constipation   > On 8/31/2020, started:    > Docusate sodium 100 mg PO once daily after breakfast    > Pericolace 2 tabs PO once daily after dinner   > Continue:    > Docusate sodium 100 mg PO once daily after breakfast    > Pericolace 2 tabs PO once daily after dinner    > COVID-19 ruled out by laboratory testing   > SARS-CoV-2 (Abbott m2000) (collected 8/25/2020, resulted 8/26/2020): Not detected    > Analgesia   > Continue Acetaminophen 650 mg PO q 4 hr PRN for pain level less than 5/10    > Diet:   > Specifications: Cardiac, diabetic, consistent carb 1800 kcal, no concentrated sweets   > Solids (consistency): Regular    > Liquids (consistency): Thin    > Fluid restriction: None      9. Patient's progress in rehabilitation and medical issues discussed with the patient.  All questions answered to the best of my ability. Care plan discussed with patient and nurse. 9. Discharge Planning:  Discharge date  9/4/2020 (Friday)   Discharge location  [x] Home     [] [address]    [] Other:    Follow-up services  [x] Outpatient      [] Home Health       [x] Physical Therapy              [x] Occupational Therapy       [] Speech Therapy                [] Medical Social Worker    [] Aide   [] Skilled Nursing           [] Medication reconciliation        [] Disease education        [] PT/INR monitoring        [] Routine PICC line care        [] IV antibiotic administration            Antibiotic:             Stop date:        [] Tube feeding        [] Indwelling barbosa catheter care        [] Wound Care/Dressing             Instructions:       [] Other:      Follow-up appointments  1. PCP (Dr. Vaughan Left)   2.  [facility]        Signed:    Adrienne Yu MD    September 3, 2020

## 2020-09-03 NOTE — PROGRESS NOTES
Problem: Self Care Deficits Care Plan (Adult)  Goal: *Therapy Goal (Edit Goal, Insert Text)  Description: Occupational Therapy Goals   Short term = Long Term Goals  Initiated 2020 and to be accomplished within 2 week(s) 08/10/2020  1. Pt will perform self-feeding with I.   2. Pt will perform grooming with I.  3. Pt will perform UB bathing with Mod I.  4. Pt will perform LB bathing with Mod I.  5. Pt will perform tub/shower transfer with Mod I.   6. Pt will perform UB dressing with I.  7. Pt will perform LB dressing with I.  8. Pt will perform toileting task with mod I.   9. Pt will perform toilet transfer with mod I. Outcome: Progressing Towards Goal  OCCUPATIONAL THERAPY TREATMENT    Patient: Zina Bryant   60 y.o. Patient identified with name and : yes    Date: 9/3/2020    First Tx Session  Time In: 1105  Time Out[de-identified] 0925    Second Tx Session  Time In: 1330  Time Out[de-identified] 4994     Diagnosis: Acute ischemic stroke [facility]) [I63.9]   Precautions: Fall  Chart, occupational therapy assessment, plan of care, and goals were reviewed. Pain:  Pt reports 0/10 pain or discomfort prior to treatment. Pt reports 0/10 pain or discomfort post treatment. Intervention Provided: n/a      SUBJECTIVE:   Patient stated I have a chair in my kitchen that I sit on to take breaks.     OBJECTIVE DATA SUMMARY:     IADL Daily Assessment    Pt completed cooking activity to ensure ability to cook simple meals for herself while  is not home. Pt made spaghetti dish using peppers, onions, sauce mix, tomato paste, ground beef, seasonings, and pasta. Pt demonstrated adequate safety awareness when working around hot stovetop and showed ability to watch multiple burners at once. Patient brown hamburger and boiled noodles while safely using a knife to cut onions and peppers.  Pt needed close supervision at beginning of activity while getting use to maneuvering throughout kitchen with a wheeled walker; however progressed to requiring distant supervision by end of meal preparation task. Pt knew to take periodic rest breaks to sit when lower extremities became fatigued. Pt demonstrated ability to stand and wash dishes as she completed cleaning tasks as she cooked. MOBILITY/TRANSFERS Daily Assessment     tub transfer with rolling walker requiring distant supervision x2. Pt able to demonstrate understanding of visual demonstration from therapist.        ASSESSMENT:  Pt progressing from close supervision to distant supervision with ADLs and IADLs. Will reassess for further improvement with safety awareness to be mod I prior to discharge tomorrow. Pt states willingness to attend outpatient therapy to further improve strength and function needed to be safe at home without occasional supervision. Pts daughter sign on for video chat family education in order to be aware of pts deficits leaving inpatient rehab to best provide assistance to pt and ensure safety. Pt's daughter verbalized understanding without further questions. Progression toward goals:  [x]          Improving appropriately and progressing toward goals  []          Improving slowly and progressing toward goals  []          Not making progress toward goals and plan of care will be adjusted     PLAN:  Patient continues to benefit from skilled intervention to address the above impairments. Continue treatment per established plan of care. Discharge Recommendations:  Outpatient  Further Equipment Recommendations for Discharge:  transfer bench     Activity Tolerance:  good      Estimated LOS: 10 days    Please refer to the flowsheet for vital signs taken during this treatment.   After treatment:   []  Patient left in no apparent distress sitting up in chair   [x]  Patient left in no apparent distress in bed  [x]  Call bell left within reach  []  Nursing notified  []  Caregiver present  []  Bed alarm activated    COMMUNICATION/EDUCATION:   [x] Home safety education was provided and the patient/caregiver indicated understanding. [] Patient/family have participated as able in goal setting and plan of care. [] Patient/family agree to work toward stated goals and plan of care. [] Patient understands intent and goals of therapy, but is neutral about his/her participation. [] Patient is unable to participate in goal setting and plan of care.       Travis Valenzuela

## 2020-09-03 NOTE — PROGRESS NOTES
Problem: Mobility Impaired (Adult and Pediatric)  Goal: *Therapy Goal (Edit Goal, Insert Text)  Description: Physical Therapy Short Term Goals  Initiated 8/27/2020 and to be accomplished within 7 day(s) on 9/3/2020: All goals met 9/2/2020  1. Patient will move from supine to sit and sit to supine , scoot up and down, and roll side to side in bed with modified independence. 2.  Patient will transfer from bed to chair and chair to bed with supervision/set-up using the least restrictive device. 3.  Patient will perform sit to stand with supervision/set-up. 4.  Patient will ambulate with SBA for 100 feet with the least restrictive device, cues for neutral knee extension during midstance phase of gait <25% of the time. 5.  Patient will ascend/descend 1 curb step with appropriate AD with CGA. Physical Therapy Long Term Goals  Initiated 8/27/2020 and to be accomplished within 14 day(s) on 9/10/2020  1. Patient will move from supine to sit and sit to supine , scoot up and down, and roll side to side in bed with independence. Goal met 9/2/2020  2. Patient will transfer from bed to chair and chair to bed with modified independence using the least restrictive device. Goal met 9/2/2020  3. Patient will perform sit to stand with modified independence. Goal met 9/2/2020  4. Patient will ambulate with modified independence for 150 feet with the least restrictive device. Goal met 9/2/2020  5. Patient will ascend/descend 1 curb step with SBA, using appropriate AD. Goal met 9/2/2020    Outcome: Resolved/Met   PHYSICAL THERAPY DISCHARGE NOTE    Patient: Annette Ko (81 y.o. female)  Date: 9/3/2020  Diagnosis: Acute ischemic stroke [facility]) [I63.9] Acute ischemic stroke [facility])  Precautions: Fall  Chart, physical therapy assessment, plan of care and goals were reviewed.   Time In: 0930  Time Out: 1504  Time in:1300  Time out:1327    Patient seen for: Family training;Patient education;Gait training;Transfer training; Wheelchair mobility;Balance activities    Pain:  Patient did not indicate pain during session. Patient identified with name and : yes    SUBJECTIVE:     Patient reporting looking forward to discharge home, reporting \"I'm determined\" in regards to her wanting to continue to get better and work toward getting better. States that her muscles feel \"achy\" from use but denies pain. OBJECTIVE DATA SUMMARY:     GROSS ASSESSMENT Discharge Assessment 9/3/2020   AROM Generally decreased, functional   Strength Generally decreased, functional   Coordination (left LE slightly impaired heel to shin)   Tone Normal   Sensation Impaired   PROM Within functional limits       POSTURE Discharge Assessment 9/3/2020   Posture (WDL) Exceptions to [facility]   Posture Assessment Forward head;Rounded shoulders         BALANCE Discharge Assessment 9/3/2020    Sitting - Static: Good (unsupported)  Sitting - Dynamic: Good (unsupported)  Standing - Static: Good;Occasional;Fair  Standing - Dynamic : Impaired  Other (comment): Escobar Balance Scale score: 44/56(indicates potential for increased falls risk)       BED/CHAIR/WHEELCHAIR TRANSFERS Initial Assessment Discharge Assessment   Rolling Right 5 (Supervision) 7 (Independent)   Rolling Left 5 (Supervision) 7 (Independent)   Supine to Sit 5 (Supervision) 7 (Independent)   Sit to Stand Contact guard assistance Modified independent   Sit to Supine 5 (Supervision) 7 (Independent)   Transfer Assistance Level 4 (Minimal assistance) 6 (Modified independent)   Transfer Type Other Other   Comments    Patient mod I using RW for transfers. Using straight cane needing intermittent cues for safety.    Car Transfer Minimum assistance(without AD as per PLOF) Supervision(set up only)   Car Type car transfer simulator car transfer simulator       WHEELCHAIR MOBILITY/MANAGEMENT Initial Assessment Discharge Assessment   Able to Propel 170 feet 220 feet(using bilat UE and LE to propel )   Assistance Level 3(mod A for steering due to veering observed) Mod I even terrain   Curbs/ramps assistance required 0 (Not tested) 0 (Not tested)   Wheelchair set up assistance required 3 (Moderate assistance) 6 (Modified independent)   Wheelchair management Manages left brake, Manages right brake(needing assist with legrests) Manages left brake;Manages right brake       WALKING INDEPENDENCE Initial Assessment Discharge Assessment   Assistive device (HHA initially, RW subsequent trials) Gait belt;Walker, rolling(CG/SBA for 80 ft bout with straight cane)   Ambulation assistance - level surface 3 (Moderate assistance) 6 (Modified independent)   Distance 18 Feet (ft)(without AD as per PLOF, 25 ft trial with RW min A) 155 Feet (ft)   Comments    Needing cues for safe turning, maintaining neutral knee consistently left knee during midstance with increased fatigue observed left LE. Observed to have difficulty with performing more reciprocal gait pattern using straight cane as well. Improved reciprocal gait pattern with better gait  quality, maintaining neutral knee extension during midstance left LE with use of RW. Able to gait for increased distance as well. Ambulation assistance - unlevel surface (not tested due to safety concern) SBA with RW for at least 10 ft distance       GAIT Discharge Assessment 9/3/2020   Gait Description (WDL) Exceptions to WDL   Gait Abnormalities Decreased step clearance(intermittent knee extension with left LE )       STEPS/STAIRS Initial Assessment Discharge Assessment   Steps/Stairs ambulated 4 (Minimal assistance)(leading with right LE ascending stairs) 12   Rail Use Both Both   Assistance Level    SBA   Comments    Cues for safety occasionally. Curbs/Ramps (not tested due to safety concern) (SBA/supervision using RW)     Neuro Re-Education:  See separate documentation for [facility] Scale. ASSESSMENT:  Patient has made significant gains with performing more independent functional mobility. Significant improvement with balance as demonstrated by Katherne Cluck Balance Scale score, but continues to be at increased risk for fall. LTGs: Patient has met all STG and LTG. PLAN:  Pt would benefit from continued skilled physical therapy in order to improve independent functional mobility at outpatient PT level. Interventions may include range of motion (AROM, PROM B LE/trunk), motor function (B LE/trunk strengthening/coordination), activity tolerance (vitals, oxygen saturation levels), bed mobility training, balance activities, gait training (progressive ambulation program), and functional transfer training. Discharge Recommendations:  Outpatient  Further Equipment Recommendations for Discharge:  rolling walker to progress to less restrictive AD as appropriate. Activity Tolerance:   Good, needing intermittent seated rests.    After treatment:   [x] Patient left in no apparent distress sitting up in chair  [] Patient left in no apparent distress in bed  [x] Call bell left within reach  [] Nursing notified  [] Caregiver present  [] Bed alarm activated      David Menendez, PT  9/3/2020

## 2020-09-03 NOTE — PROGRESS NOTES
Escobar Balance Scale    Patient: Tish House (78 y.o. female)  Date: 9/3/2020  Diagnosis: Acute ischemic stroke [facility]) [I63.9] Acute ischemic stroke [facility])  Precautions: Fall  Evaluator: Shan Jernigan PT    Sitting to standing  INSTRUCTIONS: Please stand up. Try not to use your hand for support. [x] 4 able to stand without using hands and stabilize independently  [] 3 able to stand independently using hands  [] 2 able to stand using hands after several tries  [] 1 needs minimal aid to stand or stabilize  [] 0 needs moderate or maximal assist to stand    2. Standing unsupported  INSTRUCTIONS: Please stand for two minutes without holding on. [x] 4 able to stand safely for two minutes without holding on  [] 3 able to stand for two minutes with supervision  [] 2 able to stand 30 seconds unsupported  [] 1 needs several tries to stand 30 seconds unsupported  [] 0 unable to stand 30 seconds unsupported    If a subject is able to stand 2 minutes unsupported, score full points for sitting unsupported. Proceed to item #4.    3. Sitting with back unsupported but feet supported on floor or on a stool  INSTRUCTIONS: Please sit with arms folded for 2 minutes    [x] 4 able to sit safely and securely for 2 minutes  [] 3 able to sit 2 minutes under supervision  [] 2 able to sit 30 seconds  [] 1 able to sit 10 seconds  [] 0 unable to sit without support 10 seconds    4. Standing to sitting  INSTRUCTIONS: Please sit down  [x] 4 sits safely with minimal use of hands  [] 3 controls decent by using hands  [] 2 uses back of legs against chair to control descent  [] 1 sits independently but has uncontrolled descent  [] 0 needs assist to sit    5. Transfers  INSTRUCTIONS: Arrange chair(s) for pivot transfer. Ask subject to transfer one way toward a seat with armrests and one way toward a seat without armrests. You may use two chairs (one with and one without armrests) or a bed and a chair.   [x] 4 able to transfer safely with minor use of hands  [] 3 able to transfer safely definite need of hands  [] 2 able to tranfers with verbal cuing and/or supervision  [] 1 needs one person to assist  [] 0 needs two people to assist or supervise to be safe    6. Standing unsupported with eyes closed  INSTRUCTIONS: Please close your eyes and stand still for 10 seconds. [x] 4 able to stand for 10 seconds safely  [] 3 able to stand for 10 seconds with supervision  [] 2 able to stand 3 seconds  [] 1 unable to keep eyes closed 3 seconds but stays safely  [] 0 needs help to keep from falling    7. Standing unsupported with feet together  INSTRUCTIONS: Place your feet together and stand without holding on  [x] 4 able to place feet together independently and stand 1 minute safely  [] 3 able to place feet together independently and stand 1 minute with supervison  [] 2 able to place feet together independently but unable to hold for 30 seconds  [] 1 needs help to attain position but able to stand 15 seconds feet together  [] 0 needs help to attain position and unable to hold for 15 seconds    8. Reaching forward with outstretched arm while standing  INSTRUCTIONS: Lift arm to 90 degrees. Stretch out your fingers and reach forward as far as you can. (Examiner places a ruler at the end of fingertips when arm is at 90 degrees. Fingers should not touch the ruler while reaching forward. The recorded measure is the distance forward that the fingers reach while the subject is in the most forward lean position. When possible, ask subject to use both arms when reaching to avoid rotation of the trunk.)  [] 4 can reach forward confidently 25cm (10 inches)  [x] 3 can reach forward 12 cm (5 inches)  [] 2 can reach forward 5 cm (2 inches)  [] 1 reaches forward but needs supervision  [] 0 loses balance while trying/requires external support    9.   object from the floor from a standing position  INSTRUCTIONS:  the shoe/slipper, which is place in front of your feet  [x] 4 able to  slipper safely and easily  [] 3 able to  slipper but needs supervision  [] 2 unable to  but reaches 2-5 cm(1-2 inches) from slipper and keeps balance independently   [] 1 unable to  and needs supervision while trying  [] 0 unable to try/needs assist to keep from losing balance or falling    10. Turning to look behind over left and right shoulders while standing  INSTRUCTIONS: Turn to look directly behind you over toward the left shoulder. Repeat to the right. Examiner may pick and object to look at directly behind the subject to encourage a better twist turn. [x] 4 looks behind from both sides and weight shifts well  [] 3 looks behind one side only other side shows less weight shift  [] 2 turns sideways only but maintains balance  [] 1 needs supervision when turning  [] 0 needs assist to keep from losing balance or falling    11. Turn 360 degrees  INSTRUCTIONS: turn complete around in a full [place]. Pause. Then turn a full [place] in the other direction  [] 4 able to turn 360 degrees safely in 4 seconds or less  [] 3 able to turn 360 degrees safely one side on 4 seconds or less  [] 2 able to turn 360 degrees safely but slowly  [x] 1 needs close supervision or verbal cuing  [] 0 needs assistance while turning    12. Place alternate foot on step or stool while standing unsupported  INSTRUCTIONS: Place each foot alternately on the step/stool. Continue until each foot has touch the step/stool four times. [] 4 able to stand independently and safely and complete 8 steps in 20 seconds  [] 3 able to stand independently and complete 8 steps >20 seconds  [] 2 able to complete 4 steps without aid with supervision  [x] 1 able to complete >2 steps needs minimal assist  [] 0 needs assist to keep from falling/unable to try    13. Standing unsupported one foot in front  INSTRUCTIONS: (DEMONSTRATE TO SUBJECT) Place one foot directly in front of the other.  If you feel that you cannot place your foot directly in front, try to stop far enough ahead that the heel of your forward foot is ahead of the toes of the other foot. (To score 3 points, the length of the step should exceed the length of the other foot and the width of the stand should approximate the subject's normal stride width). [] 4 able to place foot tandem independently and hold 30 seconds  [x] 3 able to place the foot ahead independently and hold 30 seconds  [] 2 able to take small step independently and hold 30 seconds  [] 1 needs help to step but can hold 15 seconds  [] 0 loses balance while stepping or standing    14.  Standing on one leg  INSTRUCTIONS: stand on one leg as long as you can without holding on  [] 4 able to lift leg independently and hold >10 seconds  [] 3 able to lift leg independently and hold 5-10 seconds  [] 2 able to lift leg independently and hold for >3 seconds  [] 1 tries to lift leg unable to hold 3 seconds but remains standing independently  [x] 0 unable to try needs assist to prevent fall    __44__ TOTAL SCORE (Maximum =56)

## 2020-09-03 NOTE — ROUTINE PROCESS
[facility]    Bedside and Verbal shift change report given to Jackie Quiñonez LPN (oncoming nurse) by Tracy Murrell RN (offgoing nurse). Report included the following information SBAR, Kardex, MAR and Recent Results. Situation:   Code Status: Full Code   Hospital Day: 8   Problem List:   Hospital Problems  Date Reviewed: 9/2/2020          Codes Class Noted POA    Sciatica of left side ICD-10-CM: M54.32  ICD-9-CM: 724.3  Unknown Yes        Type 2 diabetes mellitus, with long-term current use of insulin (HCC) (Chronic) ICD-10-CM: E11.9, Z79.4  ICD-9-CM: 250.00, V58.67  Unknown Yes    Overview Signed 8/26/2020 11:46 PM by Sebastian Garcia MD     HbA1c (8/19/2020) = 8.1; HbA1c (8/20/2020) = 8.0             COVID-19 ruled out by laboratory testing ICD-10-CM: Z03.818  ICD-9-CM: V71.83  8/26/2020 Yes    Overview Signed 8/26/2020 11:47 PM by Sebastian Garcia MD     SARS-CoV-2 ([facility]bbott m2000) (collected 8/25/2020, resulted 8/26/2020):  Not detected              Current use of aspirin ICD-10-CM: Z79.82  ICD-9-CM: V58.66  8/24/2020 Yes        On clopidogrel therapy ICD-10-CM: Z79.01  ICD-9-CM: V58.61  8/24/2020 Yes        On statin therapy due to risk of future cardiovascular event ICD-10-CM: Z79.899  ICD-9-CM: V58.69  8/24/2020 Yes    Overview Signed 8/26/2020 11:50 PM by Sebastian Garcia MD     On Atorvastatin             * (Principal) Acute ischemic stroke [facility]) ICD-10-CM: I63.9  ICD-9-CM: 434.91  8/21/2020 Yes    Overview Signed 8/26/2020 11:35 PM by Sebastian Garcia MD     Acute Ischemic Stroke (late acute/early subacute infarcts in the right centrum semiovale extending to the superior frontal lobe cortex) with residual left hemiparesis             Left hemiparesis ([facility] [address] ICD-10-CM: G81.94  ICD-9-CM: 342.90  8/21/2020 Yes        Impaired mobility and ADLs ICD-10-CM: Z74.09, Z78.9  ICD-9-CM: V49.89  8/21/2020 Yes        Pure hypercholesterolemia (Chronic) ICD-10-CM: E78.00  ICD-9-CM: 272.0  8/19/2020 Yes Overview Signed 8/26/2020 11:45 PM by Berry Batista MD     Lipid profile (8/19/2020) showed , , HDL 50,              Hypertensive heart disease without heart failure (Chronic) ICD-10-CM: I11.9  ICD-9-CM: 402.90  Unknown Yes    Overview Signed 8/26/2020 11:38 PM by Berry Batista MD     2D echocardiogram (8/22/2020) showed EF 65%; mild left ventricular hypertrophy; normal diastolic function; negative bubble study at rest and after Valsalva                   Background:   Past Medical History:   Past Medical History:   Diagnosis Date    Acute ischemic stroke ([facility] [address] 8/21/2020    Acute Ischemic Stroke (late acute/early subacute infarcts in the right centrum semiovale extending to the superior frontal lobe cortex) with residual left hemiparesis    Bipolar disorder ([facility] [address]     Chronic obstructive pulmonary disease (COPD) ([facility] [address]     COVID-19 ruled out by laboratory testing 8/26/2020    SARS-CoV-2 ([facility] m2000) (collected 8/25/2020, resulted 8/26/2020):  Not detected     Current use of aspirin 8/24/2020    Depression     Gastroesophageal reflux disease     History of colon polyps     Hypertensive heart disease without heart failure     2D echocardiogram (8/22/2020) showed EF 65%; mild left ventricular hypertrophy; normal diastolic function; negative bubble study at rest and after Valsalva    Iron deficiency anemia     Left hemiparesis ([facility] [address] 8/21/2020    Menopause     Obesity, Class II, BMI 35-39.9     Obstructive sleep apnea on CPAP     On clopidogrel therapy 8/24/2020    On statin therapy due to risk of future cardiovascular event 8/24/2020    On Atorvastatin    Pure hypercholesterolemia 8/19/2020    Lipid profile (8/19/2020) showed , , HDL 50,     Sciatica of left side     Seasonal allergic rhinitis     Severe persistent asthma 11/07/2019    Type 2 diabetes mellitus, with long-term current use of insulin (HCC)     HbA1c (8/19/2020) = 8.1; HbA1c (8/20/2020) = 8.0        Assessment:   Changes in Assessment throughout shift: none     Patient has central line: no Reasons if yes: n/a  Insertion date:n/a Last dressing date:n/a   Patient has Barbosa Cath: no Reasons if yes: n/a   Insertion date:n/a  Shift barbosa care completed: n/a      Last Vitals:     Vitals:    09/01/20 2200 09/02/20 0756 09/02/20 1548 09/02/20 2149   BP: 138/72 137/65 124/56 124/69   Pulse: 72 65 66 72   Resp: 18 18 18 18   Temp: 97.9 °F (36.6 °C) 97.3 °F (36.3 °C) 97.2 °F (36.2 °C) 97 °F (36.1 °C)   SpO2: 100% 100% 98% 94%   Height:            PAIN    Pain Assessment    Pain Intensity 1: 0 (09/03/20 0405) Pain Intensity 1: 2 (12/29/14 1105)      Pain Location 1: Abdomen      Pain Intervention(s) 1: Medication (see MAR)  Patient Stated Pain Goal: 0 Patient Stated Pain Goal: 0  o Intervention effective: yes   o Other actions taken for pain: rest/position/food     Skin Assessment  Skin color    Condition/Temperature    Integrity    Turgor    Weekly Pressure Ulcer Documentation  Pressure  Injury Documentation: No Pressure Injury Noted-Pressure Ulcer Prevention Initiated  Wound Prevention & Protection Methods  Orientation of wound Orientation of Wound Prevention: Posterior  Location of Prevention Location of Wound Prevention: Sacrum/Coccyx  Dressing Present Dressing Present : No  Dressing Status    Wound Offloading Wound Offloading (Prevention Methods): Bed, pressure redistribution/air, Chair cushion     INTAKE/OUPUT  Date 09/02/20 0700 - 09/03/20 0659 09/03/20 0700 - 09/04/20 0659   Shift 1263-8424 2135-4176 24 Hour Total 0700-1859 1900-0659 24 Hour Total   INTAKE   Shift Total         OUTPUT   Urine           Urine Occurrence(s) 3 x 4 x 7 x      Emesis/NG output           Emesis Occurrence(s) 0 x 0 x 0 x      Stool           Stool Occurrence(s) 1 x 0 x 1 x      Shift Total         NET         Weight (kg)             Recommendations:  1. Patient needs and requests: toileting    2.  Pending tests/procedures: None at this time. 3. Functional Level/Equipment: 1 person assist w/c    HEALS Safety Check    A safety check occurred in the patient's room between off going nurse and oncoming nurse listed above. The safety check included the below items  Area Items   H  High Alert Medications - Verify all high alert medication drips (heparin, PCA, etc.)   E  Equipment - Suction is set up for ALL patients (with dwayne)  - Red plugs utilized for all equipment (IV pumps, etc.)  - WOWs wiped down at end of shift.  - Room stocked with oxygen, suction, and other unit-specific supplies   A  Alarms - Bed alarm is set for fall risk patients  - Ensure chair alarm is in place and activated if patient is up in a chair   L  Lines - Check IV for any infiltration  - Harrison bag is empty if patient has a Harrison   - Tubing and IV bags are labeled   S  Safety   - Room is clean, patient is clean, and equipment is clean. - Hallways are clear from equipment besides carts. - Fall bracelet on for fall risk patients  - Ensure room is clear and free of clutter  - Suction is set up for ALL patients (with dwayne)  - Hallways are clear from equipment besides carts.    - Isolation precautions followed, supplies available outside room, sign posted     Ansley Mejias RN

## 2020-09-03 NOTE — PROGRESS NOTES
conducted an initial consultation and Spiritual Assessment for [facility], who is a 60 y.o.,female. Patient's Primary Language is: [place]. According to the patient's EMR Amish Affiliation is: [facility].     The reason the Patient came to the hospital is:   Patient Active Problem List    Diagnosis Date Noted    Sciatica of left side     COVID-19 ruled out by laboratory testing 08/26/2020    Obesity, Class II, BMI 35-39.9     Depression     Seasonal allergic rhinitis     History of colon polyps     Type 2 diabetes mellitus, with long-term current use of insulin ([facility] [address]     Menopause     Iron deficiency anemia     Bipolar disorder ([facility] [address]     Current use of aspirin 08/24/2020    On clopidogrel therapy 08/24/2020    On statin therapy due to risk of future cardiovascular event 08/24/2020    Acute ischemic stroke ([facility] [address] 08/21/2020    Left hemiparesis ([facility] [address] 08/21/2020    Impaired mobility and ADLs 08/21/2020    Pure hypercholesterolemia 08/19/2020    Severe persistent asthma 11/07/2019    Chronic obstructive pulmonary disease (COPD) ([facility] [address]     Hypertensive heart disease without heart failure     Gastroesophageal reflux disease     Obstructive sleep apnea on CPAP     Primary osteoarthritis involving multiple joints         The  provided the following Interventions:  Initiated a relationship of care and support. Explored issues of clemente, belief, spirituality and Caodaism/ritual needs while hospitalized. Provided information about Spiritual Care Services. Offered prayer and assurance of continued prayers on patient's behalf. We discussed some copying skills. 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 I listened about the grief her friend is currently experiencing and she cant wait to leave so she express her support. Chart reviewed.     The following outcomes where achieved:  Patient shared limited information about both their medical narrative and spiritual journey/beliefs. Patient processed feeling about current hospitalization. Patient expressed gratitude for 's visit. Assessment:  Patient does not have any Taoism/cultural needs that will affect patient's preferences in health care. Plan:  Chaplains will continue to follow and will provide pastoral care on an as needed/requested basis.       [address] [place] [address]   [phone]

## 2020-09-04 ENCOUNTER — HOSPITAL ENCOUNTER (OUTPATIENT)
Dept: INFUSION THERAPY | Age: 60
End: 2020-09-04
Payer: MEDICAID

## 2020-09-04 VITALS
RESPIRATION RATE: 16 BRPM | BODY MASS INDEX: 34.93 KG/M2 | TEMPERATURE: 97.1 F | OXYGEN SATURATION: 96 % | SYSTOLIC BLOOD PRESSURE: 143 MMHG | HEIGHT: 62 IN | DIASTOLIC BLOOD PRESSURE: 64 MMHG | HEART RATE: 72 BPM

## 2020-09-04 LAB
GLUCOSE BLD STRIP.AUTO-MCNC: 80 MG/DL (ref 70–110)
GLUCOSE BLD STRIP.AUTO-MCNC: 84 MG/DL (ref 70–110)

## 2020-09-04 PROCEDURE — 74011000250 HC RX REV CODE- 250: Performed by: INTERNAL MEDICINE

## 2020-09-04 PROCEDURE — 74011250637 HC RX REV CODE- 250/637: Performed by: INTERNAL MEDICINE

## 2020-09-04 PROCEDURE — 74011250636 HC RX REV CODE- 250/636: Performed by: INTERNAL MEDICINE

## 2020-09-04 PROCEDURE — 82962 GLUCOSE BLOOD TEST: CPT

## 2020-09-04 RX ORDER — PEN NEEDLE, DIABETIC 31 GX3/16"
NEEDLE, DISPOSABLE MISCELLANEOUS
Qty: 30 PEN NEEDLE | Refills: 0 | Status: SHIPPED | OUTPATIENT
Start: 2020-09-04

## 2020-09-04 RX ORDER — DOCUSATE SODIUM 100 MG/1
100 CAPSULE, LIQUID FILLED ORAL
Qty: 30 CAP | Refills: 0 | Status: SHIPPED | OUTPATIENT
Start: 2020-09-04

## 2020-09-04 RX ORDER — GUAIFENESIN 100 MG/5ML
81 LIQUID (ML) ORAL
Qty: 30 TAB | Refills: 0 | Status: SHIPPED | OUTPATIENT
Start: 2020-09-04

## 2020-09-04 RX ORDER — METFORMIN HYDROCHLORIDE 500 MG/1
500 TABLET, EXTENDED RELEASE ORAL
Qty: 30 TAB | Refills: 0 | Status: SHIPPED | OUTPATIENT
Start: 2020-09-04

## 2020-09-04 RX ORDER — AMOXICILLIN 250 MG
2 CAPSULE ORAL
Qty: 60 TAB | Refills: 0 | Status: SHIPPED | OUTPATIENT
Start: 2020-09-04

## 2020-09-04 RX ORDER — FENOFIBRATE 145 MG/1
145 TABLET, COATED ORAL DAILY
COMMUNITY

## 2020-09-04 RX ORDER — ATORVASTATIN CALCIUM 40 MG/1
40 TABLET, FILM COATED ORAL DAILY
Qty: 30 TAB | Refills: 0 | Status: SHIPPED | OUTPATIENT
Start: 2020-09-04

## 2020-09-04 RX ORDER — ALBUTEROL SULFATE 2.5 MG/.5ML
2.5 SOLUTION RESPIRATORY (INHALATION)
COMMUNITY

## 2020-09-04 RX ORDER — OMALIZUMAB 150 MG/ML
150 INJECTION, SOLUTION SUBCUTANEOUS
COMMUNITY

## 2020-09-04 RX ORDER — VALSARTAN AND HYDROCHLOROTHIAZIDE 320; 12.5 MG/1; MG/1
1 TABLET, FILM COATED ORAL DAILY
COMMUNITY

## 2020-09-04 RX ORDER — CLOPIDOGREL BISULFATE 75 MG/1
75 TABLET ORAL
Qty: 30 TAB | Refills: 0 | Status: SHIPPED | OUTPATIENT
Start: 2020-09-04 | End: 2020-09-30 | Stop reason: SDUPTHER

## 2020-09-04 RX ORDER — AMLODIPINE BESYLATE 10 MG/1
10 TABLET ORAL DAILY
COMMUNITY

## 2020-09-04 RX ORDER — GABAPENTIN 100 MG/1
100 CAPSULE ORAL
COMMUNITY
End: 2021-01-05 | Stop reason: DRUGHIGH

## 2020-09-04 RX ORDER — INSULIN ASPART 100 [IU]/ML
10 INJECTION, SUSPENSION SUBCUTANEOUS
COMMUNITY
End: 2020-09-04

## 2020-09-04 RX ORDER — INSULIN GLARGINE 100 [IU]/ML
22 INJECTION, SOLUTION SUBCUTANEOUS
Qty: 1 ADJUSTABLE DOSE PRE-FILLED PEN SYRINGE | Refills: 0 | Status: SHIPPED | OUTPATIENT
Start: 2020-09-04

## 2020-09-04 RX ORDER — ALBUTEROL SULFATE 90 UG/1
1-2 AEROSOL, METERED RESPIRATORY (INHALATION)
COMMUNITY

## 2020-09-04 RX ORDER — ACETAMINOPHEN 500 MG
2000 TABLET ORAL DAILY
COMMUNITY

## 2020-09-04 RX ORDER — MINOXIDIL 2.5 MG/1
5 TABLET ORAL EVERY 12 HOURS
Qty: 120 TAB | Refills: 0 | Status: SHIPPED | OUTPATIENT
Start: 2020-09-04

## 2020-09-04 RX ORDER — EPINEPHRINE 0.3 MG/.3ML
0.3 INJECTION SUBCUTANEOUS
COMMUNITY

## 2020-09-04 RX ORDER — CLONIDINE HYDROCHLORIDE 0.1 MG/1
0.1 TABLET ORAL 2 TIMES DAILY
COMMUNITY
End: 2020-09-04

## 2020-09-04 RX ORDER — IPRATROPIUM BROMIDE 42 UG/1
2 SPRAY, METERED NASAL 3 TIMES DAILY
COMMUNITY

## 2020-09-04 RX ADMIN — ASPIRIN 81 MG CHEWABLE TABLET 81 MG: 81 TABLET CHEWABLE at 08:25

## 2020-09-04 RX ADMIN — HEPARIN SODIUM 5000 UNITS: 5000 INJECTION INTRAVENOUS; SUBCUTANEOUS at 06:31

## 2020-09-04 RX ADMIN — IPRATROPIUM BROMIDE 0.5 MG: 0.5 SOLUTION RESPIRATORY (INHALATION) at 08:25

## 2020-09-04 RX ADMIN — ATORVASTATIN CALCIUM 40 MG: 40 TABLET, FILM COATED ORAL at 08:25

## 2020-09-04 RX ADMIN — MINOXIDIL 5 MG: 2.5 TABLET ORAL at 08:25

## 2020-09-04 RX ADMIN — DOCUSATE SODIUM 100 MG: 100 CAPSULE, LIQUID FILLED ORAL at 08:26

## 2020-09-04 RX ADMIN — AMLODIPINE BESYLATE 10 MG: 10 TABLET ORAL at 08:25

## 2020-09-04 RX ADMIN — PANTOPRAZOLE SODIUM 20 MG: 20 TABLET, DELAYED RELEASE ORAL at 06:31

## 2020-09-04 RX ADMIN — FERROUS SULFATE TAB 325 MG (65 MG ELEMENTAL FE) 325 MG: 325 (65 FE) TAB at 08:25

## 2020-09-04 RX ADMIN — CHOLECALCIFEROL (VITAMIN D3) 25 MCG (1,000 UNIT) TABLET 2 TABLET: TABLET at 08:25

## 2020-09-04 RX ADMIN — HYDROCHLOROTHIAZIDE 12.5 MG: 12.5 CAPSULE ORAL at 08:25

## 2020-09-04 RX ADMIN — FENOFIBRATE 145 MG: 145 TABLET ORAL at 08:25

## 2020-09-04 RX ADMIN — VALSARTAN 320 MG: 160 TABLET, FILM COATED ORAL at 08:25

## 2020-09-04 NOTE — DISCHARGE SUMMARY
[facility] PHYSICAL REHABILITATION  [address] [place] [address], [address]     INPATIENT REHABILITATION  DISCHARGE SUMMARY    Name: Radha Tony MRN: [id number]   Age / Sex: 60 y.o. / female CSN: [id number]   YOB: 1960 Length of Stay: 9 days   Admit Date: 8/26/2020 Discharge Date: 9/4/2020        PRIMARY CARE PHYSICIAN: Walter Antonio MD      DISCHARGE DIAGNOSES:    Primary Rehabilitation Diagnosis  1.  Impaired Mobility and ADLs  2. Acute Ischemic Stroke (late acute/early subacute infarcts in the right centrum semiovale extending to the superior frontal lobe cortex) with residual left hemiparesis     Comorbidities   Hypertensive heart disease without heart failure I11.9    Gastroesophageal reflux disease K21.9    Obstructive sleep apnea on CPAP G47.33, Z99.89    Primary osteoarthritis involving multiple joints M89.49    Chronic obstructive pulmonary disease (COPD) ([facility]) J44.9    Severe persistent asthma J45.50    Obesity, Class II, BMI 35-39.9 E66.9    Depression F32.9    Seasonal allergic rhinitis J30.2    History of colon polyps Z86.010    Pure hypercholesterolemia E78.00    Type 2 diabetes mellitus, with long-term current use of insulin ([facility]) E11.9, Z79.4    COVID-19 ruled out by laboratory testing Z03.818    Menopause Z78.0    Iron deficiency anemia D50.9    Bipolar disorder ([facility] F31.9    Current use of aspirin Z79.82    On clopidogrel therapy Z79.01    On statin therapy due to risk of future cardiovascular event Z79.899    Sciatica of left side M54.32        CONSULTS CALLED: None      PROCEDURES DONE: None      BRIEF HISTORY: The patient is a 67-year-old, right-handed, obese, Black female with multiple medical comorbidities who was admitted to [facility] on 8/21/2020 due to left-sided weakness.      The patient was apparently well until the morning of admission, as she woke up and tried to get out of bed, she fell to the floor due to weakness of the left arm and left leg. She denied any head trauma or loss of consciousness. Her fiance called 911 and on 8/21/2020, the patient was brought to the THE [facility] for further evaluation. The patient was seen and examined by Emergency Medicine (Dr. Ivana Roque).     Excerpt (History and Neurological) from the ED Provider Note by Dr. Anna Kennedy:  \"60year-old female presenting with the chief complaint of leg pain. However, in discussion with the patient, she does not really have any pain -however, she is complaining of paresthesias and weakness of both her left upper and left lower extremity. The patient is a difficult historian, and is not exactly clear when the symptoms began. The patient does endorse some pain and weakness in her left leg last night which she states was her sciatica. It is unclear whether this is typical of her sciatica presentation. However, she does state that she noticed left upper extremity weakness this morning at approximately 930 which is new to her. No changes in vision. No difficulty speaking. No loss of consciousness or altered mental status.     Neurological:   Mental Status: She is alert and oriented to person, place, and time. Sensory: Sensation is intact. Motor: Motor function is intact. Comments: Patient alert and oriented x3. Left upper extremity and left lower extremity with 2 out of 5 strength. Unable to elevate on her own and unable to keep elevated, with significant drift and effort. Endorsing intact sensation throughout body. Right side with no focal neuro deficits. Visual fields intact by confrontation. Finger to nose and heel to shin normal on right - not tested on left due to weakness. See below for NIHSS stroke scale. \"     CT scan of the head (8/21/2020) showed no acute intracranial findings; of note, MRI is more sensitive for detecting acute infarct.     Acute Stroke Alert TeleNeurology (Dr. Denise Waters) evaluated the patient and intravenous tPA was not recommended.     The patient was admitted under the service of the  [facility] (Dr. Edgar Mejia).     Excerpt (History of Present Illness and NEURO) from the H&P by Dr. Edgar Mejia:  Genie Adams is a 60 y.o. female with PMHx as noted below who is presented with chief complaint of left-sided weakness  According to the patient, she woke up this morning with left upper extremity weakness and also noted left lower extremity weakness last night and she thought this is due to sciatica. Stroke alert was called TPA was not administered due to not in window. Patient denies any obvious chest pain. No visual complaints or speech difficulty     NEURO: Patient have dense hemiplegia on the left side,, with 1 x 5 power at her hand and ankle area. Gait is not tested no facial droop\"     Patient was started on Aspirin 325 mg PO once daily and Atorvastatin 40 mg PO once daily.     Unfractionated heparin was used for DVT prophylaxis.     Lipid profile (8/19/2020) showed , , HDL 50,      HbA1c (8/19/2020) = 8.1      HbA1c (8/20/2020) = 8.0     2D echocardiogram (8/22/2020) showed EF 65%; mild left ventricular hypertrophy; normal diastolic function; negative bubble study at rest and after Valsalva.     MRI of the brain (8/24/2020) showed a small volume of late acute/early subacute infarcts in the right centrum semiovale extending to the superior frontal lobe cortex.     MRA of the head (8/24/2020) showed:  1. Severe A2 RACA stenosis which could be caused be atherosclerotic plaque, focal dissection or nonocclusive thromboembolus. 2. Moderate bilateral PCA stenoses.     MRA of the neck (8/24/2020) showed: 1. Limited visualization of certain segments due to motion artifact. 2. Extracranial carotid arteries are patent and without evidence of a hemodynamically significant stenosis.   3. Dominant RVA is patent and without evidence of stenosis. 4. Congenitally hypoplastic but patent LVA.     On 8/24/2020, Aspirin dose was decreased from 325 mg to 81 mg PO once daily and patient was started on Clopidogrel 75 mg PO once daily.     Blood pressure was controlled with Amlodipine, Hydrochlorothiazide and VAlsartan.     Blood glucose was controlled with Humulin 70/30 and an Insulin sldiing scale SC TID AC and q HS.     TERRENCE-CoV-2 (Abbott m2000) (collected 8/25/2020, resulted 8/26/2020): Not detected     Excerpt (Neuro and Hospital Course) from the Discharge Summary by Dr. Lily Newton:  \"Neuro: alert, oriented, affect appropriate LLE 1/5     Hospital Course: Danae Toure is a 60 y.o. female with PMHx as noted below who is presented with chief complaint of left-sided weakness  According to the patient, she woke up this morning with left upper extremity weakness and also noted left lower extremity weakness last night and she thought this is due to sciatica. Stroke alert was called TPA was not administered due to not in window. Patient denies any obvious chest pain. No visual complaints or speech difficulty    Pending investigations/labs for PCP to follow: none    Follow up with PCP/specialists for:PCP and neurology    Patient felt to have achieved maximal medical benefit from this hospital stay. Explained the discharge plan in detail. she understands and verbalizes understanding. sh3 agrees with discharge plan. she is clinically stable for discharge. Patient will be discharged in stable condition to IPR with the instructions and medications as noted below which were reviewed with the patient at time of discharge. \"     The patient had remained hemodynamically stable but due to the abovementioned neurologic deficit, the patient was noted to have impaired mobility and ADLs. Patient was felt to be a good candidate for acute inpatient rehabilitation.  Upon evaluation by Physical Therapy and Occupational Therapy, the patient was recommended for acute inpatient rehabilitation. The patient was discharged and was subsequently admitted to the [facility] for intensive rehabilitation to help recover strength, function and mobility. COURSE IN THE HOSPITAL: Upon admission to the [facility], the patient underwent physical therapy, occupational therapy and speech therapy. The patient was able to actively participate in the rehabilitation activities and progressed well. On discharge, the patient was able to perform the following activities:    1. Occupational Therapy    ON ADMISSION ON DISCHARGE   Eating  Functional Level: 5   Eating  Functional Level: 5     Grooming  Functional Level: 5   Grooming  Functional Level: 5     Bathing  Functional Level: 4   Bathing  Functional Level: 4     Upper Body Dressing  Functional Level: 5   Upper Body Dressing  Functional Level: 5     Lower Body Dressing  Functional Level: 5   Lower Body Dressing  Functional Level: 5     Toileting  Functional Level: 0   Toileting  Functional Level: 5     Toilet Transfers  Toilet Transfer Score: 0   Toilet Transfers  Toilet Transfer Score: 5     Tub /Shower Transfers  Tub/Shower Transfer Score: 0   Tub/Shower Transfers  Tub/Shower Transfer Score: 0       2.  Physical Therapy    ON ADMISSION ON DISCHARGE   Wheelchair Mobility/Management  Able to Propel (ft): 170 feet  Functional Level: 3(mod A for steering due to veering observed)  Curbs/Ramps Assist Required (FIM Score): 0 (Not tested)  Wheelchair Setup Assist Required : 3 (Moderate assistance)  Wheelchair Management: Manages left brake, Manages right brake(needing assist with legrests) Wheelchair Mobility/Management  Able to Propel (ft): 220 feet(using bilat UE and LE to propel )  Functional Level: 6  Curbs/Ramps Assist Required (FIM Score): 0 (Not tested)  Wheelchair Setup Assist Required : 6 (Modified independent)  Wheelchair Management: Manages left brake, Manages right brake Gait  Amount of Assistance: 3 (Moderate assistance)  Distance (ft): 18 Feet (ft)(without AD as per PLOF, 25 ft trial with RW min A)  Assistive Device: (HHA initially, RW subsequent trials) Gait  Amount of Assistance: 6 (Modified independent)  Distance (ft): 155 Feet (ft)  Assistive Device: Gait belt, Walker, rolling(CG/SBA for 80 ft bout with straight cane)     Balance-Sitting/Standing  Sitting - Static: Good (unsupported)  Sitting - Dynamic: Good (unsupported)  Standing - Static: Fair  Standing - Dynamic : Impaired  Other (comment): Escobar Balance Scale: 27/56(see separate document for details; high falls risk) Balance-Sitting/Standing  Sitting - Static: Good (unsupported)  Sitting - Dynamic: Good (unsupported)  Standing - Static: Good, Occasional, Fair  Standing - Dynamic : Impaired  Other (comment): Escobar Balance Scale score: 44/56(indicates potential for increased falls risk)     Bed/Mat Mobility  Rolling Right : 5 (Supervision)  Rolling Left : 5 (Supervision)  Supine to Sit : 5 (Supervision)  Sit to Supine : 5 (Supervision) Bed/Mat Mobility  Rolling Right : 7 (Independent)  Rolling Left : 7 (Independent)  Supine to Sit : 7 (Independent)  Sit to Supine : 7 (Independent)     Transfers  Transfer Type: Other  Other: stand step without AD as per PLOF  Transfer Assistance : 4 (Minimal assistance)  Sit to Stand Assistance: Contact guard assistance  Car Transfers: Minimum assistance(without AD as per PLOF)  Car Type: car transfer simulator Transfers  Transfer Type:  Other  Other: stand step with RW  Transfer Assistance : 6 (Modified independent)  Sit to Stand Assistance: Modified independent  Car Transfers: Supervision(set up only)  Car Type: car transfer simulator     Steps or Stairs  Steps/Stairs Ambulated (#): 7  Level of Assist : 4 (Minimal assistance)(leading with right LE ascending stairs)  Rail Use: Both Steps or Stairs  Steps/Stairs Ambulated (#): 12  Level of Assist : 5 (Stand-by assistance)  Rail Use: Both 3. Speech and Language Pathology    ON ADMISSION ON DISCHARGE   Comprehension (Native Language)  Primary Mode of Comprehension: Auditory  Score: 6 Comprehension (Native Language)  Primary Mode of Comprehension: Auditory  Score: 6     Expression (Native Language)  Primary Mode of Expression: Verbal  Score: 6   Expression (Native Language)  Primary Mode of Expression: Verbal  Score: 6     Social Interaction/Pragmatics  Score: 6 Social Interaction/Pragmatics  Score: 6     Problem Solving  Score: 5   Problem Solving  Score: 6     Memory  Score: 5 Memory  Score: 6       Legend:   7 - Independent   6 - Modified Independent   5 - Standby Assistance / Supervision / Set-up   4 - Minimum Assistance / Contact Guard Assistance   3 - Moderate Assistance   2 - Maximum Assistance   1 - Total Assistance / Dependent       ACUTE MEDICAL ISSUES ADDRESSED IN INPATIENT REHABILITATION FACILITY:     > Acute Ischemic Stroke (late acute/early subacute infarcts in the right centrum semiovale extending to the superior frontal lobe cortex) with residual left hemiparesis   > Dual antiplatelet therapy (DAPT) was started 8/24/2020   > Duration of DAPT not specified on discharge summary   > Will defer duration of DAPT to Neurology upon outpatient follow up    > Continue:    > Aspirin 81 mg PO once daily with breakfast    > Atorvastatin 40 mg PO once daily    > Clopidogrel 75 mg PO once daily with dinner     > Hypertensive heart disease without heart failure   > 2D echocardiogram (8/22/2020) showed EF 65%; mild left ventricular hypertrophy; normal diastolic function; negative bubble study at rest and after Valsalva   > On 8/28/2020, started Minoxidil 2.5 mg PO q 12 hr (9AM, 9PM)   > On 8/30/2020, increased Minoxidil from 2.5 mg to 5 mg PO q 12 hr (9AM, 9PM)   > Continue:    > Amlodipine 10 mg PO once daily (9AM)    > Hydrochlorothiazide 12.5 mg PO once daily (9AM)    > Minoxidil 5 mg PO q 12 hr (9AM, 9PM)    > Valsartan 320 mg PO once daily (9AM)    > Pure hypercholesterolemia   > Lipid profile (8/19/2020) showed , , HDL 50,    > Continue:    > Atorvastatin 40 mg PO once daily    > Fenofibrate 145 mg PO once daily    > Type 2 diabetes mellitus, with long term current use of insulin   > HbA1c (8/19/2020) = 8.1; HbA1c (8/20/2020) = 8.0   > On 8/26/2020, increased Insulin glargine from 10 units to 20 units SC q HS   > On 8/28/2020, started Metformin  mg PO once daily with dinner   > On 8/30/2020, increased Insulin glargine from 20 units to 22 units SC q HS              > During the patient's stay at the ARU, the patient was given:     > Metformin  mg PO once daily with dinner    > Insulin glargine 22 units SC q HS    > Insulin lispro sliding scale SC TID AC only    > History of wound culture positive for MRSA (3/27/2012)   > Nasal swab done to rule out MRSA colonization   > MRSA culture (collected 8/27/2020, resulted 8/28/2020): Not present     > Constipation   > On 8/31/2020, started:    > Docusate sodium 100 mg PO once daily after breakfast    > Pericolace 2 tabs PO once daily after dinner              > During the patient's stay at the ARU, the patient was given:     > Docusate sodium 100 mg PO once daily after breakfast    > Pericolace 2 tabs PO once daily after dinner    > COVID-19 ruled out by laboratory testing   > SARS-CoV-2 (Abbott m2000) (collected 8/25/2020, resulted 8/26/2020): Not detected    > Analgesia              > During the patient's stay at the ARU, the patient was given Acetaminophen 650 mg PO q 4 hr PRN for pain level less than 5/10      MEDICATIONS ON DISCHARGE:    Current Discharge Medication List      START taking these medications    Details   aspirin 81 mg chewable tablet Take 1 Tab by mouth daily (with breakfast). Indications: stroke prevention  Qty: 30 Tab, Refills: 0    Associated Diagnoses: Acute ischemic stroke ([facility] [address]  Current use of aspirin      atorvastatin (LIPITOR) 40 mg tablet Take 1 Tab by mouth daily. Indications: excessive fat in the blood, stroke prevention  Qty: 30 Tab, Refills: 0    Associated Diagnoses: On statin therapy due to risk of future cardiovascular event; Acute ischemic stroke ([address] Pure hypercholesterolemia      clopidogreL (PLAVIX) 75 mg tab Take 1 Tab by mouth daily (with dinner). Indications: prevention for a blood clot going to the brain  Qty: 30 Tab, Refills: 0    Associated Diagnoses: Acute ischemic stroke ([address] On clopidogrel therapy      docusate sodium (COLACE) 100 mg capsule Take 1 Cap by mouth daily (after breakfast). Indications: constipation  Qty: 30 Cap, Refills: 0      metFORMIN ER (GLUCOPHAGE XR) 500 mg tablet Take 1 Tab by mouth daily (with dinner). Indications: type 2 diabetes mellitus  Qty: 30 Tab, Refills: 0    Associated Diagnoses: Type 2 diabetes mellitus without complication, with long-term current use of insulin ([facility])      senna-docusate (PERICOLACE) 8.6-50 mg per tablet Take 2 Tabs by mouth daily (after dinner). Indications: constipation  Qty: 60 Tab, Refills: 0      minoxidiL (LONITEN) 2.5 mg tablet Take 2 Tabs by mouth every twelve (12) hours. Indications: high blood pressure  Qty: 120 Tab, Refills: 0    Associated Diagnoses: Hypertensive heart disease without heart failure      insulin glargine (Lantus Solostar U-100 Insulin) 100 unit/mL (3 mL) inpn 22 Units by SubCUTAneous route nightly.  Indications: type 2 diabetes mellitus  Qty: 1 Adjustable Dose Pre-filled Pen Syringe, Refills: 0    Associated Diagnoses: Type 2 diabetes mellitus without complication, with long-term current use of insulin ([facility])      Insulin Needles, Disposable, 31 gauge x 3/16\" ndle As directed  Indications: Type 2 diabetes mellitus  Qty: 30 Pen Needle, Refills: 0    Associated Diagnoses: Type 2 diabetes mellitus without complication, with long-term current use of insulin ([facility] [address]         CONTINUE these medications which have NOT CHANGED    Details   tiotropium bromide (Spiriva Respimat) 2.5 mcg/actuation inhaler Take 2 Puffs by inhalation daily. valsartan-hydroCHLOROthiazide (Diovan HCT) 320-12.5 mg per tablet Take 1 Tab by mouth daily. omalizumab (Xolair) 150 mg/mL syrg 150 mg by SubCUTAneous route every fourteen (14) days. ipratropium (ATROVENT) 42 mcg (0.06 %) nasal spray 2 Sprays by Both Nostrils route three (3) times daily. gabapentin (NEURONTIN) 100 mg capsule Take 100 mg by mouth every eight (8) hours as needed for Pain. fenofibrate nanocrystallized (Tricor) 145 mg tablet Take 145 mg by mouth daily. EPINEPHrine (EPIPEN) 0.3 mg/0.3 mL injection 0.3 mg by IntraMUSCular route once as needed for Allergic Response. cholecalciferol (VITAMIN D3) (2,000 UNITS /50 MCG) cap capsule Take 2,000 Units by mouth daily. amLODIPine (NORVASC) 10 mg tablet Take 10 mg by mouth daily. albuterol sulfate (PROVENTIL;VENTOLIN) 2.5 mg/0.5 mL nebu nebulizer solution 2.5 mg by Nebulization route every four (4) hours as needed for Wheezing. albuterol (ProAir HFA) 90 mcg/actuation inhaler Take 1-2 Puffs by inhalation every four (4) hours as needed for Wheezing.      montelukast (SINGULAIR) 10 mg tablet Take 10 mg by mouth every evening. omeprazole (PRILOSEC) 20 mg capsule Take 20 mg by mouth daily. ferrous sulfate 325 mg (65 mg iron) tablet Take 325 mg by mouth Daily (before breakfast).          STOP taking these medications       liraglutide (VICTOZA) 0.6 mg/0.1 mL (18 mg/3 mL) pnij Comments:   Reason for Stopping:         cloNIDine HCL (CATAPRES) 0.1 mg tablet Comments:   Reason for Stopping:         insulin aspart protamine/insulin aspart (NovoLOG Mix 70-30FlexPen U-100) 100 unit/mL (70-30) inpn Comments:   Reason for Stopping:           Estimated Glomerular Filtration Rate:  On admission, estimated GFR based on a Creatinine of 0.75 mg/dl:              Using CKD-EPI = 100.4 mL/min/1.73m2              Using MDRD = 101.4 mL/min/1.73m2  Most recent estimated GFR, based on a Creatinine of 0.89 mg/dl on 8/31/2020:              Using CKD-EPI = 81.6 mL/min/1.73m2              Using MDRD = 83.2 mL/min/1.73m2       DISCHARGE VITAL SIGNS:  Visit Vitals  /64 (BP 1 Location: Left arm, BP Patient Position: Sitting)   Pulse 72   Temp 97.1 °F (36.2 °C)   Resp 16   Ht 5' 2\" (1.575 m)   SpO2 96%   Breastfeeding No   BMI 34.93 kg/m²       DISCHARGE PHYSICAL EXAMINATION:  GENERAL SURVEY: Patient is awake, alert, oriented x 3, sitting comfortably on the chair, not in acute respiratory distress. HEENT: pink palpebral conjunctivae, anicteric sclerae, no nasoaural discharge, moist oral mucosa  NECK: supple, no jugular venous distention, no palpable lymph nodes  CHEST/LUNGS: symmetrical chest expansion, good air entry, clear breath sounds  HEART: adynamic precordium, good S1 S2, no S3, regular rhythm, no murmurs  ABDOMEN: obese, bowel sounds appreciated, soft, non-tender  EXTREMITIES: pink nailbeds, no edema, full and equal pulses, no calf tenderness   NEUROLOGICAL EXAM: The patient is awake, alert and oriented x3, able to answer questions fairly appropriately, able to follow 1 and 2 step commands.  Able to tell time from the wall clock.  Cranial nerves II-XII are grossly intact.  No gross sensory deficit.  Motor strength is 5/5 on the RUE and RLE, 4 to 4+/5 on the LUE, 3+ to 4-/5 on the LLE. CONDITION ON DISCHARGE: Stable. DISPOSITION: Patient clinically improved and was discharged home with outpatient physical therapy and occupational therapy. FOLLOW-UP RECOMMENDATIONS:   Follow-up Information     Follow up With Specialties Details Why Jack Aguilar MD Internal Medicine On 9/10/2020 Patient has an appointment scheduled with PCP Dr. Mike Messina on September 10, 2020 @ 12:30pm. [address] [place] [address]  [phone]      IN [facility].   On 9/15/2020 Arrive at 3:45 pm for a 4:15 pm appointment for physical therapy and 5:00 pm for occupational therapy. [address], [facility].    Jamilah Tanner MD Neurology On 9/30/2020 Patient has an appointment scheduled with Neurology Dr. Eliane Cameron on September 30, 2020 @ 3:00pm. [address] [place] [address]  [phone]            OTHER INSTRUCTIONS:  1. Diet.    > Specifications: Cardiac, diabetic, consistent carb 1800 kcal, no concentrated sweets   > Solids (consistency): Regular    > Liquids (consistency): Thin    > Fluid restriction: None  2. Activity. As tolerated. 3. Safety / fall precautions. TIME SPENT ON DISCHARGE ACTIVITIES: More than 30 minutes.       Signed:  Sidra Kraus MD    9/4/2020

## 2020-09-04 NOTE — REHAB NOTE
Kristen Arteaga [address] [place] [address] 60 y.o. female was discharged home on 09/04/20. Patient's armband removed and shredded. Discharge instructions were reviewed with patient, and she verbalized understanding. The patient was wheeled out to transportation vehicle by a staff member along with the patient's belongings. Transportation was provided by private vehicle.     Jethro Brothers RN

## 2020-09-04 NOTE — ROUTINE PROCESS
SHIFT CHANGE NOTE FOR [facility]    Bedside and Verbal shift change report given to Beck Finch RN (oncoming nurse) by Mary Obrien LPN (offgoing nurse). Report included the following information SBAR, Kardex, MAR and Recent Results. Situation:   Code Status: Full Code   Hospital Day: 8   Problem List:   Hospital Problems  Date Reviewed: 9/3/2020          Codes Class Noted POA    Sciatica of left side ICD-10-CM: M54.32  ICD-9-CM: 724.3  Unknown Yes        Type 2 diabetes mellitus, with long-term current use of insulin (HCC) (Chronic) ICD-10-CM: E11.9, Z79.4  ICD-9-CM: 250.00, V58.67  Unknown Yes    Overview Signed 8/26/2020 11:46 PM by Edgardo Bernheim, MD     HbA1c (8/19/2020) = 8.1; HbA1c (8/20/2020) = 8.0             COVID-19 ruled out by laboratory testing ICD-10-CM: Z03.818  ICD-9-CM: V71.83  8/26/2020 Yes    Overview Signed 8/26/2020 11:47 PM by Edgardo Bernheim, MD     SARS-CoV-2 (Abbott m2000) (collected 8/25/2020, resulted 8/26/2020):  Not detected              Current use of aspirin ICD-10-CM: Z79.82  ICD-9-CM: V58.66  8/24/2020 Yes        On clopidogrel therapy ICD-10-CM: Z79.01  ICD-9-CM: V58.61  8/24/2020 Yes        On statin therapy due to risk of future cardiovascular event ICD-10-CM: Z79.899  ICD-9-CM: V58.69  8/24/2020 Yes    Overview Signed 8/26/2020 11:50 PM by Edgardo Bernheim, MD     On Atorvastatin             * (Principal) Acute ischemic stroke [facility]) ICD-10-CM: I63.9  ICD-9-CM: 434.91  8/21/2020 Yes    Overview Signed 8/26/2020 11:35 PM by Edgardo Bernheim, MD     Acute Ischemic Stroke (late acute/early subacute infarcts in the right centrum semiovale extending to the superior frontal lobe cortex) with residual left hemiparesis             Left hemiparesis ([facility] [address] ICD-10-CM: G81.94  ICD-9-CM: 342.90  8/21/2020 Yes        Impaired mobility and ADLs ICD-10-CM: Z74.09, Z78.9  ICD-9-CM: V49.89  8/21/2020 Yes        Pure hypercholesterolemia (Chronic) ICD-10-CM: E78.00  ICD-9-CM: 272.0  8/19/2020 Yes Overview Signed 8/26/2020 11:45 PM by Mervin Murguia MD     Lipid profile (8/19/2020) showed , , HDL 50,              Hypertensive heart disease without heart failure (Chronic) ICD-10-CM: I11.9  ICD-9-CM: 402.90  Unknown Yes    Overview Signed 8/26/2020 11:38 PM by Mervin Murguia MD     2D echocardiogram (8/22/2020) showed EF 65%; mild left ventricular hypertrophy; normal diastolic function; negative bubble study at rest and after Valsalva                   Background:   Past Medical History:   Past Medical History:   Diagnosis Date    Acute ischemic stroke ([facility] [address] 8/21/2020    Acute Ischemic Stroke (late acute/early subacute infarcts in the right centrum semiovale extending to the superior frontal lobe cortex) with residual left hemiparesis    Bipolar disorder ([facility] [address]     Chronic obstructive pulmonary disease (COPD) ([facility] [address]     COVID-19 ruled out by laboratory testing 8/26/2020    SARS-CoV-2 (Abbott m2000) (collected 8/25/2020, resulted 8/26/2020):  Not detected     Current use of aspirin 8/24/2020    Depression     Gastroesophageal reflux disease     History of colon polyps     Hypertensive heart disease without heart failure     2D echocardiogram (8/22/2020) showed EF 65%; mild left ventricular hypertrophy; normal diastolic function; negative bubble study at rest and after Valsalva    Iron deficiency anemia     Left hemiparesis ([facility] [address] 8/21/2020    Menopause     Obesity, Class II, BMI 35-39.9     Obstructive sleep apnea on CPAP     On clopidogrel therapy 8/24/2020    On statin therapy due to risk of future cardiovascular event 8/24/2020    On Atorvastatin    Pure hypercholesterolemia 8/19/2020    Lipid profile (8/19/2020) showed , , HDL 50,     Sciatica of left side     Seasonal allergic rhinitis     Severe persistent asthma 11/07/2019    Type 2 diabetes mellitus, with long-term current use of insulin (HCC)     HbA1c (8/19/2020) = 8.1; HbA1c (8/20/2020) = 8.0 Assessment:   Changes in Assessment throughout shift: none     Patient has central line: no Reasons if yes: n/a  Insertion date:n/a Last dressing date:n/a   Patient has Barbosa Cath: no Reasons if yes: n/a   Insertion date:n/a  Shift barbosa care completed: n/a      Last Vitals:     Vitals:    09/02/20 1548 09/02/20 2149 09/03/20 0743 09/03/20 1524   BP: 124/56 124/69 131/78 108/59   Pulse: 66 72 67 75   Resp: 18 18 18 18   Temp: 97.2 °F (36.2 °C) 97 °F (36.1 °C) 97.7 °F (36.5 °C) 97 °F (36.1 °C)   SpO2: 98% 94% 99% 97%   Height:            PAIN    Pain Assessment    Pain Intensity 1: 0 (09/03/20 2000) Pain Intensity 1: 2 (12/29/14 1105)      Pain Location 1: Abdomen      Pain Intervention(s) 1: Medication (see MAR)  Patient Stated Pain Goal: 0 Patient Stated Pain Goal: 0  o Intervention effective: yes   o Other actions taken for pain: rest/position/food     Skin Assessment  Skin color    Condition/Temperature    Integrity    Turgor    Weekly Pressure Ulcer Documentation  Pressure  Injury Documentation: No Pressure Injury Noted-Pressure Ulcer Prevention Initiated  Wound Prevention & Protection Methods  Orientation of wound Orientation of Wound Prevention: Posterior  Location of Prevention Location of Wound Prevention: Buttocks, Sacrum/Coccyx  Dressing Present Dressing Present : No  Dressing Status    Wound Offloading Wound Offloading (Prevention Methods): Bed, pressure redistribution/air     INTAKE/OUPUT  Date 09/02/20 1900 - 09/03/20 0659 09/03/20 0700 - 09/04/20 0659   Shift 4828-3587 24 Hour Total 4514-1058 1044-8480 24 Hour Total   INTAKE   Shift Total        OUTPUT   Urine          Urine Occurrence(s) 4 x 7 x 3 x 1 x 4 x   Emesis/NG output          Emesis Occurrence(s) 0 x 0 x      Stool          Stool Occurrence(s) 0 x 1 x 0 x 0 x 0 x   Shift Total        NET        Weight (kg)            Recommendations:  1. Patient needs and requests: toileting    2. Pending tests/procedures: None at this time. 3. Functional Level/Equipment: 1 person assist w/c    HEALS Safety Check    A safety check occurred in the patient's room between off going nurse and oncoming nurse listed above. The safety check included the below items  Area Items   H  High Alert Medications - Verify all high alert medication drips (heparin, PCA, etc.)   E  Equipment - Suction is set up for ALL patients (with dwayne)  - Red plugs utilized for all equipment (IV pumps, etc.)  - WOWs wiped down at end of shift.  - Room stocked with oxygen, suction, and other unit-specific supplies   A  Alarms - Bed alarm is set for fall risk patients  - Ensure chair alarm is in place and activated if patient is up in a chair   L  Lines - Check IV for any infiltration  - Harrison bag is empty if patient has a Harrison   - Tubing and IV bags are labeled   S  Safety   - Room is clean, patient is clean, and equipment is clean. - Hallways are clear from equipment besides carts. - Fall bracelet on for fall risk patients  - Ensure room is clear and free of clutter  - Suction is set up for ALL patients (with dwayne)  - Hallways are clear from equipment besides carts.    - Isolation precautions followed, supplies available outside room, sign posted     Roman Isabel LPN

## 2020-09-04 NOTE — ROUTINE PROCESS
[facility]    Bedside and Verbal shift change report given to Sowmya RN (oncoming nurse) by Samia Álvarez RN (offgoing nurse). Report included the following information SBAR, Kardex, MAR and Recent Results. Situation:   Code Status: Full Code   Hospital Day: 9   Problem List:   Hospital Problems  Date Reviewed: 9/3/2020          Codes Class Noted POA    Sciatica of left side ICD-10-CM: M54.32  ICD-9-CM: 724.3  Unknown Yes        Type 2 diabetes mellitus, with long-term current use of insulin (HCC) (Chronic) ICD-10-CM: E11.9, Z79.4  ICD-9-CM: 250.00, V58.67  Unknown Yes    Overview Signed 8/26/2020 11:46 PM by Kendall Todd MD     HbA1c (8/19/2020) = 8.1; HbA1c (8/20/2020) = 8.0             COVID-19 ruled out by laboratory testing ICD-10-CM: Z03.818  ICD-9-CM: V71.83  8/26/2020 Yes    Overview Signed 8/26/2020 11:47 PM by Kendall Todd MD     SARS-CoV-2 (Abbott m2000) (collected 8/25/2020, resulted 8/26/2020):  Not detected              Current use of aspirin ICD-10-CM: Z79.82  ICD-9-CM: V58.66  8/24/2020 Yes        On clopidogrel therapy ICD-10-CM: Z79.01  ICD-9-CM: V58.61  8/24/2020 Yes        On statin therapy due to risk of future cardiovascular event ICD-10-CM: Z79.899  ICD-9-CM: V58.69  8/24/2020 Yes    Overview Signed 8/26/2020 11:50 PM by Kendall Todd MD     On Atorvastatin             * (Principal) Acute ischemic stroke [facility]) ICD-10-CM: I63.9  ICD-9-CM: 434.91  8/21/2020 Yes    Overview Signed 8/26/2020 11:35 PM by Kendall Todd MD     Acute Ischemic Stroke (late acute/early subacute infarcts in the right centrum semiovale extending to the superior frontal lobe cortex) with residual left hemiparesis             Left hemiparesis ([facility] [address] ICD-10-CM: G81.94  ICD-9-CM: 342.90  8/21/2020 Yes        Impaired mobility and ADLs ICD-10-CM: Z74.09, Z78.9  ICD-9-CM: V49.89  8/21/2020 Yes        Pure hypercholesterolemia (Chronic) ICD-10-CM: E78.00  ICD-9-CM: 272.0  8/19/2020 Yes Overview Signed 8/26/2020 11:45 PM by Uriah Garcia MD     Lipid profile (8/19/2020) showed , , HDL 50,              Hypertensive heart disease without heart failure (Chronic) ICD-10-CM: I11.9  ICD-9-CM: 402.90  Unknown Yes    Overview Signed 8/26/2020 11:38 PM by Uriah Garcia MD     2D echocardiogram (8/22/2020) showed EF 65%; mild left ventricular hypertrophy; normal diastolic function; negative bubble study at rest and after Valsalva                   Background:   Past Medical History:   Past Medical History:   Diagnosis Date    Acute ischemic stroke ([facility] [address] 8/21/2020    Acute Ischemic Stroke (late acute/early subacute infarcts in the right centrum semiovale extending to the superior frontal lobe cortex) with residual left hemiparesis    Bipolar disorder ([facility] [address]     Chronic obstructive pulmonary disease (COPD) ([facility] [address]     COVID-19 ruled out by laboratory testing 8/26/2020    SARS-CoV-2 (Abbott m2000) (collected 8/25/2020, resulted 8/26/2020):  Not detected     Current use of aspirin 8/24/2020    Depression     Gastroesophageal reflux disease     History of colon polyps     Hypertensive heart disease without heart failure     2D echocardiogram (8/22/2020) showed EF 65%; mild left ventricular hypertrophy; normal diastolic function; negative bubble study at rest and after Valsalva    Iron deficiency anemia     Left hemiparesis ([facility] [address] 8/21/2020    Menopause     Obesity, Class II, BMI 35-39.9     Obstructive sleep apnea on CPAP     On clopidogrel therapy 8/24/2020    On statin therapy due to risk of future cardiovascular event 8/24/2020    On Atorvastatin    Pure hypercholesterolemia 8/19/2020    Lipid profile (8/19/2020) showed , , HDL 50,     Sciatica of left side     Seasonal allergic rhinitis     Severe persistent asthma 11/07/2019    Type 2 diabetes mellitus, with long-term current use of insulin (HCC)     HbA1c (8/19/2020) = 8.1; HbA1c (8/20/2020) = 8.0 Assessment:   Changes in Assessment throughout shift: none     Patient has central line: no Reasons if yes: n/a  Insertion date:n/a Last dressing date:n/a   Patient has Barbosa Cath: no Reasons if yes: n/a   Insertion date:n/a  Shift barbosa care completed: n/a      Last Vitals:     Vitals:    09/03/20 0743 09/03/20 1524 09/03/20 2100 09/03/20 2138   BP: 131/78 108/59 127/60 127/60   Pulse: 67 75 80 80   Resp: 18 18 18    Temp: 97.7 °F (36.5 °C) 97 °F (36.1 °C) 97 °F (36.1 °C)    SpO2: 99% 97% 94%    Height:            PAIN    Pain Assessment    Pain Intensity 1: 0 (09/04/20 0400) Pain Intensity 1: 2 (12/29/14 1105)      Pain Location 1: Abdomen      Pain Intervention(s) 1: Medication (see MAR)  Patient Stated Pain Goal: 0 Patient Stated Pain Goal: 0  o Intervention effective: yes   o Other actions taken for pain: rest/position/food     Skin Assessment  Skin color    Condition/Temperature    Integrity    Turgor    Weekly Pressure Ulcer Documentation  Pressure  Injury Documentation: No Pressure Injury Noted-Pressure Ulcer Prevention Initiated  Wound Prevention & Protection Methods  Orientation of wound Orientation of Wound Prevention: Posterior  Location of Prevention Location of Wound Prevention: Buttocks, Sacrum/Coccyx  Dressing Present Dressing Present : No  Dressing Status    Wound Offloading Wound Offloading (Prevention Methods): Bed, pressure redistribution/air     INTAKE/OUPUT  Date 09/03/20 0700 - 09/04/20 0659 09/04/20 0700 - 09/05/20 0659   Shift 4632-4024 1139-2646 24 Hour Total 5338-0941 3535-3791 24 Hour Total   INTAKE   Shift Total         OUTPUT   Urine           Urine Occurrence(s) 3 x 3 x 6 x      Stool           Stool Occurrence(s) 0 x 0 x 0 x      Shift Total         NET         Weight (kg)             Recommendations:  1. Patient needs and requests: toileting    2. Pending tests/procedures: None at this time.      3. Functional Level/Equipment: 1 person assist w/c    HEALS Safety Check    A safety check occurred in the patient's room between off going nurse and oncoming nurse listed above. The safety check included the below items  Area Items   H  High Alert Medications - Verify all high alert medication drips (heparin, PCA, etc.)   E  Equipment - Suction is set up for ALL patients (with dwayne)  - Red plugs utilized for all equipment (IV pumps, etc.)  - WOWs wiped down at end of shift.  - Room stocked with oxygen, suction, and other unit-specific supplies   A  Alarms - Bed alarm is set for fall risk patients  - Ensure chair alarm is in place and activated if patient is up in a chair   L  Lines - Check IV for any infiltration  - Harrison bag is empty if patient has a Harrison   - Tubing and IV bags are labeled   S  Safety   - Room is clean, patient is clean, and equipment is clean. - Hallways are clear from equipment besides carts. - Fall bracelet on for fall risk patients  - Ensure room is clear and free of clutter  - Suction is set up for ALL patients (with dwayne)  - Hallways are clear from equipment besides carts.    - Isolation precautions followed, supplies available outside room, sign posted     Cleo Perry RN

## 2020-09-04 NOTE — PROGRESS NOTES
Problem: Falls - Risk of  Goal: *Absence of Falls  Description: Document Timothy Cobian Fall Risk and appropriate interventions in the flowsheet. Outcome: Progressing Towards Goal  Note: Fall Risk Interventions:  Mobility Interventions: Bed/chair exit alarm    Mentation Interventions: Bed/chair exit alarm, Evaluate medications/consider consulting pharmacy    Medication Interventions: Bed/chair exit alarm, Evaluate medications/consider consulting pharmacy    Elimination Interventions: Call light in reach, Bed/chair exit alarm, Patient to call for help with toileting needs    History of Falls Interventions: Bed/chair exit alarm, Evaluate medications/consider consulting pharmacy         Problem: Patient Education: Go to Patient Education Activity  Goal: Patient/Family Education  Outcome: Progressing Towards Goal     Problem: Pressure Injury - Risk of  Goal: *Prevention of pressure injury  Description: Document Filippo Scale and appropriate interventions in the flowsheet.   Outcome: Progressing Towards Goal  Note: Pressure Injury Interventions:  Sensory Interventions: Assess changes in LOC    Moisture Interventions: Absorbent underpads    Activity Interventions: Chair cushion, Increase time out of bed, Pressure redistribution bed/mattress(bed type)    Mobility Interventions: Chair cushion, Pressure redistribution bed/mattress (bed type), HOB 30 degrees or less    Nutrition Interventions: Document food/fluid/supplement intake    Friction and Shear Interventions: HOB 30 degrees or less                Problem: Patient Education: Go to Patient Education Activity  Goal: Patient/Family Education  Outcome: Progressing Towards Goal     Problem: Patient Education: Go to Patient Education Activity  Goal: Patient/Family Education  Outcome: Progressing Towards Goal     Problem: Patient Education: Go to Patient Education Activity  Goal: Patient/Family Education  Outcome: Progressing Towards Goal

## 2020-09-15 ENCOUNTER — HOSPITAL ENCOUNTER (OUTPATIENT)
Dept: PHYSICAL THERAPY | Age: 60
Discharge: HOME | End: 2020-09-15
Payer: MEDICAID

## 2020-09-15 PROCEDURE — 97162 PT EVAL MOD COMPLEX 30 MIN: CPT

## 2020-09-15 PROCEDURE — 97165 OT EVAL LOW COMPLEX 30 MIN: CPT

## 2020-09-15 NOTE — PROGRESS NOTES
[address] [facility] PHYSICAL THERAPY  [address] [facility] [facility], [address] - Phone: [phone]  Fax: [phone] / [address] [facility]  Patient Name: Jhonatan Kowalski : 1960   Medical   Diagnosis: Gait instability [R26.81]  CVA (cerebral vascular accident) [facility]) [I63.9] Treatment Diagnosis: Gait instability [R26.81]  CVA (cerebral vascular accident) [facility]) [I63.9]   Onset Date: 2020     Referral Source: Ford Tinajero MD [facility]): 9/15/2020   Prior Hospitalization: See medical history Provider #: [id number]   Prior Level of Function: Independent with ADLs, ambulation   Comorbidities: DM, HTN, asthma, COPD, fluid retention, LBP/B LE pain   Medications: Verified on Patient Summary List   The Plan of Care and following information is based on the information from the initial evaluation.   ===========================================================================================  Assessment / key information:  Patient is a 60 y.o. female who presents with complaints of left-sided weakness and difficulty with ambulation s/p CVA. Additionally, patient reports back and B LE pain. Patient demonstrates mild postural impairments, decreased left LE strength, decreased balance, decreased activity tolerance and impaired functional mobility/gait with mild hip hike/circumduction to advance left LE when ambulating with RW. Five Time Sit to Stand Test = 20\", Dynamic Gait Index = 16/24 with RW and gait speed = 0.46 m/s (limited community ambulation), indicating increased risk of fall. Patient would benefit from skilled PT services to address these issues and improve function.   Thank you for this referral.    Strength (MMT):  Hip L (1-5) R (1-5)   Hip Flexion 3 4   Hip Ext 3 4   Hip ABD 3 4   Hip ADD 3 4   Hip ER 3 4   Hip IR 3 4      Knee L (1-5) R (1-5)   Knee Flexion 4- 5   Knee Extension 4 5   Ankle PF 5 5   Ankle DF 5 5   Other              Romber\"/30\" (+ dizziness EC)      Foam Stance: 30\"/12\" (+ dizziness EC)      Rail Stance: NT      Sharpened Romber\" left/6\" right      Single Leg Stance: unable left/2\" right    FOTO: 51/100  ==========================================================================================  Eval Complexity: History: HIGH Complexity :3+ comorbidities / personal factors will impact the outcome/ POC Exam:HIGH Complexity : 4+ Standardized tests and measures addressing body structure, function, activity limitation and / or participation in recreation  Presentation: MEDIUM Complexity : Evolving with changing characteristics  Clinical Decision Making:MEDIUM Complexity : FOTO score of 26-74Overall Complexity:MEDIUM    Problem List: pain affecting function, decrease ROM, decrease strength, edema affecting function, impaired gait/ balance, decrease ADL/ functional abilitiies, decrease activity tolerance, decrease flexibility/ joint mobility and decrease transfer abilities   Treatment Plan may include any combination of the following: Therapeutic exercise, Therapeutic activities, Neuromuscular re-education, Physical agent/modality, Gait/balance training, Manual therapy, Patient education, Functional mobility training, Home safety training and Stair training  Patient / Family readiness to learn indicated by: asking questions, trying to perform skills and interest  Persons(s) to be included in education: patient (P)  Barriers to Learning/Limitations: None  Measures taken:    Patient Goal (s): \"Strength, more movement. \"   Patient self reported health status: fair  Rehabilitation Potential: good   Short Term Goals: To be accomplished in  2-4  weeks:  1. Patient will demonstrate compliance with HEP. 2. Patient will score greater carrie or equal to 18/24 on DGI to indicate improved gait/decreased fall risk.   3. Patient will complete FTSST in less than or equal to 17\" to increase safety with transfers/decrease fall risk.  Long Term Goals: To be accomplished in  4-8  weeks:  1. Patient will demonstrate independence with HEP. 2. Patient will score greater than or equal to 20/24 on DGI to indicate improved gait/decreased fall risk. 3. Patient will complete FTSST in less than or equal to 15\" to increase safety with transfers/decrease fall risk. Frequency / Duration:   Patient to be seen  2  times per week for 4-8  weeks:  Patient / Caregiver education and instruction: other plan of care    Therapist Signature: Clara Ramírez PT Date: 6/03/8347   Certification Period: NA Time: 4:15 PM   ===========================================================================================    To ensure your patient receives the highest quality care and to avoid disruption in therapy please sign and return this plan of care within 21 days. Per Medicaid guidelines if the plan of care is not received within 21 days the patient's care must be put on hold until signed. Per Medicaid guidelines, plan of care must be signed by physician. I certify that the above Physical Therapy Services are being furnished while the patient is under my care. I agree with the treatment plan and certify that this therapy is necessary. Physician Signature:        Date:       Time:     Please sign and return to [facility] [employer] or you may fax the signed copy to [phone]. Thank you.

## 2020-09-15 NOTE — PROGRESS NOTES
Yajaira Paez [address]  [facility]  [address] [facility] [address] Rodriguez, [address] - Phone: [phone]  Fax: [phone] / [phone]  [facility] THERAP[facility]  Patient Name: Mauricio Wilson : 1960   Medical   Diagnosis: Generalized muscle weakness [M62.81]  CVA (cerebral vascular accident) [facility]) [I63.9] Treatment Diagnosis: CVA right UE weakness; ADLs   Onset Date: 2020     Referral Source: Loli Cueto MD [facility]): 9/15/2020   Prior Hospitalization: See medical history Provider #: [id number]   Prior Level of Function: Ind   Comorbidities: Diabetes; HTN   Medications: Verified on Patient Summary List   The Plan of Care and following information is based on the information from the initial evaluation.   ===========================================================================================  Assessment / key information: Patient is a 59 yo right handed female s/p right CVA with left sided weakness. AROM left UE is WNL. SH 4/5 elbow 4/5 forearm 3+/5 wrist 4/5. Left  55# right 70#. Left pinch 14-15# right 17-19#. Sensation intact. No UE pain or edema. ADLs- assist with shower; ind dressing; IADLs- family  FOTO score: na  ===========================================================================================  Eval Complexity: History: LOW Complexity : Brief history review ; Examination: LOW Complexity : 1-3 performance deficits relating to physical, cognitive , or psychosocial skils that result in activity limitations and / or participation restrictions ;  Decision Making:LOW Complexity : No comorbidities that affect functional and no verbal or physical assistance needed to complete eval tasks   Problem List: Decreased strength and Decreased ADL/functional abilities    Treatment Plan may include any combination of the following: Therapeutic exercise, Therapeutic activities, Patient education and ADLs/IADLs  Patient / Family readiness to learn indicated by: asking questions, trying to perform skills and interest  Persons(s) to be included in education: patient (P)  Barriers to Learning/Limitations: None  Measures taken: na   Patient Goal (s): Strength, more movement. Patient self reported health status: fair  Rehabilitation Potential: excellent   Short Term Goals: To be accomplished in 1 treatments: 1. Ind HEP  2.Ind showering    Long Term Goals: To be accomplished in 2-3  treatments: 1. Baseline ADLs  2.4/5 forearm strength for IADLs  3. Return to cooking  4. Ind basic  IADLs  Frequency / Duration:   Patient to be seen 2-5 treatments:  Patient / Caregiver education and instruction: self care    Therapist Signature: BEE Casanova Date: 7/73/9652   Certification Period: na Time: 5:36 PM   ===========================================================================================  I certify that the above Occupational Therapy Services are being furnished while the patient is under my care. I agree with the treatment plan and certify that this therapy is necessary. Physician Signature:        Date:       Time:     Please sign and return to [facility] or you may fax the signed copy to [phone]. Thank you.

## 2020-09-15 NOTE — PROGRESS NOTES
PHYSICAL THERAPY - DAILY TREATMENT NOTE    Patient Name: Lyle Auguste        Date: 9/15/2020  : 1960   YES Patient  Verified  Visit #:   1     Insurance: Payor: [facility] MEDICAID / Plan: CELINE GONZALES [facility] / Product Type: Managed Care Medicaid /      In time: 4:13 Out time: 4:51   Total Treatment Time: 38     Medicare/BCBS Giancarlo Time Tracking (below)   Total Timed Codes (min):  NA 1:1 Treatment Time:  NA     TREATMENT AREA =  CVA    SUBJECTIVE    Pain Level (on 0 to 10 scale):  8  / 10   Medication Changes/New allergies or changes in medical history, any new surgeries or procedures? NO    If yes, update Summary List   Subjective Functional Status/Changes:  []  No changes reported     Pt reports that she woke up on 2020 and left side was paralyzed. Pt reports that she was hospitalized for CVA. Pt reports that she was transferred to rehab for 2 weeks and discharged on 2020. Pt reports that she has been ambulating with RW since being discharged. Pt [facility] with 1 [address]. Pt reports that she has a SPC, BSC over toilet (no longer using), tub transfer bench in tub-shower. Pt reports that her sensation and strength have improved. Pt reports that her balance has improved. Pt reports that she is still retaining a lot of fluid and her PCP started her on fluid pills yesterday. Pt reports LBP and B LE pain, which began recently.        OBJECTIVE    Physical Therapy Evaluation  Neurologic    Posture: [] Poor    [x] Fair    [] Good    Describe: Protracted head/shoulders, increased thoracic kyphosis, decreased lumbar lordosis    Gait: [] Normal    [x] Abnormal    Device: RW   Describe: Decreased gait speed, mild hip hiking/circumduction to advance left LE due to decreased left hip/knee flexion    ROM:                            AROM    PROM   Knee Left Right Left Right   Ext [facility]     Flex [facility] WFL               AROM PROM  Hip Left Right Left Right   Flex [facility] WFL     Ext [facility] WFL     ABD [facility]     ER WFL WFL     IR WFL WFL                                              AROM      PROM   Ankle Left Right Left Right   Ext [facility] [place]     Flex [facility] WFL       Strength (MMT):  Hip L (1-5) R (1-5)   Hip Flexion 3 4   Hip Ext 3 4   Hip ABD 3 4   Hip ADD 3 4   Hip ER 3 4   Hip IR 3 4     Knee L (1-5) R (1-5)   Knee Flexion 4- 5   Knee Extension 4 5   Ankle PF 5 5   Ankle DF 5 5   Other       Tone: WNL    Motor Control: Decreased speed of alternative movements    Sensation: NT    Reflexes: [x] Not Tested   Left Right   Biceps (C5)     Brachioradiais (C6)     Triceps (C7)     Knee Jerk (L4)     Ankle Jerk (SI)       Balance/ Equilibrium:              Left            Right  Tracks Across Midline  yes yes   Reaches Across Midline yes yes         Sitting Balance: Static:  [x] Good    [] Fair    [] Poor     Dynamic:   [x] Good    [] Fair    [] Poor        Standing Balance: Static:   [] Good    [x] Fair    [] Poor     Dynamic:   [] Good    [x] Fair    [] Poor        Protective Extension:  [x] Present    [] Delayed    [] Absent        Romber\"/30\" (+ dizziness EC)      Foam Stance: 30\"/12\" (+ dizziness EC)      Rail Stance: NT      Sharpened Romber\" left/6\" right      Single Leg Stance: unable left/2\" right        Functional Mobility      Bed Mobility:      Scooting: NT       Rolling: NT       Sit-Supine: NT      Transfers:       Sit-Stand: mod I       Floor-Stand: NT      Gait:       Tandem: NT       Backwards: NT       Braiding: NT      Elevations:       Curbs: NT       Ramps: NT       Stairs: DGI    Behavior: [x] Cooperative    [] Impulsive    [] Agitated    [] Perseverative    [] Confused   Oriented x: 4    Cognition: [] One Step Commands   [x] Multiple Commands   [] Displays Neglect [] R  [] L    Other:       Impaired Judgement: [] Y    [x] N      Impaired Vision:  [] Y    [x] N      Safety Awareness Deficits  [] Y    [x] N Impaired Hearing  [] Y    [x] N      Able to Express Needs [x] Y    [] N    Optional Tests:       Dynamic Gait Index (24pt scale): 16/24       Functional Gait Assessment (30pt scale):       Escobar Balance Scale (56pt scale):     Other test /comments:  FTSST = 20\"  Gait speed = 0.46 m/s    During eval min Patient Education:  NO  Reviewed HEP   []  Progressed/Changed HEP based on:   Discussed POC     Other Objective/Functional Measures:    See eval       Post Treatment Pain Level (on 0 to 10) scale:   8  / 10     ASSESSMENT    Assessment/Changes in Function:     See plan of care    Justification for Eval Code Complexity:  Patient History : HIGH - DM, HTN, asthma, COPD, fluid retention, LBP/B LE pain  Examination HIGH - See objective  Clinical Presentation: MEDIUM  Clinical Decision Making : MEDIUM - FOTO 51/100       []  See Progress Note/Recertification   Patient will continue to benefit from skilled PT services: see plan of care   Progress toward goals / Updated goals:    See plan of care     PLAN    [x]  Upgrade activities as tolerated YES Continue plan of care   []  Discharge due to :    []  Other:      Therapist: Al Kate, PT    Date: 9/15/2020 Time: 4:14 PM

## 2020-09-15 NOTE — PROGRESS NOTES
OCCUPATIONAL THERAPY - DAILY TREATMENT NOTE    Patient Name: Wanda Self        Date: 9/15/2020  : 1960   YES Patient  Verified  Visit #:   1   of   5  Insurance: Payor: [facility] MEDICAID / Plan: CELINE GONZALES [facility] / Product Type: Managed Care Medicaid /      In time: 4:55 Out time: 5:25   Total Treatment Time: 30     TREATMENT AREA =  Left arm/ADLs    SUBJECTIVE    Pain Level (on 0 to 10 scale):  0  / 10   Medication Changes/New allergies or changes in medical history, any new surgeries or procedures? NO    If yes, update Summary List   Subjective Functional Status/Changes:  []  No changes reported     The therapist had me cook a meal.          OBJECTIVE     min Therapeutic Exercise:  [x]  See flow sheet        min Patient Education:  NO  Reviewed HEP   []  Progressed/Changed HEP based on: Other Objective/Functional Measures:    See eval for details      Post Treatment Pain Level (on 0 to 10) scale:   0  / 10     ASSESSMENT  Assessment/Changes in Function:     Patient presents with slight forearm/UE weakness, decline in IADLs and should benefit from brief OT to address goals   OT Eval Complexity Justification:  Patient History: Low- CVA  Examination : low- strength; IADLs  Clinical Decision Making: Low- very motivated to return to baseline       [x]  See Progress Note/Recertification   Patient will continue to benefit from skilled OT services to address strength deficits and IADLs to attain remaining goals.    Progress toward goals / Updated goals:    See eval goals      PLAN  [x]  Upgrade activities as tolerated YES Continue plan of care   []  Discharge due to :    [x]  Other: OT 2-3 visits for goals      Therapist: BEE Cantrell    Date: 9/15/2020 Time: 5:37 PM

## 2020-09-18 ENCOUNTER — HOSPITAL ENCOUNTER (OUTPATIENT)
Dept: INFUSION THERAPY | Age: 60
Discharge: HOME | End: 2020-09-18
Payer: MEDICAID

## 2020-09-18 VITALS
DIASTOLIC BLOOD PRESSURE: 75 MMHG | SYSTOLIC BLOOD PRESSURE: 156 MMHG | HEART RATE: 71 BPM | RESPIRATION RATE: 18 BRPM | TEMPERATURE: 97.7 F | OXYGEN SATURATION: 97 %

## 2020-09-18 DIAGNOSIS — J45.50 SEVERE PERSISTENT ASTHMA, UNSPECIFIED WHETHER COMPLICATED: Primary | ICD-10-CM

## 2020-09-18 PROCEDURE — 74011250636 HC RX REV CODE- 250/636: Performed by: NURSE PRACTITIONER

## 2020-09-18 PROCEDURE — 96372 THER/PROPH/DIAG INJ SC/IM: CPT

## 2020-09-18 RX ADMIN — OMALIZUMAB 150 MG: 150 INJECTION, SOLUTION SUBCUTANEOUS at 11:56

## 2020-09-18 RX ADMIN — OMALIZUMAB 75 MG: 75 INJECTION, SOLUTION SUBCUTANEOUS at 11:56

## 2020-09-18 NOTE — PROGRESS NOTES
[facility] Progress Note    Date: 2020    Name: Claudeen Aschoff    MRN: [id number]         : 1960     Xolair Injection every 2 weeks    Ms. Julia Miller arrived to [facility] at 1320. Ms. Julia Miller was assessed and education was provided. Ms. Jaci Tolliver vitals were reviewed. Visit Vitals  BP (!) 156/75 (BP 1 Location: Left arm, BP Patient Position: Sitting)   Pulse 71   Temp 97.7 °F (36.5 °C)   Resp 18   SpO2 97%     Xolair 150 mg was administered as ordered SQ in patient's left upper arm. Xolair 75 mg was administered as ordered SQ in patient's right upper arm. Total 225 mg    Ms. Mahmood tolerated well without complaints. Patient armband removed and shredded. Ms. Julia Miller was discharged from [place] [address] in stable condition at 1200. She is to return on 10/2/2020 at 0830 for her next appointment.     Cece Yuen RN  2020  1200

## 2020-09-22 ENCOUNTER — HOSPITAL ENCOUNTER (OUTPATIENT)
Dept: PHYSICAL THERAPY | Age: 60
Discharge: HOME | End: 2020-09-22
Payer: MEDICAID

## 2020-09-29 ENCOUNTER — APPOINTMENT (OUTPATIENT)
Dept: PHYSICAL THERAPY | Age: 60
End: 2020-09-29
Payer: MEDICAID

## 2020-09-29 ENCOUNTER — HOSPITAL ENCOUNTER (OUTPATIENT)
Dept: PHYSICAL THERAPY | Age: 60
Discharge: HOME | End: 2020-09-29
Payer: MEDICAID

## 2020-09-30 ENCOUNTER — OFFICE VISIT (OUTPATIENT)
Dept: NEUROLOGY | Age: 60
End: 2020-09-30
Payer: MEDICAID

## 2020-09-30 VITALS
SYSTOLIC BLOOD PRESSURE: 146 MMHG | HEIGHT: 62 IN | BODY MASS INDEX: 38.57 KG/M2 | OXYGEN SATURATION: 100 % | RESPIRATION RATE: 22 BRPM | HEART RATE: 68 BPM | WEIGHT: 209.6 LBS | TEMPERATURE: 97 F | DIASTOLIC BLOOD PRESSURE: 72 MMHG

## 2020-09-30 DIAGNOSIS — I63.9 ACUTE ISCHEMIC STROKE (HCC): ICD-10-CM

## 2020-09-30 DIAGNOSIS — I66.9 ASYMPTOMATIC STENOSIS OF INTRACRANIAL ARTERY: Primary | ICD-10-CM

## 2020-09-30 PROCEDURE — 99203 OFFICE O/P NEW LOW 30 MIN: CPT | Performed by: STUDENT IN AN ORGANIZED HEALTH CARE EDUCATION/TRAINING PROGRAM

## 2020-09-30 RX ORDER — CLOPIDOGREL BISULFATE 75 MG/1
75 TABLET ORAL
Qty: 30 TAB | Refills: 1 | Status: SHIPPED | OUTPATIENT
Start: 2020-09-30 | End: 2021-01-05

## 2020-09-30 NOTE — LETTER
9/30/20 Patient: Jhonatan Kowalski YOB: 1960 Date of Visit: 9/30/2020 Jahaira Lunsford MD 
[address] [id number] VIA Facsimile: [phone] Dear Jahaira Lunsford MD, Thank you for referring Ms. Tyrone Scales to [facility]  for evaluation. My notes for this consultation are attached. If you have questions, please do not hesitate to call me. I look forward to following your patient along with you. Sincerely, Jerome Krishnamurthy MD

## 2020-09-30 NOTE — PROGRESS NOTES
Hakeem Hi is a 60 y.o. female . presents for Hospital Follow Up [facility]) and New Patient   . A 60years old female patient with medical history of hypertension, hyperlipidemia, diabetes, COPD, ROBBY, and arthritis here for evaluation of stroke after hospital discharge. She was admitted to [facility] on August 21, 2020 after presenting with left arm and leg weakness. She woke up that morning and has complete weakness of the left arm and leg with numbness. Initially she could not move her arm or leg. There is no facial droop. No change in speech. No change in her vision. MRI of the brain was done and showed small volume of late acute/early subacute infarcts in the right centrum semiovale extending to the superior frontal lobe cortex. MRA of the head showed severe A2 RACA stenosis and  moderate bilateral PCA stenoses. MRA of the neck did not show any significant extracranial artery disease. Had an echocardiography which showed normal ejection fraction with no thrombus or shunt. No reported atrial fibrillation during hospital stay. She was discharged with aspirin 81 mg p.o. per day and Plavix 75 mg p.o. per day and still taking it. She has been in a [facility] rehab until September 4, 2020. The left upper extremity weakness has completely resolved. Still continues to have weakness of the left lower extremity with difficulty walking. She also has lower back pain which radiates to the left lower extremity (had a previous diagnosis of sciatica). Currently uses a walker. No incontinence to bowel or bladder. No difficulty swallowing. Review of Systems   Constitutional: Positive for weight loss. Negative for chills and fever. HENT: Negative for hearing loss and tinnitus. Eyes: Negative for blurred vision and double vision. Respiratory: Negative for cough and shortness of breath. Cardiovascular: Negative for chest pain and leg swelling.    Gastrointestinal: Negative for nausea and vomiting. Genitourinary: Negative for dysuria, frequency and urgency. Musculoskeletal: Positive for back pain (LBP radiates to the LLE). Negative for neck pain. Skin: Negative for itching and rash. Neurological: Positive for focal weakness (left side). Negative for dizziness, tingling and speech change. Endo/Heme/Allergies: Bruises/bleeds easily. Past Medical History:   Diagnosis Date    Acute ischemic stroke ([facility] [address] 8/21/2020    Acute Ischemic Stroke (late acute/early subacute infarcts in the right centrum semiovale extending to the superior frontal lobe cortex) with residual left hemiparesis    Bipolar disorder ([address]     Chronic obstructive pulmonary disease (COPD) ([facility] [address]     COVID-19 ruled out by laboratory testing 8/26/2020    SARS-CoV-2 (Abbott m2000) (collected 8/25/2020, resulted 8/26/2020):  Not detected     Current use of aspirin 8/24/2020    Depression     Gastroesophageal reflux disease     History of colon polyps     Hypertensive heart disease without heart failure     2D echocardiogram (8/22/2020) showed EF 65%; mild left ventricular hypertrophy; normal diastolic function; negative bubble study at rest and after Valsalva    Iron deficiency anemia     Left hemiparesis ([facility] [address] 8/21/2020    Menopause     Obesity, Class II, BMI 35-39.9     Obstructive sleep apnea on CPAP     On clopidogrel therapy 8/24/2020    On statin therapy due to risk of future cardiovascular event 8/24/2020    On Atorvastatin    Pure hypercholesterolemia 8/19/2020    Lipid profile (8/19/2020) showed , , HDL 50,     Sciatica of left side     Seasonal allergic rhinitis     Severe persistent asthma 11/07/2019    Type 2 diabetes mellitus, with long-term current use of insulin ([facility])     HbA1c (8/19/2020) = 8.1; HbA1c (8/20/2020) = 8.0       Past Surgical History:   Procedure Laterality Date    HX HYSTERECTOMY  2000    Fibroids        Family History   Problem Relation Age of Onset    Heart Attack Father     Breast Cancer Maternal Aunt     Heart Disease Mother     Stroke Neg Hx         Social History     Socioeconomic History    Marital status:      Spouse name: Not on file    Number of children: Not on file    Years of education: Not on file    Highest education level: Not on file   Occupational History    Not on file   Social Needs    Financial resource strain: Not on file    Food insecurity     Worry: Not on file     Inability: Not on file    Transportation needs     Medical: Not on file     Non-medical: Not on file   Tobacco Use    Smoking status: Never Smoker    Smokeless tobacco: Never Used   Substance and Sexual Activity    Alcohol use: Yes     Comment: occ    Drug use: Yes     Types: Marijuana     Comment: \"every now and then. \"     Sexual activity: Not on file   Lifestyle    Physical activity     Days per week: Not on file     Minutes per session: Not on file    Stress: Not on file   Relationships    Social connections     Talks on phone: Not on file     Gets together: Not on file     Attends Congregational service: Not on file     Active member of club or organization: Not on file     Attends meetings of clubs or organizations: Not on file     Relationship status: Not on file    Intimate partner violence     Fear of current or ex partner: Not on file     Emotionally abused: Not on file     Physically abused: Not on file     Forced sexual activity: Not on file   Other Topics Concern    Not on file   Social History Narrative    Not on file        Allergies   Allergen Reactions    Grass Pollen Anaphylaxis     Environmental allergies Unsure what  triggers episodes. Intubated for episodes in 2006 and 2008.     Metformin Nausea Only     Pt states stomach cramps     Other Plant, Animal, Environmental Shortness of Breath    Pneumococcal Vaccine Other (comments)         Current Outpatient Medications   Medication Sig Dispense Refill    clopidogreL (PLAVIX) 75 mg tab Take 1 Tab by mouth daily (with dinner). Indications: prevention for a blood clot going to the brain 30 Tab 1    tiotropium bromide (Spiriva Respimat) 2.5 mcg/actuation inhaler Take 2 Puffs by inhalation daily.  valsartan-hydroCHLOROthiazide (Diovan HCT) 320-12.5 mg per tablet Take 1 Tab by mouth daily.  omalizumab (Xolair) 150 mg/mL syrg 150 mg by SubCUTAneous route every fourteen (14) days.  ipratropium (ATROVENT) 42 mcg (0.06 %) nasal spray 2 Sprays by Both Nostrils route three (3) times daily.  gabapentin (NEURONTIN) 100 mg capsule Take 100 mg by mouth every eight (8) hours as needed for Pain.  fenofibrate nanocrystallized (Tricor) 145 mg tablet Take 145 mg by mouth daily.  EPINEPHrine (EPIPEN) 0.3 mg/0.3 mL injection 0.3 mg by IntraMUSCular route once as needed for Allergic Response.  cholecalciferol (VITAMIN D3) (2,000 UNITS /50 MCG) cap capsule Take 2,000 Units by mouth daily.  amLODIPine (NORVASC) 10 mg tablet Take 10 mg by mouth daily.  albuterol sulfate (PROVENTIL;VENTOLIN) 2.5 mg/0.5 mL nebu nebulizer solution 2.5 mg by Nebulization route every four (4) hours as needed for Wheezing.  albuterol (ProAir HFA) 90 mcg/actuation inhaler Take 1-2 Puffs by inhalation every four (4) hours as needed for Wheezing.  aspirin 81 mg chewable tablet Take 1 Tab by mouth daily (with breakfast). Indications: stroke prevention 30 Tab 0    atorvastatin (LIPITOR) 40 mg tablet Take 1 Tab by mouth daily. Indications: excessive fat in the blood, stroke prevention 30 Tab 0    docusate sodium (COLACE) 100 mg capsule Take 1 Cap by mouth daily (after breakfast). Indications: constipation 30 Cap 0    metFORMIN ER (GLUCOPHAGE XR) 500 mg tablet Take 1 Tab by mouth daily (with dinner). Indications: type 2 diabetes mellitus 30 Tab 0    senna-docusate (PERICOLACE) 8.6-50 mg per tablet Take 2 Tabs by mouth daily (after dinner).  Indications: constipation 60 Tab 0    minoxidiL (LONITEN) 2.5 mg tablet Take 2 Tabs by mouth every twelve (12) hours. Indications: high blood pressure 120 Tab 0    insulin glargine (Lantus Solostar U-100 Insulin) 100 unit/mL (3 mL) inpn 22 Units by SubCUTAneous route nightly. Indications: type 2 diabetes mellitus 1 Adjustable Dose Pre-filled Pen Syringe 0    Insulin Needles, Disposable, 31 gauge x 3/16\" ndle As directed  Indications: Type 2 diabetes mellitus 30 Pen Needle 0    montelukast (SINGULAIR) 10 mg tablet Take 10 mg by mouth every evening.  ferrous sulfate 325 mg (65 mg iron) tablet Take 325 mg by mouth Daily (before breakfast).  omeprazole (PRILOSEC) 20 mg capsule Take 20 mg by mouth daily. Physical Exam  Constitutional:       Appearance: Normal appearance. HENT:      Mouth/Throat:      Mouth: Mucous membranes are moist.      Pharynx: Oropharynx is clear. No oropharyngeal exudate. Eyes:      Extraocular Movements: Extraocular movements intact. Pupils: Pupils are equal, round, and reactive to light. Neck:      Musculoskeletal: Normal range of motion and neck supple. Pulmonary:      Effort: Pulmonary effort is normal. No respiratory distress. Musculoskeletal:         General: Tenderness (left hip) present. Injury: left hip. Right lower leg: No edema. Left lower leg: No edema. Neurological:      Mental Status: She is alert. Comments: Mental status: Awake, alert, oriented x3, follows simple and complex commands, no neglect, no extinction to DSS or VSS. Speech and languge: fluent, coherentand comprehension intact  CN: VFF, EOMI, PERRLA, face sensation intact , no facial asymmetry noted, palate elevation symmetric bilat, SS+SCM 5/5 bilat, tongue midline  Motor: no pronator drift, tone normal throughout, strength 5/5 throughout except the LLE: Hip flexion 4/5, knee extension/flexion 4+/5, foot dorsiflexion and plantar flexion 5 out of 5.   Sensory: intact to light touch and pinprick throughout  Coordination: FNF accurate w/o dysmetria; unable to perform heel-to-shin. DTR: 2+ throughout, toes downgoing BL  Gait: Drags the left leg when walking with a walker. Admission on 08/26/2020, Discharged on 09/04/2020   Component Date Value Ref Range Status    Glucose (POC) 08/26/2020 175* 70 - 110 mg/dL Final    Comment: (NOTE)  The FDA has indicated that no capillary point of care blood glucose   monitoring systems are approved for use in \"critically ill\" patients,   however they have not defined this population. The College of   American Pathologists has recommended that these devices should not   be used in cases such as severe hypotension, dehydration, shock, and   hyperglycemic-hyperosmolar state, amongst others. Venous or arterial   collection is the recommended specimen for testing these patients.  WBC 08/27/2020 7.3  4.6 - 13.2 K/uL Final    RBC 08/27/2020 4.02* 4.20 - 5.30 M/uL Final    HGB 08/27/2020 11.5* 12.0 - 16.0 g/dL Final    HCT 08/27/2020 35.7  35.0 - 45.0 % Final    MCV 08/27/2020 88.8  74.0 - 97.0 FL Final    MCH 08/27/2020 28.6  24.0 - 34.0 PG Final    MCHC 08/27/2020 32.2  31.0 - 37.0 g/dL Final    RDW 08/27/2020 13.1  11.6 - 14.5 % Final    PLATELET 07/30/9769 905  135 - 420 K/uL Final    MPV 08/27/2020 10.4  9.2 - 11.8 FL Final    NEUTROPHILS 08/27/2020 65  40 - 73 % Final    LYMPHOCYTES 08/27/2020 22  21 - 52 % Final    MONOCYTES 08/27/2020 11* 3 - 10 % Final    EOSINOPHILS 08/27/2020 2  0 - 5 % Final    BASOPHILS 08/27/2020 0  0 - 2 % Final    ABS. NEUTROPHILS 08/27/2020 4.7  1.8 - 8.0 K/UL Final    ABS. LYMPHOCYTES 08/27/2020 1.6  0.9 - 3.6 K/UL Final    ABS. MONOCYTES 08/27/2020 0.8  0.05 - 1.2 K/UL Final    ABS. EOSINOPHILS 08/27/2020 0.2  0.0 - 0.4 K/UL Final    ABS.  BASOPHILS 08/27/2020 0.0  0.0 - 0.1 K/UL Final    DF 08/27/2020 AUTOMATED    Final    Magnesium 08/27/2020 1.7  1.6 - 2.6 mg/dL Final    Sodium 08/27/2020 141  136 - 145 mmol/L Final    Potassium 08/27/2020 3.8  3.5 - 5.5 mmol/L Final    Chloride 08/27/2020 106  100 - 111 mmol/L Final    CO2 08/27/2020 32  21 - 32 mmol/L Final    Anion gap 08/27/2020 3  3.0 - 18 mmol/L Final    Glucose 08/27/2020 106* 74 - 99 mg/dL Final    BUN 08/27/2020 18  7.0 - 18 MG/DL Final    Creatinine 08/27/2020 0.75  0.6 - 1.3 MG/DL Final    BUN/Creatinine ratio 08/27/2020 24* 12 - 20   Final    GFR est AA 08/27/2020 >60  >60 ml/min/1.73m2 Final    GFR est non-AA 08/27/2020 >60  >60 ml/min/1.73m2 Final    Comment: (NOTE)  Estimated GFR is calculated using the Modification of Diet in Renal   Disease (MDRD) Study equation, reported for both  Americans   (GFRAA) and non- Americans (GFRNA), and normalized to 1.73m2   body surface area. The physician must decide which value applies to   the patient. The MDRD study equation should only be used in   individuals age 25 or older. It has not been validated for the   following: pregnant women, patients with serious comorbid conditions,   or on certain medications, or persons with extremes of body size,   muscle mass, or nutritional status.  Calcium 08/27/2020 9.3  8.5 - 10.1 MG/DL Final    Glucose (POC) 08/27/2020 136* 70 - 110 mg/dL Final    Comment: (NOTE)  The FDA has indicated that no capillary point of care blood glucose   monitoring systems are approved for use in \"critically ill\" patients,   however they have not defined this population. The College of   American Pathologists has recommended that these devices should not   be used in cases such as severe hypotension, dehydration, shock, and   hyperglycemic-hyperosmolar state, amongst others. Venous or arterial   collection is the recommended specimen for testing these patients.       Glucose (POC) 08/27/2020 117* 70 - 110 mg/dL Final    Comment: (NOTE)  The FDA has indicated that no capillary point of care blood glucose   monitoring systems are approved for use in \"critically ill\" patients,   however they have not defined this population. The College of   American Pathologists has recommended that these devices should not   be used in cases such as severe hypotension, dehydration, shock, and   hyperglycemic-hyperosmolar state, amongst others. Venous or arterial   collection is the recommended specimen for testing these patients.  Special Requests: 08/27/2020 NO SPECIAL REQUESTS    Final    Culture result: 08/27/2020 MRSA NOT PRESENT    Final    Culture result: 08/27/2020     Screening of patient nares for MRSA is for surveillance purposes and, if positive, to facilitate isolation considerations in high risk settings. It is not intended for automatic decolonization interventions per se as regimens are not sufficiently effective to warrant routine use. Final    Glucose (POC) 08/27/2020 96  70 - 110 mg/dL Final    Comment: (NOTE)  The FDA has indicated that no capillary point of care blood glucose   monitoring systems are approved for use in \"critically ill\" patients,   however they have not defined this population. The College of   American Pathologists has recommended that these devices should not   be used in cases such as severe hypotension, dehydration, shock, and   hyperglycemic-hyperosmolar state, amongst others. Venous or arterial   collection is the recommended specimen for testing these patients.  Glucose (POC) 08/27/2020 186* 70 - 110 mg/dL Final    Comment: (NOTE)  The FDA has indicated that no capillary point of care blood glucose   monitoring systems are approved for use in \"critically ill\" patients,   however they have not defined this population. The College of   American Pathologists has recommended that these devices should not   be used in cases such as severe hypotension, dehydration, shock, and   hyperglycemic-hyperosmolar state, amongst others. Venous or arterial   collection is the recommended specimen for testing these patients.       Glucose (POC) 08/28/2020 78  70 - 110 mg/dL Final    Comment: (NOTE)  The FDA has indicated that no capillary point of care blood glucose   monitoring systems are approved for use in \"critically ill\" patients,   however they have not defined this population. The College of   American Pathologists has recommended that these devices should not   be used in cases such as severe hypotension, dehydration, shock, and   hyperglycemic-hyperosmolar state, amongst others. Venous or arterial   collection is the recommended specimen for testing these patients.  Glucose (POC) 08/28/2020 101  70 - 110 mg/dL Final    Comment: (NOTE)  The FDA has indicated that no capillary point of care blood glucose   monitoring systems are approved for use in \"critically ill\" patients,   however they have not defined this population. The College of   American Pathologists has recommended that these devices should not   be used in cases such as severe hypotension, dehydration, shock, and   hyperglycemic-hyperosmolar state, amongst others. Venous or arterial   collection is the recommended specimen for testing these patients.  Glucose (POC) 08/28/2020 93  70 - 110 mg/dL Final    Comment: (NOTE)  The FDA has indicated that no capillary point of care blood glucose   monitoring systems are approved for use in \"critically ill\" patients,   however they have not defined this population. The College of   American Pathologists has recommended that these devices should not   be used in cases such as severe hypotension, dehydration, shock, and   hyperglycemic-hyperosmolar state, amongst others. Venous or arterial   collection is the recommended specimen for testing these patients.  Glucose (POC) 08/28/2020 168* 70 - 110 mg/dL Final    Comment: (NOTE)  The FDA has indicated that no capillary point of care blood glucose   monitoring systems are approved for use in \"critically ill\" patients,   however they have not defined this population.  The College of American Pathologists has recommended that these devices should not   be used in cases such as severe hypotension, dehydration, shock, and   hyperglycemic-hyperosmolar state, amongst others. Venous or arterial   collection is the recommended specimen for testing these patients.  Glucose (POC) 08/28/2020 247* 70 - 110 mg/dL Final    Comment: (NOTE)  The FDA has indicated that no capillary point of care blood glucose   monitoring systems are approved for use in \"critically ill\" patients,   however they have not defined this population. The College of   American Pathologists has recommended that these devices should not   be used in cases such as severe hypotension, dehydration, shock, and   hyperglycemic-hyperosmolar state, amongst others. Venous or arterial   collection is the recommended specimen for testing these patients.  Glucose (POC) 08/29/2020 138* 70 - 110 mg/dL Final    Comment: (NOTE)  The FDA has indicated that no capillary point of care blood glucose   monitoring systems are approved for use in \"critically ill\" patients,   however they have not defined this population. The College of   American Pathologists has recommended that these devices should not   be used in cases such as severe hypotension, dehydration, shock, and   hyperglycemic-hyperosmolar state, amongst others. Venous or arterial   collection is the recommended specimen for testing these patients.  Glucose (POC) 08/29/2020 190* 70 - 110 mg/dL Final    Comment: (NOTE)  The FDA has indicated that no capillary point of care blood glucose   monitoring systems are approved for use in \"critically ill\" patients,   however they have not defined this population. The College of   American Pathologists has recommended that these devices should not   be used in cases such as severe hypotension, dehydration, shock, and   hyperglycemic-hyperosmolar state, amongst others.   Venous or arterial   collection is the recommended specimen for testing these patients.  Glucose (POC) 08/29/2020 129* 70 - 110 mg/dL Final    Comment: (NOTE)  The FDA has indicated that no capillary point of care blood glucose   monitoring systems are approved for use in \"critically ill\" patients,   however they have not defined this population. The College of   American Pathologists has recommended that these devices should not   be used in cases such as severe hypotension, dehydration, shock, and   hyperglycemic-hyperosmolar state, amongst others. Venous or arterial   collection is the recommended specimen for testing these patients.  Glucose (POC) 08/29/2020 196* 70 - 110 mg/dL Final    Comment: (NOTE)  The FDA has indicated that no capillary point of care blood glucose   monitoring systems are approved for use in \"critically ill\" patients,   however they have not defined this population. The College of   American Pathologists has recommended that these devices should not   be used in cases such as severe hypotension, dehydration, shock, and   hyperglycemic-hyperosmolar state, amongst others. Venous or arterial   collection is the recommended specimen for testing these patients.  Glucose (POC) 08/30/2020 129* 70 - 110 mg/dL Final    Comment: (NOTE)  The FDA has indicated that no capillary point of care blood glucose   monitoring systems are approved for use in \"critically ill\" patients,   however they have not defined this population. The College of   American Pathologists has recommended that these devices should not   be used in cases such as severe hypotension, dehydration, shock, and   hyperglycemic-hyperosmolar state, amongst others. Venous or arterial   collection is the recommended specimen for testing these patients.       Glucose (POC) 08/30/2020 98  70 - 110 mg/dL Final    Comment: (NOTE)  The FDA has indicated that no capillary point of care blood glucose   monitoring systems are approved for use in \"critically ill\" patients,   however they have not defined this population. The College of   American Pathologists has recommended that these devices should not   be used in cases such as severe hypotension, dehydration, shock, and   hyperglycemic-hyperosmolar state, amongst others. Venous or arterial   collection is the recommended specimen for testing these patients.  Glucose (POC) 08/30/2020 139* 70 - 110 mg/dL Final    Comment: (NOTE)  The FDA has indicated that no capillary point of care blood glucose   monitoring systems are approved for use in \"critically ill\" patients,   however they have not defined this population. The College of   American Pathologists has recommended that these devices should not   be used in cases such as severe hypotension, dehydration, shock, and   hyperglycemic-hyperosmolar state, amongst others. Venous or arterial   collection is the recommended specimen for testing these patients.  Glucose (POC) 08/30/2020 189* 70 - 110 mg/dL Final    Comment: (NOTE)  The FDA has indicated that no capillary point of care blood glucose   monitoring systems are approved for use in \"critically ill\" patients,   however they have not defined this population. The College of   American Pathologists has recommended that these devices should not   be used in cases such as severe hypotension, dehydration, shock, and   hyperglycemic-hyperosmolar state, amongst others. Venous or arterial   collection is the recommended specimen for testing these patients.  HGB 08/31/2020 11.2* 12.0 - 16.0 g/dL Final    HCT 08/31/2020 33.8* 35.0 - 45.0 % Final    PLATELET 25/04/2818 277  135 - 420 K/uL Final    Protein, total 08/31/2020 7.1  6.4 - 8.2 g/dL Final    Albumin 08/31/2020 3.3* 3.4 - 5.0 g/dL Final    Globulin 08/31/2020 3.8  2.0 - 4.0 g/dL Final    A-G Ratio 08/31/2020 0.9  0.8 - 1.7   Final    Bilirubin, total 08/31/2020 0.9  0.2 - 1.0 MG/DL Final    Bilirubin, direct 08/31/2020 <0.1  0.0 - 0.2 MG/DL Final    Alk.  phosphatase 08/31/2020 65  45 - 117 U/L Final    AST (SGOT) 08/31/2020 20  10 - 38 U/L Final    ALT (SGPT) 08/31/2020 60* 13 - 56 U/L Final    Sodium 08/31/2020 140  136 - 145 mmol/L Final    Potassium 08/31/2020 3.9  3.5 - 5.5 mmol/L Final    Chloride 08/31/2020 106  100 - 111 mmol/L Final    CO2 08/31/2020 29  21 - 32 mmol/L Final    Anion gap 08/31/2020 5  3.0 - 18 mmol/L Final    Glucose 08/31/2020 81  74 - 99 mg/dL Final    BUN 08/31/2020 25* 7.0 - 18 MG/DL Final    Creatinine 08/31/2020 0.89  0.6 - 1.3 MG/DL Final    BUN/Creatinine ratio 08/31/2020 28* 12 - 20   Final    GFR est AA 08/31/2020 >60  >60 ml/min/1.73m2 Final    GFR est non-AA 08/31/2020 >60  >60 ml/min/1.73m2 Final    Comment: (NOTE)  Estimated GFR is calculated using the Modification of Diet in Renal   Disease (MDRD) Study equation, reported for both  Americans   (GFRAA) and non- Americans (GFRNA), and normalized to 1.73m2   body surface area. The physician must decide which value applies to   the patient. The MDRD study equation should only be used in   individuals age 25 or older. It has not been validated for the   following: pregnant women, patients with serious comorbid conditions,   or on certain medications, or persons with extremes of body size,   muscle mass, or nutritional status.  Calcium 08/31/2020 9.6  8.5 - 10.1 MG/DL Final    Glucose (POC) 08/31/2020 86  70 - 110 mg/dL Final    Comment: (NOTE)  The FDA has indicated that no capillary point of care blood glucose   monitoring systems are approved for use in \"critically ill\" patients,   however they have not defined this population. The College of   American Pathologists has recommended that these devices should not   be used in cases such as severe hypotension, dehydration, shock, and   hyperglycemic-hyperosmolar state, amongst others. Venous or arterial   collection is the recommended specimen for testing these patients.       Glucose (POC) 08/31/2020 163* 70 - 110 mg/dL Final    Comment: (NOTE)  The FDA has indicated that no capillary point of care blood glucose   monitoring systems are approved for use in \"critically ill\" patients,   however they have not defined this population. The College of   American Pathologists has recommended that these devices should not   be used in cases such as severe hypotension, dehydration, shock, and   hyperglycemic-hyperosmolar state, amongst others. Venous or arterial   collection is the recommended specimen for testing these patients.  Glucose (POC) 08/31/2020 91  70 - 110 mg/dL Final    Comment: (NOTE)  The FDA has indicated that no capillary point of care blood glucose   monitoring systems are approved for use in \"critically ill\" patients,   however they have not defined this population. The College of   American Pathologists has recommended that these devices should not   be used in cases such as severe hypotension, dehydration, shock, and   hyperglycemic-hyperosmolar state, amongst others. Venous or arterial   collection is the recommended specimen for testing these patients.  Glucose (POC) 08/31/2020 161* 70 - 110 mg/dL Final    Comment: (NOTE)  The FDA has indicated that no capillary point of care blood glucose   monitoring systems are approved for use in \"critically ill\" patients,   however they have not defined this population. The College of   American Pathologists has recommended that these devices should not   be used in cases such as severe hypotension, dehydration, shock, and   hyperglycemic-hyperosmolar state, amongst others. Venous or arterial   collection is the recommended specimen for testing these patients.  Glucose (POC) 09/01/2020 82  70 - 110 mg/dL Final    Comment: (NOTE)  The FDA has indicated that no capillary point of care blood glucose   monitoring systems are approved for use in \"critically ill\" patients,   however they have not defined this population.  The [address] [place] [address] Pathologists has recommended that these devices should not   be used in cases such as severe hypotension, dehydration, shock, and   hyperglycemic-hyperosmolar state, amongst others. Venous or arterial   collection is the recommended specimen for testing these patients.  Glucose (POC) 09/01/2020 127* 70 - 110 mg/dL Final    Comment: (NOTE)  The FDA has indicated that no capillary point of care blood glucose   monitoring systems are approved for use in \"critically ill\" patients,   however they have not defined this population. The College of   American Pathologists has recommended that these devices should not   be used in cases such as severe hypotension, dehydration, shock, and   hyperglycemic-hyperosmolar state, amongst others. Venous or arterial   collection is the recommended specimen for testing these patients.  Glucose (POC) 09/01/2020 116* 70 - 110 mg/dL Final    Comment: (NOTE)  The FDA has indicated that no capillary point of care blood glucose   monitoring systems are approved for use in \"critically ill\" patients,   however they have not defined this population. The College of   American Pathologists has recommended that these devices should not   be used in cases such as severe hypotension, dehydration, shock, and   hyperglycemic-hyperosmolar state, amongst others. Venous or arterial   collection is the recommended specimen for testing these patients.  Glucose (POC) 09/01/2020 159* 70 - 110 mg/dL Final    Comment: (NOTE)  The FDA has indicated that no capillary point of care blood glucose   monitoring systems are approved for use in \"critically ill\" patients,   however they have not defined this population. The College of   American Pathologists has recommended that these devices should not   be used in cases such as severe hypotension, dehydration, shock, and   hyperglycemic-hyperosmolar state, amongst others.   Venous or arterial   collection is the recommended specimen for testing these patients.  Glucose (POC) 09/02/2020 96  70 - 110 mg/dL Final    Comment: (NOTE)  The FDA has indicated that no capillary point of care blood glucose   monitoring systems are approved for use in \"critically ill\" patients,   however they have not defined this population. The College of   American Pathologists has recommended that these devices should not   be used in cases such as severe hypotension, dehydration, shock, and   hyperglycemic-hyperosmolar state, amongst others. Venous or arterial   collection is the recommended specimen for testing these patients.  Glucose (POC) 09/02/2020 168* 70 - 110 mg/dL Final    Comment: (NOTE)  The FDA has indicated that no capillary point of care blood glucose   monitoring systems are approved for use in \"critically ill\" patients,   however they have not defined this population. The College of   American Pathologists has recommended that these devices should not   be used in cases such as severe hypotension, dehydration, shock, and   hyperglycemic-hyperosmolar state, amongst others. Venous or arterial   collection is the recommended specimen for testing these patients.  HGB 09/02/2020 11.2* 12.0 - 16.0 g/dL Final    HCT 09/02/2020 34.4* 35.0 - 45.0 % Final    Glucose (POC) 09/02/2020 85  70 - 110 mg/dL Final    Comment: (NOTE)  The FDA has indicated that no capillary point of care blood glucose   monitoring systems are approved for use in \"critically ill\" patients,   however they have not defined this population. The College of   American Pathologists has recommended that these devices should not   be used in cases such as severe hypotension, dehydration, shock, and   hyperglycemic-hyperosmolar state, amongst others. Venous or arterial   collection is the recommended specimen for testing these patients.       Glucose (POC) 09/02/2020 129* 70 - 110 mg/dL Final    Comment: (NOTE)  The FDA has indicated that no capillary point of care blood glucose   monitoring systems are approved for use in \"critically ill\" patients,   however they have not defined this population. The College of   American Pathologists has recommended that these devices should not   be used in cases such as severe hypotension, dehydration, shock, and   hyperglycemic-hyperosmolar state, amongst others. Venous or arterial   collection is the recommended specimen for testing these patients.  HGB 09/03/2020 10.8* 12.0 - 16.0 g/dL Final    HCT 09/03/2020 33.4* 35.0 - 45.0 % Final    PLATELET 42/38/3328 378  135 - 420 K/uL Final    Sodium 09/03/2020 143  136 - 145 mmol/L Final    Potassium 09/03/2020 4.0  3.5 - 5.5 mmol/L Final    Chloride 09/03/2020 109  100 - 111 mmol/L Final    CO2 09/03/2020 27  21 - 32 mmol/L Final    Anion gap 09/03/2020 7  3.0 - 18 mmol/L Final    Glucose 09/03/2020 110* 74 - 99 mg/dL Final    BUN 09/03/2020 25* 7.0 - 18 MG/DL Final    Creatinine 09/03/2020 1.15  0.6 - 1.3 MG/DL Final    BUN/Creatinine ratio 09/03/2020 22* 12 - 20   Final    GFR est AA 09/03/2020 58* >60 ml/min/1.73m2 Final    GFR est non-AA 09/03/2020 48* >60 ml/min/1.73m2 Final    Comment: (NOTE)  Estimated GFR is calculated using the Modification of Diet in Renal   Disease (MDRD) Study equation, reported for both  Americans   (GFRAA) and non- Americans (GFRNA), and normalized to 1.73m2   body surface area. The physician must decide which value applies to   the patient. The MDRD study equation should only be used in   individuals age 25 or older. It has not been validated for the   following: pregnant women, patients with serious comorbid conditions,   or on certain medications, or persons with extremes of body size,   muscle mass, or nutritional status.       Calcium 09/03/2020 9.5  8.5 - 10.1 MG/DL Final    Glucose (POC) 09/03/2020 97  70 - 110 mg/dL Final    Comment: (NOTE)  The FDA has indicated that no capillary point of care blood glucose   monitoring systems are approved for use in \"critically ill\" patients,   however they have not defined this population. The College of   American Pathologists has recommended that these devices should not   be used in cases such as severe hypotension, dehydration, shock, and   hyperglycemic-hyperosmolar state, amongst others. Venous or arterial   collection is the recommended specimen for testing these patients.  Glucose (POC) 09/03/2020 134* 70 - 110 mg/dL Final    Comment: (NOTE)  The FDA has indicated that no capillary point of care blood glucose   monitoring systems are approved for use in \"critically ill\" patients,   however they have not defined this population. The College of   American Pathologists has recommended that these devices should not   be used in cases such as severe hypotension, dehydration, shock, and   hyperglycemic-hyperosmolar state, amongst others. Venous or arterial   collection is the recommended specimen for testing these patients.  Glucose (POC) 09/03/2020 121* 70 - 110 mg/dL Final    Comment: (NOTE)  The FDA has indicated that no capillary point of care blood glucose   monitoring systems are approved for use in \"critically ill\" patients,   however they have not defined this population. The College of   American Pathologists has recommended that these devices should not   be used in cases such as severe hypotension, dehydration, shock, and   hyperglycemic-hyperosmolar state, amongst others. Venous or arterial   collection is the recommended specimen for testing these patients.  Glucose (POC) 09/03/2020 161* 70 - 110 mg/dL Final    Comment: (NOTE)  The FDA has indicated that no capillary point of care blood glucose   monitoring systems are approved for use in \"critically ill\" patients,   however they have not defined this population.  The College of   American Pathologists has recommended that these devices should not   be used in cases such as severe hypotension, dehydration, shock, and hyperglycemic-hyperosmolar state, amongst others. Venous or arterial   collection is the recommended specimen for testing these patients.  Glucose (POC) 09/04/2020 80  70 - 110 mg/dL Final    Comment: (NOTE)  The FDA has indicated that no capillary point of care blood glucose   monitoring systems are approved for use in \"critically ill\" patients,   however they have not defined this population. The College of   American Pathologists has recommended that these devices should not   be used in cases such as severe hypotension, dehydration, shock, and   hyperglycemic-hyperosmolar state, amongst others. Venous or arterial   collection is the recommended specimen for testing these patients.  Glucose (POC) 09/04/2020 84  70 - 110 mg/dL Final    Comment: (NOTE)  The FDA has indicated that no capillary point of care blood glucose   monitoring systems are approved for use in \"critically ill\" patients,   however they have not defined this population. The College of   American Pathologists has recommended that these devices should not   be used in cases such as severe hypotension, dehydration, shock, and   hyperglycemic-hyperosmolar state, amongst others. Venous or arterial   collection is the recommended specimen for testing these patients. ICD-10-CM ICD-9-CM    1. Asymptomatic stenosis of intracranial artery  I67.2 437.0    2. Acute ischemic stroke (HCC)  I63.9 434.91 clopidogreL (PLAVIX) 75 mg tab    Involfing the right centrum semiovale and frontal cortex(small)       A 60years old female patient with above medical problems came for follow-up evaluation after hospital discharge. Was admitted to [facility] on August 21, 2020 for sudden weakness of the left side of her body. MRI showed an acute lacunar infarction over the right centrum semiovale with some involvement of the right frontal cortex. MRA showed severe stenosis of the right PAM and the PCAs. And extracranial artery disease. Echo unremarkable. No atrial fibrillation. Possible small vessel disease. She is currently on aspirin and Plavix. We will continue her on dual antiplatelets for 90 days [for intracranial atherosclerosis]. She will continue with atorvastatin 40 mg p.o. per day. Advised her to strictly follow with her primary care provider for risk factor modifications. Patient was told that she is not going for transfer out of place 6 months from the time of the stroke. We will follow her in 3 months time.

## 2020-10-01 ENCOUNTER — HOSPITAL ENCOUNTER (OUTPATIENT)
Dept: PHYSICAL THERAPY | Age: 60
Discharge: HOME | End: 2020-10-01
Payer: MEDICAID

## 2020-10-01 PROCEDURE — 97112 NEUROMUSCULAR REEDUCATION: CPT

## 2020-10-01 PROCEDURE — 97110 THERAPEUTIC EXERCISES: CPT

## 2020-10-01 PROCEDURE — 97530 THERAPEUTIC ACTIVITIES: CPT

## 2020-10-01 NOTE — PROGRESS NOTES
PHYSICAL THERAPY - DAILY TREATMENT NOTE    Patient Name: Juliane Chamberlain        Date: 10/1/2020  : 1960   YES Patient  Verified  Visit #:   2   of     Insurance: Payor: Adan Liu / [facility] / Product Type: Managed Care Medicaid /      In time: 4:42 Out time: 5:24   Total Treatment Time: 42     Medicare/JAVED Mondragon Time Tracking (below)   Total Timed Codes (min):  42 1:1 Treatment Time:  42     TREATMENT AREA =  Gait instability [R26.81]  CVA (cerebral vascular accident) ([facility] [address] [I63.9]  SUBJECTIVE    Pain Level (on 0 to 10 scale):  0  / 10   Medication Changes/New allergies or changes in medical history, any new surgeries or procedures? NO    If yes, update Summary List   Subjective Functional Status/Changes:  []  No changes reported     Pt reports that she has been using rollator walker for ambulation, reports that she tried a SPC at home, but it was not safe.           OBJECTIVE    22 min Therapeutic Exercise:  [x]  See flow sheet   Rationale:      increase ROM, increase strength, improve coordination, improve balance and increase proprioception to improve the patients ability to perform ADLs/IADLs, functional mobility and gait safely and independently without increased pain/symptoms     8 min Therapeutic Activity: Tap-ups, step-ups   Rationale: improve strength and balance in preparation for gait/stair negotiation to improve the patient's ability to navigate home and community safely and independently without increased pain/symptoms      12 min Neuromuscular Re-ed: EO/EC balance on floor/foam   Rationale: improve balance to improve the patient's ability to perform ADLs/IADLs, functional mobility and gait safely and independently without increased pain/symptoms       During TE min Patient Education:  YES  Reviewed HEP   [x]  Progressed/Changed HEP based on:   Initiated HEP (copy in chart)     Other Objective/Functional Measures:    Exercises per flowsheet     Post Treatment Pain Level (on 0 to 10) scale:   0  / 10     ASSESSMENT    Assessment/Changes in Function:     Tolerated exercises without complaints     []  See Progress Note/Recertification   Patient will continue to benefit from skilled PT services to modify and progress therapeutic interventions, address functional mobility deficits, address ROM deficits, address strength deficits, analyze and address soft tissue restrictions, analyze and cue movement patterns, analyze and modify body mechanics/ergonomics, assess and modify postural abnormalities, address imbalance/dizziness and instruct in home and community integration to attain remaining goals. Progress toward goals / Updated goals:    Progressing toward goals:  1. Patient will demonstrate compliance with HEP. - Initiated HEP  2. Patient will score greater carrie or equal to 18/24 on DGI to indicate improved gait/decreased fall risk. 3. Patient will complete FTSST in less than or equal to 17\" to increase safety with transfers/decrease fall risk. · Long Term Goals: To be accomplished in  4-8  weeks:  1. Patient will demonstrate independence with HEP. 2. Patient will score greater than or equal to 20/24 on DGI to indicate improved gait/decreased fall risk. 3. Patient will complete FTSST in less than or equal to 15\" to increase safety with transfers/decrease fall risk.        PLAN    [x]  Upgrade activities as tolerated YES Continue plan of care   []  Discharge due to :    []  Other:      Therapist: Jeannie Moralez PT    Date: 10/1/2020 Time: 4:40 PM     Future Appointments   Date Time Provider Margarita Wells   10/1/2020  5:00 PM Vi James PT [facility]   10/2/2020  1:30 PM [address] [place] [address] FAST TRACK NURSE [facility] [place] [address] [place] [address]   10/6/2020  4:15 PM Vi James PT [facility]   10/6/2020  5:00 PM Bettina Meza [facility]   10/8/2020  4:15 PM Edmond[place] Nails, OTR/L [facility]   10/8/2020  5:00 PM Vi James PT [facility] 10/13/2020  4:15 PM [place] Nest, OTR/L [facility]   10/13/2020  5:00 PM Arnold Balbuena PT [facility]   10/15/2020  4:15 PM [place] Nest, OTR/L [facility]   10/15/2020  5:00 PM Arnold Balbuena, PT [facility]   10/19/2020  9:00 AM [facility] FAST TRACK NURSE [facility]   10/20/2020  4:15 PM Arnold Balbuena, PT [facility]   10/20/2020  5:00 PM Stevens Almas [facility]   10/22/2020  4:15 PM [place] Nest, OTR/L [facility]   10/22/2020  5:00 PM Arnold Balbuena PT [facility]   10/27/2020  4:15 PM Arnold Balbuena PT [facility]   10/27/2020  5:00 PM Stevens Almas [facility]   10/29/2020  4:15 PM [facility]   10/29/2020  5:00 PM Arnold Balbuena, PT [facility]   12/14/2020  1:15 PM [facility] RADHA STEREO BX RM 1 [facility]   12/15/2020  2:00 PM [facility] RADHA STEREO BX RM 1 [facility]   1/5/2021 11:40 AM [facility]

## 2020-10-01 NOTE — PROGRESS NOTES
[address] [facility] PHYSICAL THERAPY  [address] [facility] [address], [address] - Phone: [phone]  Fax: [phone]  [address] [place] [address] THERAPY          Patient Name: Sarahy Del Castillo : 1960   Medical/Treatment Diagnosis: Gait instability [R26.81]  CVA (cerebral vascular accident) [facility]) [I63.9]   Onset Date: 2020    Referral Source: Paige Foley MD [facility]): 9/15/2020   Prior Hospitalization: See Medical History Provider #: [id number]   Prior Level of Function: Ind   Comorbidities: Diabetes; HTN   Medications: Verified on Patient Summary List   Visits from [place] [facility]: 2 Missed Visits: 1 cx       Goal/Measure of Progress Goal Met? 1. Ind showering    Status at last Eval: Min A Current Status: Ind yes   2. Baseline ADLs   Status at last Eval: Min A Current Status: Ind yes   3. Return to cooking    Status at last Eval: Light tasks Current Status: Ind full meals yes   4. Ind basic IADLs   Status at last Eval: na Current Status: Ind laundry and cleaning  yes     Key Functional Changes/Progress: Ind with band exercises for home. She reports Ind with cooking, laundry, vacuuming, washing dishes, going to the grocery store with family and is Ind with bathing and dressing. She continues to use the rollator for balance and safety. Left UE AROM and strength is WNL. Left  65#. Assessments/Recommendations: Discontinue therapy. Progressing towards or have reached established goals. If you have any questions/comments please contact us directly at [phone]. Thank you for allowing us to assist in the care of your patient. Therapist Signature: Jessica Dodge OTR/L Date: 10/1/2020   Reporting Period: 9/15/44862 Time: 4:56 PM       NOTE TO PHYSICIAN:  PLEASE COMPLETE THE ORDERS BELOW AND FAX TO   [facility]: [phone]   If you are unable to process this request in 24 hours please contact our office: [phone].    ___ I have read the above report and request that my patient be discharged from therapy.      Physician Signature:        Date:       Time:

## 2020-10-01 NOTE — PROGRESS NOTES
OCCUPATIONAL THERAPY - DAILY TREATMENT NOTE    Patient Name: Brenda Gracia        Date: 10/1/2020  : 1960   YES Patient  Verified  Visit #:   2   of   5  Insurance: Payor: Gracia Irwin / [facility] / Product Type: Managed Care Medicaid /      In time: 4:20 Out time: 4:40   Total Treatment Time: 20     TREATMENT AREA =  Left arm/ADLs    SUBJECTIVE    Pain Level (on 0 to 10 scale):  0  / 10   Medication Changes/New allergies or changes in medical history, any new surgeries or procedures? NO    If yes, update Summary List   Subjective Functional Status/Changes:  []  No changes reported     I cooked ribs. I've been cooking and doing laundry. I can vacuum with my walker. OBJECTIVE    10 min Therapeutic Exercise:  [x]  See flow sheet   Rationale:      increase strength to improve the patients ability with all tasks     10 min Therapeutic Activity: ADLs/IADLs review   Rationale:    increase strength to improve the patients ability to care for self     min Patient Education:  YES  Reviewed HEP   []  Progressed/Changed HEP based on: Other Objective/Functional Measures: Ind with band exercises for home. She reports Ind with cooking, laundry, vacuuming, washing dishes, going to the grocery store with family and is Ind with bathing and dressing. She continues to use the rollator for balance and safety. Left UE AROM and strength is WNL. Left  65#.      Post Treatment Pain Level (on 0 to 10) scale:   0  / 10     ASSESSMENT  Assessment/Changes in Function:     Goals met, DC to HEP     [x]  See Progress Note/Recertification   Patient will continue to benefit from skilled OT services to na   Progress toward goals / Updated goals:    Goals met     PLAN  []  Upgrade activities as tolerated NO Continue plan of care   [x]  Discharge due to : Goals met   []  Other:      Therapist: ANKIT Nice/L    Date: 10/1/2020 Time: 4:18 PM

## 2020-10-02 ENCOUNTER — HOSPITAL ENCOUNTER (OUTPATIENT)
Dept: INFUSION THERAPY | Age: 60
End: 2020-10-02

## 2020-10-02 ENCOUNTER — HOSPITAL ENCOUNTER (OUTPATIENT)
Dept: INFUSION THERAPY | Age: 60
Discharge: HOME | End: 2020-10-02
Payer: MEDICAID

## 2020-10-02 VITALS
TEMPERATURE: 97 F | OXYGEN SATURATION: 97 % | RESPIRATION RATE: 18 BRPM | DIASTOLIC BLOOD PRESSURE: 76 MMHG | HEART RATE: 78 BPM | SYSTOLIC BLOOD PRESSURE: 142 MMHG

## 2020-10-02 DIAGNOSIS — J45.50 SEVERE PERSISTENT ASTHMA, UNSPECIFIED WHETHER COMPLICATED: Primary | ICD-10-CM

## 2020-10-02 PROCEDURE — 74011250636 HC RX REV CODE- 250/636: Performed by: NURSE PRACTITIONER

## 2020-10-02 PROCEDURE — 96372 THER/PROPH/DIAG INJ SC/IM: CPT

## 2020-10-02 RX ADMIN — OMALIZUMAB 75 MG: 75 INJECTION, SOLUTION SUBCUTANEOUS at 13:38

## 2020-10-02 RX ADMIN — OMALIZUMAB 150 MG: 150 INJECTION, SOLUTION SUBCUTANEOUS at 13:38

## 2020-10-02 NOTE — PROGRESS NOTES
[facility] OPIC Progress Note    Date: 2020    Name: Gerri Vaz    MRN: [id number]         : 1960     Xolair Injection every 2 weeks    Ms. Julia Miller arrived to [facility] at 80. Ms. Julia Miller was assessed and education was provided. Ms. Venkatesh Barnett vitals were reviewed. Visit Vitals  BP (!) 142/76 (BP 1 Location: Left arm, BP Patient Position: Sitting)   Pulse 78   Temp 97 °F (36.1 °C)   Resp 18   SpO2 97%   Breastfeeding No     Xolair 150 mg was administered as ordered SQ in patient's left upper arm. Xolair 75 mg was administered as ordered SQ in patient's right upper arm. Total 225 mg    Ms. Mahmood tolerated well without complaints. Patient armband removed and shredded. Ms. Julia Miller was discharged from [place] [address] in stable condition at 1335. She is to return on 10/19/2020 at 0900 for her next appointment.     Abdirashid Figueroa RN  2020  1344

## 2020-10-06 ENCOUNTER — APPOINTMENT (OUTPATIENT)
Dept: PHYSICAL THERAPY | Age: 60
End: 2020-10-06
Payer: MEDICAID

## 2020-10-06 ENCOUNTER — HOSPITAL ENCOUNTER (OUTPATIENT)
Dept: PHYSICAL THERAPY | Age: 60
Discharge: HOME | End: 2020-10-06
Payer: MEDICAID

## 2020-10-06 PROCEDURE — 97110 THERAPEUTIC EXERCISES: CPT

## 2020-10-06 PROCEDURE — 97530 THERAPEUTIC ACTIVITIES: CPT

## 2020-10-06 PROCEDURE — 97116 GAIT TRAINING THERAPY: CPT

## 2020-10-06 NOTE — PROGRESS NOTES
PHYSICAL THERAPY - DAILY TREATMENT NOTE    Patient Name: Manuela Charter        Date: 10/6/2020  : 1960   YES Patient  Verified  Visit #:   3     Insurance: Payor: Silvana Kay / [facility] / Product Type: Managed Care Medicaid /      In time: 10:15 Out time: 10:58   Total Treatment Time: 43     Medicare/BCBS [place] Time Tracking (below)   Total Timed Codes (min):  43 1:1 Treatment Time:  43     TREATMENT AREA =  Gait instability [R26.81]  CVA (cerebral vascular accident) ([facility] [address] [I63.9]  SUBJECTIVE    Pain Level (on 0 to 10 scale):  0  / 10   Medication Changes/New allergies or changes in medical history, any new surgeries or procedures? NO    If yes, update Summary List   Subjective Functional Status/Changes:  []  No changes reported     Pt denies pain, reports compliance with HEP.        OBJECTIVE    22 min Therapeutic Exercise:  [x]  See flow sheet   Rationale:      increase ROM, increase strength, improve coordination, improve balance and increase proprioception to improve the patients ability to perform ADLs/IADLs, functional mobility and gait safely and independently without increased pain/symptoms     11 min Therapeutic Activity: See flowshet   Rationale: improve strength and balance in preparation for gait/stair negotiation to improve the patient's ability to navigate home and community safely and independently without increased pain/symptoms      10 min Gait Training: See flowsheet   Rationale: improve gait to improve the patient's ability to perform ADLs/IADLs, functional mobility and gait safely and independently without increased pain/symptoms         During TE min Patient Education:  YES  Reviewed HEP   []  Progressed/Changed HEP based on:   Cont HEP     Other Objective/Functional Measures:    Exercises per flowsheet  Increased reps bridge, clam, LAQ, seated knee flex with t-band, stand hip 3-way, mini-squats  Added SLR flex/abd     Post Treatment Pain Level (on 0 to 10) scale:   0  / 10     ASSESSMENT    Assessment/Changes in Function:     Tolerated exercises without c/o pain     []  See Progress Note/Recertification   Patient will continue to benefit from skilled PT services to modify and progress therapeutic interventions, address functional mobility deficits, address ROM deficits, address strength deficits, analyze and address soft tissue restrictions, analyze and cue movement patterns, analyze and modify body mechanics/ergonomics, assess and modify postural abnormalities, address imbalance/dizziness and instruct in home and community integration to attain remaining goals. Progress toward goals / Updated goals:    Progressing toward goals: · Short Term Goals: To be accomplished in  2-4  weeks:  1. Patient will demonstrate compliance with HEP. - Reports compliance with HEP  2. Patient will score greater carrie or equal to 18/24 on DGI to indicate improved gait/decreased fall risk. 3. Patient will complete FTSST in less than or equal to 17\" to increase safety with transfers/decrease fall risk. · Long Term Goals: To be accomplished in  4-8  weeks:  1. Patient will demonstrate independence with HEP. 2. Patient will score greater than or equal to 20/24 on DGI to indicate improved gait/decreased fall risk. 3. Patient will complete FTSST in less than or equal to 15\" to increase safety with transfers/decrease fall risk.        PLAN    [x]  Upgrade activities as tolerated YES Continue plan of care   []  Discharge due to :    []  Other:      Therapist: Becky Avila PT    Date: 10/6/2020 Time: 10:19 AM     Future Appointments   Date Time Provider Margarita Wells   10/8/2020  5:00 PM Reyes Hammonds PT [facility]   10/13/2020  5:00 PM Reyes Hammonds PT [facility]   10/15/2020  5:00 PM Reyes Hammonds PT [facility]   10/19/2020  9:00 AM [facility] FAST TRACK NURSE FERMININFMARILEE Dumont [address] [place] [address]   10/20/2020  4:15 PM Reyes Hammonds PT [facility]   10/22/2020 5:00 PM [facility], PT [facility]   10/27/2020  4:15 PM [facility], PT [facility]   10/29/2020  5:00 PM [facility], PT [facility]   12/14/2020  1:15 PM [facility] RADHA STEREO BX RM 1 [facility]   12/15/2020  2:00 PM [facility] STEREO BX RM 1 [facility]   1/5/2021 11:40 AM Roxana Hawkins MD [facility] BS AMB

## 2020-10-08 ENCOUNTER — HOSPITAL ENCOUNTER (OUTPATIENT)
Dept: PHYSICAL THERAPY | Age: 60
Discharge: HOME | End: 2020-10-08
Payer: MEDICAID

## 2020-10-08 ENCOUNTER — APPOINTMENT (OUTPATIENT)
Dept: PHYSICAL THERAPY | Age: 60
End: 2020-10-08
Payer: MEDICAID

## 2020-10-08 PROCEDURE — 97530 THERAPEUTIC ACTIVITIES: CPT

## 2020-10-08 PROCEDURE — 97110 THERAPEUTIC EXERCISES: CPT

## 2020-10-08 PROCEDURE — 97112 NEUROMUSCULAR REEDUCATION: CPT

## 2020-10-08 NOTE — PROGRESS NOTES
PHYSICAL THERAPY - DAILY TREATMENT NOTE    Patient Name: Veronica Posadas        Date: 10/8/2020  : 1960   YES Patient  Verified  Visit #:   4     Insurance: Payor: Chey Houser / [facility] / Product Type: Managed Care Medicaid /      In time: 11:46 Out time: 12:29   Total Treatment Time: 43     Medicare/BCBS [place] Time Tracking (below)   Total Timed Codes (min):  43 1:1 Treatment Time:  43     TREATMENT AREA =  Gait instability [R26.81]  CVA (cerebral vascular accident) ([facility] [address] [I63.9]  SUBJECTIVE    Pain Level (on 0 to 10 scale):  0  / 10   Medication Changes/New allergies or changes in medical history, any new surgeries or procedures? NO    If yes, update Summary List   Subjective Functional Status/Changes:  []  No changes reported     Pt reports that she has had blood in stools. Pt reports that she saw her doctor last week and they told her that it was caused by her Coumadin. Pt reports that she is undergoing a colonscopy next week for further investigation.           OBJECTIVE    22 min Therapeutic Exercise:  [x]  See flow sheet   Rationale:      increase ROM, increase strength, improve coordination, improve balance and increase proprioception to improve the patients ability to perform ADLs/IADs, functional mobility and gait safely and independently without increased pain/symptoms     8 min Therapeutic Activity: See flowsheet   Rationale: improve strength and balance in preparation for gait/stair negotiation to improve the patient's ability to navigate home and community safely and independently without increased pain/symptoms      13 min Neuromuscular Re-ed: See flowsheet   Rationale: improve balance to improve the patient's ability to perform ADLs/IADLs, functional mobility and gait safely and independently without increased pain/symptoms      During TE min Patient Education:  YES  Reviewed HEP   []  Progressed/Changed HEP based on:   Cont HEP     Other Objective/Functional Measures:    Exercises per flowsheet     Post Treatment Pain Level (on 0 to 10) scale:   0  / 10     ASSESSMENT    Assessment/Changes in Function:     Tolerated exercises without complaints     []  See Progress Note/Recertification   Patient will continue to benefit from skilled PT services to modify and progress therapeutic interventions, address functional mobility deficits, address ROM deficits, address strength deficits, analyze and address soft tissue restrictions, analyze and cue movement patterns, analyze and modify body mechanics/ergonomics, assess and modify postural abnormalities, address imbalance/dizziness and instruct in home and community integration to attain remaining goals. Progress toward goals / Updated goals:    Progressing toward goals: · Short Term Goals: To be accomplished in  2-4  weeks:  1. Patient will demonstrate compliance with HEP. - Reports compliance with HEP  2. Patient will score greater carrie or equal to 18/24 on DGI to indicate improved gait/decreased fall risk. 3. Patient will complete FTSST in less than or equal to 17\" to increase safety with transfers/decrease fall risk. · Long Term Goals: To be accomplished in  4-8  weeks:  1. Patient will demonstrate independence with HEP. 2. Patient will score greater than or equal to 20/24 on DGI to indicate improved gait/decreased fall risk. 3. Patient will complete FTSST in less than or equal to 15\" to increase safety with transfers/decrease fall risk.        PLAN    [x]  Upgrade activities as tolerated YES Continue plan of care   []  Discharge due to :    []  Other:      Therapist: Sonam Arthur PT    Date: 10/8/2020 Time: 11:48 AM     Future Appointments   Date Time Provider Margarita Wells   10/13/2020  5:00 PM Kourtney Oropeza PT [facility]   10/16/2020 11:00 AM Kourtney Oropeza PT [facility]   10/19/2020  9:00 AM [facility] FAST TRACK NURSE SHAKIR Dumont [address] [place] [address]   10/20/2020 11:00 AM Kourtney Oropeza PT [facility] [address]   10/23/2020 11:45 AM Dejuan Mclaughlin, PT [facility] [address]   10/28/2020 11:00 AM Dejuan Mclaughlin, PT [facility] [address]   10/30/2020  1:15 PM Dejuan Mclaughlin, PT [facility] [address]   12/14/2020  1:15 PM [facility] RADHA STEREO BX RM 1 [facility]   12/15/2020  2:00 PM [facility] RADHA STEREO BX RM 1 [facility]   1/5/2021 11:40 AM Minal Burton MD [facility] BS AMB

## 2020-10-13 ENCOUNTER — APPOINTMENT (OUTPATIENT)
Dept: PHYSICAL THERAPY | Age: 60
End: 2020-10-13
Payer: MEDICAID

## 2020-10-15 ENCOUNTER — APPOINTMENT (OUTPATIENT)
Dept: PHYSICAL THERAPY | Age: 60
End: 2020-10-15
Payer: MEDICAID

## 2020-10-16 ENCOUNTER — HOSPITAL ENCOUNTER (OUTPATIENT)
Dept: PHYSICAL THERAPY | Age: 60
Discharge: HOME | End: 2020-10-16
Payer: MEDICAID

## 2020-10-16 PROCEDURE — 97110 THERAPEUTIC EXERCISES: CPT

## 2020-10-16 PROCEDURE — 97530 THERAPEUTIC ACTIVITIES: CPT

## 2020-10-16 PROCEDURE — 97116 GAIT TRAINING THERAPY: CPT

## 2020-10-16 PROCEDURE — 97112 NEUROMUSCULAR REEDUCATION: CPT

## 2020-10-16 NOTE — PROGRESS NOTES
[address]  [facility] PHYSICAL THERAPY  [address] [place] [address] Fred Rodriguez, [address] - Phone: [phone]  Fax: [phone]  PROGRESS NOTE  Patient Name: Gulshan Castro : 1960   Treatment/Medical Diagnosis: Gait instability [R26.81]  CVA (cerebral vascular accident) [facility]) [I63.9]   Referral Source: Akua Salmeron MD     Date of Initial Visit: 2020 Attended Visits: 5 Missed Visits: 3     SUMMARY OF TREATMENT  Treatment has consisted of initial evaluation and 4 treatment sessions, which have included core/LE strengthening exercises, balance exercises, functional mobility and gait training and patient education (including HEP). CURRENT STATUS  Patient has progressed with exercises in clinic, and demonstrates compliance with HEP. FOTO has increased from 55/100 to 58/100, indicating improved function. Dynamic Gait Index has improved from 16/24 to 19/24, indicating improved gait and decreased fall risk with ambulation. 6-meter walk test = 9\", indicating gait speed of 0.67 m/s (limited community ambulation), with gait speed increased from 0.46 m/s. Five Time Sit to Stand Test decreased from 20\" to 13\", indicating improved transfer status. Static standing balance improved (see below) and LE strength improved (see below). Romber\"/30\"      Foam Stance: 30\"/30\"       Rail Stance: 30\"/11\"      Sharpened Romber\" left/30\" right      Single Leg Stance: 1\"B     Strength (MMT):  Hip L (1-5) R (1-5)   Hip Flexion 4 5   Hip Ext 4 5   Hip ABD 4 5   Hip ADD 4 5   Hip ER 4 5   Hip IR 4 5      Knee L (1-5) R (1-5)   Knee Flexion 4 5   Knee Extension 5 5   Ankle PF 5 5   Ankle DF 5 5   Other         Goal/Measure of Progress Goal Met? 1. Patient will demonstrate compliance with HEP. Status at last Eval: NA Current Status: Compliant with HEP yes   2. Patient will score greater carrie or equal to 18/24 on DGI to indicate improved gait/decreased fall risk.    Status at last Eval: DGI = 16/24 Current Status: DGI = 19/24 yes   3. Patient will complete FTSST in less than or equal to 17\" to increase safety with transfers/decrease fall risk. Status at last Eval: FTSST = 20\" Current Status: FTSST = 13\" yes     New Goals to be achieved in __2-4__  weeks:  1. Patient will demonstrate independence with HEP. 2.  Patient will score greater than or equal to 20/24 on DGI to indicate improved gait/decreased fall risk. 3.  Patient will maintain sharpened Romberg eyes open 20\" left to increase safety in challenging environments. RECOMMENDATIONS  Patient would benefit from continued skilled PT services to address decreased strength, decreased balance and impaired functional mobility/gait to improve the patient's ability to perform ADLs/IADLs, functional mobility and gait safely and independently without increased pain/symptoms. If you have any questions/comments please contact us directly at [phone]. Thank you for allowing us to assist in the care of your patient. Therapist Signature: Petey Nuno PT Date: 10/16/2020     Time: 11:26 AM   NOTE TO PHYSICIAN:  PLEASE COMPLETE THE ORDERS BELOW AND FAX TO   [facility] Physical Therapy: [phone]. If you are unable to process this request in 24 hours please contact our office: [phone].    ___ I have read the above report and request that my patient continue as recommended.   ___ I have read the above report and request that my patient continue therapy with the following changes/special instructions:_________________________________________________________   ___ I have read the above report and request that my patient be discharged from therapy.      Physician Signature:        Date:       Time:

## 2020-10-16 NOTE — PROGRESS NOTES
PHYSICAL THERAPY - DAILY TREATMENT NOTE    Patient Name: Yousif Hartman        Date: 10/16/2020  : 1960   YES Patient  Verified  Visit #:   5     Insurance: Payor: Cr Padilla / Plan: [facility]IL[facility] Center[facility] / Product Type: Managed Care Medicaid /      In time: 10:54 Out time: 11:36   Total Treatment Time: 42     Medicare/BCBS [place] Time Tracking (below)   Total Timed Codes (min):  NA 1:1 Treatment Time:  NA     TREATMENT AREA =  Gait instability [R26.81]  CVA (cerebral vascular accident) ([facility] [address] [I63.9]  SUBJECTIVE    Pain Level (on 0 to 10 scale):  0  / 10   Medication Changes/New allergies or changes in medical history, any new surgeries or procedures? NO    If yes, update Summary List   Subjective Functional Status/Changes:  []  No changes reported     Pt without complaints, reports compliance with HEP.           OBJECTIVE    18 min Therapeutic Exercise:  [x]  See flow sheet (NS, MMT, strengthening ex)   Rationale:      increase ROM, increase strength, improve coordination, improve balance and increase proprioception to improve the patients ability to perform ADLs/IADLs, functional mobility and gait safely and independently without increased pain/symptoms     8 min Therapeutic Activity: FOTO, FTSST   Rationale: assess ADL/IADL function and functional mobility to improve the patient's ability to perform ADLs/IADLs, functional mobility and gait safely and independently without increased pain/symptoms      8 min Neuromuscular Re-ed: EO/EC balance   Rationale: assess balance to improve the patient's ability to perform ADLs/IADLs, functional mobility and gait safely and independently without increased pain/symptoms    8 min Gait Training: FGA, 6-meter walk test   Rationale: assess gait to improve the patient's ability to ambulate safely and independently without increased pain/symptoms       During TE min Patient Education:  YES  Reviewed HEP   []  Progressed/Changed HEP based on:   Cont HEP     Other Objective/Functional Measures:    Exercises per flowsheet    FOTO = 58/100    FTSST = 13\"  6-meter walk test = 9\" (0.67 m/s, limited community ambulation)  DGI =         Romber\"/30\"      Foam Stance: 30\"/30\"       Rail Stance: 30\"/11\"      Sharpened Romber\" left/30\" right      Single Leg Stance: 1\"B    Strength (MMT):  Hip L (1-5) R (1-5)   Hip Flexion 4 5   Hip Ext 4 5   Hip ABD 4 5   Hip ADD 4 5   Hip ER 4 5   Hip IR 4 5      Knee L (1-5) R (1-5)   Knee Flexion 4 5   Knee Extension 5 5   Ankle PF 5 5   Ankle DF 5 5   Other              0  / 10     ASSESSMENT    Assessment/Changes in Function:     See progress note     [x]  See Progress Note/Recertification   Patient will continue to benefit from skilled PT services: see progress note   Progress toward goals / Updated goals:    See progress note     PLAN    [x]  Upgrade activities as tolerated YES Continue plan of care   []  Discharge due to :    []  Other:      Therapist: Alex Lebron PT    Date: 10/16/2020 Time: 10:56 AM     Future Appointments   Date Time Provider Margarita Wells   10/16/2020 11:00 AM Vignesh Avila PT [facility]   10/19/2020  9:00 AM [facility] FAST TRACK NURSE SHAKIR [address]   10/20/2020 11:00 AM Vignesh Avila PT [facility]   10/23/2020 11:45 AM Vignesh Avila PT [facility]   10/28/2020 11:00 AM Demarcus Cabrera [facility]   10/30/2020  1:15 PM Vignesh Avila PT [facility]   2020 11:30 AM [facility] FA[facility] TRACK [facility]IR [facility]   2020  1:15 PM [facility] RADHA STEREO BX RM 1 [facility]   12/15/2020  2:00 PM [facility] RADHA STEREO BX RM 1 [facility]   2021 11:40 AM Viral Viera MD [facility] BS AMB

## 2020-10-20 ENCOUNTER — APPOINTMENT (OUTPATIENT)
Dept: PHYSICAL THERAPY | Age: 60
End: 2020-10-20
Payer: MEDICAID

## 2020-10-22 ENCOUNTER — APPOINTMENT (OUTPATIENT)
Dept: PHYSICAL THERAPY | Age: 60
End: 2020-10-22
Payer: MEDICAID

## 2020-10-23 ENCOUNTER — HOSPITAL ENCOUNTER (OUTPATIENT)
Dept: PHYSICAL THERAPY | Age: 60
End: 2020-10-23
Payer: MEDICAID

## 2020-10-23 ENCOUNTER — APPOINTMENT (OUTPATIENT)
Dept: INFUSION THERAPY | Age: 60
End: 2020-10-23

## 2020-10-27 ENCOUNTER — APPOINTMENT (OUTPATIENT)
Dept: PHYSICAL THERAPY | Age: 60
End: 2020-10-27
Payer: MEDICAID

## 2020-10-28 ENCOUNTER — HOSPITAL ENCOUNTER (OUTPATIENT)
Dept: PHYSICAL THERAPY | Age: 60
Discharge: HOME | End: 2020-10-28
Payer: MEDICAID

## 2020-10-28 PROCEDURE — 97116 GAIT TRAINING THERAPY: CPT

## 2020-10-28 PROCEDURE — 97530 THERAPEUTIC ACTIVITIES: CPT

## 2020-10-28 PROCEDURE — 97110 THERAPEUTIC EXERCISES: CPT
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
## 2020-10-29 ENCOUNTER — APPOINTMENT (OUTPATIENT)
Dept: PHYSICAL THERAPY | Age: 60
End: 2020-10-29
Payer: MEDICAID

## 2020-10-30 ENCOUNTER — APPOINTMENT (OUTPATIENT)
Dept: PHYSICAL THERAPY | Age: 60
End: 2020-10-30
Payer: MEDICAID

## 2020-11-02 ENCOUNTER — HOSPITAL ENCOUNTER (OUTPATIENT)
Dept: INFUSION THERAPY | Age: 60
End: 2020-11-02

## 2020-11-03 ENCOUNTER — HOSPITAL ENCOUNTER (OUTPATIENT)
Dept: PHYSICAL THERAPY | Age: 60
Discharge: HOME | End: 2020-11-03
Payer: MEDICAID

## 2020-11-03 ENCOUNTER — HOSPITAL ENCOUNTER (OUTPATIENT)
Dept: INFUSION THERAPY | Age: 60
Discharge: HOME | End: 2020-11-03
Payer: MEDICAID

## 2020-11-03 VITALS
HEART RATE: 64 BPM | RESPIRATION RATE: 18 BRPM | DIASTOLIC BLOOD PRESSURE: 66 MMHG | TEMPERATURE: 98 F | OXYGEN SATURATION: 99 % | SYSTOLIC BLOOD PRESSURE: 133 MMHG

## 2020-11-03 DIAGNOSIS — J45.50 SEVERE PERSISTENT ASTHMA, UNSPECIFIED WHETHER COMPLICATED: Primary | ICD-10-CM

## 2020-11-03 PROCEDURE — 97112 NEUROMUSCULAR REEDUCATION: CPT

## 2020-11-03 PROCEDURE — 97110 THERAPEUTIC EXERCISES: CPT

## 2020-11-03 PROCEDURE — 74011250636 HC RX REV CODE- 250/636: Performed by: NURSE PRACTITIONER

## 2020-11-03 PROCEDURE — 96372 THER/PROPH/DIAG INJ SC/IM: CPT

## 2020-11-03 PROCEDURE — 97530 THERAPEUTIC ACTIVITIES: CPT

## 2020-11-03 RX ADMIN — OMALIZUMAB 75 MG: 75 INJECTION, SOLUTION SUBCUTANEOUS at 14:00

## 2020-11-03 RX ADMIN — OMALIZUMAB 150 MG: 150 INJECTION, SOLUTION SUBCUTANEOUS at 14:00

## 2020-11-03 NOTE — PROGRESS NOTES
PHYSICAL THERAPY - DAILY TREATMENT NOTE    Patient Name: Brenda Gracia        Date: 11/3/2020  : 1960   YES Patient  Verified  Visit #:   7     Insurance: Payor: Gracia Irwin / [facility] Product Type: Managed Care Medicaid /      In time: 11:47 Out time: 12:37   Total Treatment Time: 50     Medicare/BCBS [place] Time Tracking (below)   Total Timed Codes (min):  40 1:1 Treatment Time:  40     TREATMENT AREA =  Gait instability [R26.81]  CVA (cerebral vascular accident) ([facility] [address] [I63.9]    SUBJECTIVE    Pain Level (on 0 to 10 scale):  6  / 10 left hip   Medication Changes/New allergies or changes in medical history, any new surgeries or procedures? NO    If yes, update Summary List   Subjective Functional Status/Changes:  []  No changes reported     Pt reports her sciatica is still a little flared up. Pt states no LOB or falls recently.         OBJECTIVE    Modalities Rationale: decrease pain and increase tissue extensibility to improve patient's ability to perform ADL's with less pain    min [] Estim, type/location:                                      []  att     []  unatt     []  w/US     []  w/ice    []  w/heat    min []  Mechanical Traction: type/lbs                   []  pro   []  sup   []  int   []  cont    []  before manual    []  after manual    min []  Ultrasound, settings/location:      min []  Iontophoresis w/ dexamethasone, location:                                               []  take home patch       []  in clinic   10 min []  Ice     [x]  Heat    location/position: MHP to left hip, pt Supine with LE wedge    min []  Vasopneumatic Device, press/temp:     min []  Other:    [x] Skin assessment post-treatment (if applicable):    [x]  intact    []  redness- no adverse reaction     []redness  adverse reaction:      10 min Therapeutic Exercise:  [x]  See flow sheet   Rationale:  increase ROM, increase strength, improve coordination, improve balance and increase proprioception to promote increased functional mobility and increased activity tolerance with ADL's. 15 min Therapeutic Activity: sit<>stand  tap ups   step ups    Rationale:   increase ROM, increase strength, improve coordination, improve balance and increase proprioception to promote increased functional mobility and increased activity tolerance with ADL's. 15 min Neuromuscular Re-ed: Static standing balance exercises with EO/EC on compliant and non-compliant surfaces. Rationale:     improve coordination, improve balance and increase proprioception to improve the patients ability to perform ADLs, transfers and gait safely and independently. min Patient Education:  YES  Reviewed HEP   []  Progressed/Changed HEP based on: Other Objective/Functional Measures:    EO MSR tip of GT 30\" B   EC MSR bun 19\" left, 20\" right   EO MSR bun on Airex 30\" B   EC narrow stance on Airex 30\"     Pt c/o increased LBP after performing static standing balance ex. Pt required short seated rest breaks between standing therapeutic activity  secondary to fatigue and LBP. Pt unsteady with tap ups, utilizes intermittent proprioceptive touch (forearms on parallel bars) for balance. Pt demo's ms weakness left quad when cued for controlled eccentric and concentric quad control with step ups.       Post Treatment Pain Level (on 0 to 10) scale:   0  / 10     ASSESSMENT    Assessment/Changes in Function:     Pt reports no recent falls or LOB with ADL's.      []  See Progress Note/Recertification   Patient will continue to benefit from skilled PT services to modify and progress therapeutic interventions, address functional mobility deficits, address ROM deficits, address strength deficits, analyze and address soft tissue restrictions, analyze and cue movement patterns, analyze and modify body mechanics/ergonomics, assess and modify postural abnormalities, address imbalance/dizziness and instruct in home and community integration to attain remaining goals. Progress toward goals / Updated goals:    1. Patient will demonstrate independence with HEP. 2.  Patient will score greater than or equal to 20/24 on DGI to indicate improved gait/decreased fall risk. 3.  Patient will maintain sharpened Romberg eyes open 20\" left to increase safety in challenging environments.  EO MSR tip of GT 30\" B           PLAN    []  Upgrade activities as tolerated YES Continue plan of care   []  Discharge due to :    []  Other:      Therapist: Di Franco PTA    Date: 11/3/2020 Time: 12:32 PM     Future Appointments   Date Time Provider Margarita Wells   11/3/2020  2:00 PM [facility] FAST TRACK [place] [address]   11/5/2020 11:45 AM Trueberenice Abdalla, PT [facility]   11/10/2020  1:15 PM Trueberenice Abdalla, PT [facility]   11/12/2020 11:45 AM Trueberenice Abdalla, PT [facility]   11/17/2020 11:45 AM Matt Essex [facility]   11/17/2020  2:30 PM [facility] FAST TRACK NURSE [facility]   11/19/2020 11:45 AM Trueberenice Abdalla, PT [facility]   11/24/2020 11:45 AM Matt Essex [facility]   12/14/2020  1:15 PM [facility] RADHA STEREO BX RM 1 [facility]   12/15/2020  2:00 PM [facility] RADHA STEREO BX RM 1 [facility]   1/5/2021 11:40 AM [facility]

## 2020-11-03 NOTE — PROGRESS NOTES
[facility] Progress Note    Date: November 3, 2020    Name: Feli Pitts    MRN: [id number]         : 1960     Xolair Injection every 2 weeks    Ms. Julia Miller arrived to [facility] at [phone]. Ms. Julia Miller was assessed and education was provided. Ms. Ozuna [facility] vitals were reviewed. Visit Vitals  /66 (BP 1 Location: Left arm, BP Patient Position: Sitting)   Pulse 64   Temp 98 °F (36.7 °C)   Resp 18   SpO2 99%     Xolair 150 mg was administered as ordered SQ in patient's left upper arm. Xolair 75 mg was administered as ordered SQ in patient's right upper arm. Total 225 mg    Ms. Mahmood tolerated well without complaints. Patient armband removed and shredded. Ms. Julia Miller was discharged from [place] [address] in stable condition at 1355. She is to return on 2020 at 1430 for her next appointment.     Anuradha Ulloa RN  November 3, 2020  1355

## 2020-11-05 ENCOUNTER — HOSPITAL ENCOUNTER (OUTPATIENT)
Dept: PHYSICAL THERAPY | Age: 60
Discharge: HOME | End: 2020-11-05
Payer: MEDICAID

## 2020-11-05 PROCEDURE — 97530 THERAPEUTIC ACTIVITIES: CPT

## 2020-11-05 PROCEDURE — 97116 GAIT TRAINING THERAPY: CPT

## 2020-11-05 PROCEDURE — 97110 THERAPEUTIC EXERCISES: CPT

## 2020-11-05 NOTE — PROGRESS NOTES
PHYSICAL THERAPY - DAILY TREATMENT NOTE    Patient Name: Juliane Chamberlain        Date: 2020  : 1960   YES Patient  Verified  Visit #:     Insurance: Payor: Adan Liu / [facility]ILP [facility] / Product Type: Managed Care Medicaid /      In time: 11:48 Out time: 12:26   Total Treatment Time: 38     Medicare/BCBS [place] Time Tracking (below)   Total Timed Codes (min):  38 1:1 Treatment Time:  38     TREATMENT AREA =  Gait instability [R26.81]  CVA (cerebral vascular accident) ([facility] [address] [I63.9]  SUBJECTIVE    Pain Level (on 0 to 10 scale):  6  / 10   Medication Changes/New allergies or changes in medical history, any new surgeries or procedures? NO    If yes, update Summary List   Subjective Functional Status/Changes:  []  No changes reported     Pt reports left hip pain, which she attributes to sciatica; reports that she took Gabapentin.         OBJECTIVE    22 min Therapeutic Exercise:  [x]  See flow sheet   Rationale:      increase ROM, increase strength, improve coordination, improve balance and increase proprioception to improve the patients ability to perform ADLs/IADLs, functional mobility and gait safely and independently without increased pain/symptoms     8 min Therapeutic Activity: NS for reciprocal UE/LE movement, seated piriformis stretch, sit to stand   Rationale: improve functional mobility to improve the patient's ability to perform ADLs/IADLs, functional mobility and gait safely and independently without increased pain/symptoms      8 min Gait Training: Gait with SPC, 180' with S to CG and verbal cues for sequence   Rationale: improve gait to improve the patient's ability to perform ADLs/IADLs, functional mobility and gait safely and independently without increased pain/symptoms       During TE/TA min Patient Education:  YES  Reviewed HEP   []  Progressed/Changed HEP based on:   Cont HEP     Other Objective/Functional Measures:    Exercises per flowsheet Post Treatment Pain Level (on 0 to 10) scale:   0  / 10     ASSESSMENT    Assessment/Changes in Function:     Tolerated exercises without c/o increased pain     []  See Progress Note/Recertification   Patient will continue to benefit from skilled PT services to modify and progress therapeutic interventions, address functional mobility deficits, address ROM deficits, address strength deficits, analyze and address soft tissue restrictions, analyze and cue movement patterns, analyze and modify body mechanics/ergonomics, assess and modify postural abnormalities, address imbalance/dizziness and instruct in home and community integration to attain remaining goals. Progress toward goals / Updated goals:    Progressing toward goals:  1.  Patient will demonstrate independence with HEP. 2.  Patient will score greater than or equal to 20/24 on DGI to indicate improved gait/decreased fall risk. 3.  Patient will maintain sharpened Romberg eyes open 20\" left to increase safety in challenging environments.             PLAN    [x]  Upgrade activities as tolerated YES Continue plan of care   []  Discharge due to :    []  Other:      Therapist: Becky Avila PT    Date: 11/5/2020 Time: 11:51 AM     Future Appointments   Date Time Provider Margarita Wells   11/10/2020  1:15 PM Reyes Hammonds PT [facility]   11/12/2020 11:45 AM Reyes Hammonds PT [facility]   11/17/2020 11:45 AM Chetan Jones [facility]   11/17/2020  2:30 PM [facility] FAST TRACK NURSE [facility] [address] [facility]   11/19/2020 11:45 AM Reyes Hammonds PT [facility]   11/24/2020 11:45 AM Chetan Jones [facility]   12/14/2020  1:15 PM [facility] [address] RADHA STEREO BX RM 1 [facility]   12/15/2020  2:00 PM [facility] [address] RADHA STEREO BX RM 1 [facility]   1/5/2021 11:40 AM [facility]

## 2020-11-10 ENCOUNTER — HOSPITAL ENCOUNTER (OUTPATIENT)
Dept: PHYSICAL THERAPY | Age: 60
Discharge: HOME | End: 2020-11-10
Payer: MEDICAID

## 2020-11-10 PROCEDURE — 97530 THERAPEUTIC ACTIVITIES: CPT

## 2020-11-10 PROCEDURE — 97112 NEUROMUSCULAR REEDUCATION: CPT

## 2020-11-10 PROCEDURE — 97110 THERAPEUTIC EXERCISES: CPT

## 2020-11-10 NOTE — PROGRESS NOTES
PHYSICAL THERAPY - DAILY TREATMENT NOTE    Patient Name: Levon Perez        Date: 11/10/2020  : 1960   YES Patient  Verified  Visit #:     Insurance: Payor: rBeezy Fernandes / [facility] CELINE [facility]ILP [facility] / Product Type: Managed Care Medicaid /      In time: 1:21 Out time: 2:00   Total Treatment Time: 39     Medicare/BCBS [place] Time Tracking (below)   Total Timed Codes (min):  39 1:1 Treatment Time:  39     TREATMENT AREA =  Gait instability [R26.81]  CVA (cerebral vascular accident) ([facility] [address] [I63.9]  SUBJECTIVE    Pain Level (on 0 to 10 scale):  6  / 10   Medication Changes/New allergies or changes in medical history, any new surgeries or procedures? NO    If yes, update Summary List   Subjective Functional Status/Changes:  []  No changes reported     Pt reports 6/10 left hip pain. Pt reports that she is still having issues with sciatica. Pt reports that she just saw her doctor, who told her to get an over-the-counter pain cream.  Pt reports that she has used the cream in the past.  Pt reports that overall her symptoms are improving. Pt reports that she was able to stand with seated rest breaks as need while cooking over the weekend.        OBJECTIVE    22 min Therapeutic Exercise:  [x]  See flow sheet   Rationale:      increase ROM, increase strength, improve coordination, improve balance and increase proprioception to improve the patients ability to perform ADLs/IADLs, functional mobility and gait safely and independently without increased pain/symptoms     9 min Therapeutic Activity: NS for reciprocal UE/LE movement, seated piriformis stretch, sit to stand from mat   Rationale: improve functional mobility to improve the patient's ability to perform ADLs/IADLs, functional mobility and gait safely and independently without increased pain/symptoms      8 min Neuromuscular Re-ed:  EO/EC balance   Rationale: improve balance to improve the patient's ability to perform ADLs/IADLs, functional mobility and gait safely and independently without increased pain/symptoms      During TE/TA/NM min Patient Education:  YES  Reviewed HEP   [x]  Progressed/Changed HEP based on:   Cont HEP; progressed EO/EC balance (copy in chart)     Other Objective/Functional Measures:    Exercises per flowsheet     Post Treatment Pain Level (on 0 to 10) scale:   4 / 10     ASSESSMENT    Assessment/Changes in Function:     Tolerated exercises without minimal c/o increased pain     []  See Progress Note/Recertification   Patient will continue to benefit from skilled PT services to modify and progress therapeutic interventions, address functional mobility deficits, address ROM deficits, address strength deficits, analyze and address soft tissue restrictions, analyze and cue movement patterns, analyze and modify body mechanics/ergonomics, assess and modify postural abnormalities, address imbalance/dizziness and instruct in home and community integration to attain remaining goals. Progress toward goals / Updated goals:    Progressing toward goals:  1.  Patient will demonstrate independence with HEP. - Compliant with HEP   2.  Patient will score greater than or equal to 20/24 on DGI to indicate improved gait/decreased fall risk.    3.  Patient will maintain sharpened Romberg eyes open 20\" left to increase safety in challenging environments.  - MET (30\" B)         PLAN    []  Upgrade activities as tolerated YES Continue plan of care   []  Discharge due to :    []  Other:      Therapist: Brea Haile, PT    Date: 11/10/2020 Time: 1:24 PM     Future Appointments   Date Time Provider Margarita Wells   11/12/2020 11:45 AM Mely Mantilla PT [facility]   11/17/2020 11:45 AM Milena Helton [facility]   11/17/2020  2:30 PM [facility] FAST TRACK NURSE [facility]   11/19/2020 11:45 AM Mely Mantilla PT [facility]   11/24/2020 11:45 AM Alexus Munoz [facility]   12/14/2020  1:15 PM [facility] RADHA STEREO BX RM 1 [facility]   12/15/2020  2:00 PM [facility]   1/5/2021 11:40 AM [facility]

## 2020-11-12 ENCOUNTER — APPOINTMENT (OUTPATIENT)
Dept: PHYSICAL THERAPY | Age: 60
End: 2020-11-12
Payer: MEDICAID

## 2020-11-16 ENCOUNTER — APPOINTMENT (OUTPATIENT)
Dept: INFUSION THERAPY | Age: 60
End: 2020-11-16

## 2020-11-19 ENCOUNTER — APPOINTMENT (OUTPATIENT)
Dept: PHYSICAL THERAPY | Age: 60
End: 2020-11-19
Payer: MEDICAID

## 2020-11-30 ENCOUNTER — HOSPITAL ENCOUNTER (OUTPATIENT)
Dept: PHYSICAL THERAPY | Age: 60
Discharge: HOME | End: 2020-11-30
Payer: MEDICAID

## 2020-11-30 PROCEDURE — 97110 THERAPEUTIC EXERCISES: CPT

## 2020-11-30 NOTE — PROGRESS NOTES
[address] [facility] PHYSICAL THERAPY  [address] [facility] [address], [address] - Phone: [phone]  Fax: [phone] SUMMARY  Patient Name: Danielle Seaman : 1960   Treatment/Medical Diagnosis: Gait instability [R26.81]  CVA (cerebral vascular accident) [facility]) [I63.9]   Referral Source: MD Adrian Paris MD     Date of Initial Visit: 9/15/2020 Attended Visits: 10 Missed Visits: 9 (7 cancels and 2 no shows)     SUMMARY OF TREATMENT  Treatment has consisted of LE strengthening and static/dynamic balance training, patient education, and home exercise program.  CURRENT STATUS  Patient is happy with progress. States she began walking with a single point cane (SPC) 1-2 weeks ago. She denies any falls but does reports feelings of imbalance initially upon standing. She denies any reports of imbalance while walking with the [facility]. She reports compliance with HEP and requests discharge from physical therapy at this time with HEP. Left LE strength is slowly improving. Her Dynamic Gait Index (DGI) score is  with SPC; her last DGI score was  but performed with a walker. Strength (MMT):  Hip L (1-5) R (1-5)   Hip Flexion 4+ 5   Hip Ext 4+ 5   Hip ABD 4 5   Hip ADD 4+ 5   Hip ER       Hip IR          Knee L (1-5) R (1-5)   Knee Flexion 4 5   Knee Extension 5 5   Ankle PF       Ankle DF 5 5   Other          Rail Stance (EO/EC): 30\"/7\"      Sharpened Romberg (eyes open): 17\" left/30\" right (2 attempts)      Single Leg Stance: 2\"B      DGI: with SPC  Five Times Sit to Stand: 13\"      Goal/Measure of Progress Goal Met? 1. Patient will demonstrate independence with HEP. Status at last Eval: Compliant with HEP Current Status: independent with HEP yes   2. Patient will score greater than or equal to 20/24 on DGI to indicate improved gait/decreased fall risk.    Status at last Eval:  (with walker) Current Status:  (with Single point cane) no   3. Patient will maintain sharpened Romberg eyes open 20\" left to increase safety in challenging environments. Status at last Eval: Sharpened Romber\" left/30\" right Current Status: Sharpened Romberg (eyes open): 17\" left/30\" right (2 attempts) progressing       RECOMMENDATIONS  Discontinue therapy. Progressing towards or have reached established goals. If you have any questions/comments please contact us directly at [phone]. Thank you for allowing us to assist in the care of your patient. Therapist Signature: Aneta Romano PT Date: 2020     Time: 2:10 PM     NOTE TO PHYSICIAN:  PLEASE COMPLETE THE ORDERS BELOW AND FAX TO   [facility] Physical Therapy: [phone]. If you are unable to process this request in 24 hours please contact our office: [phone].    ___ I have read the above report and request that my patient be discharged from therapy.      Physician Signature:        Date:       Time:

## 2020-11-30 NOTE — PROGRESS NOTES
PHYSICAL THERAPY - DAILY TREATMENT NOTE    Patient Name: Alvaro Rodriguez        Date: 2020  : 1960   YES Patient  Verified  Visit #:   10   of   9-13  Insurance: Payor: Ayaz Rodriguez / [facility] C[facility]INE [facility]ILP [facility] / Product Type: Managed Care Medicaid /      In time: 1:16 Out time: 2:04   Total Treatment Time: 48     Medicare/BCBS [place] Time Tracking (below)   Total Timed Codes (min):  48 1:1 Treatment Time:  48     TREATMENT AREA = Gait instability [R26.81]  CVA (cerebral vascular accident) ([facility] [address] [I63.9]    SUBJECTIVE    Pain Level (on 0 to 10 scale):  0  / 10   Medication Changes/New allergies or changes in medical history, any new surgeries or procedures?    no    If yes, update Summary List   Subjective Functional Status/Changes:  []  No changes reported     States she is able to go grocery shopping by herself now and is able to cook. States she was having left ankle and foot pain. States she started using a SPC about 1-2 weeks ago. States the MD told her that her foot wasn't broken. MD told her it was the way she was walking that hurt her foot. States he told her to wrap left ankle/foot with Ace bandage. States she has done that and it has alleviated pain. States she feels a little off balance when she initially stands up. She reports compliance with HEP and requests discharge at this time. OBJECTIVE    48 min Therapeutic Exercise:  [x]  See flow sheet   Rationale:      increase ROM, increase strength, improve coordination, improve balance and increase proprioception to improve the patients ability to perform ADL's, gait, and functional mobility with increased safety and independence.       min Patient Education:  YES  Reviewed HEP   []  Progressed/Changed HEP based on:   Cont HEP     Other Objective/Functional Measures:    Strength (MMT):  Hip L (1-5) R (1-5)   Hip Flexion 4+ 5   Hip Ext 4+ 5   Hip ABD 4 5   Hip ADD 4+ 5   Hip ER     Hip IR        Knee L (1-5) R (1-5) Knee Flexion 4 5   Knee Extension 5 5   Ankle PF     Ankle DF 5 5   Other         Rail Stance (EO/EC): 30\"/7\"      Sharpened Romberg (eyes open): 17\" left/30\" right (2 attempts)      Single Leg Stance: 2\"B     DGI:17/24 with SPC  Five Times Sit to Stand: 13\"     Post Treatment Pain Level (on 0 to 10) scale:   0  / 10     ASSESSMENT    Assessment/Changes in Function:     See DC note to MD     []  See Progress Note/Recertification      Progress toward goals / Updated goals:    See DC note to MD     PLAN    []  Upgrade activities as tolerated YES Continue plan of care   [x]  Discharge due to : Patient request   []  Other:      Therapist: Antonio Strong PT    Date: 11/30/2020 Time: 7:20 AM     Future Appointments   Date Time Provider Margarita Wells   11/30/2020  1:15 PM Yanci Samaniego PT [facility]   12/1/2020  2:30 PM [facility] FAST TRACK NURSE [address]   12/15/2020 11:30  E Maribell [address]   12/15/2020  2:00 PM [facility]   12/29/2020  2:30 PM [facility] FAST TRACK NURSE [address]   1/5/2021 11:40 AM Kiley Tony MD [facility] AMB

## 2020-12-01 ENCOUNTER — HOSPITAL ENCOUNTER (OUTPATIENT)
Dept: INFUSION THERAPY | Age: 60
Discharge: HOME | End: 2020-12-01
Payer: MEDICAID

## 2020-12-01 VITALS
OXYGEN SATURATION: 99 % | DIASTOLIC BLOOD PRESSURE: 57 MMHG | SYSTOLIC BLOOD PRESSURE: 140 MMHG | RESPIRATION RATE: 18 BRPM | TEMPERATURE: 98.8 F | HEART RATE: 67 BPM

## 2020-12-01 DIAGNOSIS — J45.50 SEVERE PERSISTENT ASTHMA, UNSPECIFIED WHETHER COMPLICATED: Primary | ICD-10-CM

## 2020-12-01 PROCEDURE — 96372 THER/PROPH/DIAG INJ SC/IM: CPT

## 2020-12-01 PROCEDURE — 74011250636 HC RX REV CODE- 250/636: Performed by: INTERNAL MEDICINE

## 2020-12-01 RX ADMIN — OMALIZUMAB 150 MG: 150 INJECTION, SOLUTION SUBCUTANEOUS at 15:28

## 2020-12-01 RX ADMIN — OMALIZUMAB 75 MG: 75 INJECTION, SOLUTION SUBCUTANEOUS at 15:28

## 2020-12-10 ENCOUNTER — HOSPITAL ENCOUNTER (EMERGENCY)
Age: 60
Discharge: HOME | End: 2020-12-10
Attending: EMERGENCY MEDICINE | Admitting: EMERGENCY MEDICINE
Payer: MEDICAID

## 2020-12-10 VITALS
DIASTOLIC BLOOD PRESSURE: 72 MMHG | RESPIRATION RATE: 18 BRPM | WEIGHT: 210 LBS | SYSTOLIC BLOOD PRESSURE: 144 MMHG | BODY MASS INDEX: 38.41 KG/M2 | TEMPERATURE: 97.2 F | HEART RATE: 63 BPM | OXYGEN SATURATION: 98 %

## 2020-12-10 DIAGNOSIS — R04.0 EPISTAXIS: Primary | ICD-10-CM

## 2020-12-10 DIAGNOSIS — D64.9 ANEMIA, UNSPECIFIED TYPE: ICD-10-CM

## 2020-12-10 LAB
BASOPHILS # BLD: 0 K/UL (ref 0–0.1)
BASOPHILS NFR BLD: 0 % (ref 0–2)
DIFFERENTIAL METHOD BLD: ABNORMAL
EOSINOPHIL # BLD: 0.1 K/UL (ref 0–0.4)
EOSINOPHIL NFR BLD: 2 % (ref 0–5)
ERYTHROCYTE [DISTWIDTH] IN BLOOD BY AUTOMATED COUNT: 12.9 % (ref 11.6–14.5)
HCT VFR BLD AUTO: 30.6 % (ref 35–45)
HGB BLD-MCNC: 9.6 G/DL (ref 12–16)
INR PPP: 1.1 (ref 0.8–1.2)
LYMPHOCYTES # BLD: 1.2 K/UL (ref 0.9–3.6)
LYMPHOCYTES NFR BLD: 19 % (ref 21–52)
MCH RBC QN AUTO: 28.7 PG (ref 24–34)
MCHC RBC AUTO-ENTMCNC: 31.4 G/DL (ref 31–37)
MCV RBC AUTO: 91.6 FL (ref 74–97)
MONOCYTES # BLD: 0.6 K/UL (ref 0.05–1.2)
MONOCYTES NFR BLD: 9 % (ref 3–10)
NEUTS SEG # BLD: 4.5 K/UL (ref 1.8–8)
NEUTS SEG NFR BLD: 70 % (ref 40–73)
PLATELET # BLD AUTO: 329 K/UL (ref 135–420)
PMV BLD AUTO: 10.3 FL (ref 9.2–11.8)
PROTHROMBIN TIME: 13.7 SEC (ref 11.5–15.2)
RBC # BLD AUTO: 3.34 M/UL (ref 4.2–5.3)
WBC # BLD AUTO: 6.5 K/UL (ref 4.6–13.2)

## 2020-12-10 PROCEDURE — 99282 EMERGENCY DEPT VISIT SF MDM: CPT

## 2020-12-10 PROCEDURE — 85610 PROTHROMBIN TIME: CPT

## 2020-12-10 PROCEDURE — 85025 COMPLETE CBC W/AUTO DIFF WBC: CPT

## 2020-12-10 RX ORDER — OXYMETAZOLINE HCL 0.05 %
2 SPRAY, NON-AEROSOL (ML) NASAL 2 TIMES DAILY
Qty: 1 EACH | Refills: 0 | Status: SHIPPED | OUTPATIENT
Start: 2020-12-10 | End: 2020-12-13

## 2020-12-10 NOTE — ED TRIAGE NOTES
Pt arrives through triage for c/o nose bleed since Tuesday. States she is having blood clots coming out as well. Can taste blood in the back of her throat. Stopped taking blood thinners at the beginning of the month. Denies any injury or trauma.

## 2020-12-10 NOTE — ED PROVIDER NOTES
EMERGENCY DEPARTMENT HISTORY AND PHYSICAL EXAM    1:16 PM      Date: 12/10/2020  Patient Name: Gulshan Castro    History of Presenting Illness     Chief Complaint   Patient presents with    Epistaxis         History Provided By: Patient    Additional History (Context): Gulshan Castro is a 63 y.o. female with hx of CVA and other noted PMH who presents with complaint of intermittent epistaxis x4 days. Patient notes blood is coming from left nare. Patient notes bleeding reduces with pressure. Patient denies head injury, trauma, fever or chills, dizziness, chest pain, dyspnea, n/v.  Denies blood thinner use. \"I was taken off blood thinners a month ago\". PCP: Paulino Hi MD    Current Outpatient Medications   Medication Sig Dispense Refill    oxymetazoline (Afrin, oxymetazoline,) 0.05 % nasal spray 2 Sprays by Left Nostril route two (2) times a day for 3 days. Do not take for more than 3 days. 1 Each 0    clopidogreL (PLAVIX) 75 mg tab Take 1 Tab by mouth daily (with dinner). Indications: prevention for a blood clot going to the brain 30 Tab 1    tiotropium bromide (Spiriva Respimat) 2.5 mcg/actuation inhaler Take 2 Puffs by inhalation daily.  valsartan-hydroCHLOROthiazide (Diovan HCT) 320-12.5 mg per tablet Take 1 Tab by mouth daily.  omalizumab (Xolair) 150 mg/mL syrg 150 mg by SubCUTAneous route every fourteen (14) days.  ipratropium (ATROVENT) 42 mcg (0.06 %) nasal spray 2 Sprays by Both Nostrils route three (3) times daily.  gabapentin (NEURONTIN) 100 mg capsule Take 100 mg by mouth every eight (8) hours as needed for Pain.  fenofibrate nanocrystallized (Tricor) 145 mg tablet Take 145 mg by mouth daily.  EPINEPHrine (EPIPEN) 0.3 mg/0.3 mL injection 0.3 mg by IntraMUSCular route once as needed for Allergic Response.  cholecalciferol (VITAMIN D3) (2,000 UNITS /50 MCG) cap capsule Take 2,000 Units by mouth daily.       amLODIPine (NORVASC) 10 mg tablet Take 10 mg by mouth daily.  albuterol sulfate (PROVENTIL;VENTOLIN) 2.5 mg/0.5 mL nebu nebulizer solution 2.5 mg by Nebulization route every four (4) hours as needed for Wheezing.  albuterol (ProAir HFA) 90 mcg/actuation inhaler Take 1-2 Puffs by inhalation every four (4) hours as needed for Wheezing.  aspirin 81 mg chewable tablet Take 1 Tab by mouth daily (with breakfast). Indications: stroke prevention 30 Tab 0    atorvastatin (LIPITOR) 40 mg tablet Take 1 Tab by mouth daily. Indications: excessive fat in the blood, stroke prevention 30 Tab 0    docusate sodium (COLACE) 100 mg capsule Take 1 Cap by mouth daily (after breakfast). Indications: constipation 30 Cap 0    metFORMIN ER (GLUCOPHAGE XR) 500 mg tablet Take 1 Tab by mouth daily (with dinner). Indications: type 2 diabetes mellitus 30 Tab 0    senna-docusate (PERICOLACE) 8.6-50 mg per tablet Take 2 Tabs by mouth daily (after dinner). Indications: constipation 60 Tab 0    minoxidiL (LONITEN) 2.5 mg tablet Take 2 Tabs by mouth every twelve (12) hours. Indications: high blood pressure 120 Tab 0    insulin glargine (Lantus Solostar U-100 Insulin) 100 unit/mL (3 mL) inpn 22 Units by SubCUTAneous route nightly. Indications: type 2 diabetes mellitus 1 Adjustable Dose Pre-filled Pen Syringe 0    Insulin Needles, Disposable, 31 gauge x 3/16\" ndle As directed  Indications: Type 2 diabetes mellitus 30 Pen Needle 0    montelukast (SINGULAIR) 10 mg tablet Take 10 mg by mouth every evening.  ferrous sulfate 325 mg (65 mg iron) tablet Take 325 mg by mouth Daily (before breakfast).  omeprazole (PRILOSEC) 20 mg capsule Take 20 mg by mouth daily.          Past History     Past Medical History:  Past Medical History:   Diagnosis Date    Acute ischemic stroke ([email or web address] 8/21/2020    Acute Ischemic Stroke (late acute/early subacute infarcts in the right centrum semiovale extending to the superior frontal lobe cortex) with residual left hemiparesis    Bipolar disorder ([facility]     Chronic obstructive pulmonary disease (COPD) ([facility]     COVID-19 ruled out by laboratory testing 8/26/2020    SARS-CoV-2 ([facility]ott m2000) (collected 8/25/2020, resulted 8/26/2020): Not detected     Current use of aspirin 8/24/2020    Depression     Gastroesophageal reflux disease     History of colon polyps     Hypertensive heart disease without heart failure     2D echocardiogram (8/22/2020) showed EF 65%; mild left ventricular hypertrophy; normal diastolic function; negative bubble study at rest and after Valsalva    Iron deficiency anemia     Left hemiparesis ([facility] 8/21/2020    Menopause     Obesity, Class II, BMI 35-39.9     Obstructive sleep apnea on CPAP     On clopidogrel therapy 8/24/2020    On statin therapy due to risk of future cardiovascular event 8/24/2020    On Atorvastatin    Pure hypercholesterolemia 8/19/2020    Lipid profile (8/19/2020) showed , , HDL 50,     Sciatica of left side     Seasonal allergic rhinitis     Severe persistent asthma 11/07/2019    Type 2 diabetes mellitus, with long-term current use of insulin (HCC)     HbA1c (8/19/2020) = 8.1; HbA1c (8/20/2020) = 8.0       Past Surgical History:  Past Surgical History:   Procedure Laterality Date    HX HYSTERECTOMY  2000    Fibroids       Family History:  Family History   Problem Relation Age of Onset    Heart Attack Father     Breast Cancer Maternal Aunt     Heart Disease Mother     Stroke Neg Hx        Social History:  Social History     Tobacco Use    Smoking status: Never Smoker    Smokeless tobacco: Never Used   Substance Use Topics    Alcohol use: Yes     Comment: occ    Drug use: Yes     Types: Marijuana     Comment: \"every now and then. \"        Allergies: Allergies   Allergen Reactions    Grass Pollen Anaphylaxis     Environmental allergies Unsure what  triggers episodes. Intubated for episodes in 2006 and 2008.     Metformin Nausea Only     Pt states stomach cramps     Other Plant, Animal, Environmental Shortness of Breath    Pneumococcal Vaccine Other (comments)         Review of Systems       Review of Systems   Constitutional: Negative for chills and fever. HENT: Positive for nosebleeds. Respiratory: Negative for shortness of breath. Cardiovascular: Negative for chest pain. Gastrointestinal: Negative for abdominal pain, nausea and vomiting. Skin: Negative for rash. Neurological: Negative for weakness. All other systems reviewed and are negative. Physical Exam     Visit Vitals  BP (!) 144/72   Pulse 63   Temp 97.2 °F (36.2 °C)   Resp 18   Wt 95.3 kg (210 lb)   SpO2 98%   BMI 38.41 kg/m²         Physical Exam  Vitals signs and nursing note reviewed. Constitutional:       General: She is not in acute distress. Appearance: Normal appearance. She is well-developed. She is not ill-appearing or diaphoretic. HENT:      Head: Normocephalic and atraumatic. Nose:      Right Nostril: No epistaxis, septal hematoma or occlusion. Left Nostril: No epistaxis, septal hematoma or occlusion. Comments: No active bleeding     Mouth/Throat:      Mouth: Mucous membranes are moist.      Pharynx: No oropharyngeal exudate or posterior oropharyngeal erythema. Neck:      Musculoskeletal: Normal range of motion and neck supple. No neck rigidity. Cardiovascular:      Rate and Rhythm: Normal rate and regular rhythm. Heart sounds: Normal heart sounds. No murmur. No friction rub. No gallop. Pulmonary:      Effort: Pulmonary effort is normal. No respiratory distress. Breath sounds: Normal breath sounds. No wheezing or rales. Musculoskeletal: Normal range of motion. Lymphadenopathy:      Cervical: No cervical adenopathy. Skin:     General: Skin is warm. Findings: No rash. Neurological:      Mental Status: She is alert.            Diagnostic Study Results     Labs -  Recent Results (from the past 12 hour(s))   CBC WITH AUTOMATED DIFF    Collection Time: 12/10/20  1:52 PM   Result Value Ref Range    WBC 6.5 4.6 - 13.2 K/uL    RBC 3.34 (L) 4.20 - 5.30 M/uL    HGB 9.6 (L) 12.0 - 16.0 g/dL    HCT 30.6 (L) 35.0 - 45.0 %    MCV 91.6 74.0 - 97.0 FL    MCH 28.7 24.0 - 34.0 PG    MCHC 31.4 31.0 - 37.0 g/dL    RDW 12.9 11.6 - 14.5 %    PLATELET 440 138 - 231 K/uL    MPV 10.3 9.2 - 11.8 FL    NEUTROPHILS 70 40 - 73 %    LYMPHOCYTES 19 (L) 21 - 52 %    MONOCYTES 9 3 - 10 %    EOSINOPHILS 2 0 - 5 %    BASOPHILS 0 0 - 2 %    ABS. NEUTROPHILS 4.5 1.8 - 8.0 K/UL    ABS. LYMPHOCYTES 1.2 0.9 - 3.6 K/UL    ABS. MONOCYTES 0.6 0.05 - 1.2 K/UL    ABS. EOSINOPHILS 0.1 0.0 - 0.4 K/UL    ABS. BASOPHILS 0.0 0.0 - 0.1 K/UL    DF AUTOMATED     PROTHROMBIN TIME + INR    Collection Time: 12/10/20  1:52 PM   Result Value Ref Range    Prothrombin time 13.7 11.5 - 15.2 sec    INR 1.1 0.8 - 1.2         Radiologic Studies -   No orders to display         Medical Decision Making   I am the first provider for this patient. I reviewed the vital signs, available nursing notes, past medical history, past surgical history, family history and social history. Vital Signs-Reviewed the patient's vital signs. Records Reviewed: Nursing Notes and Old Medical Records (Time of Review: 1:16 PM)    ED Course: Progress Notes, Reevaluation, and Consults:  2:16 PM  Reviewed results with patient. Resting comfortably, no distress, no active bleeding. Discussed need for close outpatient follow-up this week for reassessment. Discussed strict return precautions, including recurrent bleeding, dizziness, or any other medical concerns. Pt in agreement with plan, ready to go home. Provider Notes (Medical Decision Making): 65-year-old female who presents to the ED due to intermittent epistaxis x 4 days. Afebrile, nontoxic-appearing, looks well. No active bleeding on examination. Labs demonstrate anemia, PT/INR within normal limits.   Patient observed in the emergency department for 1.5 hours without any recurrence of bleeding. Stable for discharge with close outpatient follow-up with ENT, strict return precautions for any new or worsening symptoms. Diagnosis     Clinical Impression:   1. Epistaxis    2. Anemia, unspecified type        Disposition: home     Follow-up Information     Follow up With Specialties Details Why [facility] EMERGENCY DEPT Emergency Medicine  If symptoms worsen [address] [facility]    Robert Sun MD Internal Medicine   [address]      Conor Jose MD Otolaryngology, Surgery Schedule an appointment as soon as possible for a visit ENT  [address] [place] [address]  [phone]             Patient's Medications   Start Taking    OXYMETAZOLINE (AFRIN, OXYMETAZOLINE,) 0.05 % NASAL SPRAY    2 Sprays by Left Nostril route two (2) times a day for 3 days. Do not take for more than 3 days. Continue Taking    ALBUTEROL (PROAIR HFA) 90 MCG/ACTUATION INHALER    Take 1-2 Puffs by inhalation every four (4) hours as needed for Wheezing. ALBUTEROL SULFATE (PROVENTIL;VENTOLIN) 2.5 MG/0.5 ML NEBU NEBULIZER SOLUTION    2.5 mg by Nebulization route every four (4) hours as needed for Wheezing. AMLODIPINE (NORVASC) 10 MG TABLET    Take 10 mg by mouth daily. ASPIRIN 81 MG CHEWABLE TABLET    Take 1 Tab by mouth daily (with breakfast). Indications: stroke prevention    ATORVASTATIN (LIPITOR) 40 MG TABLET    Take 1 Tab by mouth daily. Indications: excessive fat in the blood, stroke prevention    CHOLECALCIFEROL (VITAMIN D3) (2,000 UNITS /50 MCG) CAP CAPSULE    Take 2,000 Units by mouth daily. CLOPIDOGREL (PLAVIX) 75 MG TAB    Take 1 Tab by mouth daily (with dinner). Indications: prevention for a blood clot going to the brain    DOCUSATE SODIUM (COLACE) 100 MG CAPSULE    Take 1 Cap by mouth daily (after breakfast).  Indications: constipation    EPINEPHRINE (EPIPEN) 0.3 MG/0.3 ML INJECTION    0.3 mg by IntraMUSCular route once as needed for Allergic Response. FENOFIBRATE NANOCRYSTALLIZED (TRICOR) 145 MG TABLET    Take 145 mg by mouth daily. FERROUS SULFATE 325 MG (65 MG IRON) TABLET    Take 325 mg by mouth Daily (before breakfast). GABAPENTIN (NEURONTIN) 100 MG CAPSULE    Take 100 mg by mouth every eight (8) hours as needed for Pain. INSULIN GLARGINE (LANTUS SOLOSTAR U-100 INSULIN) 100 UNIT/ML (3 ML) INPN    22 Units by SubCUTAneous route nightly. Indications: type 2 diabetes mellitus    INSULIN NEEDLES, DISPOSABLE, 31 GAUGE X 3/16\" NDLE    As directed  Indications: Type 2 diabetes mellitus    IPRATROPIUM (ATROVENT) 42 MCG (0.06 %) NASAL SPRAY    2 Sprays by Both Nostrils route three (3) times daily. METFORMIN ER (GLUCOPHAGE XR) 500 MG TABLET    Take 1 Tab by mouth daily (with dinner). Indications: type 2 diabetes mellitus    MINOXIDIL (LONITEN) 2.5 MG TABLET    Take 2 Tabs by mouth every twelve (12) hours. Indications: high blood pressure    MONTELUKAST (SINGULAIR) 10 MG TABLET    Take 10 mg by mouth every evening. OMALIZUMAB (XOLAIR) 150 MG/ML SYRG    150 mg by SubCUTAneous route every fourteen (14) days. OMEPRAZOLE (PRILOSEC) 20 MG CAPSULE    Take 20 mg by mouth daily. SENNA-DOCUSATE (PERICOLACE) 8.6-50 MG PER TABLET    Take 2 Tabs by mouth daily (after dinner). Indications: constipation    TIOTROPIUM BROMIDE (SPIRIVA RESPIMAT) 2.5 MCG/ACTUATION INHALER    Take 2 Puffs by inhalation daily. VALSARTAN-HYDROCHLOROTHIAZIDE (DIOVAN HCT) 320-12.5 MG PER TABLET    Take 1 Tab by mouth daily. These Medications have changed    No medications on file   Stop Taking    No medications on file       Dictation disclaimer:  Please note that this dictation was completed with [employer], the "[employer] voice recognition software.   Quite often unanticipated grammatical, syntax, homophones, and other interpretive errors are inadvertently transcribed by the computer software. Please disregard these errors. Please excuse any errors that have escaped final proofreading.

## 2020-12-10 NOTE — ED NOTES
Patient stated understanding of discharge instructions. Patient was ambulatory upon discharge. Patient received one prescription.     Patient armband removed and shredded    Patient ambulatory with a cane

## 2020-12-10 NOTE — DISCHARGE INSTRUCTIONS
Patient Education      Take medication as prescribed. Follow-up with the ear nose and throat physician within 1 week for reassessment. Bring the results from this visit with you for their review. Return to the ED immediately for any new, worsening, or persistent symptoms, including recurrent bleeding that does not reduce with pressure, dizziness, or any other medical concerns. Anemia: Care Instructions  Your Care Instructions     Anemia is a low level of red blood cells, which carry oxygen throughout your body. Many things can cause anemia. Lack of iron is one of the most common causes. Your body needs iron to make hemoglobin, a substance in red blood cells that carries oxygen from the lungs to your body's cells. Without enough iron, the body produces fewer and smaller red blood cells. As a result, your body's cells do not get enough oxygen, and you feel tired and weak. And you may have trouble concentrating. Bleeding is the most common cause of a lack of iron. You may have heavy menstrual bleeding or bleeding caused by conditions such as ulcers, hemorrhoids, or cancer. Regular use of aspirin or other anti-inflammatory medicines (such as ibuprofen) also can cause bleeding in some people. A lack of iron in your diet also can cause anemia, especially at times when the body needs more iron, such as during pregnancy, infancy, and the teen years. Your doctor may have prescribed iron pills. It may take several months of treatment for your iron levels to return to normal. Your doctor also may suggest that you eat foods that are rich in iron, such as meat and beans. There are many other causes of anemia. It is not always due to a lack of iron. Finding the specific cause of your anemia will help your doctor find the right treatment for you. Follow-up care is a key part of your treatment and safety. Be sure to make and go to all appointments, and call your doctor if you are having problems.  It's also a good idea to know your test results and keep a list of the medicines you take. How can you care for yourself at home? · Take your medicines exactly as prescribed. Call your doctor if you think you are having a problem with your medicine. · If your doctor recommends iron pills, take them as directed:  ? Try to take the pills on an empty stomach about 1 hour before or 2 hours after meals. But you may need to take iron with food to avoid an upset stomach. ? Do not take antacids or drink milk or caffeine drinks (such as coffee, tea, or cola) at the same time or within 2 hours of the time that you take your iron. They can make it hard for your body to absorb the iron. ? Vitamin C (from food or supplements) helps your body absorb iron. Try taking iron pills with a glass of orange juice or some other food that is high in vitamin C, such as citrus fruits. ? Iron pills may cause stomach problems, such as heartburn, nausea, diarrhea, constipation, and cramps. Be sure to drink plenty of fluids, and include fruits, vegetables, and fiber in your diet each day. Iron pills often make your bowel movements dark or green. ? If you forget to take an iron pill, do not take a double dose of iron the next time you take a pill. ? Keep iron pills out of the reach of small children. An overdose of iron can be very dangerous. · Follow your doctor's advice about eating iron-rich foods. These include red meat, shellfish, poultry, eggs, beans, raisins, whole-grain bread, and leafy green vegetables. · Steam vegetables to help them keep their iron content. When should you call for help? Call 911 anytime you think you may need emergency care. For example, call if:    · You have symptoms of a heart attack. These may include:  ? Chest pain or pressure, or a strange feeling in the chest.  ? Sweating. ? Shortness of breath. ? Nausea or vomiting.   ? Pain, pressure, or a strange feeling in the back, neck, jaw, or upper belly or in one or both shoulders or arms.  ? Lightheadedness or sudden weakness. ? A fast or irregular heartbeat. After you call 911, the  may tell you to chew 1 adult-strength or 2 to 4 low-dose aspirin. Wait for an ambulance. Do not try to drive yourself.     · You passed out (lost consciousness). Call your doctor now or seek immediate medical care if:    · You have new or increased shortness of breath.     · You are dizzy or lightheaded, or you feel like you may faint.     · Your fatigue and weakness continue or get worse.     · You have any abnormal bleeding, such as:  ? Nosebleeds. ? Vaginal bleeding that is different (heavier, more frequent, at a different time of the month) than what you are used to.  ? Bloody or black stools, or rectal bleeding. ? Bloody or pink urine. Watch closely for changes in your health, and be sure to contact your doctor if:    · You do not get better as expected. Where can you learn more? Go to http://www.zapien.com/  Enter R301 in the search box to learn more about \"Anemia: Care Instructions. \"  Current as of: November 8, 2019               Content Version: 12.6  © 8739-4356 [employer]. Care instructions adapted under license by [employer] (which disclaims liability or warranty for this information). If you have questions about a medical condition or this instruction, always ask your healthcare professional. [place] [address] any warranty or liability for your use of this information. Patient Education        Nosebleeds: Care Instructions  Your Care Instructions     Nosebleeds are common, especially if you have colds or allergies. Many things can cause a nosebleed. Some nosebleeds stop on their own with pressure. Others need packing. Some get cauterized (sealed). If you have gauze or other packing materials in your nose, you will need to follow up with your doctor to have the packing removed.  You may need more treatment if you get nosebleeds a lot. The doctor has checked you carefully, but problems can develop later. If you notice any problems or new symptoms, get medical treatment right away. Follow-up care is a key part of your treatment and safety. Be sure to make and go to all appointments, and call your doctor if you are having problems. It's also a good idea to know your test results and keep a list of the medicines you take. How can you care for yourself at home? · If you get another nosebleed:  ? Sit up and tilt your head slightly forward. This keeps blood from going down your throat. ? Use your thumb and index finger to pinch your nose shut for 10 minutes. Use a clock. Do not check to see if the bleeding has stopped before the 10 minutes are up. If the bleeding has not stopped, pinch your nose shut for another 10 minutes. ? When the bleeding has stopped, try not to pick, rub, or blow your nose for 12 hours. Avoiding these things helps keep your nose from bleeding again. · If your doctor prescribed antibiotics, take them as directed. Do not stop taking them just because you feel better. You need to take the full course of antibiotics. To prevent nosebleeds  · Do not blow your nose too hard. · Try not to lift or strain after a nosebleed. · Raise your head on a pillow while you sleep. · Put a thin layer of a saline- or water-based nasal gel, such as NasoGel, inside your nose. Put it on the septum, which divides your nostrils. This will prevent dryness that can cause nosebleeds. · Use a vaporizer or humidifier to add moisture to your bedroom. Follow the directions for cleaning the machine. · Do not use aspirin, ibuprofen (Advil, Motrin), or naproxen (Aleve) for 36 to 48 hours after a nosebleed unless your doctor tells you to. You can use acetaminophen (Tylenol) for pain relief. · Talk to your doctor about stopping any other medicines you are taking. Some medicines may make you more likely to get a nosebleed.   · Do not use cold medicines or nasal sprays without first talking to your doctor. They can make your nose dry. When should you call for help? Call 911 anytime you think you may need emergency care. For example, call if:    · You passed out (lost consciousness). Call your doctor now or seek immediate medical care if:    · You get another nosebleed and your nose is still bleeding after you have applied pressure 3 times for 10 minutes each time (30 minutes total).     · There is a lot of blood running down the back of your throat even after you pinch your nose and tilt your head forward.     · You have a fever.     · You have sinus pain. Watch closely for changes in your health, and be sure to contact your doctor if:    · You get nosebleeds often, even if they stop.     · You do not get better as expected. Where can you learn more? Go to http://www.zapien.com/  Enter S156 in the search box to learn more about \"Nosebleeds: Care Instructions. \"  Current as of: June 26, 2019               Content Version: 12.6  © 9387-3465 "[employer]. Care instructions adapted under license by [email or web address] (which disclaims liability or warranty for this information). If you have questions about a medical condition or this instruction, always ask your healthcare professional. [address] any warranty or liability for your use of this information.

## 2020-12-10 NOTE — ED NOTES
Updated patient on plan of care. All questions answered at this time.  Patient sitting in position of comfort on stretcher in NAD

## 2020-12-15 ENCOUNTER — HOSPITAL ENCOUNTER (OUTPATIENT)
Dept: MAMMOGRAPHY | Age: 60
Discharge: HOME | End: 2020-12-15
Attending: INTERNAL MEDICINE
Payer: MEDICAID

## 2020-12-15 ENCOUNTER — HOSPITAL ENCOUNTER (OUTPATIENT)
Dept: INFUSION THERAPY | Age: 60
Discharge: HOME | End: 2020-12-15
Payer: MEDICAID

## 2020-12-15 VITALS
OXYGEN SATURATION: 99 % | RESPIRATION RATE: 20 BRPM | SYSTOLIC BLOOD PRESSURE: 154 MMHG | TEMPERATURE: 96.9 F | HEART RATE: 64 BPM | DIASTOLIC BLOOD PRESSURE: 59 MMHG

## 2020-12-15 DIAGNOSIS — J45.50 SEVERE PERSISTENT ASTHMA, UNSPECIFIED WHETHER COMPLICATED: Primary | ICD-10-CM

## 2020-12-15 DIAGNOSIS — Z12.31 VISIT FOR SCREENING MAMMOGRAM: ICD-10-CM

## 2020-12-15 PROCEDURE — 74011250636 HC RX REV CODE- 250/636: Performed by: INTERNAL MEDICINE

## 2020-12-15 PROCEDURE — 96372 THER/PROPH/DIAG INJ SC/IM: CPT

## 2020-12-15 PROCEDURE — 77063 BREAST TOMOSYNTHESIS BI: CPT

## 2020-12-15 RX ADMIN — OMALIZUMAB 150 MG: 150 INJECTION, SOLUTION SUBCUTANEOUS at 11:44

## 2020-12-15 RX ADMIN — OMALIZUMAB 75 MG: 75 INJECTION, SOLUTION SUBCUTANEOUS at 11:43

## 2020-12-15 NOTE — PROGRESS NOTES
[facility] Progress Note    Date: December 15, 2020    Name: Manuela Hawthorne    MRN: [id number]         : 1960     Xolair Injection every 2 weeks    Ms. Julia Miller arrived ambulatory and in no acute distress at 1140. Patient denied complaints of pain/discomfort, changes in medication regimen or health condition. Ms. Julia Miller was assessed and education was provided. Ms. Lisa Morales vitals were reviewed. Visit Vitals  BP (!) 154/59 (BP 1 Location: Left arm, BP Patient Position: At rest;Sitting)   Pulse 64   Temp 96.9 °F (36.1 °C)   Resp 20   SpO2 99%     Xolair 150 mg was administered as ordered SQ in patient's posterior left upper arm. Xolair 75 mg was administered as ordered SQ in patient's posterior left upper arm. Total dose administered was 225 mg SQ. Ms. Julia Miller tolerated well without complaints. Patient armband removed and shredded. Ms. Julia Miller was discharged from [place] [address] in stable condition at 1150. She is to return on 2020 at 1430 for her next appointment.     Raven Ruffin RN  December 15, 2020  0130

## 2020-12-29 ENCOUNTER — HOSPITAL ENCOUNTER (OUTPATIENT)
Dept: INFUSION THERAPY | Age: 60
End: 2020-12-29

## 2020-12-30 ENCOUNTER — HOSPITAL ENCOUNTER (OUTPATIENT)
Dept: INFUSION THERAPY | Age: 60
Discharge: HOME | End: 2020-12-30
Payer: MEDICAID

## 2020-12-30 VITALS
RESPIRATION RATE: 20 BRPM | DIASTOLIC BLOOD PRESSURE: 69 MMHG | OXYGEN SATURATION: 95 % | TEMPERATURE: 98.2 F | HEART RATE: 68 BPM | SYSTOLIC BLOOD PRESSURE: 137 MMHG

## 2020-12-30 DIAGNOSIS — J45.50 SEVERE PERSISTENT ASTHMA, UNSPECIFIED WHETHER COMPLICATED: Primary | ICD-10-CM

## 2020-12-30 PROCEDURE — 96372 THER/PROPH/DIAG INJ SC/IM: CPT

## 2020-12-30 PROCEDURE — 74011250636 HC RX REV CODE- 250/636: Performed by: NURSE PRACTITIONER

## 2020-12-30 RX ADMIN — OMALIZUMAB 150 MG: 150 INJECTION, SOLUTION SUBCUTANEOUS at 15:19

## 2020-12-30 RX ADMIN — OMALIZUMAB 75 MG: 75 INJECTION, SOLUTION SUBCUTANEOUS at 15:19

## 2020-12-30 NOTE — PROGRESS NOTES
[facility] Progress Note    Date: 2020    Name: Vignesh Muniz    MRN: [id number]         : 1960     Xolair Injection every 2 weeks    Ms. Julia Miller arrived to [address] [place] [address] at [phone]. Ms. Julia Miller was assessed and education was provided. Ms. Alondra Diaz vitals were reviewed. Visit Vitals  /69 (BP 1 Location: Left arm, BP Patient Position: Sitting)   Pulse 68   Temp 98.2 °F (36.8 °C)   Resp 20   SpO2 95%     Xolair 150 mg was administered as ordered SQ in patient's left upper arm. Xolair 75 mg was administered as ordered SQ in patient's right upper arm. Total 225 mg    Ms. Mahmood tolerated well without complaints. Patient armband removed and shredded. Ms. Julia Miller was discharged from [place] [address] in stable condition at 1525. She is to return on 2021 at 1430 for her next appointment.     Deneen Dow  2020  1330

## 2021-01-05 ENCOUNTER — OFFICE VISIT (OUTPATIENT)
Dept: NEUROLOGY | Age: 61
End: 2021-01-05
Payer: MEDICAID

## 2021-01-05 VITALS
BODY MASS INDEX: 38.13 KG/M2 | HEIGHT: 62 IN | DIASTOLIC BLOOD PRESSURE: 60 MMHG | RESPIRATION RATE: 20 BRPM | SYSTOLIC BLOOD PRESSURE: 126 MMHG | HEART RATE: 73 BPM | TEMPERATURE: 98 F | WEIGHT: 207.2 LBS | OXYGEN SATURATION: 98 %

## 2021-01-05 DIAGNOSIS — M79.606 LOW BACK PAIN RADIATING DOWN LEG: Primary | ICD-10-CM

## 2021-01-05 DIAGNOSIS — G95.19 NEUROGENIC CLAUDICATION (HCC): ICD-10-CM

## 2021-01-05 DIAGNOSIS — I63.9 ACUTE ISCHEMIC STROKE (HCC): ICD-10-CM

## 2021-01-05 DIAGNOSIS — M54.50 LOW BACK PAIN RADIATING DOWN LEG: Primary | ICD-10-CM

## 2021-01-05 PROCEDURE — 99214 OFFICE O/P EST MOD 30 MIN: CPT | Performed by: STUDENT IN AN ORGANIZED HEALTH CARE EDUCATION/TRAINING PROGRAM

## 2021-01-05 RX ORDER — GABAPENTIN 300 MG/1
CAPSULE ORAL
Qty: 90 CAP | Refills: 5 | Status: SHIPPED | OUTPATIENT
Start: 2021-01-05 | End: 2021-04-22 | Stop reason: SDUPTHER

## 2021-01-05 RX ORDER — GABAPENTIN 300 MG/1
CAPSULE ORAL
Qty: 90 CAP | Refills: 5 | Status: CANCELLED | OUTPATIENT
Start: 2021-01-05

## 2021-01-05 NOTE — PROGRESS NOTES
Tomás Vega is a 63 y.o. female . presents for Follow-up (asymptomatic stenosis)      A 63years old female patient here for follow-up evaluation of stroke (right centrum semiovale extending to the superior frontal lobe cortex. ). Last seen in the clinic on September 30, 2000. Since her last visit, the weakness over her left side has improved. No significant weakness of her upper extremities. Mild left lower extremity weakness. Continues to have burning sensation of the left lower extremity starting from the hip, to the lateral thigh. Occasionally limps from the pain. No dragging her legs. No falls. She has lower back pain which radiates to the left side. Has worsening symptoms when she is walking for some distance and standing for a long time; she has to rest and restart again. No bladder or bowel dysfunction. She has completed a course of dual antiplatelets. Currently on aspirin and atorvastatin. Will the left lower extremity pain, she takes gabapentin: She sometimes takes up to 4 times a day of the 100 mg tablets. From previous encounter:  A 60years old female patient with medical history of hypertension, hyperlipidemia, diabetes, COPD, ROBBY, and arthritis here for evaluation of stroke after hospital discharge. She was admitted to [facility] on August 21, 2020 after presenting with left arm and leg weakness. She woke up that morning and has complete weakness of the left arm and leg with numbness. Initially she could not move her arm or leg. There is no facial droop. No change in speech. No change in her vision. MRI of the brain was done and showed small volume of late acute/early subacute infarcts in the right centrum semiovale extending to the superior frontal lobe cortex. MRA of the head showed severe A2 RACA stenosis and  moderate bilateral PCA stenoses. MRA of the neck did not show any significant extracranial artery disease.   Had an echocardiography which showed normal ejection fraction with no thrombus or shunt. No reported atrial fibrillation during hospital stay. She was discharged with aspirin 81 mg p.o. per day and Plavix 75 mg p.o. per day and still taking it. She has been in a [facility] rehab until September 4, 2020. The left upper extremity weakness has completely resolved. Still continues to have weakness of the left lower extremity with difficulty walking. She also has lower back pain which radiates to the left lower extremity (had a previous diagnosis of sciatica). Currently uses a walker. No incontinence to bowel or bladder. No difficulty swallowing. Follow-up  Pertinent negatives include no chest pain, no headaches and no shortness of breath. Review of Systems   Constitutional: Positive for weight loss. Negative for chills and fever. HENT: Negative for hearing loss and tinnitus. Eyes: Negative for blurred vision and double vision. Respiratory: Negative for cough and shortness of breath. Cardiovascular: Negative for chest pain and leg swelling. Gastrointestinal: Negative for nausea and vomiting. Genitourinary: Negative for dysuria, frequency and urgency. Musculoskeletal: Positive for back pain (LBP radiates to the LLE). Negative for neck pain. Skin: Negative for itching and rash. Neurological: Positive for tingling (and burning over the left lateral thigh) and focal weakness (left side). Negative for dizziness, speech change and headaches. Endo/Heme/Allergies: Bruises/bleeds easily.        Past Medical History:   Diagnosis Date    Acute ischemic stroke ([address] 8/21/2020    Acute Ischemic Stroke (late acute/early subacute infarcts in the right centrum semiovale extending to the superior frontal lobe cortex) with residual left hemiparesis    Bipolar disorder ([address]     Chronic obstructive pulmonary disease (COPD) ([address]     COVID-19 ruled out by laboratory testing 8/26/2020    SARS-CoV-2 ([place]ott m2000) (collected 8/25/2020, resulted 8/26/2020): Not detected     Current use of aspirin 8/24/2020    Depression     Gastroesophageal reflux disease     History of colon polyps     Hypertensive heart disease without heart failure     2D echocardiogram (8/22/2020) showed EF 65%; mild left ventricular hypertrophy; normal diastolic function; negative bubble study at rest and after Valsalva    Iron deficiency anemia     Left hemiparesis ([address] 8/21/2020    Menopause     Obesity, Class II, BMI 35-39.9     Obstructive sleep apnea on CPAP     On clopidogrel therapy 8/24/2020    On statin therapy due to risk of future cardiovascular event 8/24/2020    On Atorvastatin    Pure hypercholesterolemia 8/19/2020    Lipid profile (8/19/2020) showed , , HDL 50,     Sciatica of left side     Seasonal allergic rhinitis     Severe persistent asthma 11/07/2019    Type 2 diabetes mellitus, with long-term current use of insulin (HCC)     HbA1c (8/19/2020) = 8.1; HbA1c (8/20/2020) = 8.0       Past Surgical History:   Procedure Laterality Date    HX HYSTERECTOMY  2000    Fibroids        Family History   Problem Relation Age of Onset    Heart Attack Father     Breast Cancer Maternal Aunt     Heart Disease Mother     Stroke Neg Hx         Social History     Socioeconomic History    Marital status:      Spouse name: Not on file    Number of children: Not on file    Years of education: Not on file    Highest education level: Not on file   Occupational History    Not on file   Social Needs    Financial resource strain: Not on file    Food insecurity     Worry: Not on file     Inability: Not on file    Transportation needs     Medical: Not on file     Non-medical: Not on file   Tobacco Use    Smoking status: Never Smoker    Smokeless tobacco: Never Used   Substance and Sexual Activity    Alcohol use: Yes     Comment: occ    Drug use: Yes     Types: Marijuana     Comment: \"every now and then. \"     Sexual activity: Not on file   Lifestyle    Physical activity     Days per week: Not on file     Minutes per session: Not on file    Stress: Not on file   Relationships    Social connections     Talks on phone: Not on file     Gets together: Not on file     Attends Mosque service: Not on file     Active member of club or organization: Not on file     Attends meetings of clubs or organizations: Not on file     Relationship status: Not on file    Intimate partner violence     Fear of current or ex partner: Not on file     Emotionally abused: Not on file     Physically abused: Not on file     Forced sexual activity: Not on file   Other Topics Concern    Not on file   Social History Narrative    Not on file        Allergies   Allergen Reactions    Grass Pollen Anaphylaxis     Environmental allergies Unsure what  triggers episodes. Intubated for episodes in 2006 and 2008.  Metformin Nausea Only     Pt states stomach cramps     Other Plant, Animal, Environmental Shortness of Breath    Pneumococcal Vaccine Other (comments)         Current Outpatient Medications   Medication Sig Dispense Refill    gabapentin (NEURONTIN) 300 mg capsule Take 300 mg PO BID for 1 week; then 300 mg PO TID 90 Cap 5    tiotropium bromide (Spiriva Respimat) 2.5 mcg/actuation inhaler Take 2 Puffs by inhalation daily.  valsartan-hydroCHLOROthiazide (Diovan HCT) 320-12.5 mg per tablet Take 1 Tab by mouth daily.  omalizumab (Xolair) 150 mg/mL syrg 150 mg by SubCUTAneous route every fourteen (14) days.  ipratropium (ATROVENT) 42 mcg (0.06 %) nasal spray 2 Sprays by Both Nostrils route three (3) times daily.  fenofibrate nanocrystallized (Tricor) 145 mg tablet Take 145 mg by mouth daily.  EPINEPHrine (EPIPEN) 0.3 mg/0.3 mL injection 0.3 mg by IntraMUSCular route once as needed for Allergic Response.  cholecalciferol (VITAMIN D3) (2,000 UNITS /50 MCG) cap capsule Take 2,000 Units by mouth daily.       amLODIPine (NORVASC) 10 mg tablet Take 10 mg by mouth daily.  albuterol sulfate (PROVENTIL;VENTOLIN) 2.5 mg/0.5 mL nebu nebulizer solution 2.5 mg by Nebulization route every four (4) hours as needed for Wheezing.  albuterol (ProAir HFA) 90 mcg/actuation inhaler Take 1-2 Puffs by inhalation every four (4) hours as needed for Wheezing.  aspirin 81 mg chewable tablet Take 1 Tab by mouth daily (with breakfast). Indications: stroke prevention 30 Tab 0    atorvastatin (LIPITOR) 40 mg tablet Take 1 Tab by mouth daily. Indications: excessive fat in the blood, stroke prevention 30 Tab 0    docusate sodium (COLACE) 100 mg capsule Take 1 Cap by mouth daily (after breakfast). Indications: constipation 30 Cap 0    metFORMIN ER (GLUCOPHAGE XR) 500 mg tablet Take 1 Tab by mouth daily (with dinner). Indications: type 2 diabetes mellitus 30 Tab 0    minoxidiL (LONITEN) 2.5 mg tablet Take 2 Tabs by mouth every twelve (12) hours. Indications: high blood pressure 120 Tab 0    insulin glargine (Lantus Solostar U-100 Insulin) 100 unit/mL (3 mL) inpn 22 Units by SubCUTAneous route nightly. Indications: type 2 diabetes mellitus 1 Adjustable Dose Pre-filled Pen Syringe 0    montelukast (SINGULAIR) 10 mg tablet Take 10 mg by mouth every evening.  ferrous sulfate 325 mg (65 mg iron) tablet Take 325 mg by mouth Daily (before breakfast).  omeprazole (PRILOSEC) 20 mg capsule Take 20 mg by mouth daily.  senna-docusate (PERICOLACE) 8.6-50 mg per tablet Take 2 Tabs by mouth daily (after dinner). Indications: constipation 60 Tab 0    Insulin Needles, Disposable, 31 gauge x 3/16\" ndle As directed  Indications: Type 2 diabetes mellitus 30 Pen Needle 0         Physical Exam  Constitutional:       Appearance: Normal appearance. HENT:      Mouth/Throat:      Mouth: Mucous membranes are moist.      Pharynx: Oropharynx is clear. No oropharyngeal exudate. Eyes:      Extraocular Movements: Extraocular movements intact.       Pupils: Pupils are equal, round, and reactive to light. Neck:      Musculoskeletal: Normal range of motion and neck supple. Pulmonary:      Effort: Pulmonary effort is normal. No respiratory distress. Musculoskeletal:         General: Tenderness (left hip) present. Injury: left hip. Right lower leg: No edema. Left lower leg: No edema. Neurological:      Mental Status: She is alert. Comments: Mental status: Awake, alert, oriented x3, follows simple and complex commands, no neglect, no extinction to DSS or VSS. Speech and languge: fluent, coherentand comprehension intact  CN: VFF, EOMI, PERRLA, face sensation intact , no facial asymmetry noted, palate elevation symmetric bilat, SS+SCM 5/5 bilat, tongue midline  Motor: no pronator drift, tone normal throughout, strength 5/5 throughout except the LLE: Hip flexion 4+/5, knee extension/flexion 4+/5, foot dorsiflexion and plantar flexion 5 out of 5. Sensory: intact to light touch and pinprick throughout  Coordination: FNF accurate w/o dysmetria; unable to perform heel-to-shin. DTR: 2+ throughout  Gait: Antalgic. Admission on 12/10/2020, Discharged on 12/10/2020   Component Date Value Ref Range Status    WBC 12/10/2020 6.5  4.6 - 13.2 K/uL Final    RBC 12/10/2020 3.34* 4.20 - 5.30 M/uL Final    HGB 12/10/2020 9.6* 12.0 - 16.0 g/dL Final    HCT 12/10/2020 30.6* 35.0 - 45.0 % Final    MCV 12/10/2020 91.6  74.0 - 97.0 FL Final    MCH 12/10/2020 28.7  24.0 - 34.0 PG Final    MCHC 12/10/2020 31.4  31.0 - 37.0 g/dL Final    RDW 12/10/2020 12.9  11.6 - 14.5 % Final    PLATELET 78/85/0009 775  135 - 420 K/uL Final    MPV 12/10/2020 10.3  9.2 - 11.8 FL Final    NEUTROPHILS 12/10/2020 70  40 - 73 % Final    LYMPHOCYTES 12/10/2020 19* 21 - 52 % Final    MONOCYTES 12/10/2020 9  3 - 10 % Final    EOSINOPHILS 12/10/2020 2  0 - 5 % Final    BASOPHILS 12/10/2020 0  0 - 2 % Final    ABS. NEUTROPHILS 12/10/2020 4.5  1.8 - 8.0 K/UL Final    ABS.  LYMPHOCYTES 12/10/2020 1.2  0.9 - 3.6 K/UL Final    ABS. MONOCYTES 12/10/2020 0.6  0.05 - 1.2 K/UL Final    ABS. EOSINOPHILS 12/10/2020 0.1  0.0 - 0.4 K/UL Final    ABS. BASOPHILS 12/10/2020 0.0  0.0 - 0.1 K/UL Final    DF 12/10/2020 AUTOMATED    Final    Prothrombin time 12/10/2020 13.7  11.5 - 15.2 sec Final    INR 12/10/2020 1.1  0.8 - 1.2   Final    Comment:            INR Therapeutic Ranges         (on stable oral anticoagulant):     INDICATION                INR  DVT/PE/Atrial Fib          2.0-3.0  MI/Mechanical Heart Valve  2.5-3.5               ICD-10-CM ICD-9-CM    1. Low back pain radiating down leg  M54.5 724.2 MRI LUMB SPINE WO CONT      gabapentin (NEURONTIN) 300 mg capsule   2. Neurogenic claudication  M48.062 724.03 MRI LUMB SPINE WO CONT      gabapentin (NEURONTIN) 300 mg capsule   3. Acute ischemic stroke (HCC)  I63.9 434.91     right centrum semiovale extending to the superior frontal lobe cortex. A 63years old female patient here for follow-up of stroke involving the right centrum semiovale with residual left-sided body weakness. The weakness is much better now. No upper extremity weakness currently. Mild left lower extremity weakness. Has some limping when walking but mainly from pain over the left lower back extending to the left lower extremity with burning and tingling sensation. No bladder or bowel dysfunction. She she has currently completed a course of dual antiplatelets. On aspirin and atorvastatin. Will follow with her primary care provider for risk factor modification. For the low back pain which radiates to the left lower extremity [which has been going on for some time] and possible spinal claudication, will get MRI of the lumbosacral spine. Will increase the dose of gabapentin to 300 mg p.o. twice daily for 1 week and then 7 mg p.o. 3 times daily. I have ordered MRI of the lumbosacral spine. We will see her in the clinic in 6 months time.

## 2021-01-12 ENCOUNTER — APPOINTMENT (OUTPATIENT)
Dept: INFUSION THERAPY | Age: 61
End: 2021-01-12

## 2021-01-12 ENCOUNTER — TELEPHONE (OUTPATIENT)
Dept: NEUROLOGY | Age: 61
End: 2021-01-12

## 2021-01-12 NOTE — TELEPHONE ENCOUNTER
Benito @ [facility] states that a peer to peer is needed for pts' MRI lumbar spine.  For peer to peer, please call [facility] at: [phone]

## 2021-01-13 ENCOUNTER — HOSPITAL ENCOUNTER (OUTPATIENT)
Dept: INFUSION THERAPY | Age: 61
Discharge: HOME | End: 2021-01-13
Payer: MEDICAID

## 2021-01-13 VITALS
DIASTOLIC BLOOD PRESSURE: 59 MMHG | OXYGEN SATURATION: 95 % | HEART RATE: 63 BPM | RESPIRATION RATE: 20 BRPM | TEMPERATURE: 98 F | SYSTOLIC BLOOD PRESSURE: 132 MMHG

## 2021-01-13 DIAGNOSIS — J45.50 SEVERE PERSISTENT ASTHMA, UNSPECIFIED WHETHER COMPLICATED: Primary | ICD-10-CM

## 2021-01-13 PROCEDURE — 74011250636 HC RX REV CODE- 250/636: Performed by: NURSE PRACTITIONER

## 2021-01-13 PROCEDURE — 96372 THER/PROPH/DIAG INJ SC/IM: CPT

## 2021-01-13 RX ADMIN — OMALIZUMAB 75 MG: 75 INJECTION, SOLUTION SUBCUTANEOUS at 14:28

## 2021-01-13 RX ADMIN — OMALIZUMAB 150 MG: 150 INJECTION, SOLUTION SUBCUTANEOUS at 14:28

## 2021-01-13 NOTE — PROGRESS NOTES
[facility] Progress Note    Date: 2021    Name: Alfred Ritchie    MRN: [id number]         : 1960     Xolair Injection every 2 weeks    Ms. Julia Miller arrived to [facility] at [phone]. Ms. Julia Miller was assessed and education was provided. Ms. Lisa Lechuga vitals were reviewed. Visit Vitals  BP (!) 132/59 (BP 1 Location: Left arm, BP Patient Position: Sitting)   Pulse 63   Temp 98 °F (36.7 °C)   Resp 20   SpO2 95%   Breastfeeding No     Xolair 150 mg was administered as ordered SQ in patient's left upper arm. Xolair 75 mg was administered as ordered SQ in patient's right upper arm. Total 225 mg    Ms. Mahmood tolerated well without complaints. Patient armband removed and shredded. Ms. Julia Miller was discharged from [place] [address] in stable condition at 1435. She is to return on 2021 at 1500 for her next appointment.     [facility]  2021  1330

## 2021-01-19 DIAGNOSIS — M54.50 LOW BACK PAIN RADIATING DOWN LEG: ICD-10-CM

## 2021-01-19 DIAGNOSIS — G95.19 NEUROGENIC CLAUDICATION (HCC): ICD-10-CM

## 2021-01-19 DIAGNOSIS — M79.606 LOW BACK PAIN RADIATING DOWN LEG: ICD-10-CM

## 2021-01-26 ENCOUNTER — APPOINTMENT (OUTPATIENT)
Dept: INFUSION THERAPY | Age: 61
End: 2021-01-26

## 2021-01-28 ENCOUNTER — HOSPITAL ENCOUNTER (OUTPATIENT)
Dept: INFUSION THERAPY | Age: 61
Discharge: HOME | End: 2021-01-28
Payer: MEDICAID

## 2021-01-28 VITALS
HEART RATE: 61 BPM | RESPIRATION RATE: 20 BRPM | OXYGEN SATURATION: 100 % | SYSTOLIC BLOOD PRESSURE: 161 MMHG | DIASTOLIC BLOOD PRESSURE: 64 MMHG | TEMPERATURE: 97.2 F

## 2021-01-28 DIAGNOSIS — J45.50 SEVERE PERSISTENT ASTHMA, UNSPECIFIED WHETHER COMPLICATED: Primary | ICD-10-CM

## 2021-01-28 PROCEDURE — 74011250636 HC RX REV CODE- 250/636: Performed by: NURSE PRACTITIONER

## 2021-01-28 PROCEDURE — 96372 THER/PROPH/DIAG INJ SC/IM: CPT

## 2021-01-28 RX ADMIN — OMALIZUMAB 75 MG: 75 INJECTION, SOLUTION SUBCUTANEOUS at 11:51

## 2021-01-28 RX ADMIN — OMALIZUMAB 150 MG: 150 INJECTION, SOLUTION SUBCUTANEOUS at 11:51

## 2021-01-28 NOTE — PROGRESS NOTES
[facility] Progress Note    Date: 2021    Name: Tata Patel    MRN: [id number]         : 1960     Xolair injection      Ms. Julia Miller arrived to [facility] at 1140. Ms. Julia Miller was assessed and education was provided. Ms. [place] vitals were reviewed. Pt states, \"I haven't taking my BP meds this morning yet. \"    Visit Vitals  BP (!) 161/64 (BP 1 Location: Left arm, BP Patient Position: Sitting)   Pulse 61   Temp 97.2 °F (36.2 °C)   Resp 20   SpO2 100%   Breastfeeding No         Xolair 150 mg was administered as ordered SQ in patient's Right upper arm ( right) and band-aid applied. Xolair 75 mg was administered as ordered SQ in patient's left upper arm ( left) and band-aid applied    Ms. Mahmood tolerated well without complaints. Ms. Julia Miller was discharged from [place] [address] in stable condition at 1155. She is to return on 2021 at 1400 for her next appointment.     Nav Rivero RN  2021

## 2021-02-09 ENCOUNTER — TELEPHONE (OUTPATIENT)
Dept: NEUROLOGY | Age: 61
End: 2021-02-09

## 2021-02-09 NOTE — TELEPHONE ENCOUNTER
Patient called office in reference to MRI order. Patient went in for MRI and was told they cancelled because not enough info was sent to insurance for approval. Please advise.

## 2021-02-16 ENCOUNTER — TRANSCRIBE ORDER (OUTPATIENT)
Dept: SLEEP MEDICINE | Age: 61
End: 2021-02-16

## 2021-02-16 DIAGNOSIS — J44.9 COPD (CHRONIC OBSTRUCTIVE PULMONARY DISEASE) (HCC): ICD-10-CM

## 2021-02-16 DIAGNOSIS — G47.30 SLEEP APNEA: ICD-10-CM

## 2021-02-16 DIAGNOSIS — E66.01 MORBID OBESITY (HCC): ICD-10-CM

## 2021-02-16 DIAGNOSIS — I10 ESSENTIAL HYPERTENSION, BENIGN: ICD-10-CM

## 2021-02-16 DIAGNOSIS — K21.9 GERD (GASTROESOPHAGEAL REFLUX DISEASE): ICD-10-CM

## 2021-02-16 DIAGNOSIS — E11.9 TYPE II DIABETES MELLITUS (HCC): ICD-10-CM

## 2021-02-16 DIAGNOSIS — R06.02 SOB (SHORTNESS OF BREATH): ICD-10-CM

## 2021-02-16 DIAGNOSIS — R09.02 HYPOXEMIA: ICD-10-CM

## 2021-02-16 DIAGNOSIS — G47.33 OSA (OBSTRUCTIVE SLEEP APNEA): Primary | ICD-10-CM

## 2021-02-16 DIAGNOSIS — J45.909 ASTHMA: ICD-10-CM

## 2021-02-19 NOTE — TELEPHONE ENCOUNTER
Per scheduling department patient is scheduled for Monday and authorization department is handling any needed authorizations.

## 2021-02-22 ENCOUNTER — HOSPITAL ENCOUNTER (OUTPATIENT)
Dept: MRI IMAGING | Age: 61
Discharge: HOME | End: 2021-02-22
Attending: STUDENT IN AN ORGANIZED HEALTH CARE EDUCATION/TRAINING PROGRAM
Payer: MEDICAID

## 2021-02-22 PROCEDURE — 72148 MRI LUMBAR SPINE W/O DYE: CPT

## 2021-02-23 DIAGNOSIS — G95.19 NEUROGENIC CLAUDICATION (HCC): Primary | ICD-10-CM

## 2021-02-23 DIAGNOSIS — M48.00 CENTRAL STENOSIS OF SPINAL CANAL: ICD-10-CM

## 2021-02-23 NOTE — PROGRESS NOTES
Please let the patient know that the MRI of the lumbar spine has shown moderate to severe canal narrowing and neural foraminal narrowing. I have sent referral to spine surgery.

## 2021-02-25 ENCOUNTER — HOSPITAL ENCOUNTER (OUTPATIENT)
Dept: INFUSION THERAPY | Age: 61
Discharge: HOME | End: 2021-02-25
Payer: MEDICAID

## 2021-02-25 VITALS
SYSTOLIC BLOOD PRESSURE: 136 MMHG | RESPIRATION RATE: 18 BRPM | HEART RATE: 69 BPM | DIASTOLIC BLOOD PRESSURE: 69 MMHG | OXYGEN SATURATION: 98 % | TEMPERATURE: 98 F

## 2021-02-25 DIAGNOSIS — J45.50 SEVERE PERSISTENT ASTHMA, UNSPECIFIED WHETHER COMPLICATED: Primary | ICD-10-CM

## 2021-02-25 PROCEDURE — 96372 THER/PROPH/DIAG INJ SC/IM: CPT

## 2021-02-25 PROCEDURE — 74011250636 HC RX REV CODE- 250/636: Performed by: INTERNAL MEDICINE

## 2021-02-25 RX ADMIN — OMALIZUMAB 150 MG: 150 INJECTION, SOLUTION SUBCUTANEOUS at 11:36

## 2021-02-25 RX ADMIN — OMALIZUMAB 75 MG: 75 INJECTION, SOLUTION SUBCUTANEOUS at 11:36

## 2021-02-25 NOTE — PROGRESS NOTES
[facility] Progress Note    Date: 2021    Name: Bob Romano    MRN: [id number]         : 1960     Xolair injection      Ms. Julia Miller arrived to [facility] at 1130. Ms. Julia Miller was assessed and education was provided. Ms. Iva Toledo vitals were reviewed. Visit Vitals  /69 (BP 1 Location: Right upper arm)   Pulse 69   Temp 98 °F (36.7 °C)   Resp 18   SpO2 98%   Breastfeeding No     Xolair 225 mg    Xolair 150 mg was administered as ordered SQ in patient's Left upper arm ( right) and band-aid applied. Xolair 75 mg was administered as ordered SQ in patient's Right upper arm ( left) and band-aid applied    Ms. Mahmood tolerated well without complaints. Patient armband removed and shredded. Ms. Julia Miller was discharged from [place] [address] in stable condition at 1145. She is to return on 2021 at 1130 for her next appointment.       Ellen Gee  2021

## 2021-03-01 ENCOUNTER — TRANSCRIBE ORDER (OUTPATIENT)
Dept: REGISTRATION | Age: 61
End: 2021-03-01

## 2021-03-01 ENCOUNTER — HOSPITAL ENCOUNTER (OUTPATIENT)
Dept: PREADMISSION TESTING | Age: 61
Discharge: HOME | End: 2021-03-01
Payer: MEDICAID

## 2021-03-01 DIAGNOSIS — Z01.812 BLOOD TESTS PRIOR TO TREATMENT OR PROCEDURE: ICD-10-CM

## 2021-03-01 DIAGNOSIS — Z20.828 EXPOSURE TO SARS-ASSOCIATED CORONAVIRUS: ICD-10-CM

## 2021-03-01 DIAGNOSIS — Z01.812 BLOOD TESTS PRIOR TO TREATMENT OR PROCEDURE: Primary | ICD-10-CM

## 2021-03-01 PROCEDURE — U0003 INFECTIOUS AGENT DETECTION BY NUCLEIC ACID (DNA OR RNA); SEVERE ACUTE RESPIRATORY SYNDROME CORONAVIRUS 2 (SARS-COV-2) (CORONAVIRUS DISEASE [COVID-19]), AMPLIFIED PROBE TECHNIQUE, MAKING USE OF HIGH THROUGHPUT TECHNOLOGIES AS DESCRIBED BY CMS-2020-01-R: HCPCS

## 2021-03-03 LAB — SARS-COV-2, COV2NT: NOT DETECTED

## 2021-03-05 ENCOUNTER — HOSPITAL ENCOUNTER (OUTPATIENT)
Dept: SLEEP MEDICINE | Age: 61
Discharge: HOME | End: 2021-03-05
Attending: INTERNAL MEDICINE
Payer: MEDICAID

## 2021-03-05 DIAGNOSIS — G47.30 SLEEP APNEA: ICD-10-CM

## 2021-03-05 DIAGNOSIS — G47.33 OSA (OBSTRUCTIVE SLEEP APNEA): ICD-10-CM

## 2021-03-05 DIAGNOSIS — J44.9 COPD (CHRONIC OBSTRUCTIVE PULMONARY DISEASE) (HCC): ICD-10-CM

## 2021-03-05 DIAGNOSIS — E66.01 MORBID OBESITY (HCC): ICD-10-CM

## 2021-03-05 DIAGNOSIS — K21.9 GERD (GASTROESOPHAGEAL REFLUX DISEASE): ICD-10-CM

## 2021-03-05 DIAGNOSIS — R09.02 HYPOXEMIA: ICD-10-CM

## 2021-03-05 DIAGNOSIS — J45.909 ASTHMA: ICD-10-CM

## 2021-03-05 DIAGNOSIS — E11.9 TYPE II DIABETES MELLITUS (HCC): ICD-10-CM

## 2021-03-05 DIAGNOSIS — R06.02 SOB (SHORTNESS OF BREATH): ICD-10-CM

## 2021-03-05 DIAGNOSIS — I10 ESSENTIAL HYPERTENSION, BENIGN: ICD-10-CM

## 2021-03-05 PROCEDURE — 95811 POLYSOM 6/>YRS CPAP 4/> PARM: CPT

## 2021-03-06 VITALS
HEART RATE: 65 BPM | BODY MASS INDEX: 38.76 KG/M2 | DIASTOLIC BLOOD PRESSURE: 57 MMHG | HEIGHT: 62 IN | TEMPERATURE: 98 F | SYSTOLIC BLOOD PRESSURE: 98 MMHG | WEIGHT: 210.6 LBS

## 2021-03-11 ENCOUNTER — HOSPITAL ENCOUNTER (OUTPATIENT)
Dept: INFUSION THERAPY | Age: 61
Discharge: HOME | End: 2021-03-11
Payer: MEDICAID

## 2021-03-11 VITALS
OXYGEN SATURATION: 96 % | TEMPERATURE: 97 F | RESPIRATION RATE: 18 BRPM | DIASTOLIC BLOOD PRESSURE: 60 MMHG | HEART RATE: 72 BPM | SYSTOLIC BLOOD PRESSURE: 135 MMHG

## 2021-03-11 DIAGNOSIS — J45.50 SEVERE PERSISTENT ASTHMA, UNSPECIFIED WHETHER COMPLICATED: Primary | ICD-10-CM

## 2021-03-11 PROCEDURE — 74011250636 HC RX REV CODE- 250/636: Performed by: INTERNAL MEDICINE

## 2021-03-11 PROCEDURE — 96372 THER/PROPH/DIAG INJ SC/IM: CPT

## 2021-03-11 RX ADMIN — OMALIZUMAB 75 MG: 75 INJECTION, SOLUTION SUBCUTANEOUS at 11:45

## 2021-03-11 RX ADMIN — OMALIZUMAB 150 MG: 150 INJECTION, SOLUTION SUBCUTANEOUS at 11:45

## 2021-03-11 NOTE — PROGRESS NOTES
[facility] Progress Note    Date: 2021    Name: Denise See    MRN: [id number]         : 1960     Xolair Injection every 2 weeks    Ms. Julia Miller arrived to [facility] at 1135. Ms. Julia Miller was assessed and education was provided. Ms. Nam Molina vitals were reviewed. Visit Vitals  /60 (BP 1 Location: Left upper arm, BP Patient Position: Sitting)   Pulse 72   Temp 97 °F (36.1 °C)   Resp 18   SpO2 96%   Breastfeeding No     Xolair 150 mg was administered as ordered SQ in patient's left upper arm. Xolair 75 mg was administered as ordered SQ in patient's right upper arm. Total 225 mg    Ms. Mahmood tolerated well without complaints. Patient armband removed and shredded. Ms. Julia Miller was discharged from [place] [address] in stable condition at 1145. She is to return on 3/25/2021 at 1130 for her next appointment.     Gabriela Noyola  2021  1330

## 2021-03-18 ENCOUNTER — OFFICE VISIT (OUTPATIENT)
Dept: ORTHOPEDIC SURGERY | Age: 61
End: 2021-03-18
Payer: MEDICAID

## 2021-03-18 VITALS
TEMPERATURE: 97.5 F | SYSTOLIC BLOOD PRESSURE: 110 MMHG | OXYGEN SATURATION: 100 % | BODY MASS INDEX: 39.2 KG/M2 | WEIGHT: 213 LBS | HEIGHT: 62 IN | DIASTOLIC BLOOD PRESSURE: 54 MMHG | HEART RATE: 73 BPM

## 2021-03-18 DIAGNOSIS — M48.061 SPINAL STENOSIS OF LUMBAR REGION WITHOUT NEUROGENIC CLAUDICATION: ICD-10-CM

## 2021-03-18 DIAGNOSIS — M54.32 SCIATICA OF LEFT SIDE: Primary | ICD-10-CM

## 2021-03-18 DIAGNOSIS — G81.94 LEFT HEMIPARESIS (HCC): ICD-10-CM

## 2021-03-18 PROCEDURE — 99204 OFFICE O/P NEW MOD 45 MIN: CPT | Performed by: PHYSICAL MEDICINE & REHABILITATION

## 2021-03-18 NOTE — PROGRESS NOTES
Fatou Chan presents today for   Chief Complaint   Patient presents with    Back Pain       Is someone accompanying this pt? no    Is the patient using any DME equipment during OV? no      Coordination of Care:  1. Have you been to the ER, urgent care clinic since your last visit? no  Hospitalized since your last visit? no    2. Have you seen or consulted any other health care providers outside of the [address] [place] [address] since your last visit? Yes, pcp and neurology Include any pap smears or colon screening.  no

## 2021-03-18 NOTE — PROGRESS NOTES
[address]  [address], [address]  [facility], [address]  Phone: [phone]  Fax: [phone]      Patient: Real Locke                                                                              MRN: [id number]        YOB: 1960          AGE: 63 y.o. PCP: Marni Sanchez MD  Date:  03/18/21    Reason for Consultation: No chief complaint on file. HPI:  Real Locke is a 63 y.o. 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 She did have back pain prior to her CVA which she relates to a fall in 2018. She was referred by Dr. Marisol Rogel for low back symptoms. Neurologic symptoms: + numbness, tingling,+  Weakness. No bowel or bladder changes. No recent falls      Location: The pain is located in the low back, left thigh anterior and posterior  Radiation: The pain does radiate down the left leg, mainly left thigh but can travel to foot. Pain Score: Currently: 8/10  Quality: Pain is of a Achy, Burning, Tingling and Numbness quality. Aggravating: Pain is exacerbated by walking, lying down and exercise  Alleviating: The pain is alleviated by sitting    Prior Treatments:   Rehabilitation after CVA- initially [facility] and then  [facility]   Previous Medications:   Current Medications: ibuprofen, tylenol, gabapentin 300mg bid  Previous work-up has included:      L2-L3: No significant disc pathology. Narrowed anteroposterior interpeduncular  distances exaggerate canal stenosis at this level. Dorsal epidural lipomatosis. Moderate bilateral facet arthropathy and ligamentum flavum buckling. Minimal  overall canal stenosis. Mild right and left foraminal stenoses.     L3-L4: Mild broad-based disc bulge flattens ventral thecal sac.  Moderate left  and right lateral and dorsal epidural lipomatosis. Narrowed anteroposterior  interpeduncular distances exaggerate canal stenosis at this level. Moderate  bilateral facet arthropathy and uncovertebral proliferative changes. Minimal  overall canal stenosis. Moderate left and mild right foraminal stenoses.     L4-L5: Moderate broad-based disc bulge. Moderate, mostly dorsal epidural  lipomatosis with near complete effacement of thecal sac CSF. Narrowed  anteroposterior interpeduncular distances exaggerate canal stenosis at this  level. Moderate bilateral facet arthropathy and ligamentum flavum buckling.  Moderately severe overall canal stenosis, largest AP dimension of the thecal sac  measuring just over 4 mm. Moderate right foraminal stenosis. Moderately severe  left foraminal stenosis with mild flattening deformation of the left L4  foraminal nerve root (sagittal image 5).     L5-S1: Moderate broad-based disc bulge flattens ventral thecal sac. Prominent  dorsal and lateral epidural lipomatosis with triangulation of the thecal sac.  Moderate overall canal stenosis, largest AP dimension of the thecal sac  measuring just over 7 mm. Moderate right and moderately severe left facet  arthropathy. Moderate right and moderately severe left foraminal stenoses.  Moderate associated impingement of the left L5 foraminal nerve root.    Past Medical History:   Past Medical History:   Diagnosis Date   • Acute ischemic stroke ([facility]) 8/21/2020    Acute Ischemic Stroke (late acute/early subacute infarcts in the right centrum semiovale extending to the superior frontal lobe cortex) with residual left hemiparesis   • Bipolar disorder ([facility])    • Chronic obstructive pulmonary disease (COPD) ([facility])    • COVID-19 ruled out by laboratory testing 8/26/2020    SARS-CoV-2 ([facility]ott m2000) (collected 8/25/2020, resulted 8/26/2020): Not detected    • Current use of aspirin 8/24/2020   • Depression    • Gastroesophageal reflux disease    • History of colon polyps    •  Hypertensive heart disease without heart failure     2D echocardiogram (8/22/2020) showed EF 65%; mild left ventricular hypertrophy; normal diastolic function; negative bubble study at rest and after Valsalva    Iron deficiency anemia     Left hemiparesis ([address] 8/21/2020    Menopause     Obesity, Class II, BMI 35-39.9     Obstructive sleep apnea on CPAP     On clopidogrel therapy 8/24/2020    On statin therapy due to risk of future cardiovascular event 8/24/2020    On Atorvastatin    Pure hypercholesterolemia 8/19/2020    Lipid profile (8/19/2020) showed , , HDL 50,     Sciatica of left side     Seasonal allergic rhinitis     Severe persistent asthma 11/07/2019    Type 2 diabetes mellitus, with long-term current use of insulin (HCC)     HbA1c (8/19/2020) = 8.1; HbA1c (8/20/2020) = 8.0      Past Surgical History:   Past Surgical History:   Procedure Laterality Date    HX HYSTERECTOMY  2000    Fibroids      SocHx:   Social History     Tobacco Use    Smoking status: Never Smoker    Smokeless tobacco: Never Used   Substance Use Topics    Alcohol use: Yes     Comment: occ      FamHx:? Family History   Problem Relation Age of Onset    Heart Attack Father     Breast Cancer Maternal Aunt     Heart Disease Mother     Stroke Neg Hx        Current Medications:    Current Outpatient Medications   Medication Sig Dispense Refill    gabapentin (NEURONTIN) 300 mg capsule Take 300 mg PO BID for 1 week; then 300 mg PO TID 90 Cap 5    tiotropium bromide (Spiriva Respimat) 2.5 mcg/actuation inhaler Take 2 Puffs by inhalation daily.  valsartan-hydroCHLOROthiazide (Diovan HCT) 320-12.5 mg per tablet Take 1 Tab by mouth daily.  omalizumab (Xolair) 150 mg/mL syrg 150 mg by SubCUTAneous route every fourteen (14) days.  ipratropium (ATROVENT) 42 mcg (0.06 %) nasal spray 2 Sprays by Both Nostrils route three (3) times daily.       fenofibrate nanocrystallized (Tricor) 145 mg tablet Take 145 mg by mouth daily.  EPINEPHrine (EPIPEN) 0.3 mg/0.3 mL injection 0.3 mg by IntraMUSCular route once as needed for Allergic Response.  cholecalciferol (VITAMIN D3) (2,000 UNITS /50 MCG) cap capsule Take 2,000 Units by mouth daily.  amLODIPine (NORVASC) 10 mg tablet Take 10 mg by mouth daily.  albuterol sulfate (PROVENTIL;VENTOLIN) 2.5 mg/0.5 mL nebu nebulizer solution 2.5 mg by Nebulization route every four (4) hours as needed for Wheezing.  albuterol (ProAir HFA) 90 mcg/actuation inhaler Take 1-2 Puffs by inhalation every four (4) hours as needed for Wheezing.  aspirin 81 mg chewable tablet Take 1 Tab by mouth daily (with breakfast). Indications: stroke prevention 30 Tab 0    atorvastatin (LIPITOR) 40 mg tablet Take 1 Tab by mouth daily. Indications: excessive fat in the blood, stroke prevention 30 Tab 0    docusate sodium (COLACE) 100 mg capsule Take 1 Cap by mouth daily (after breakfast). Indications: constipation 30 Cap 0    metFORMIN ER (GLUCOPHAGE XR) 500 mg tablet Take 1 Tab by mouth daily (with dinner). Indications: type 2 diabetes mellitus 30 Tab 0    senna-docusate (PERICOLACE) 8.6-50 mg per tablet Take 2 Tabs by mouth daily (after dinner). Indications: constipation 60 Tab 0    minoxidiL (LONITEN) 2.5 mg tablet Take 2 Tabs by mouth every twelve (12) hours. Indications: high blood pressure 120 Tab 0    insulin glargine (Lantus Solostar U-100 Insulin) 100 unit/mL (3 mL) inpn 22 Units by SubCUTAneous route nightly. Indications: type 2 diabetes mellitus 1 Adjustable Dose Pre-filled Pen Syringe 0    Insulin Needles, Disposable, 31 gauge x 3/16\" ndle As directed  Indications: Type 2 diabetes mellitus 30 Pen Needle 0    montelukast (SINGULAIR) 10 mg tablet Take 10 mg by mouth every evening.  ferrous sulfate 325 mg (65 mg iron) tablet Take 325 mg by mouth Daily (before breakfast).  omeprazole (PRILOSEC) 20 mg capsule Take 20 mg by mouth daily. Allergies: Allergies   Allergen Reactions    Grass Pollen Anaphylaxis     Environmental allergies Unsure what  triggers episodes. Intubated for episodes in 2006 and 2008.  Metformin Nausea Only     Pt states stomach cramps     Other Plant, Animal, Environmental Shortness of Breath    Pneumococcal Vaccine Other (comments)        Review of Systems:   Gen:    Denied fevers, chills, malaise, fatigue, weight changes   Resp: Denied shortness of breath, cough, wheezing   CVS: Denied chest pain, palpitations   : Denied urinary urgency, frequency, incontinence   GI: Denied nausea, vomiting, constipation, diarrhea   Skin: Denied rashes, wounds   Psych: Denied anxiety, depression   Vasc: Denied claudication, ulcers   Hem: Denied easy bruising/bleeding   MSK: See HPI   Neuro: See HPI         Physical Exam     Vital Signs: There were no vitals taken for this visit. General: ??????? Well nourished and well developed female without any acute distress   Psychiatric: ?  Alert and oriented x 3 with normal mood    HEENT: ???????? Atraumatic   Respiratory:   Breathing non-labored and non dyspneic   CV: ???????????????? Peripheral pulses intact, no peripheral edema   Skin: ????????????? No rashes       Neurologic: ??       Sensation: normal and grossly intact thebilateral, upper extremity(s), lower extremity(s)   Strength: 5/5 in the bilateral, upper extremity(s), lower extremity(s) exceptweakness, left lower extremity HF 3/5, KE 3/5, DF 4/5, EHL 4/5  Reflexes: reveals 2+ symmetric DTRs throughout except  Brisk on left  Gait: antalgic unsteady gait    Musculoskeletal: Lumbar Exam     Inspection:   Alignment: Abnormal  Atrophy: None       Tenderness to Palpation:   Lumbar paraspinals Positive  Lumbar spinous processes Negative  SI Joint:  Positive  Gluteal:Positive   Greater trochanter: Positive      ROM:   Lumbar ROM: Abnormal pain with flexion and extension worse with flexon  Hip ROM: No reproduction of pain with movement     Special Tests      SLR: Positive left  RUTH: Negative  FADIR: Negative  Log Roll: Negative         Medical Decision Making:  MRI lumbar spine 2021    Images:MRI lumbar spine- rotary scoliosis, epidural lipomatosis, facet arthropathy L3/4 moderate Left foraminal stenosis  L4/5- moderate to severe spinal stenosis- moderate to severe left foraminal stenosis  L5/S1- moderate to severe central stenosis severe left foraminal stenosis      Labs:  HB A1C 7.5 12/2020       Assessment:   1. Spinal stenosis L4/5 and L5/S1  2. Left foraminal stenosis at L4/5 and L5/S1  3. DM  4. Prior CVA with residual left sided weakness    Plan:      -Physical therapy -  Referral to restart physical therapy for LLE strengthening, gait training, fall prevention  -Medications - continue gabapentin. Counseled regarding side effects and appropriate administration of medications.    -Diagnostics/Imaging -Reviewed MRI lumbars pine  -Injections - referral to try left L4 and l5 SNRB (TF SIMIN) / Patient is diabetic and on ASA 81mg  -Lifestyle - Encouraged regular exercise and weight loss  -Education - The patient's diagnosis, prognosis and treatment options were discussed today. All questions were answered. F/U - in 3 week(s) after SIMIN or sooner if needed.           [address] [facility]

## 2021-03-25 ENCOUNTER — HOSPITAL ENCOUNTER (OUTPATIENT)
Dept: INFUSION THERAPY | Age: 61
Discharge: HOME | End: 2021-03-25
Payer: MEDICAID

## 2021-03-25 VITALS
RESPIRATION RATE: 18 BRPM | TEMPERATURE: 96.9 F | OXYGEN SATURATION: 98 % | SYSTOLIC BLOOD PRESSURE: 123 MMHG | HEART RATE: 66 BPM | DIASTOLIC BLOOD PRESSURE: 71 MMHG

## 2021-03-25 DIAGNOSIS — J45.50 SEVERE PERSISTENT ASTHMA, UNSPECIFIED WHETHER COMPLICATED: Primary | ICD-10-CM

## 2021-03-25 PROCEDURE — 74011250636 HC RX REV CODE- 250/636: Performed by: NURSE PRACTITIONER

## 2021-03-25 PROCEDURE — 96372 THER/PROPH/DIAG INJ SC/IM: CPT

## 2021-03-25 RX ADMIN — OMALIZUMAB 150 MG: 150 INJECTION, SOLUTION SUBCUTANEOUS at 12:00

## 2021-03-25 RX ADMIN — OMALIZUMAB 75 MG: 75 INJECTION, SOLUTION SUBCUTANEOUS at 11:59

## 2021-03-25 NOTE — PROGRESS NOTES
[facility] Progress Note    Date: 2021    Name: Benigno Gregory    MRN: [id number]         : 1960     Xolair Injection every 2 weeks    Ms. Julia Miller arrived ambulatory and in no acute distress at 1153. Patient denied complaints of pain/discomfort, changes in medication regimen or health condition. Ms. Julia Miller was assessed and education was provided. Ms. Lani Aponte vitals were reviewed. Visit Vitals  /71 (BP 1 Location: Left upper arm, BP Patient Position: At rest;Sitting)   Pulse 66   Temp 96.9 °F (36.1 °C)   Resp 18   SpO2 98%     Xolair 150 mg was administered as ordered SQ in patient's posterior right upper arm. Xolair 75 mg was administered as ordered SQ in patient's posterior left upper arm. Total dose administered was 225 mg. Ms. Julia Miller tolerated well without complaints. Patient armband removed and shredded. Ms. Julia Miller was discharged from [place] [address] in stable condition at 1203. She is to return on 2021 at 36 for her next appointment.     Favian Tipton RN  2021  [id number]

## 2021-03-29 ENCOUNTER — HOSPITAL ENCOUNTER (OUTPATIENT)
Dept: PHYSICAL THERAPY | Age: 61
Discharge: HOME | End: 2021-03-29
Payer: MEDICAID

## 2021-03-29 PROCEDURE — 97162 PT EVAL MOD COMPLEX 30 MIN: CPT

## 2021-03-29 NOTE — PROGRESS NOTES
PT DAILY TREATMENT NOTE     Patient Name: Sylvia Pick  Date:3/29/2021  : 1960  [x]  Patient  Verified  Payor: [facility] MEDICAID / Plan: CELINE GONZALES [facility] CCCP / Product Type: Managed Care Medicaid /    In time:427p  Out time:503p  Total Treatment Time (min): 30  Visit #: 1 of     Medicare/BCBS Only   Total Timed Codes (min):  na 1:1 Treatment Time:  na       Treatment Area: Sciatica, left side [M54.32]  Hemiplegia, unspecified affecting left nondominant side [G81.94]  Spinal stenosis, lumbar region without neurogenic claudication [M48.061]    SUBJECTIVE  Pain Level (0-10 scale): 6  Any medication changes, allergies to medications, adverse drug reactions, diagnosis change, or new procedure performed?: [x] No    [] Yes (see summary sheet for update)  Subjective functional status/changes:   [] No changes reported  Pt initial eval see POC for details    OBJECTIVE    Modality rationale: decrease pain to improve the patient's ability to tolerate ADLs   Min Type Additional Details    [] Estim:  []Unatt       []IFC  []Premod                        []Other:  []w/ice   []w/heat  Position:  Location:    [] Estim: []Att    []TENS instruct  []NMES                    []Other:  []w/US   []w/ice   []w/heat  Position:  Location:    []  Traction: [] Cervical       []Lumbar                       [] Prone          []Supine                       []Intermittent   []Continuous Lbs:  [] before manual  [] after manual    []  Ultrasound: []Continuous   [] Pulsed                           []1MHz   []3MHz W/cm2:  Location:    []  Iontophoresis with dexamethasone         Location: [] Take home patch   [] In clinic   PD []  Ice     []  heat  []  Ice massage  []  Laser   []  Anodyne Position:  Location:    []  Laser with stim  []  Other:  Position:  Location:    []  Vasopneumatic Device Pressure:       [] lo [] med [] hi   Temperature: [] lo [] med [] hi   [] Skin assessment post-treatment:  []intact []redness- no adverse reaction    []redness - adverse reaction:     23 min [x]Eval                  []Re-Eval       7 NC min Therapeutic Exercise:  [] See flow sheet :   Rationale: increase ROM and increase strength to improve the patient's ability to walk and stand          With   [] TE   [] TA   [] neuro   [] other: Patient Education: [x] Review HEP    [] Progressed/Changed HEP based on:   [] positioning   [] body mechanics   [] transfers   [] heat/ice application    [] other:      Other Objective/Functional Measures:    Justification for Eval Code Complexity:  Patient History : see POC   Examination see exam   Clinical Presentation: evolving  Clinical Decision Making : FOTO : 35/100    Pain Level (0-10 scale) post treatment: 6    ASSESSMENT/Changes in Function: Pt was instructed in initial HEP required demo, vc, and tc for all exercises to perform correctly. Pt was given hand out detailing exercises and instructed in modification of exercises to tolerance, and in performing exercises safely. Pt verbalized understanding. Patient will continue to benefit from skilled PT services to modify and progress therapeutic interventions, address functional mobility deficits, address ROM deficits, address strength deficits, analyze and address soft tissue restrictions, analyze and cue movement patterns, analyze and modify body mechanics/ergonomics, assess and modify postural abnormalities, address imbalance/dizziness and instruct in home and community integration to attain remaining goals.      []  See Plan of Care  []  See progress note/recertification  []  See Discharge Summary         Progress towards goals / Updated goals:  No progress as goals were set today    PLAN  []  Upgrade activities as tolerated     []  Continue plan of care  []  Update interventions per flow sheet       []  Discharge due to:_  []  Other:_        Luanne Moore 3/29/2021  4:13 PM    Future Appointments   Date Time Provider Margarita Wells   3/29/2021  4:15 PM Liss Pat [facility]   4/8/2021 11:30 AM [facility] FAST TRACK NURSE [facility] [address]   4/22/2021 10:30 AM [facility] FAST TRACK NURSE [facility] [address]   4/22/2021 11:30 AM Mahesh Aquino MD [facility]   12/21/2021  1:15 PM [facility]

## 2021-03-29 NOTE — PROGRESS NOTES
[address] [facility] [address] [facility] AT [address] [place], [place]  Phone: [phone] Fax: [phone] / [address] [place] [address] PHYSICAL THERAPY SERVICES  Patient Name: Asia Aly : 1960   Medical   Diagnosis: Sciatica, left side [M54.32]  Hemiplegia, unspecified affecting left nondominant side [G81.94]  Spinal stenosis, lumbar region without neurogenic claudication [M48.061] Treatment Diagnosis: Sciatica, left side [M54.32]  Hemiplegia, unspecified affecting left nondominant side [G81.94]  Spinal stenosis, lumbar region without neurogenic claudication [M48.061]   Onset Date: 20     Referral Source: Eleni Cassidy* Start of Care [facility]): 3/29/2021   Prior Hospitalization: See medical history Provider #: [id number]   Prior Level of Function: I in all functional mobility, no restriction or limitation with standing/walking   Comorbidities: Stroke (20) HTN, asthma, diabetes   Medications: Verified on Patient Summary List   The Plan of Care and following information is based on the information from the initial evaluation.   ========================================================================  Assessment / key information:    Pt is a 63 y.o. F who presents with L sided LE weakness, LBP, and balance deficits s/p stroke on 20. Current deficits include: dec lumbar ROM, dec hip ROM, dec LE strength, dec core stability, dec balance. Functional deficits include: impaired walking impaired standing, impaired transfers, reliance on FWW for ambulation, inc risk for falls, impaired dressing. FOTO score indicates 65% functional impairment. Home exercise program initiated on initial evaluation to address these deficits. Pt would benefit from PT to address these deficits for increased functional mobility and QOL.     JEFF: Pt reports that on 20 she had a stroke resulting in a hospitalization, then a month of inpatient therapy, then to outpatient therapy. As she did outpatient therapy she began having a lot of dizziness, and developed a tremor in her hands. She saw her MD who diagnosed her with vertigo in December, and is also sending her to an ENT to further assess the vertigo. She also saw her back MD who diagnosed her with arthritis in her back, and referred her back to therapy. 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 At this point the pain in the back is worse with standing, bending down to reach. Prior to the vertigo spell she was able to stand for 45 mins or more cooking, at this point she is limited to standing for about 20 mins and will periodically use her FWW if she is getting tired. She currently uses a FWW for longer distance walking and if she is feeling tired. She is limited to about 1/4 mile. Prior to the stroke she was on her feet for hours every day cooking. She is also having difficulty kneeling and is not able to comfortably kneel. She also has trouble lifting her leg to put on her shoes. She has some numbness and tingling on both inner thighs, and sciatica on the L leg, both of which started after the stroke. OBJECTIVE:    PAIN:  Location- low back, CHETAN LE L>R  Current- 6  Best-6  Worst-10  Alleviating factors: elevating legs  Aggravating factors: standing, walking, bending, kneeling    MMT:  Hip   -flex L=3-/5 R=3+/5  -ext L=3-/5 R=3+/5  -abd L=3-/5 R=3+/5  Knee  -flex L=3/5 R=5/5  -ext L=3/5 R=5/5  Ankle  - DF L=4/5 R=4/5    Sensation: mild deficits in sensation along L lateral calf, posterior calf, chetan medial thighs    Posture: flattened lumbar spine,     Balance: Therapist plans to assess vertigo sx as indicated at a later date following Pt ENT appointment.    SLS R= 20s L= 3s    AROM:  Lumbar  Flexion- finger tips to tibial tuberosity, pain in LLE  Extension-  50% limited with pain  Rotation R-50% limited  Rotation L- 50% limited with pain  Sidebend R-finger tips to 1 in above jt line  Sidebend L-finger tips to 1 in above jt line  Hip  Flexion- R= WNL, L= 45 deg SLR! Outcome Measure: FOTO=35      Palpation for Tenderness: TTP of L hip flexor, TFL, glute med, distal quad    Special Tests:  SLR- positive on L    ========================================================================  Eval Complexity: History: HIGH Complexity :3+ comorbidities / personal factors will impact the outcome/ POC Exam:MEDIUM Complexity : 3 Standardized tests and measures addressing body structure, function, activity limitation and / or participation in recreation  Presentation: MEDIUM Complexity : Evolving with changing characteristics  Clinical Decision Making:MEDIUM Complexity : FOTO score of 26-74Overall Complexity:MEDIUM  Problem List: pain affecting function, decrease ROM, decrease strength, edema affecting function, impaired gait/ balance, decrease ADL/ functional abilitiies, decrease activity tolerance, decrease flexibility/ joint mobility and decrease transfer abilities   Treatment Plan may include any combination of the following: Therapeutic exercise, Therapeutic activities, Neuromuscular re-education, Physical agent/modality, Gait/balance training, Manual therapy, Patient education, Self Care training, Functional mobility training, Home safety training and Stair training  Patient / Family readiness to learn indicated by: asking questions  Persons(s) to be included in education: patient (P)  Barriers to Learning/Limitations: None  Measures taken:    Patient Goal (s): \"making my muscles stronger\"   Patient self reported health status: good  Rehabilitation Potential: good  Short Term Goals: To be accomplished in 1 weeks:  1) Goal: Patient will be independent and compliant with HEP in order to progress toward long term goals.   Status at last note/certification: issued and reviewed  Long Term Goals: To be accomplished in 8-12 treatments:  1) Goal: Patient will improve FOTO assessment score to 47 pts in order to indicate improved functional abilities. Status at last note/certification: 35 pts  2) Goal: Patient will improve L hip flexion to 80 deg SLR for improved posture and gait mechanics  Status at last note/certification: 39  Deg! 3) Goal: Patient will report worst back pain as 4/10 or less in order to progress toward personal goals. Status at last note/certification: 02/71  4) Goal: Patient will report overall at least 65% improvement in function in order to progress toward premorbid status. Status at last note/certification: n/a  5) Goal: Patient will report ability to walk/stand for 45 to buy groceries and cook  Status at last note/certification: stand 20 mins, walk 1/4 mile  5) Goal: Patient will report ability to kneel for improved QOL  Status at last note/certification: unable to kneel  Frequency / Duration:   Patient to be seen  1-2  times per week for 4-6  weeks:  Patient / Caregiver education and instruction: exercises  Therapist Signature: Juany Kline Date: 2/13/2638   Certification Period: na Time: 4:12 PM   ========================================================================  I certify that the above Physical Therapy Services are being furnished while the patient is under my care. I agree with the treatment plan and certify that this therapy is necessary. Physician Signature:        Date:       Time: ___                                            Lily Gutierrez*. Please sign and return to [facility] [place] or you may fax the signed copy to [phone]. Thank you.

## 2021-03-30 ENCOUNTER — DOCUMENTATION ONLY (OUTPATIENT)
Dept: ORTHOPEDIC SURGERY | Age: 61
End: 2021-03-30

## 2021-03-30 NOTE — PROGRESS NOTES
Patient called she wants to complete PT first before she has injection.  We cancelled the SNRB for 4/8/21 but kept the FU

## 2021-04-08 ENCOUNTER — HOSPITAL ENCOUNTER (OUTPATIENT)
Dept: INFUSION THERAPY | Age: 61
Discharge: HOME | End: 2021-04-08

## 2021-04-08 VITALS
SYSTOLIC BLOOD PRESSURE: 133 MMHG | TEMPERATURE: 98.7 F | DIASTOLIC BLOOD PRESSURE: 77 MMHG | RESPIRATION RATE: 18 BRPM | HEART RATE: 69 BPM | OXYGEN SATURATION: 99 %

## 2021-04-08 DIAGNOSIS — J45.50 SEVERE PERSISTENT ASTHMA, UNSPECIFIED WHETHER COMPLICATED: Primary | ICD-10-CM

## 2021-04-08 NOTE — PROGRESS NOTES
[facility] Progress Note    Date: 2021    Name: Pal Quintanilla    MRN: [id number]         : 1960     Xolair Injection every 2 weeks (Held)    Ms. Kimberly Moser arrived to [facility] at 1200. Ms. Kimberly Moser was assessed and education was provided. Ms. Abraham Kennedy vitals were reviewed. Visit Vitals  /77 (BP 1 Location: Left upper arm, BP Patient Position: Sitting)   Pulse 69   Temp 98.7 °F (37.1 °C)   Resp 18   SpO2 99%     Total 225mg held today. Patient scheduled to received first COVID-19 vaccine Saturday, 4/10/2021    Ms. Mahmood tolerated well without complaints. Patient armband removed and shredded. Ms. Kimberly Moser was discharged from [place] [address] in stable condition at 1200. She is to return on 2021 at 56 for her next appointment.     Denzel Tran RN  2021  1200

## 2021-04-22 ENCOUNTER — VIRTUAL VISIT (OUTPATIENT)
Dept: ORTHOPEDIC SURGERY | Age: 61
End: 2021-04-22
Payer: MEDICAID

## 2021-04-22 ENCOUNTER — HOSPITAL ENCOUNTER (OUTPATIENT)
Dept: INFUSION THERAPY | Age: 61
End: 2021-04-22

## 2021-04-22 DIAGNOSIS — G95.19 NEUROGENIC CLAUDICATION (HCC): ICD-10-CM

## 2021-04-22 DIAGNOSIS — M54.50 LOW BACK PAIN RADIATING DOWN LEG: ICD-10-CM

## 2021-04-22 DIAGNOSIS — M79.606 LOW BACK PAIN RADIATING DOWN LEG: ICD-10-CM

## 2021-04-22 PROCEDURE — 99212 OFFICE O/P EST SF 10 MIN: CPT | Performed by: PHYSICAL MEDICINE & REHABILITATION

## 2021-04-22 RX ORDER — GABAPENTIN 300 MG/1
CAPSULE ORAL
Qty: 180 CAP | Refills: 0 | Status: SHIPPED | OUTPATIENT
Start: 2021-04-22 | End: 2022-10-26 | Stop reason: ALTCHOICE

## 2021-04-22 NOTE — PROGRESS NOTES
[address]  [address], [address]  [place], [address] [place] [address] Street  Phone: [phone]  Fax: [phone]    Video visit    Patient: Aspen Sevilla                                                                              MRN: [id number]        YOB: 1960          AGE: 63 y.o. PCP: Ronaldo Ott MD  Date:  04/22/21    Reason for Consultation: No chief complaint on file. HPI:  Aspen Sevilla is a 63 y.o. female with relevant PMH of HTN, DM,  ROBBY , COPD, CVA in 8/2020 with left sided weakness who presented  with low back pain and left leg pain which began after she fell during her CVA 8/2020. She did have back pain prior to her CVA which she relates to a fall in 2018. She was referred by Dr. Radha Ely for low back symptoms. Lumbar MRI with spinal stenosis and left foraminal stenosis L4/5 and L5/S1. Since her last visit she has had one session of PT and is now approved for 6 weeks of therapy. She was taking gabapentin 300mg bid but ran out recently. Pain has worsened over the past few weeks. She has decided to hold off on the \[facility] [place] [facility]\"" & [facility] for now. Neurologic symptoms: + numbness, tingling,+  Weakness. No bowel or bladder changes. No recent falls      Location: The pain is located in the low back, left thigh anterior and posterior  Radiation: The pain does radiate down the left leg, mainly left thigh but can travel to foot. Pain Score: Currently: 8/10  Quality: Pain is of a Achy, Burning, Tingling and Numbness quality. Aggravating: Pain is exacerbated by walking, lying down and exercise  Alleviating: The pain is alleviated by sitting    Prior Treatments:   Rehabilitation after CVA- initially [facility] and then  [facility]   Previous Medications:   Current Medications: ibuprofen, tylenol, gabapentin 300mg bid  Previous work-up has included:      L2-L3: No significant disc pathology.  Narrowed anteroposterior interpeduncular  distances exaggerate canal stenosis at this level. Dorsal epidural lipomatosis. Moderate bilateral facet arthropathy and ligamentum flavum buckling. Minimal  overall canal stenosis. Mild right and left foraminal stenoses.     L3-L4: Mild broad-based disc bulge flattens ventral thecal sac. Moderate left  and right lateral and dorsal epidural lipomatosis. Narrowed anteroposterior  interpeduncular distances exaggerate canal stenosis at this level. Moderate  bilateral facet arthropathy and uncovertebral proliferative changes. Minimal  overall canal stenosis. Moderate left and mild right foraminal stenoses.     L4-L5: Moderate broad-based disc bulge. Moderate, mostly dorsal epidural  lipomatosis with near complete effacement of thecal sac CSF. Narrowed  anteroposterior interpeduncular distances exaggerate canal stenosis at this  level. Moderate bilateral facet arthropathy and ligamentum flavum buckling. Moderately severe overall canal stenosis, largest AP dimension of the thecal sac  measuring just over 4 mm. Moderate right foraminal stenosis. Moderately severe  left foraminal stenosis with mild flattening deformation of the left L4  foraminal nerve root (sagittal image 5).    L5-S1: Moderate broad-based disc bulge flattens ventral thecal sac. Prominent  dorsal and lateral epidural lipomatosis with triangulation of the thecal sac. Moderate overall canal stenosis, largest AP dimension of the thecal sac  measuring just over 7 mm. Moderate right and moderately severe left facet  arthropathy. Moderate right and moderately severe left foraminal stenoses. Moderate associated impingement of the left L5 foraminal nerve root.     Past Medical History:   Past Medical History:   Diagnosis Date    Acute ischemic stroke ([facility] [address] 8/21/2020    Acute Ischemic Stroke (late acute/early subacute infarcts in the right centrum semiovale extending to the superior frontal lobe cortex) with residual left hemiparesis    Bipolar disorder ([facility]     Chronic obstructive pulmonary disease (COPD) ([facility]     COVID-19 ruled out by laboratory testing 8/26/2020    SARS-CoV-2 ([facility] m2000) (collected 8/25/2020, resulted 8/26/2020): Not detected     Current use of aspirin 8/24/2020    Depression     Gastroesophageal reflux disease     History of colon polyps     Hypertensive heart disease without heart failure     2D echocardiogram (8/22/2020) showed EF 65%; mild left ventricular hypertrophy; normal diastolic function; negative bubble study at rest and after Valsalva    Iron deficiency anemia     Left hemiparesis ([facility] 8/21/2020    Menopause     Obesity, Class II, BMI 35-39.9     Obstructive sleep apnea on CPAP     On clopidogrel therapy 8/24/2020    On statin therapy due to risk of future cardiovascular event 8/24/2020    On Atorvastatin    Pure hypercholesterolemia 8/19/2020    Lipid profile (8/19/2020) showed , , HDL 50,     Sciatica of left side     Seasonal allergic rhinitis     Severe persistent asthma 11/07/2019    Type 2 diabetes mellitus, with long-term current use of insulin (HCC)     HbA1c (8/19/2020) = 8.1; HbA1c (8/20/2020) = 8.0      Past Surgical History:   Past Surgical History:   Procedure Laterality Date    HX HYSTERECTOMY  2000    Fibroids      SocHx:   Social History     Tobacco Use    Smoking status: Never Smoker    Smokeless tobacco: Never Used   Substance Use Topics    Alcohol use: Yes     Comment: occ      FamHx:? Family History   Problem Relation Age of Onset    Heart Attack Father     Breast Cancer Maternal Aunt     Heart Disease Mother     Stroke Neg Hx        Current Medications:    Current Outpatient Medications   Medication Sig Dispense Refill    gabapentin (NEURONTIN) 300 mg capsule Take 300 mg PO BID for 1 week; then 300 mg PO TID 90 Cap 5    tiotropium bromide (Spiriva Respimat) 2.5 mcg/actuation inhaler Take 2 Puffs by inhalation daily.       valsartan-hydroCHLOROthiazide (Diovan HCT) 320-12.5 mg per tablet Take 1 Tab by mouth daily.  omalizumab (Xolair) 150 mg/mL syrg 150 mg by SubCUTAneous route every fourteen (14) days.  ipratropium (ATROVENT) 42 mcg (0.06 %) nasal spray 2 Sprays by Both Nostrils route three (3) times daily.  fenofibrate nanocrystallized (Tricor) 145 mg tablet Take 145 mg by mouth daily.  EPINEPHrine (EPIPEN) 0.3 mg/0.3 mL injection 0.3 mg by IntraMUSCular route once as needed for Allergic Response.  cholecalciferol (VITAMIN D3) (2,000 UNITS /50 MCG) cap capsule Take 2,000 Units by mouth daily.  amLODIPine (NORVASC) 10 mg tablet Take 10 mg by mouth daily.  albuterol sulfate (PROVENTIL;VENTOLIN) 2.5 mg/0.5 mL nebu nebulizer solution 2.5 mg by Nebulization route every four (4) hours as needed for Wheezing.  albuterol (ProAir HFA) 90 mcg/actuation inhaler Take 1-2 Puffs by inhalation every four (4) hours as needed for Wheezing.  aspirin 81 mg chewable tablet Take 1 Tab by mouth daily (with breakfast). Indications: stroke prevention 30 Tab 0    atorvastatin (LIPITOR) 40 mg tablet Take 1 Tab by mouth daily. Indications: excessive fat in the blood, stroke prevention 30 Tab 0    docusate sodium (COLACE) 100 mg capsule Take 1 Cap by mouth daily (after breakfast). Indications: constipation 30 Cap 0    metFORMIN ER (GLUCOPHAGE XR) 500 mg tablet Take 1 Tab by mouth daily (with dinner). Indications: type 2 diabetes mellitus 30 Tab 0    senna-docusate (PERICOLACE) 8.6-50 mg per tablet Take 2 Tabs by mouth daily (after dinner). Indications: constipation 60 Tab 0    minoxidiL (LONITEN) 2.5 mg tablet Take 2 Tabs by mouth every twelve (12) hours. Indications: high blood pressure 120 Tab 0    insulin glargine (Lantus Solostar U-100 Insulin) 100 unit/mL (3 mL) inpn 22 Units by SubCUTAneous route nightly.  Indications: type 2 diabetes mellitus 1 Adjustable Dose Pre-filled Pen Syringe 0    Insulin Needles, Disposable, 31 gauge x 3/16\" ndle As directed  Indications: Type 2 diabetes mellitus 30 Pen Needle 0    montelukast (SINGULAIR) 10 mg tablet Take 10 mg by mouth every evening.  ferrous sulfate 325 mg (65 mg iron) tablet Take 325 mg by mouth Daily (before breakfast).  omeprazole (PRILOSEC) 20 mg capsule Take 20 mg by mouth daily. Allergies: Allergies   Allergen Reactions    Grass Pollen Anaphylaxis     Environmental allergies Unsure what  triggers episodes. Intubated for episodes in 2006 and 2008.  Metformin Nausea Only     Pt states stomach cramps     Other Plant, Animal, Environmental Shortness of Breath    Pneumococcal Vaccine Other (comments)        Review of Systems:   Gen:    Denied fevers, chills, malaise, fatigue, weight changes   Resp: Denied shortness of breath, cough, wheezing   CVS: Denied chest pain, palpitations   : Denied urinary urgency, frequency, incontinence   GI: Denied nausea, vomiting, constipation, diarrhea   Skin: Denied rashes, wounds   Psych: Denied anxiety, depression   Vasc: Denied claudication, ulcers   Hem: Denied easy bruising/bleeding   MSK: See HPI   Neuro: See HPI               Medical Decision Making:  MRI lumbar spine 2021    Images:MRI lumbar spine- rotary scoliosis, epidural lipomatosis, facet arthropathy L3/4 moderate Left foraminal stenosis  L4/5- moderate to severe spinal stenosis- moderate to severe left foraminal stenosis  L5/S1- moderate to severe central stenosis severe left foraminal stenosis      Labs:  HB A1C 7.5 12/2020       Assessment:   1. Spinal stenosis L4/5 and L5/S1  2. Left foraminal stenosis at L4/5 and L5/S1  3. DM  4. Prior CVA with residual left sided weakness    Plan:      -Physical therapy - cont physical therapy for LLE strengthening, gait training, fall prevention  -Medications - continue gabapentin will renew rx for 300mg bid x 3 months supply.  PDMP checked Counseled regarding side effects and appropriate administration of medications.    -Diagnostics/Imaging -Reviewed MRI lumbars pine  -Injections - she would like to hold off on left L4 and l5 SNRB (TF SIMIN) / Patient is diabetic and on ASA 81mg  -Lifestyle - Encouraged regular exercise and weight loss  -Education - The patient's diagnosis, prognosis and treatment options were discussed today. All questions were answered.  -patient is also interested in medical marijuana, she has recently stopped smoking on her own but it was helping her pain. Will try to help her find location for treatment  F/U - in 6 weeks    I was in the office while conducting this encounter. Consent:  She and/or her healthcare decision maker is aware that this patient-initiated Telehealth encounter is a billable service, with coverage as determined by her insurance carrier. She is aware that she may receive a bill and has provided verbal consent to proceed: Yes    This virtual visit was conducted via [employer]. Pursuant to the emergency declaration under the [facility][note 3][facility] waiver authority and the [employer] Act, this Virtual  Visit was conducted to reduce the patient's risk of exposure to COVID-19 and provide continuity of care for an established patient. Services were provided through a video synchronous discussion virtually to substitute for in-person clinic visit. Due to this being a TeleHealth evaluation, many elements of the physical examination are unable to be assessed. Total Time: minutes: 11-20 minutes.         [facility]

## 2021-04-28 ENCOUNTER — APPOINTMENT (OUTPATIENT)
Dept: PHYSICAL THERAPY | Age: 61
End: 2021-04-28

## 2021-04-30 NOTE — PROGRESS NOTES
[address] [facility] [address] [facility] AT [address]. [address], [place], [place] [address]  Phone: [phone] Fax [phone] SUMMARY  Patient Name: Alva Sewell : 1960   Treatment/Medical Diagnosis: Sciatica, left side [M54.32]  Hemiplegia, unspecified affecting left nondominant side [G81.94]  Spinal stenosis, lumbar region without neurogenic claudication [M48.061]   Referral Source: Guerda Small*     Date of Initial Visit: 3/29/21 Attended Visits: 1 Missed Visits: 1     SUMMARY OF TREATMENT  Pt attended initial evaluation and 0 follow-up appointments. Unfortunately, patient failed to return for further treatment and is therefore discharged from our care at this time. A formal reassessment of goals was unable to be performed due to unplanned DC. RECOMMENDATIONS  Discontinue physical therapy due to patient not returning. RECOMMENDATIONS  Discontinue therapy due to lack of attendance or compliance. If you have any questions/comments please contact us directly at [phone]. Thank you for allowing us to assist in the care of your patient. Therapist Signature: Emily Ramirez Date: 21   Reporting Period: na Time: 3:19 PM     NOTE TO PHYSICIAN:  Your patient's insurance requires this discharge note be signed and returned. PLEASE COMPLETE THE ORDERS BELOW AND RETURN TO:  [facility] @ [phone]    ___ I have read the above report and request that my patient be discharged from therapy.      Physician Signature:        Date:       Time:

## 2021-05-03 ENCOUNTER — APPOINTMENT (OUTPATIENT)
Dept: PHYSICAL THERAPY | Age: 61
End: 2021-05-03

## 2021-05-06 ENCOUNTER — APPOINTMENT (OUTPATIENT)
Dept: INFUSION THERAPY | Age: 61
End: 2021-05-06
Payer: MEDICAID

## 2021-05-06 ENCOUNTER — APPOINTMENT (OUTPATIENT)
Dept: PHYSICAL THERAPY | Age: 61
End: 2021-05-06

## 2021-05-10 ENCOUNTER — APPOINTMENT (OUTPATIENT)
Dept: PHYSICAL THERAPY | Age: 61
End: 2021-05-10

## 2021-05-12 ENCOUNTER — APPOINTMENT (OUTPATIENT)
Dept: PHYSICAL THERAPY | Age: 61
End: 2021-05-12

## 2021-05-17 ENCOUNTER — HOSPITAL ENCOUNTER (OUTPATIENT)
Dept: INFUSION THERAPY | Age: 61
Discharge: HOME | End: 2021-05-17
Payer: MEDICAID

## 2021-05-17 ENCOUNTER — APPOINTMENT (OUTPATIENT)
Dept: PHYSICAL THERAPY | Age: 61
End: 2021-05-17

## 2021-05-17 VITALS
HEART RATE: 66 BPM | SYSTOLIC BLOOD PRESSURE: 155 MMHG | DIASTOLIC BLOOD PRESSURE: 70 MMHG | OXYGEN SATURATION: 98 % | RESPIRATION RATE: 18 BRPM | TEMPERATURE: 97.6 F

## 2021-05-17 DIAGNOSIS — J45.50 SEVERE PERSISTENT ASTHMA, UNSPECIFIED WHETHER COMPLICATED: Primary | ICD-10-CM

## 2021-05-17 PROCEDURE — 74011250636 HC RX REV CODE- 250/636: Performed by: INTERNAL MEDICINE

## 2021-05-17 PROCEDURE — 96372 THER/PROPH/DIAG INJ SC/IM: CPT

## 2021-05-17 RX ADMIN — OMALIZUMAB 75 MG: 75 INJECTION, SOLUTION SUBCUTANEOUS at 13:17

## 2021-05-17 RX ADMIN — OMALIZUMAB 150 MG: 150 INJECTION, SOLUTION SUBCUTANEOUS at 13:18

## 2021-05-17 NOTE — PROGRESS NOTES
[facility] Progress Note    Date: May 17, 2021    Name: Ga Woo    MRN: [id number]         : 1960     Xolair Injection every 2 weeks    Ms. Julia Miller arrived to Health system at 1310. Ms. Julia Miller was assessed and education was provided. Ms. Marce Flores vitals were reviewed. Visit Vitals  BP (!) 155/70 (BP 1 Location: Left upper arm, BP Patient Position: Sitting)   Pulse 66   Temp 97.6 °F (36.4 °C)   Resp 18   SpO2 98%   Breastfeeding No     Xolair 150 mg was administered as ordered SQ in patient's left upper arm., bandaid applied. Xolair 75 mg was administered as ordered SQ in patient's right upper arm, bandaid applied. Total 225 mg    Ms. Mahmood tolerated well without complaints. Patient armband removed and shredded. Ms. Julia Miller was discharged from [place] [address] in stable condition at 1320. She is to return on 2021 at 1400 for her next appointment.     Scottie Hale RN  May 17, 2021

## 2021-05-19 ENCOUNTER — APPOINTMENT (OUTPATIENT)
Dept: PHYSICAL THERAPY | Age: 61
End: 2021-05-19

## 2021-05-20 ENCOUNTER — APPOINTMENT (OUTPATIENT)
Dept: INFUSION THERAPY | Age: 61
End: 2021-05-20
Payer: MEDICAID

## 2021-05-25 ENCOUNTER — OFFICE VISIT (OUTPATIENT)
Dept: ORTHOPEDIC SURGERY | Age: 61
End: 2021-05-25
Payer: MEDICAID

## 2021-05-25 VITALS
SYSTOLIC BLOOD PRESSURE: 145 MMHG | TEMPERATURE: 97.1 F | WEIGHT: 204.4 LBS | RESPIRATION RATE: 18 BRPM | BODY MASS INDEX: 37.61 KG/M2 | OXYGEN SATURATION: 98 % | HEIGHT: 62 IN | HEART RATE: 61 BPM | DIASTOLIC BLOOD PRESSURE: 65 MMHG

## 2021-05-25 DIAGNOSIS — M79.606 LOW BACK PAIN RADIATING DOWN LEG: Primary | ICD-10-CM

## 2021-05-25 DIAGNOSIS — M54.50 LOW BACK PAIN RADIATING DOWN LEG: Primary | ICD-10-CM

## 2021-05-25 DIAGNOSIS — M54.32 SCIATICA OF LEFT SIDE: ICD-10-CM

## 2021-05-25 PROCEDURE — 99214 OFFICE O/P EST MOD 30 MIN: CPT | Performed by: PHYSICAL MEDICINE & REHABILITATION

## 2021-05-25 RX ORDER — CYCLOBENZAPRINE HCL 5 MG
5 TABLET ORAL
Qty: 30 TABLET | Refills: 0 | Status: SHIPPED | OUTPATIENT
Start: 2021-05-25 | End: 2021-07-06

## 2021-05-25 NOTE — PROGRESS NOTES
[address]  [address], [address]  [facility], [address]  Phone: [phone]  Fax: [phone]      Patient: Kalyan Springer                                                                              MRN: [id number]        YOB: 1960          AGE: 63 y.o. PCP: Lauren Bruce MD  Date:  05/25/21    Reason for Consultation: Follow-up and Back Pain (lower)      HPI:  Kalyan Springer is a 63 y.o. 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 She did have back pain prior to her CVA which she relates to a fall in 2018. She was referred by Dr. Little Andujar for low back symptoms. Lumbar MRI demonstrated  spinal stenosis and left foraminal stenosis L4/5 and L5/S1. Since her last visit she has only had one session of PT scheduled at [facility] but had an issue with their scheduling . She was taking gabapentin 300mg TID  Pain has worsened over the past few weeks. We discussed SIMIN but she wanted to hold off until she has started physical.     No pain mainly in the low back pain. Neurologic symptoms: + numbness, tingling,+  Weakness. No bowel or bladder changes. No recent falls      Location: The pain is located in the low back, left thigh anterior and posterior  Radiation: The pain does radiate down the left leg, mainly left thigh but can travel to foot. Pain Score: Currently: 8/10  Quality: Pain is of a Achy, Burning, Tingling and Numbness quality. Aggravating: Pain is exacerbated by walking, lying down and exercise  Alleviating:  The pain is alleviated by sitting    Prior Treatments:   Rehabilitation after CVA- initially [facility] and then  [facility]   Previous Medications:   Current Medications: ibuprofen, tylenol, gabapentin 300mg bid  Previous work-up has included:    L2-L3: No significant disc pathology. Narrowed anteroposterior interpeduncular  distances exaggerate canal stenosis at this level. Dorsal epidural lipomatosis. Moderate bilateral facet arthropathy and ligamentum flavum buckling. Minimal  overall canal stenosis. Mild right and left foraminal stenoses.     L3-L4: Mild broad-based disc bulge flattens ventral thecal sac. Moderate left  and right lateral and dorsal epidural lipomatosis. Narrowed anteroposterior  interpeduncular distances exaggerate canal stenosis at this level. Moderate  bilateral facet arthropathy and uncovertebral proliferative changes. Minimal  overall canal stenosis. Moderate left and mild right foraminal stenoses.     L4-L5: Moderate broad-based disc bulge. Moderate, mostly dorsal epidural  lipomatosis with near complete effacement of thecal sac CSF. Narrowed  anteroposterior interpeduncular distances exaggerate canal stenosis at this  level. Moderate bilateral facet arthropathy and ligamentum flavum buckling. Moderately severe overall canal stenosis, largest AP dimension of the thecal sac  measuring just over 4 mm. Moderate right foraminal stenosis. Moderately severe  left foraminal stenosis with mild flattening deformation of the left L4  foraminal nerve root (sagittal image 5).    L5-S1: Moderate broad-based disc bulge flattens ventral thecal sac. Prominent  dorsal and lateral epidural lipomatosis with triangulation of the thecal sac. Moderate overall canal stenosis, largest AP dimension of the thecal sac  measuring just over 7 mm. Moderate right and moderately severe left facet  arthropathy. Moderate right and moderately severe left foraminal stenoses. Moderate associated impingement of the left L5 foraminal nerve root.     Past Medical History:   Past Medical History:   Diagnosis Date    Acute ischemic stroke ([facility] [address] 8/21/2020    Acute Ischemic Stroke (late acute/early subacute infarcts in the right centrum semiovale extending to the superior frontal lobe cortex) with residual left hemiparesis    Bipolar disorder (HCC)     Chronic obstructive pulmonary disease (COPD) ([facility] [address]     COVID-19 ruled out by laboratory testing 8/26/2020    SARS-CoV-2 ([facility]bbott m2000) (collected 8/25/2020, resulted 8/26/2020): Not detected     Current use of aspirin 8/24/2020    Depression     Gastroesophageal reflux disease     History of colon polyps     Hypertensive heart disease without heart failure     2D echocardiogram (8/22/2020) showed EF 65%; mild left ventricular hypertrophy; normal diastolic function; negative bubble study at rest and after Valsalva    Iron deficiency anemia     Left hemiparesis ([facility] [address] 8/21/2020    Menopause     Obesity, Class II, BMI 35-39.9     Obstructive sleep apnea on CPAP     On clopidogrel therapy 8/24/2020    On statin therapy due to risk of future cardiovascular event 8/24/2020    On Atorvastatin    Pure hypercholesterolemia 8/19/2020    Lipid profile (8/19/2020) showed , , HDL 50,     Sciatica of left side     Seasonal allergic rhinitis     Severe persistent asthma 11/07/2019    Type 2 diabetes mellitus, with long-term current use of insulin ([facility])     HbA1c (8/19/2020) = 8.1; HbA1c (8/20/2020) = 8.0      Past Surgical History:   Past Surgical History:   Procedure Laterality Date    HX HYSTERECTOMY  2000    Fibroids      SocHx:   Social History     Tobacco Use    Smoking status: Never Smoker    Smokeless tobacco: Never Used   Substance Use Topics    Alcohol use: Yes     Comment: occ      FamHx:?    Family History   Problem Relation Age of Onset    Heart Attack Father     Breast Cancer Maternal Aunt     Heart Disease Mother     Stroke Neg Hx        Current Medications:    Current Outpatient Medications   Medication Sig Dispense Refill    gabapentin (NEURONTIN) 300 mg capsule Take 300 mg PO  Cap 0    tiotropium bromide (Spiriva Respimat) 2.5 mcg/actuation inhaler Take 2 Puffs by inhalation daily.  valsartan-hydroCHLOROthiazide (Diovan HCT) 320-12.5 mg per tablet Take 1 Tab by mouth daily.  omalizumab (Xolair) 150 mg/mL syrg 150 mg by SubCUTAneous route every fourteen (14) days.  ipratropium (ATROVENT) 42 mcg (0.06 %) nasal spray 2 Sprays by Both Nostrils route three (3) times daily.  fenofibrate nanocrystallized (Tricor) 145 mg tablet Take 145 mg by mouth daily.  EPINEPHrine (EPIPEN) 0.3 mg/0.3 mL injection 0.3 mg by IntraMUSCular route once as needed for Allergic Response.  cholecalciferol (VITAMIN D3) (2,000 UNITS /50 MCG) cap capsule Take 2,000 Units by mouth daily.  amLODIPine (NORVASC) 10 mg tablet Take 10 mg by mouth daily.  albuterol sulfate (PROVENTIL;VENTOLIN) 2.5 mg/0.5 mL nebu nebulizer solution 2.5 mg by Nebulization route every four (4) hours as needed for Wheezing.  albuterol (ProAir HFA) 90 mcg/actuation inhaler Take 1-2 Puffs by inhalation every four (4) hours as needed for Wheezing.  aspirin 81 mg chewable tablet Take 1 Tab by mouth daily (with breakfast). Indications: stroke prevention 30 Tab 0    atorvastatin (LIPITOR) 40 mg tablet Take 1 Tab by mouth daily. Indications: excessive fat in the blood, stroke prevention 30 Tab 0    docusate sodium (COLACE) 100 mg capsule Take 1 Cap by mouth daily (after breakfast). Indications: constipation 30 Cap 0    metFORMIN ER (GLUCOPHAGE XR) 500 mg tablet Take 1 Tab by mouth daily (with dinner). Indications: type 2 diabetes mellitus 30 Tab 0    senna-docusate (PERICOLACE) 8.6-50 mg per tablet Take 2 Tabs by mouth daily (after dinner). Indications: constipation 60 Tab 0    minoxidiL (LONITEN) 2.5 mg tablet Take 2 Tabs by mouth every twelve (12) hours. Indications: high blood pressure 120 Tab 0    insulin glargine (Lantus Solostar U-100 Insulin) 100 unit/mL (3 mL) inpn 22 Units by SubCUTAneous route nightly.  Indications: type 2 diabetes mellitus 1 Adjustable Dose Pre-filled Pen Syringe 0    Insulin Needles, Disposable, 31 gauge x 3/16\" ndle As directed  Indications: Type 2 diabetes mellitus 30 Pen Needle 0    montelukast (SINGULAIR) 10 mg tablet Take 10 mg by mouth every evening.  ferrous sulfate 325 mg (65 mg iron) tablet Take 325 mg by mouth Daily (before breakfast).  omeprazole (PRILOSEC) 20 mg capsule Take 20 mg by mouth daily. Allergies: Allergies   Allergen Reactions    Grass Pollen Anaphylaxis     Environmental allergies Unsure what  triggers episodes. Intubated for episodes in 2006 and 2008.  Metformin Nausea Only     Pt states stomach cramps     Other Plant, Animal, Environmental Shortness of Breath    Pneumococcal Vaccine Other (comments)        Review of Systems:   Gen:    Denied fevers, chills, malaise, fatigue, weight changes   Resp: Denied shortness of breath, cough, wheezing   CVS: Denied chest pain, palpitations   : Denied urinary urgency, frequency, incontinence   GI: Denied nausea, vomiting, constipation, diarrhea   Skin: Denied rashes, wounds   Psych: Denied anxiety, depression   Vasc: Denied claudication, ulcers   Hem: Denied easy bruising/bleeding   MSK: See HPI   Neuro: See HPI         Physical Exam     Vital Signs:   Visit Vitals  BP (!) 145/65 (BP 1 Location: Right leg, BP Patient Position: Sitting)   Pulse 61   Temp 97.1 °F (36.2 °C) (Temporal)   Resp 18   Ht 5' 2\" (1.575 m)   Wt 204 lb 6.4 oz (92.7 kg)   SpO2 98%   BMI 37.39 kg/m²      General: ??????? Well nourished and well developed female without any acute distress   Psychiatric: ?  Alert and oriented x 3 with normal mood    HEENT: ???????? Atraumatic   Respiratory:   Breathing non-labored and non dyspneic   CV: ???????????????? Peripheral pulses intact, no peripheral edema   Skin: ????????????? No rashes       Neurologic: ??       Sensation: normal and grossly intact thebilateral, upper extremity(s), lower extremity(s)   Strength: 5/5 in the bilateral, upper extremity(s), lower extremity(s) exceptweakness, left lower extremity HF 3/5, KE 3/5, DF 4/5, EHL 4/5  Reflexes: reveals 2+ symmetric DTRs throughout except  Brisk on left  Gait: antalgic unsteady gait    Musculoskeletal: Lumbar Exam     Inspection:   Alignment: Abnormal  Atrophy: None       Tenderness to Palpation:   Lumbar paraspinals Positive  Lumbar spinous processes Negative  SI Joint:  Positive  Gluteal:Positive   Greater trochanter: Positive      ROM:   Lumbar ROM: Abnormal pain with flexion and extension worse with flexon  Hip ROM: No reproduction of pain with movement     Special Tests      SLR: Positive left  RUTH: Negative  FADIR: Negative  Log Roll: Negative         Medical Decision Making:  MRI lumbar spine 2021    Images:MRI lumbar spine- rotary scoliosis, epidural lipomatosis, facet arthropathy L3/4 moderate Left foraminal stenosis  L4/5- moderate to severe spinal stenosis- moderate to severe left foraminal stenosis  L5/S1- moderate to severe central stenosis severe left foraminal stenosis      Labs:  HB A1C 7.5 12/2020       Assessment:   1. Spinal stenosis L4/5 and L5/S1  2. Left foraminal stenosis at L4/5 and L5/S1  3. DM  4. Prior CVA with residual left sided weakness    Plan:      -Physical therapy -  Referral to restart physical therapy for LLE strengthening, gait training, fall prevention- new location  -Medications - change gabapentin 600mg qhs. Will add flexeril for muscle spasm. Counseled regarding side effects and appropriate administration of medications.    -Diagnostics/Imaging -Reviewed MRI lumbars pine  -Injections -consider  referral to try left L4 and l5 SNRB (TF SIMIN) / Patient is diabetic and on ASA 81mg. Patient would like to wait for now  -Lifestyle - Encouraged regular exercise and weight loss  -Education - The patient's diagnosis, prognosis and treatment options were discussed today. All questions were answered.   F/U - in 8 week(s) consider SIMIN        Mahesh [address] [place] [address] [facility]

## 2021-05-31 ENCOUNTER — APPOINTMENT (OUTPATIENT)
Dept: INFUSION THERAPY | Age: 61
End: 2021-05-31
Payer: MEDICAID

## 2021-06-07 ENCOUNTER — HOSPITAL ENCOUNTER (OUTPATIENT)
Dept: INFUSION THERAPY | Age: 61
Discharge: HOME | End: 2021-06-07
Payer: MEDICAID

## 2021-06-07 VITALS
SYSTOLIC BLOOD PRESSURE: 123 MMHG | TEMPERATURE: 96.8 F | DIASTOLIC BLOOD PRESSURE: 64 MMHG | OXYGEN SATURATION: 98 % | RESPIRATION RATE: 18 BRPM | HEART RATE: 66 BPM

## 2021-06-07 DIAGNOSIS — J45.50 SEVERE PERSISTENT ASTHMA, UNSPECIFIED WHETHER COMPLICATED: Primary | ICD-10-CM

## 2021-06-07 PROCEDURE — 96372 THER/PROPH/DIAG INJ SC/IM: CPT

## 2021-06-07 PROCEDURE — 74011250636 HC RX REV CODE- 250/636: Performed by: NURSE PRACTITIONER

## 2021-06-07 RX ADMIN — OMALIZUMAB 75 MG: 75 INJECTION, SOLUTION SUBCUTANEOUS at 14:25

## 2021-06-07 RX ADMIN — OMALIZUMAB 150 MG: 150 INJECTION, SOLUTION SUBCUTANEOUS at 14:25

## 2021-06-07 NOTE — PROGRESS NOTES
[facility] OPI Progress Note    Date: 2021    Name: Brittni Smith    MRN: [id number]         : 1960     Xolair Injection       [facility] Ms. Julia Miller was assessed and education was provided. Ms. Hakan Beck vitals were reviewed. Visit Vitals  /64 (BP 1 Location: Left arm, BP Patient Position: Sitting)   Pulse 66   Temp 96.8 °F (36 °C)   Resp 18   SpO2 98%   Breastfeeding No       Xolair 225 mg total dose. Xolair 150 mg was administered as ordered SQ in patient's Right upper arm ( right) and band-aid applied to site. Xolair 75 mg was administered as ordered SQ in patient's left upper arm ( left) and band-aid applied to site. Ms. Julia Miller tolerated well without complaints. Discharge/ follow-up instructions discussed w/ pt. Pt verbalized understanding. Pt armband removed & shredded. Ms. Julia Miller was discharged from [place] [address] in stable condition at 1430. She is to return on 2021 at 1300 for her next appointment.     Jeannine Frazier RN  2021

## 2021-06-21 ENCOUNTER — HOSPITAL ENCOUNTER (OUTPATIENT)
Dept: INFUSION THERAPY | Age: 61
Discharge: HOME | End: 2021-06-21
Payer: MEDICAID

## 2021-06-21 VITALS
DIASTOLIC BLOOD PRESSURE: 57 MMHG | OXYGEN SATURATION: 100 % | SYSTOLIC BLOOD PRESSURE: 129 MMHG | HEART RATE: 62 BPM | TEMPERATURE: 97.2 F | RESPIRATION RATE: 20 BRPM

## 2021-06-21 DIAGNOSIS — J45.50 SEVERE PERSISTENT ASTHMA, UNSPECIFIED WHETHER COMPLICATED: Primary | ICD-10-CM

## 2021-06-21 PROCEDURE — 74011250636 HC RX REV CODE- 250/636: Performed by: NURSE PRACTITIONER

## 2021-06-21 PROCEDURE — 96372 THER/PROPH/DIAG INJ SC/IM: CPT

## 2021-06-21 RX ADMIN — OMALIZUMAB 150 MG: 150 INJECTION, SOLUTION SUBCUTANEOUS at 13:15

## 2021-06-21 RX ADMIN — OMALIZUMAB 75 MG: 75 INJECTION, SOLUTION SUBCUTANEOUS at 13:15

## 2021-06-21 NOTE — PROGRESS NOTES
[facility] OPIC Progress Note    Date: 2021    Name: Jade Duty    MRN: [id number]         : 1960     Xolair injection      Ms. Julia Miller arrived to [facility] at . Ms. Julia Miller was assessed and education was provided. Ms. Estevez Police vitals were reviewed. Visit Vitals  BP (!) 129/57   Pulse 62   Temp 97.2 °F (36.2 °C)   Resp 20   SpO2 100%       Xolair 225 mg total dose administered. Xolair 150 mg was administered as ordered SQ in patient's Right upper arm and band-aid applied to site  Xolair 75 mg was administered as ordered SQ in patient's left upper arm and band-aid applied to site    Ms. Mahmood tolerated well without complaints. Discharge/ follow-up instructions discussed w/ pt. Pt verbalized understanding. Pt armband removed & shredded. Ms. Julia Miller was discharged from [place] [address] in stable condition at 1320. She is to return on 2021 at 1300 for her next appointment.     Conor Cortez RN  2021

## 2021-07-06 RX ORDER — CYCLOBENZAPRINE HCL 5 MG
TABLET ORAL
Qty: 30 TABLET | Refills: 0 | Status: SHIPPED | OUTPATIENT
Start: 2021-07-06

## 2021-07-16 RX ORDER — ACETAMINOPHEN 325 MG/1
650 TABLET ORAL AS NEEDED
Status: CANCELLED
Start: 2021-07-20

## 2021-07-16 RX ORDER — ALBUTEROL SULFATE 0.83 MG/ML
2.5 SOLUTION RESPIRATORY (INHALATION) AS NEEDED
Status: CANCELLED
Start: 2021-07-20

## 2021-07-16 RX ORDER — ONDANSETRON 2 MG/ML
8 INJECTION INTRAMUSCULAR; INTRAVENOUS AS NEEDED
Status: CANCELLED | OUTPATIENT
Start: 2021-07-20

## 2021-07-16 RX ORDER — DIPHENHYDRAMINE HYDROCHLORIDE 50 MG/ML
50 INJECTION, SOLUTION INTRAMUSCULAR; INTRAVENOUS AS NEEDED
Status: CANCELLED
Start: 2021-07-20

## 2021-07-16 RX ORDER — EPINEPHRINE 1 MG/ML
0.3 INJECTION, SOLUTION, CONCENTRATE INTRAVENOUS AS NEEDED
Status: CANCELLED | OUTPATIENT
Start: 2021-07-20

## 2021-07-16 RX ORDER — HYDROCORTISONE SODIUM SUCCINATE 100 MG/2ML
100 INJECTION, POWDER, FOR SOLUTION INTRAMUSCULAR; INTRAVENOUS AS NEEDED
Status: CANCELLED | OUTPATIENT
Start: 2021-07-20

## 2021-07-16 RX ORDER — DIPHENHYDRAMINE HYDROCHLORIDE 50 MG/ML
25 INJECTION, SOLUTION INTRAMUSCULAR; INTRAVENOUS AS NEEDED
Status: CANCELLED
Start: 2021-07-20

## 2021-07-20 ENCOUNTER — HOSPITAL ENCOUNTER (OUTPATIENT)
Dept: INFUSION THERAPY | Age: 61
Discharge: HOME | End: 2021-07-20
Payer: MEDICAID

## 2021-07-20 VITALS
HEART RATE: 65 BPM | OXYGEN SATURATION: 98 % | RESPIRATION RATE: 18 BRPM | DIASTOLIC BLOOD PRESSURE: 71 MMHG | SYSTOLIC BLOOD PRESSURE: 143 MMHG

## 2021-07-20 DIAGNOSIS — J45.50 SEVERE PERSISTENT ASTHMA, UNSPECIFIED WHETHER COMPLICATED: Primary | ICD-10-CM

## 2021-07-20 PROCEDURE — 96372 THER/PROPH/DIAG INJ SC/IM: CPT

## 2021-07-20 PROCEDURE — 74011250636 HC RX REV CODE- 250/636: Performed by: INTERNAL MEDICINE

## 2021-07-20 RX ORDER — HYDROCORTISONE SODIUM SUCCINATE 100 MG/2ML
100 INJECTION, POWDER, FOR SOLUTION INTRAMUSCULAR; INTRAVENOUS AS NEEDED
Status: CANCELLED | OUTPATIENT
Start: 2021-08-03

## 2021-07-20 RX ORDER — ONDANSETRON 2 MG/ML
8 INJECTION INTRAMUSCULAR; INTRAVENOUS AS NEEDED
Status: CANCELLED | OUTPATIENT
Start: 2021-08-03

## 2021-07-20 RX ORDER — ACETAMINOPHEN 325 MG/1
650 TABLET ORAL AS NEEDED
Status: CANCELLED
Start: 2021-08-03

## 2021-07-20 RX ORDER — ALBUTEROL SULFATE 0.83 MG/ML
2.5 SOLUTION RESPIRATORY (INHALATION) AS NEEDED
Status: CANCELLED
Start: 2021-08-03

## 2021-07-20 RX ORDER — DIPHENHYDRAMINE HYDROCHLORIDE 50 MG/ML
25 INJECTION, SOLUTION INTRAMUSCULAR; INTRAVENOUS AS NEEDED
Status: CANCELLED
Start: 2021-08-03

## 2021-07-20 RX ORDER — EPINEPHRINE 1 MG/ML
0.3 INJECTION, SOLUTION, CONCENTRATE INTRAVENOUS AS NEEDED
Status: CANCELLED | OUTPATIENT
Start: 2021-08-03

## 2021-07-20 RX ORDER — DIPHENHYDRAMINE HYDROCHLORIDE 50 MG/ML
50 INJECTION, SOLUTION INTRAMUSCULAR; INTRAVENOUS AS NEEDED
Status: CANCELLED
Start: 2021-08-03

## 2021-07-20 RX ADMIN — OMALIZUMAB 150 MG: 150 INJECTION, SOLUTION SUBCUTANEOUS at 10:33

## 2021-07-20 RX ADMIN — OMALIZUMAB 75 MG: 75 INJECTION, SOLUTION SUBCUTANEOUS at 10:33

## 2021-07-20 NOTE — PROGRESS NOTES
[facility] Progress Note    Date: 2021    Name: Haskell Frankel    MRN: [id number]         : 1960     Xolair Injection      Ms. Julia Miller arrived to [place] at 1025. Ms. Julia Miller was assessed and education was provided. Ms. Ely Gutierrez vitals were reviewed. Visit Vitals  BP (!) 143/71 (BP 1 Location: Left upper arm, BP Patient Position: Sitting)   Pulse 65   Resp 18   SpO2 98%   Breastfeeding No       Xolair 150 mg was administered as ordered SQ in patient's Left upper arm and band-aid applied to site. Xolair 75 mg was administered as ordered SQ in patient's right upper arm and band-aid applied to site. Ms. Julia Miller tolerated well without complaints. Discharge/ follow-up instructions discussed w/ pt. Pt verbalized understanding. Pt armband removed & shredded. Ms. Julia Miller was discharged from [place] [address] in stable condition at 1035. She is to return on 2021 at 1330 for her next appointment.     Alejandro Archer RN  2021

## 2021-07-26 ENCOUNTER — APPOINTMENT (OUTPATIENT)
Dept: INFUSION THERAPY | Age: 61
End: 2021-07-26

## 2021-08-09 ENCOUNTER — HOSPITAL ENCOUNTER (OUTPATIENT)
Dept: INFUSION THERAPY | Age: 61
Discharge: HOME | End: 2021-08-09
Payer: MEDICAID

## 2021-08-09 VITALS
TEMPERATURE: 96.8 F | SYSTOLIC BLOOD PRESSURE: 154 MMHG | HEART RATE: 56 BPM | RESPIRATION RATE: 20 BRPM | DIASTOLIC BLOOD PRESSURE: 66 MMHG | OXYGEN SATURATION: 100 %

## 2021-08-09 DIAGNOSIS — J45.50 SEVERE PERSISTENT ASTHMA, UNSPECIFIED WHETHER COMPLICATED: Primary | ICD-10-CM

## 2021-08-09 PROCEDURE — 74011250636 HC RX REV CODE- 250/636: Performed by: INTERNAL MEDICINE

## 2021-08-09 PROCEDURE — 96372 THER/PROPH/DIAG INJ SC/IM: CPT

## 2021-08-09 RX ORDER — ACETAMINOPHEN 325 MG/1
650 TABLET ORAL AS NEEDED
Status: CANCELLED
Start: 2021-08-17

## 2021-08-09 RX ORDER — ONDANSETRON 2 MG/ML
8 INJECTION INTRAMUSCULAR; INTRAVENOUS AS NEEDED
Status: CANCELLED | OUTPATIENT
Start: 2021-08-17

## 2021-08-09 RX ORDER — DIPHENHYDRAMINE HYDROCHLORIDE 50 MG/ML
50 INJECTION, SOLUTION INTRAMUSCULAR; INTRAVENOUS AS NEEDED
Status: CANCELLED
Start: 2021-08-17

## 2021-08-09 RX ORDER — DIPHENHYDRAMINE HYDROCHLORIDE 50 MG/ML
25 INJECTION, SOLUTION INTRAMUSCULAR; INTRAVENOUS AS NEEDED
Status: CANCELLED
Start: 2021-08-17

## 2021-08-09 RX ORDER — HYDROCORTISONE SODIUM SUCCINATE 100 MG/2ML
100 INJECTION, POWDER, FOR SOLUTION INTRAMUSCULAR; INTRAVENOUS AS NEEDED
Status: CANCELLED | OUTPATIENT
Start: 2021-08-17

## 2021-08-09 RX ORDER — ALBUTEROL SULFATE 0.83 MG/ML
2.5 SOLUTION RESPIRATORY (INHALATION) AS NEEDED
Status: CANCELLED
Start: 2021-08-17

## 2021-08-09 RX ORDER — EPINEPHRINE 1 MG/ML
0.3 INJECTION, SOLUTION, CONCENTRATE INTRAVENOUS AS NEEDED
Status: CANCELLED | OUTPATIENT
Start: 2021-08-17

## 2021-08-09 RX ADMIN — OMALIZUMAB 75 MG: 75 INJECTION, SOLUTION SUBCUTANEOUS at 13:58

## 2021-08-09 RX ADMIN — OMALIZUMAB 150 MG: 150 INJECTION, SOLUTION SUBCUTANEOUS at 13:59

## 2021-08-09 NOTE — PROGRESS NOTES
[facility] OPIC Progress Note    Date: 2021    Name: Ashley Riley    MRN: [id number]         : 1960     Xolair Injection every 2 weeks    Ms. Julia Miller arrived ambulatory and in no acute distress at 1350. Patient denied complaints of pain/discomfort, changes in medication regimen or health condition. Ms. Julia Miller was assessed and education was provided. Ms. Key Pettit vitals were reviewed. Visit Vitals  BP (!) 154/66 (BP 1 Location: Left upper arm, BP Patient Position: At rest;Sitting)   Pulse (!) 56   Temp 96.8 °F (36 °C)   Resp 20   SpO2 100%   Breastfeeding No     Xolair 150 mg inj.,  was administered as ordered SQ to posterior aspect of patient's left upper arm. Xolair 75 mg inj., was administered as ordered SQ to  posterior aspect to patient's right upper arm. Total dose administered was 225 mg. Ms. Julia Miller tolerated well without complaints. Patient armband removed and shredded. Ms. Julia Miller was discharged from [place] [address] in stable condition at 1400. She is to return on 2021 at 1400 for her next appointment.     Sergio Moran RN  2021  [id number]

## 2021-08-23 ENCOUNTER — HOSPITAL ENCOUNTER (OUTPATIENT)
Dept: INFUSION THERAPY | Age: 61
Discharge: HOME | End: 2021-08-23
Payer: MEDICAID

## 2021-08-23 VITALS
TEMPERATURE: 97.5 F | OXYGEN SATURATION: 98 % | RESPIRATION RATE: 118 BRPM | DIASTOLIC BLOOD PRESSURE: 70 MMHG | HEART RATE: 67 BPM | SYSTOLIC BLOOD PRESSURE: 139 MMHG

## 2021-08-23 DIAGNOSIS — J45.50 SEVERE PERSISTENT ASTHMA, UNSPECIFIED WHETHER COMPLICATED: Primary | ICD-10-CM

## 2021-08-23 PROCEDURE — 96372 THER/PROPH/DIAG INJ SC/IM: CPT

## 2021-08-23 PROCEDURE — 74011250636 HC RX REV CODE- 250/636: Performed by: INTERNAL MEDICINE

## 2021-08-23 RX ORDER — ONDANSETRON 2 MG/ML
8 INJECTION INTRAMUSCULAR; INTRAVENOUS AS NEEDED
Status: CANCELLED | OUTPATIENT
Start: 2021-08-30

## 2021-08-23 RX ORDER — EPINEPHRINE 1 MG/ML
0.3 INJECTION, SOLUTION, CONCENTRATE INTRAVENOUS AS NEEDED
Status: CANCELLED | OUTPATIENT
Start: 2021-08-30

## 2021-08-23 RX ORDER — HYDROCORTISONE SODIUM SUCCINATE 100 MG/2ML
100 INJECTION, POWDER, FOR SOLUTION INTRAMUSCULAR; INTRAVENOUS AS NEEDED
Status: CANCELLED | OUTPATIENT
Start: 2021-08-30

## 2021-08-23 RX ORDER — ALBUTEROL SULFATE 0.83 MG/ML
2.5 SOLUTION RESPIRATORY (INHALATION) AS NEEDED
Status: CANCELLED
Start: 2021-08-30

## 2021-08-23 RX ORDER — ACETAMINOPHEN 325 MG/1
650 TABLET ORAL AS NEEDED
Status: CANCELLED
Start: 2021-08-30

## 2021-08-23 RX ORDER — DIPHENHYDRAMINE HYDROCHLORIDE 50 MG/ML
25 INJECTION, SOLUTION INTRAMUSCULAR; INTRAVENOUS AS NEEDED
Status: CANCELLED
Start: 2021-08-30

## 2021-08-23 RX ORDER — DIPHENHYDRAMINE HYDROCHLORIDE 50 MG/ML
50 INJECTION, SOLUTION INTRAMUSCULAR; INTRAVENOUS AS NEEDED
Status: CANCELLED
Start: 2021-08-30

## 2021-08-23 RX ADMIN — OMALIZUMAB 150 MG: 150 INJECTION, SOLUTION SUBCUTANEOUS at 14:15

## 2021-08-23 RX ADMIN — OMALIZUMAB 75 MG: 75 INJECTION, SOLUTION SUBCUTANEOUS at 14:15

## 2021-08-23 NOTE — PROGRESS NOTES
[facility] OPIC Progress Note    Date: 2021    Name: Ashley Riley    MRN: [id number]         : 1960     Xolair Injection every 2 weeks    Ms. Julia Miller arrived to [facility] at 1410. Ms. Julia Miller was assessed and education was provided. Ms. Key Pettit vitals were reviewed. Visit Vitals  /70 (BP 1 Location: Left upper arm, BP Patient Position: Sitting)   Pulse 67   Temp 97.5 °F (36.4 °C)   Resp (!) 118   SpO2 98%     Xolair 150 mg was administered as ordered SQ in patient's left upper arm. Xolair 75 mg was administered as ordered SQ in patient's right upper arm. Total 225 mg    Ms. Mahmood tolerated well without complaints. Patient armband removed and shredded. Ms. Julia Miller was discharged from [place] [address] in stable condition at 1420. She is to return on 2021 at 1400 for her next appointment.     Jennie Ibanez RN  2021  1420

## 2021-09-13 ENCOUNTER — HOSPITAL ENCOUNTER (OUTPATIENT)
Dept: INFUSION THERAPY | Age: 61
Discharge: HOME | End: 2021-09-13
Payer: MEDICAID

## 2021-09-13 VITALS
DIASTOLIC BLOOD PRESSURE: 53 MMHG | HEART RATE: 71 BPM | RESPIRATION RATE: 18 BRPM | SYSTOLIC BLOOD PRESSURE: 116 MMHG | TEMPERATURE: 97.2 F | OXYGEN SATURATION: 100 %

## 2021-09-13 DIAGNOSIS — J45.50 SEVERE PERSISTENT ASTHMA, UNSPECIFIED WHETHER COMPLICATED: Primary | ICD-10-CM

## 2021-09-13 PROCEDURE — 96372 THER/PROPH/DIAG INJ SC/IM: CPT

## 2021-09-13 PROCEDURE — 74011250636 HC RX REV CODE- 250/636: Performed by: INTERNAL MEDICINE

## 2021-09-13 RX ORDER — HYDROCORTISONE SODIUM SUCCINATE 100 MG/2ML
100 INJECTION, POWDER, FOR SOLUTION INTRAMUSCULAR; INTRAVENOUS AS NEEDED
Status: CANCELLED | OUTPATIENT
Start: 2021-09-27

## 2021-09-13 RX ORDER — ACETAMINOPHEN 325 MG/1
650 TABLET ORAL AS NEEDED
Status: CANCELLED
Start: 2021-09-27

## 2021-09-13 RX ORDER — ONDANSETRON 2 MG/ML
8 INJECTION INTRAMUSCULAR; INTRAVENOUS AS NEEDED
Status: CANCELLED | OUTPATIENT
Start: 2021-09-27

## 2021-09-13 RX ORDER — DIPHENHYDRAMINE HYDROCHLORIDE 50 MG/ML
50 INJECTION, SOLUTION INTRAMUSCULAR; INTRAVENOUS AS NEEDED
Status: CANCELLED
Start: 2021-09-27

## 2021-09-13 RX ORDER — EPINEPHRINE 1 MG/ML
0.3 INJECTION, SOLUTION, CONCENTRATE INTRAVENOUS AS NEEDED
Status: CANCELLED | OUTPATIENT
Start: 2021-09-27

## 2021-09-13 RX ORDER — ALBUTEROL SULFATE 0.83 MG/ML
2.5 SOLUTION RESPIRATORY (INHALATION) AS NEEDED
Status: CANCELLED
Start: 2021-09-27

## 2021-09-13 RX ORDER — DIPHENHYDRAMINE HYDROCHLORIDE 50 MG/ML
25 INJECTION, SOLUTION INTRAMUSCULAR; INTRAVENOUS AS NEEDED
Status: CANCELLED
Start: 2021-09-27

## 2021-09-13 RX ADMIN — OMALIZUMAB 75 MG: 75 INJECTION, SOLUTION SUBCUTANEOUS at 14:19

## 2021-09-13 RX ADMIN — OMALIZUMAB 150 MG: 150 INJECTION, SOLUTION SUBCUTANEOUS at 14:19

## 2021-09-13 NOTE — PROGRESS NOTES
[facility] OPIC Progress Note    Date: 2021    Name: Davie Salgado    MRN: [id number]         : 1960     Xolair Injection      Ms. Julia Miller arrived to [facility] at 1410. Ms. Julia Miller was assessed and education was provided. Ms. Mely Knott vitals were reviewed. Visit Vitals  BP (!) 116/53 (BP 1 Location: Left upper arm, BP Patient Position: Sitting)   Pulse 71   Temp 97.2 °F (36.2 °C)   Resp 18   SpO2 100%   Breastfeeding No         Xolair 150 mg was administered as ordered SQ in patient's left upper arm and band-aid applied to site. Xolair 75 mg was administered as ordered SQ in patient'sright upper arm and band-aid applied to site. Ms. Julia Miller tolerated well without complaints. Discharge/ follow-up instructions discussed w/ pt. Pt verbalized understanding. Pt armband removed & shredded. Ms. Julia Miller was discharged from [place] [address] in stable condition at 1425. She is to return on 2021 at 1400 for her next appointment.     Lora Solano RN  2021

## 2021-09-27 ENCOUNTER — HOSPITAL ENCOUNTER (OUTPATIENT)
Dept: INFUSION THERAPY | Age: 61
Discharge: HOME | End: 2021-09-27
Payer: MEDICAID

## 2021-09-27 VITALS
SYSTOLIC BLOOD PRESSURE: 135 MMHG | RESPIRATION RATE: 18 BRPM | DIASTOLIC BLOOD PRESSURE: 71 MMHG | OXYGEN SATURATION: 96 % | TEMPERATURE: 97.2 F | HEART RATE: 65 BPM

## 2021-09-27 DIAGNOSIS — J45.50 SEVERE PERSISTENT ASTHMA, UNSPECIFIED WHETHER COMPLICATED: Primary | ICD-10-CM

## 2021-09-27 PROCEDURE — 96372 THER/PROPH/DIAG INJ SC/IM: CPT

## 2021-09-27 PROCEDURE — 74011250636 HC RX REV CODE- 250/636: Performed by: INTERNAL MEDICINE

## 2021-09-27 RX ORDER — ACETAMINOPHEN 325 MG/1
650 TABLET ORAL AS NEEDED
Status: CANCELLED
Start: 2021-10-11

## 2021-09-27 RX ORDER — DIPHENHYDRAMINE HYDROCHLORIDE 50 MG/ML
25 INJECTION, SOLUTION INTRAMUSCULAR; INTRAVENOUS AS NEEDED
Status: CANCELLED
Start: 2021-10-11

## 2021-09-27 RX ORDER — ONDANSETRON 2 MG/ML
8 INJECTION INTRAMUSCULAR; INTRAVENOUS AS NEEDED
Status: CANCELLED | OUTPATIENT
Start: 2021-10-11

## 2021-09-27 RX ORDER — EPINEPHRINE 1 MG/ML
0.3 INJECTION, SOLUTION, CONCENTRATE INTRAVENOUS AS NEEDED
Status: CANCELLED | OUTPATIENT
Start: 2021-10-11

## 2021-09-27 RX ORDER — HYDROCORTISONE SODIUM SUCCINATE 100 MG/2ML
100 INJECTION, POWDER, FOR SOLUTION INTRAMUSCULAR; INTRAVENOUS AS NEEDED
Status: CANCELLED | OUTPATIENT
Start: 2021-10-11

## 2021-09-27 RX ORDER — ALBUTEROL SULFATE 0.83 MG/ML
2.5 SOLUTION RESPIRATORY (INHALATION) AS NEEDED
Status: CANCELLED
Start: 2021-10-11

## 2021-09-27 RX ORDER — DIPHENHYDRAMINE HYDROCHLORIDE 50 MG/ML
50 INJECTION, SOLUTION INTRAMUSCULAR; INTRAVENOUS AS NEEDED
Status: CANCELLED
Start: 2021-10-11

## 2021-09-27 RX ADMIN — OMALIZUMAB 75 MG: 75 INJECTION, SOLUTION SUBCUTANEOUS at 14:00

## 2021-09-27 RX ADMIN — OMALIZUMAB 150 MG: 150 INJECTION, SOLUTION SUBCUTANEOUS at 14:00

## 2021-09-27 NOTE — PROGRESS NOTES
[facility] OPIC Progress Note    Date: 2021    Name: Denise Dee    MRN: [id number]         : 1960     Xolair Injection      Ms. Julia Miller arrived to [facility] at [phone]. Ms. Julia Miller was assessed and education was provided. Ms. Nam Molina vitals were reviewed. Visit Vitals  /71 (BP 1 Location: Left upper arm, BP Patient Position: Sitting)   Pulse 65   Temp 97.2 °F (36.2 °C)   Resp 18   SpO2 96%         Xolair 150 mg was administered as ordered SQ in patient's Right upper arm and band-aid applied to site. Xolair 75 mg was administered as ordered SQ in patient's left upper arm and band-aid applied to site. Ms. Julia Miller tolerated well without complaints. Discharge/ follow-up instructions discussed w/ pt. Pt verbalized understanding. Pt armband removed & shredded. Ms. Julia Miller was discharged from [place] [address] in stable condition at 1355. She is to return on 10/11/2021 at 1300 for her next appointment.     Patience Cordoba, RN ,BSN  2021

## 2021-10-11 ENCOUNTER — HOSPITAL ENCOUNTER (OUTPATIENT)
Dept: INFUSION THERAPY | Age: 61
Discharge: HOME | End: 2021-10-11
Payer: MEDICAID

## 2021-10-11 VITALS
RESPIRATION RATE: 18 BRPM | DIASTOLIC BLOOD PRESSURE: 70 MMHG | SYSTOLIC BLOOD PRESSURE: 165 MMHG | OXYGEN SATURATION: 99 % | HEART RATE: 61 BPM | TEMPERATURE: 97.3 F

## 2021-10-11 DIAGNOSIS — J45.50 SEVERE PERSISTENT ASTHMA, UNSPECIFIED WHETHER COMPLICATED: Primary | ICD-10-CM

## 2021-10-11 PROCEDURE — 96372 THER/PROPH/DIAG INJ SC/IM: CPT

## 2021-10-11 PROCEDURE — 74011250636 HC RX REV CODE- 250/636: Performed by: INTERNAL MEDICINE

## 2021-10-11 RX ORDER — DIPHENHYDRAMINE HYDROCHLORIDE 50 MG/ML
50 INJECTION, SOLUTION INTRAMUSCULAR; INTRAVENOUS AS NEEDED
Status: CANCELLED
Start: 2021-10-25

## 2021-10-11 RX ORDER — HYDROCORTISONE SODIUM SUCCINATE 100 MG/2ML
100 INJECTION, POWDER, FOR SOLUTION INTRAMUSCULAR; INTRAVENOUS AS NEEDED
Status: CANCELLED | OUTPATIENT
Start: 2021-10-25

## 2021-10-11 RX ORDER — ACETAMINOPHEN 325 MG/1
650 TABLET ORAL AS NEEDED
Status: CANCELLED
Start: 2021-10-25

## 2021-10-11 RX ORDER — ONDANSETRON 2 MG/ML
8 INJECTION INTRAMUSCULAR; INTRAVENOUS AS NEEDED
Status: CANCELLED | OUTPATIENT
Start: 2021-10-25

## 2021-10-11 RX ORDER — EPINEPHRINE 1 MG/ML
0.3 INJECTION, SOLUTION, CONCENTRATE INTRAVENOUS AS NEEDED
Status: CANCELLED | OUTPATIENT
Start: 2021-10-25

## 2021-10-11 RX ORDER — ALBUTEROL SULFATE 0.83 MG/ML
2.5 SOLUTION RESPIRATORY (INHALATION) AS NEEDED
Status: CANCELLED
Start: 2021-10-25

## 2021-10-11 RX ORDER — DIPHENHYDRAMINE HYDROCHLORIDE 50 MG/ML
25 INJECTION, SOLUTION INTRAMUSCULAR; INTRAVENOUS AS NEEDED
Status: CANCELLED
Start: 2021-10-25

## 2021-10-11 RX ADMIN — OMALIZUMAB 75 MG: 75 INJECTION, SOLUTION SUBCUTANEOUS at 13:13

## 2021-10-11 RX ADMIN — OMALIZUMAB 150 MG: 150 INJECTION, SOLUTION SUBCUTANEOUS at 13:13

## 2021-10-11 NOTE — PROGRESS NOTES
[facility] OPIC Progress Note    Date: 2021    Name: Eber Morocho    MRN: [id number]         : 1960     Xolair Injection      Ms. Julia Miller arrived to [facility] at . Ms. Jluia Miller was assessed and education was provided. Ms. Lucio Fisher vitals were reviewed. Visit Vitals  BP (!) 165/70 (BP 1 Location: Left upper arm, BP Patient Position: Sitting)   Pulse 61   Temp 97.3 °F (36.3 °C)   Resp 18   SpO2 99%   Breastfeeding No           Xolair 150 mg was administered as ordered SQ in patient's Right upper arm and band-aid applied to site. Xolair 75 mg was administered as ordered SQ in patient's left upper arm and band-aid applied to site. Ms. Julia Miller tolerated well without complaints. Discharge/ follow-up instructions discussed w/ pt. Pt verbalized understanding. Pt armband removed & shredded. Ms. Julia Miller was discharged from [place] [address] in stable condition at 1315. She is to return on 10/25/2021 at 1400 for her next appointment.     Rafael Knight RN  2021

## 2021-11-01 ENCOUNTER — HOSPITAL ENCOUNTER (OUTPATIENT)
Dept: INFUSION THERAPY | Age: 61
Discharge: HOME | End: 2021-11-01
Payer: MEDICAID

## 2021-11-01 VITALS
TEMPERATURE: 97.1 F | SYSTOLIC BLOOD PRESSURE: 124 MMHG | OXYGEN SATURATION: 99 % | RESPIRATION RATE: 18 BRPM | DIASTOLIC BLOOD PRESSURE: 62 MMHG | HEART RATE: 73 BPM

## 2021-11-01 DIAGNOSIS — J45.50 SEVERE PERSISTENT ASTHMA, UNSPECIFIED WHETHER COMPLICATED: Primary | ICD-10-CM

## 2021-11-01 PROCEDURE — 74011250636 HC RX REV CODE- 250/636: Performed by: INTERNAL MEDICINE

## 2021-11-01 PROCEDURE — 96372 THER/PROPH/DIAG INJ SC/IM: CPT

## 2021-11-01 RX ORDER — DIPHENHYDRAMINE HYDROCHLORIDE 50 MG/ML
25 INJECTION, SOLUTION INTRAMUSCULAR; INTRAVENOUS AS NEEDED
Status: CANCELLED
Start: 2021-11-08

## 2021-11-01 RX ORDER — ALBUTEROL SULFATE 0.83 MG/ML
2.5 SOLUTION RESPIRATORY (INHALATION) AS NEEDED
Status: CANCELLED
Start: 2021-11-08

## 2021-11-01 RX ORDER — EPINEPHRINE 1 MG/ML
0.3 INJECTION, SOLUTION, CONCENTRATE INTRAVENOUS AS NEEDED
Status: CANCELLED | OUTPATIENT
Start: 2021-11-08

## 2021-11-01 RX ORDER — ACETAMINOPHEN 325 MG/1
650 TABLET ORAL AS NEEDED
Status: CANCELLED
Start: 2021-11-08

## 2021-11-01 RX ORDER — DIPHENHYDRAMINE HYDROCHLORIDE 50 MG/ML
50 INJECTION, SOLUTION INTRAMUSCULAR; INTRAVENOUS AS NEEDED
Status: CANCELLED
Start: 2021-11-08

## 2021-11-01 RX ORDER — HYDROCORTISONE SODIUM SUCCINATE 100 MG/2ML
100 INJECTION, POWDER, FOR SOLUTION INTRAMUSCULAR; INTRAVENOUS AS NEEDED
Status: CANCELLED | OUTPATIENT
Start: 2021-11-08

## 2021-11-01 RX ORDER — ONDANSETRON 2 MG/ML
8 INJECTION INTRAMUSCULAR; INTRAVENOUS AS NEEDED
Status: CANCELLED | OUTPATIENT
Start: 2021-11-08

## 2021-11-01 RX ADMIN — OMALIZUMAB 75 MG: 75 INJECTION, SOLUTION SUBCUTANEOUS at 10:22

## 2021-11-01 RX ADMIN — OMALIZUMAB 150 MG: 150 INJECTION, SOLUTION SUBCUTANEOUS at 10:22

## 2021-11-01 NOTE — PROGRESS NOTES
[facility] Progress Note    Date: 2021    Name: Uriah Ruffin    MRN: [id number]         : 1960     Xolair injection      Ms. Julia Miller arrived to [facility] at [address] Ms. Julia Miller was assessed and education was provided. Ms. Kim Weiss vitals were reviewed. Visit Vitals  /62 (BP 1 Location: Left upper arm, BP Patient Position: Sitting)   Pulse 73   Temp 97.1 °F (36.2 °C)   Resp 18   SpO2 99%   Breastfeeding No     Xolair 225 mg    Xolair 150 mg was administered as ordered SQ in patient's R upper arm and band-aid applied. Xolair 75 mg was administered as ordered SQ in patient's L upper arm and band-aid applied    Ms. Mahmood tolerated well without complaints. Patient armband removed and shredded. Ms. Julia Miller was discharged from [place] [address] in stable condition at 1025. She is to return on 11/15/2021 at 56 for her next appointment.       Jeanie Richards RN  2021

## 2021-11-08 ENCOUNTER — APPOINTMENT (OUTPATIENT)
Dept: INFUSION THERAPY | Age: 61
End: 2021-11-08
Payer: MEDICAID

## 2021-11-15 ENCOUNTER — HOSPITAL ENCOUNTER (OUTPATIENT)
Dept: INFUSION THERAPY | Age: 61
Discharge: HOME | End: 2021-11-15
Payer: MEDICAID

## 2021-11-15 VITALS
DIASTOLIC BLOOD PRESSURE: 87 MMHG | SYSTOLIC BLOOD PRESSURE: 150 MMHG | HEART RATE: 63 BPM | OXYGEN SATURATION: 100 % | TEMPERATURE: 97.6 F | RESPIRATION RATE: 20 BRPM

## 2021-11-15 DIAGNOSIS — J45.50 SEVERE PERSISTENT ASTHMA, UNSPECIFIED WHETHER COMPLICATED: Primary | ICD-10-CM

## 2021-11-15 PROCEDURE — 96372 THER/PROPH/DIAG INJ SC/IM: CPT

## 2021-11-15 PROCEDURE — 74011250636 HC RX REV CODE- 250/636: Performed by: INTERNAL MEDICINE

## 2021-11-15 RX ORDER — DIPHENHYDRAMINE HYDROCHLORIDE 50 MG/ML
50 INJECTION, SOLUTION INTRAMUSCULAR; INTRAVENOUS AS NEEDED
Status: CANCELLED
Start: 2021-11-22

## 2021-11-15 RX ORDER — EPINEPHRINE 1 MG/ML
0.3 INJECTION, SOLUTION, CONCENTRATE INTRAVENOUS AS NEEDED
Status: CANCELLED | OUTPATIENT
Start: 2021-11-22

## 2021-11-15 RX ORDER — ALBUTEROL SULFATE 0.83 MG/ML
2.5 SOLUTION RESPIRATORY (INHALATION) AS NEEDED
Status: CANCELLED
Start: 2021-11-22

## 2021-11-15 RX ORDER — ONDANSETRON 2 MG/ML
8 INJECTION INTRAMUSCULAR; INTRAVENOUS AS NEEDED
Status: CANCELLED | OUTPATIENT
Start: 2021-11-22

## 2021-11-15 RX ORDER — HYDROCORTISONE SODIUM SUCCINATE 100 MG/2ML
100 INJECTION, POWDER, FOR SOLUTION INTRAMUSCULAR; INTRAVENOUS AS NEEDED
Status: CANCELLED | OUTPATIENT
Start: 2021-11-22

## 2021-11-15 RX ORDER — ACETAMINOPHEN 325 MG/1
650 TABLET ORAL AS NEEDED
Status: CANCELLED
Start: 2021-11-22

## 2021-11-15 RX ORDER — DIPHENHYDRAMINE HYDROCHLORIDE 50 MG/ML
25 INJECTION, SOLUTION INTRAMUSCULAR; INTRAVENOUS AS NEEDED
Status: CANCELLED
Start: 2021-11-22

## 2021-11-15 RX ADMIN — OMALIZUMAB 75 MG: 75 INJECTION, SOLUTION SUBCUTANEOUS at 10:43

## 2021-11-15 RX ADMIN — OMALIZUMAB 150 MG: 150 INJECTION, SOLUTION SUBCUTANEOUS at 10:44

## 2021-11-15 NOTE — PROGRESS NOTES
[facility] OPIC Progress Note    Date: November 15, 2021    Name: Tomás Vega    MRN: [id number]         : 1960    Omalizumab Rosa Colace) Injection    Ms. Julia Miller arrived to [facility] ambulatory and in no acute distress, at 1040. Patient was assessed and education was provided. Patient denied complaint of pain/discomfort, recent change to her medication regimen or health condition. Ms. Venkatesh Bob vitals were reviewed and patient was observed for 5 minutes prior to treatment. Visit Vitals  BP (!) 150/87 (BP 1 Location: Left upper arm, BP Patient Position: At rest;Sitting)   Pulse 63   Temp 97.6 °F (36.4 °C)   Resp 20   SpO2 100%     Xolair 225 mg inj., administered as SQ injections (150mg/75mg) to posterior aspect of left upper arm. Total dose administered 225 mg. Ms. Julia Miller tolerated injection well, and had no complaints. Patient armband removed and shredded. Ms. Julia Miller was discharged from [place] [address] in stable condition at 1050. She is to return on 2021 at 36 for her next appointment.     Melvi Carter RN  November 15, 2021  1:09 PM

## 2021-11-22 ENCOUNTER — APPOINTMENT (OUTPATIENT)
Dept: INFUSION THERAPY | Age: 61
End: 2021-11-22
Payer: MEDICAID

## 2021-11-29 ENCOUNTER — HOSPITAL ENCOUNTER (OUTPATIENT)
Dept: INFUSION THERAPY | Age: 61
Discharge: HOME | End: 2021-11-29
Payer: MEDICAID

## 2021-11-29 VITALS
HEART RATE: 59 BPM | TEMPERATURE: 97.3 F | OXYGEN SATURATION: 98 % | SYSTOLIC BLOOD PRESSURE: 154 MMHG | RESPIRATION RATE: 18 BRPM | DIASTOLIC BLOOD PRESSURE: 76 MMHG

## 2021-11-29 DIAGNOSIS — J45.50 SEVERE PERSISTENT ASTHMA, UNSPECIFIED WHETHER COMPLICATED: Primary | ICD-10-CM

## 2021-11-29 PROCEDURE — 96372 THER/PROPH/DIAG INJ SC/IM: CPT

## 2021-11-29 PROCEDURE — 74011250636 HC RX REV CODE- 250/636: Performed by: INTERNAL MEDICINE

## 2021-11-29 RX ORDER — ACETAMINOPHEN 325 MG/1
650 TABLET ORAL AS NEEDED
Status: CANCELLED
Start: 2021-12-06

## 2021-11-29 RX ORDER — DIPHENHYDRAMINE HYDROCHLORIDE 50 MG/ML
25 INJECTION, SOLUTION INTRAMUSCULAR; INTRAVENOUS AS NEEDED
Status: CANCELLED
Start: 2021-12-06

## 2021-11-29 RX ORDER — ONDANSETRON 2 MG/ML
8 INJECTION INTRAMUSCULAR; INTRAVENOUS AS NEEDED
Status: CANCELLED | OUTPATIENT
Start: 2021-12-06

## 2021-11-29 RX ORDER — EPINEPHRINE 1 MG/ML
0.3 INJECTION, SOLUTION, CONCENTRATE INTRAVENOUS AS NEEDED
Status: CANCELLED | OUTPATIENT
Start: 2021-12-06

## 2021-11-29 RX ORDER — ALBUTEROL SULFATE 0.83 MG/ML
2.5 SOLUTION RESPIRATORY (INHALATION) AS NEEDED
Status: CANCELLED
Start: 2021-12-06

## 2021-11-29 RX ORDER — HYDROCORTISONE SODIUM SUCCINATE 100 MG/2ML
100 INJECTION, POWDER, FOR SOLUTION INTRAMUSCULAR; INTRAVENOUS AS NEEDED
Status: CANCELLED | OUTPATIENT
Start: 2021-12-06

## 2021-11-29 RX ORDER — DIPHENHYDRAMINE HYDROCHLORIDE 50 MG/ML
50 INJECTION, SOLUTION INTRAMUSCULAR; INTRAVENOUS AS NEEDED
Status: CANCELLED
Start: 2021-12-06

## 2021-11-29 RX ADMIN — OMALIZUMAB 75 MG: 75 INJECTION, SOLUTION SUBCUTANEOUS at 10:45

## 2021-11-29 RX ADMIN — OMALIZUMAB 150 MG: 150 INJECTION, SOLUTION SUBCUTANEOUS at 10:45

## 2021-11-29 NOTE — PROGRESS NOTES
[facility] OPIC Progress Note    Date: 2021    Name: Nu Mclean    MRN: [id number]         : 1960     Xolair Injection    Ms. Julia Miller arrived to [facility] at [phone]. Ms. Julia Miller was assessed and education was provided. Ms. Felicia Huynh vitals were reviewed. Visit Vitals  BP (!) 154/76 (BP 1 Location: Left upper arm, BP Patient Position: Sitting)   Pulse (!) 59   Temp 97.3 °F (36.3 °C)   Resp 18   SpO2 98%         Xolair 150 mg was administered as ordered SQ in patient's Right upper arm and band-aid applied to site. Xolair 75 mg was administered as ordered SQ in patient's left upper arm and band-aid applied to site. Ms. Julia Miller tolerated well without complaints. Discharge/ follow-up instructions discussed w/ pt. Pt verbalized understanding. Pt armband removed & shredded. Ms. Julia Miller was discharged from [place] [address] in stable condition at 1050. She is to return on 2021 at 1000 for her next appointment.     Koleen Hamman, RN  2021

## 2021-12-13 ENCOUNTER — HOSPITAL ENCOUNTER (OUTPATIENT)
Dept: INFUSION THERAPY | Age: 61
Discharge: HOME | End: 2021-12-13
Payer: MEDICAID

## 2021-12-13 VITALS
SYSTOLIC BLOOD PRESSURE: 143 MMHG | OXYGEN SATURATION: 100 % | HEART RATE: 63 BPM | RESPIRATION RATE: 20 BRPM | TEMPERATURE: 97 F | DIASTOLIC BLOOD PRESSURE: 64 MMHG

## 2021-12-13 DIAGNOSIS — J45.50 SEVERE PERSISTENT ASTHMA, UNSPECIFIED WHETHER COMPLICATED: Primary | ICD-10-CM

## 2021-12-13 PROCEDURE — 96372 THER/PROPH/DIAG INJ SC/IM: CPT

## 2021-12-13 PROCEDURE — 74011250636 HC RX REV CODE- 250/636: Performed by: INTERNAL MEDICINE

## 2021-12-13 RX ORDER — EPINEPHRINE 1 MG/ML
0.3 INJECTION, SOLUTION, CONCENTRATE INTRAVENOUS AS NEEDED
Status: CANCELLED | OUTPATIENT
Start: 2021-12-27

## 2021-12-13 RX ORDER — DIPHENHYDRAMINE HYDROCHLORIDE 50 MG/ML
50 INJECTION, SOLUTION INTRAMUSCULAR; INTRAVENOUS AS NEEDED
Status: CANCELLED
Start: 2021-12-27

## 2021-12-13 RX ORDER — HYDROCORTISONE SODIUM SUCCINATE 100 MG/2ML
100 INJECTION, POWDER, FOR SOLUTION INTRAMUSCULAR; INTRAVENOUS AS NEEDED
Status: CANCELLED | OUTPATIENT
Start: 2021-12-27

## 2021-12-13 RX ORDER — ACETAMINOPHEN 325 MG/1
650 TABLET ORAL AS NEEDED
Status: CANCELLED
Start: 2021-12-27

## 2021-12-13 RX ORDER — ONDANSETRON 2 MG/ML
8 INJECTION INTRAMUSCULAR; INTRAVENOUS AS NEEDED
Status: CANCELLED | OUTPATIENT
Start: 2021-12-27

## 2021-12-13 RX ORDER — ALBUTEROL SULFATE 0.83 MG/ML
2.5 SOLUTION RESPIRATORY (INHALATION) AS NEEDED
Status: CANCELLED
Start: 2021-12-27

## 2021-12-13 RX ORDER — DIPHENHYDRAMINE HYDROCHLORIDE 50 MG/ML
25 INJECTION, SOLUTION INTRAMUSCULAR; INTRAVENOUS AS NEEDED
Status: CANCELLED
Start: 2021-12-27

## 2021-12-13 RX ADMIN — OMALIZUMAB 75 MG: 75 INJECTION, SOLUTION SUBCUTANEOUS at 10:08

## 2021-12-13 RX ADMIN — OMALIZUMAB 150 MG: 150 INJECTION, SOLUTION SUBCUTANEOUS at 10:08

## 2021-12-13 NOTE — PROGRESS NOTES
[facility] Progress Note    Date: 2021    Name: Bob Romano    MRN: [id number]         : 1960     Xolair Injection      Ms. Julia Miller arrived to [facility] at 1000. Ms. Julia Miller was assessed and education was provided. Ms. Iva Toledo vitals were reviewed. Visit Vitals  BP (!) 143/64 (BP 1 Location: Left upper arm, BP Patient Position: Sitting)   Pulse 63   Temp 97 °F (36.1 °C)   Resp 20   SpO2 100%   Breastfeeding No           Xolair 150 mg was administered as ordered SQ in patient's Right upper arm and band-aid applied to site. Xolair 75 mg was administered as ordered SQ in patient's left upper arm and band-aid applied to site. Ms. Julia Miller tolerated well without complaints. Discharge/ follow-up instructions discussed w/ pt. Pt verbalized understanding. Pt armband removed & shredded. Ms. Julia Miller was discharged from [place] [address] in stable condition at 1010. She is to return on 2021 at 56 for her next appointment.     Christi Moon RN  2021

## 2021-12-27 ENCOUNTER — HOSPITAL ENCOUNTER (OUTPATIENT)
Dept: INFUSION THERAPY | Age: 61
Discharge: HOME | End: 2021-12-27
Payer: MEDICAID

## 2021-12-27 VITALS
SYSTOLIC BLOOD PRESSURE: 147 MMHG | DIASTOLIC BLOOD PRESSURE: 69 MMHG | TEMPERATURE: 97.3 F | RESPIRATION RATE: 18 BRPM | OXYGEN SATURATION: 100 % | HEART RATE: 69 BPM

## 2021-12-27 DIAGNOSIS — J45.50 SEVERE PERSISTENT ASTHMA, UNSPECIFIED WHETHER COMPLICATED: Primary | ICD-10-CM

## 2021-12-27 PROCEDURE — 74011250636 HC RX REV CODE- 250/636: Performed by: INTERNAL MEDICINE

## 2021-12-27 PROCEDURE — 96372 THER/PROPH/DIAG INJ SC/IM: CPT

## 2021-12-27 RX ORDER — DIPHENHYDRAMINE HYDROCHLORIDE 50 MG/ML
50 INJECTION, SOLUTION INTRAMUSCULAR; INTRAVENOUS AS NEEDED
Status: CANCELLED
Start: 2022-01-10

## 2021-12-27 RX ORDER — ACETAMINOPHEN 325 MG/1
650 TABLET ORAL AS NEEDED
Status: CANCELLED
Start: 2022-01-10

## 2021-12-27 RX ORDER — ONDANSETRON 2 MG/ML
8 INJECTION INTRAMUSCULAR; INTRAVENOUS AS NEEDED
Status: CANCELLED | OUTPATIENT
Start: 2022-01-10

## 2021-12-27 RX ORDER — DIPHENHYDRAMINE HYDROCHLORIDE 50 MG/ML
25 INJECTION, SOLUTION INTRAMUSCULAR; INTRAVENOUS AS NEEDED
Status: CANCELLED
Start: 2022-01-10

## 2021-12-27 RX ORDER — HYDROCORTISONE SODIUM SUCCINATE 100 MG/2ML
100 INJECTION, POWDER, FOR SOLUTION INTRAMUSCULAR; INTRAVENOUS AS NEEDED
Status: CANCELLED | OUTPATIENT
Start: 2022-01-10

## 2021-12-27 RX ORDER — EPINEPHRINE 1 MG/ML
0.3 INJECTION, SOLUTION, CONCENTRATE INTRAVENOUS AS NEEDED
Status: CANCELLED | OUTPATIENT
Start: 2022-01-10

## 2021-12-27 RX ORDER — ALBUTEROL SULFATE 0.83 MG/ML
2.5 SOLUTION RESPIRATORY (INHALATION) AS NEEDED
Status: CANCELLED
Start: 2022-01-10

## 2021-12-27 RX ADMIN — OMALIZUMAB 75 MG: 75 INJECTION, SOLUTION SUBCUTANEOUS at 10:29

## 2021-12-27 RX ADMIN — OMALIZUMAB 150 MG: 150 INJECTION, SOLUTION SUBCUTANEOUS at 10:29

## 2021-12-27 NOTE — PROGRESS NOTES
[facility] Progress Note    Date: 2021    Name: Carlos Gross    MRN: [id number]         : 1960     Xolair Injection    Ms. Julia Miller arrived to [facility] at 1025. Ms. Julia Miller was assessed and education was provided. Ms. Janette Meyers vitals were reviewed. Visit Vitals  BP (!) 147/69 (BP 1 Location: Left upper arm, BP Patient Position: Sitting)   Pulse 69   Temp 97.3 °F (36.3 °C)   Resp 18   SpO2 100%         Xolair 150 mg was administered as ordered SQ in patient's Right upper arm and band-aid applied to site. Xolair 75 mg was administered as ordered SQ in patient's left upper arm and band-aid applied to site. Ms. Julia Miller tolerated well without complaints. Discharge/ follow-up instructions discussed w/ pt. Pt verbalized understanding. Pt armband removed & shredded. Ms. Julia Miller was discharged from [place] [address] in stable condition at 1035. She is to return on 01/10/2022 at 56 for her next appointment.     Silverio Varma RN  2021

## 2022-01-10 ENCOUNTER — HOSPITAL ENCOUNTER (OUTPATIENT)
Dept: INFUSION THERAPY | Age: 62
Discharge: HOME | End: 2022-01-10
Payer: MEDICAID

## 2022-01-10 VITALS
SYSTOLIC BLOOD PRESSURE: 148 MMHG | DIASTOLIC BLOOD PRESSURE: 66 MMHG | RESPIRATION RATE: 18 BRPM | HEART RATE: 60 BPM | TEMPERATURE: 97.4 F | OXYGEN SATURATION: 98 %

## 2022-01-10 DIAGNOSIS — J45.50 SEVERE PERSISTENT ASTHMA, UNSPECIFIED WHETHER COMPLICATED: Primary | ICD-10-CM

## 2022-01-10 PROCEDURE — 96372 THER/PROPH/DIAG INJ SC/IM: CPT

## 2022-01-10 PROCEDURE — 74011250636 HC RX REV CODE- 250/636: Performed by: INTERNAL MEDICINE

## 2022-01-10 RX ORDER — ACETAMINOPHEN 325 MG/1
650 TABLET ORAL AS NEEDED
Status: CANCELLED
Start: 2022-01-24

## 2022-01-10 RX ORDER — ALBUTEROL SULFATE 0.83 MG/ML
2.5 SOLUTION RESPIRATORY (INHALATION) AS NEEDED
Status: CANCELLED
Start: 2022-01-24

## 2022-01-10 RX ORDER — DIPHENHYDRAMINE HYDROCHLORIDE 50 MG/ML
50 INJECTION, SOLUTION INTRAMUSCULAR; INTRAVENOUS AS NEEDED
Status: CANCELLED
Start: 2022-01-24

## 2022-01-10 RX ORDER — DIPHENHYDRAMINE HYDROCHLORIDE 50 MG/ML
25 INJECTION, SOLUTION INTRAMUSCULAR; INTRAVENOUS AS NEEDED
Status: CANCELLED
Start: 2022-01-24

## 2022-01-10 RX ORDER — HYDROCORTISONE SODIUM SUCCINATE 100 MG/2ML
100 INJECTION, POWDER, FOR SOLUTION INTRAMUSCULAR; INTRAVENOUS AS NEEDED
Status: CANCELLED | OUTPATIENT
Start: 2022-01-24

## 2022-01-10 RX ORDER — EPINEPHRINE 1 MG/ML
0.3 INJECTION, SOLUTION, CONCENTRATE INTRAVENOUS AS NEEDED
Status: CANCELLED | OUTPATIENT
Start: 2022-01-24

## 2022-01-10 RX ORDER — ONDANSETRON 2 MG/ML
8 INJECTION INTRAMUSCULAR; INTRAVENOUS AS NEEDED
Status: CANCELLED | OUTPATIENT
Start: 2022-01-24

## 2022-01-10 RX ADMIN — OMALIZUMAB 150 MG: 150 INJECTION, SOLUTION SUBCUTANEOUS at 10:59

## 2022-01-10 RX ADMIN — OMALIZUMAB 75 MG: 75 INJECTION, SOLUTION SUBCUTANEOUS at 10:59

## 2022-01-10 NOTE — PROGRESS NOTES
[facility] Progress Note    Date: January 10, 2022    Name: Hany Boyer    MRN: [id number]         : 1960    Omalizumab Darion Snell) Injection    Ms. Julia Miller arrived to [facility] ambulatory and in no acute distress, at 1050. Patient was assessed and education was provided. Patient denied complaint of pain/discomfort, recent change to her medication regimen or health condition. Ms. Kenzie Christianson vitals were reviewed and patient was observed for 5 minutes prior to treatment. Visit Vitals  BP (!) 148/66 (BP 1 Location: Left upper arm, BP Patient Position: At rest;Sitting)   Pulse 60   Temp 97.4 °F (36.3 °C)   Resp 18   SpO2 98%   Breastfeeding No     Xolair 225 mg inj., administered as SQ injections (150mg/75mg) to posterior aspect of right and left upper arms, respectively. Total dose administered 225 mg. Ms. Julia Miller tolerated injections well, and had no complaints. Patient armband removed and shredded. Ms. Julia Miller was discharged from [place] [address] in stable condition at 1105. She is to return on 2022 at 56 for her next appointment.     Mary Kay Ricci RN  January 10, 2022  1:09 PM

## 2022-01-12 ENCOUNTER — HOSPITAL ENCOUNTER (OUTPATIENT)
Dept: WOMENS IMAGING | Age: 62
Discharge: HOME | End: 2022-01-12
Attending: INTERNAL MEDICINE
Payer: MEDICAID

## 2022-01-12 DIAGNOSIS — Z12.31 VISIT FOR SCREENING MAMMOGRAM: ICD-10-CM

## 2022-01-12 PROCEDURE — 77063 BREAST TOMOSYNTHESIS BI: CPT

## 2022-01-24 ENCOUNTER — HOSPITAL ENCOUNTER (OUTPATIENT)
Dept: INFUSION THERAPY | Age: 62
Discharge: HOME | End: 2022-01-24
Payer: MEDICAID

## 2022-01-24 VITALS
BODY MASS INDEX: 34.88 KG/M2 | DIASTOLIC BLOOD PRESSURE: 68 MMHG | SYSTOLIC BLOOD PRESSURE: 159 MMHG | TEMPERATURE: 97.9 F | OXYGEN SATURATION: 99 % | RESPIRATION RATE: 18 BRPM | WEIGHT: 190.7 LBS | HEART RATE: 59 BPM

## 2022-01-24 DIAGNOSIS — J45.50 SEVERE PERSISTENT ASTHMA, UNSPECIFIED WHETHER COMPLICATED: Primary | ICD-10-CM

## 2022-01-24 PROCEDURE — 74011250636 HC RX REV CODE- 250/636: Performed by: INTERNAL MEDICINE

## 2022-01-24 PROCEDURE — 96372 THER/PROPH/DIAG INJ SC/IM: CPT

## 2022-01-24 RX ORDER — DIPHENHYDRAMINE HYDROCHLORIDE 50 MG/ML
50 INJECTION, SOLUTION INTRAMUSCULAR; INTRAVENOUS AS NEEDED
Status: CANCELLED
Start: 2022-02-07

## 2022-01-24 RX ORDER — DIPHENHYDRAMINE HYDROCHLORIDE 50 MG/ML
25 INJECTION, SOLUTION INTRAMUSCULAR; INTRAVENOUS AS NEEDED
Status: CANCELLED
Start: 2022-02-07

## 2022-01-24 RX ORDER — ONDANSETRON 2 MG/ML
8 INJECTION INTRAMUSCULAR; INTRAVENOUS AS NEEDED
Status: CANCELLED | OUTPATIENT
Start: 2022-02-07

## 2022-01-24 RX ORDER — HYDROCORTISONE SODIUM SUCCINATE 100 MG/2ML
100 INJECTION, POWDER, FOR SOLUTION INTRAMUSCULAR; INTRAVENOUS AS NEEDED
Status: CANCELLED | OUTPATIENT
Start: 2022-02-07

## 2022-01-24 RX ORDER — ACETAMINOPHEN 325 MG/1
650 TABLET ORAL AS NEEDED
Status: CANCELLED
Start: 2022-02-07

## 2022-01-24 RX ORDER — EPINEPHRINE 1 MG/ML
0.3 INJECTION, SOLUTION, CONCENTRATE INTRAVENOUS AS NEEDED
Status: CANCELLED | OUTPATIENT
Start: 2022-02-07

## 2022-01-24 RX ORDER — ALBUTEROL SULFATE 0.83 MG/ML
2.5 SOLUTION RESPIRATORY (INHALATION) AS NEEDED
Status: CANCELLED
Start: 2022-02-07

## 2022-01-24 RX ADMIN — OMALIZUMAB 75 MG: 75 INJECTION, SOLUTION SUBCUTANEOUS at 10:38

## 2022-01-24 RX ADMIN — OMALIZUMAB 150 MG: 150 INJECTION, SOLUTION SUBCUTANEOUS at 10:38

## 2022-01-24 NOTE — PROGRESS NOTES
[facility] Progress Note    Date: 2022    Name: Juani Quiñones    MRN: [id number]         : 1960    Xolair Injection     Ms. Julia Miller arrived to [facility] at 21 , ambulatory and in no acute distress. Pt states she has not yet taken any of her home meds because she just woke up and came here. Ms. Julia Miller was assessed and education was provided. Ms. Blue Medley vitals were reviewed. Visit Vitals  BP (!) 159/68 (BP 1 Location: Left upper arm, BP Patient Position: Sitting)   Pulse (!) 59   Temp 97.9 °F (36.6 °C)   Resp 18   Wt 86.5 kg (190 lb 11.2 oz)   SpO2 99%   BMI 34.88 kg/m²       Xolair 75mg was administered as ordered SQ in patient's Left upper arm ( left). Xolair 150mg was administered as ordered SQ in patient's Right upper arm ( right). Ms. Julia Miller tolerated well without complaints. Pt armband removed & shredded. Ms. Julia Miller was discharged from [place] [address] in stable condition at 1045. She is to return on 22 at 1030 for her next appointment.     Snehal Ryan RN  2022

## 2022-02-07 ENCOUNTER — HOSPITAL ENCOUNTER (OUTPATIENT)
Dept: INFUSION THERAPY | Age: 62
Discharge: HOME | End: 2022-02-07
Payer: MEDICAID

## 2022-02-07 VITALS
DIASTOLIC BLOOD PRESSURE: 73 MMHG | SYSTOLIC BLOOD PRESSURE: 162 MMHG | TEMPERATURE: 97.7 F | RESPIRATION RATE: 18 BRPM | HEART RATE: 77 BPM | OXYGEN SATURATION: 97 %

## 2022-02-07 DIAGNOSIS — J45.50 SEVERE PERSISTENT ASTHMA, UNSPECIFIED WHETHER COMPLICATED: Primary | ICD-10-CM

## 2022-02-07 PROCEDURE — 74011250636 HC RX REV CODE- 250/636: Performed by: INTERNAL MEDICINE

## 2022-02-07 PROCEDURE — 96372 THER/PROPH/DIAG INJ SC/IM: CPT

## 2022-02-07 RX ORDER — EPINEPHRINE 1 MG/ML
0.3 INJECTION, SOLUTION, CONCENTRATE INTRAVENOUS AS NEEDED
Status: CANCELLED | OUTPATIENT
Start: 2022-02-21

## 2022-02-07 RX ORDER — ALBUTEROL SULFATE 0.83 MG/ML
2.5 SOLUTION RESPIRATORY (INHALATION) AS NEEDED
Status: CANCELLED
Start: 2022-02-21

## 2022-02-07 RX ORDER — HYDROCORTISONE SODIUM SUCCINATE 100 MG/2ML
100 INJECTION, POWDER, FOR SOLUTION INTRAMUSCULAR; INTRAVENOUS AS NEEDED
Status: CANCELLED | OUTPATIENT
Start: 2022-02-21

## 2022-02-07 RX ORDER — ONDANSETRON 2 MG/ML
8 INJECTION INTRAMUSCULAR; INTRAVENOUS AS NEEDED
Status: CANCELLED | OUTPATIENT
Start: 2022-02-21

## 2022-02-07 RX ORDER — DIPHENHYDRAMINE HYDROCHLORIDE 50 MG/ML
50 INJECTION, SOLUTION INTRAMUSCULAR; INTRAVENOUS AS NEEDED
Status: CANCELLED
Start: 2022-02-21

## 2022-02-07 RX ORDER — ACETAMINOPHEN 325 MG/1
650 TABLET ORAL AS NEEDED
Status: CANCELLED
Start: 2022-02-21

## 2022-02-07 RX ORDER — DIPHENHYDRAMINE HYDROCHLORIDE 50 MG/ML
25 INJECTION, SOLUTION INTRAMUSCULAR; INTRAVENOUS AS NEEDED
Status: CANCELLED
Start: 2022-02-21

## 2022-02-07 RX ADMIN — OMALIZUMAB 75 MG: 75 INJECTION, SOLUTION SUBCUTANEOUS at 11:45

## 2022-02-07 RX ADMIN — OMALIZUMAB 150 MG: 150 INJECTION, SOLUTION SUBCUTANEOUS at 11:44

## 2022-02-07 NOTE — PROGRESS NOTES
[facility] Progress Note    Date: 2022    Name: Jana Medrano    MRN: [id number]         : 1960     Xolair Injection every 2 weeks    Ms. Julia Miller arrived to [facility] at 1140. Ms. Julia Miller was assessed and education was provided. Ms. Leah Barakat vitals were reviewed. Visit Vitals  BP (!) 162/73 (BP 1 Location: Left upper arm, BP Patient Position: Sitting)   Pulse 77   Temp 97.7 °F (36.5 °C)   Resp 18   SpO2 97%   Breastfeeding No     Xolair 150 mg was administered as ordered SQ in patient's Right upper arm. Xolair 75 mg was administered as ordered SQ in patient's Left upper arm. Total 225 mg    Ms. Mahmood tolerated well without complaints. Patient armband removed and shredded. Ms. Julia Miller was discharged from [place] [address] in stable condition at 1155. She is to return on 2022 at 1130 for her next appointment.     Shiva Gamboa  2022  1155

## 2022-02-10 ENCOUNTER — TRANSCRIBE ORDER (OUTPATIENT)
Dept: SCHEDULING | Age: 62
End: 2022-02-10

## 2022-02-10 DIAGNOSIS — Z78.0 ASYMPTOMATIC MENOPAUSAL STATE: Primary | ICD-10-CM

## 2022-02-21 ENCOUNTER — HOSPITAL ENCOUNTER (OUTPATIENT)
Dept: INFUSION THERAPY | Age: 62
Discharge: HOME | End: 2022-02-21
Payer: MEDICAID

## 2022-02-21 VITALS
OXYGEN SATURATION: 97 % | RESPIRATION RATE: 18 BRPM | DIASTOLIC BLOOD PRESSURE: 63 MMHG | TEMPERATURE: 98.1 F | HEART RATE: 55 BPM | SYSTOLIC BLOOD PRESSURE: 131 MMHG

## 2022-02-21 DIAGNOSIS — J45.50 SEVERE PERSISTENT ASTHMA, UNSPECIFIED WHETHER COMPLICATED: Primary | ICD-10-CM

## 2022-02-21 PROCEDURE — 74011250636 HC RX REV CODE- 250/636: Performed by: INTERNAL MEDICINE

## 2022-02-21 PROCEDURE — 96372 THER/PROPH/DIAG INJ SC/IM: CPT

## 2022-02-21 RX ORDER — DIPHENHYDRAMINE HYDROCHLORIDE 50 MG/ML
25 INJECTION, SOLUTION INTRAMUSCULAR; INTRAVENOUS AS NEEDED
Status: CANCELLED
Start: 2022-03-07

## 2022-02-21 RX ORDER — ACETAMINOPHEN 325 MG/1
650 TABLET ORAL AS NEEDED
Status: CANCELLED
Start: 2022-03-07

## 2022-02-21 RX ORDER — HYDROCORTISONE SODIUM SUCCINATE 100 MG/2ML
100 INJECTION, POWDER, FOR SOLUTION INTRAMUSCULAR; INTRAVENOUS AS NEEDED
Status: CANCELLED | OUTPATIENT
Start: 2022-03-07

## 2022-02-21 RX ORDER — DIPHENHYDRAMINE HYDROCHLORIDE 50 MG/ML
50 INJECTION, SOLUTION INTRAMUSCULAR; INTRAVENOUS AS NEEDED
Status: CANCELLED
Start: 2022-03-07

## 2022-02-21 RX ORDER — ONDANSETRON 2 MG/ML
8 INJECTION INTRAMUSCULAR; INTRAVENOUS AS NEEDED
Status: CANCELLED | OUTPATIENT
Start: 2022-03-07

## 2022-02-21 RX ORDER — EPINEPHRINE 1 MG/ML
0.3 INJECTION, SOLUTION, CONCENTRATE INTRAVENOUS AS NEEDED
Status: CANCELLED | OUTPATIENT
Start: 2022-03-07

## 2022-02-21 RX ORDER — ALBUTEROL SULFATE 0.83 MG/ML
2.5 SOLUTION RESPIRATORY (INHALATION) AS NEEDED
Status: CANCELLED
Start: 2022-03-07

## 2022-02-21 RX ADMIN — OMALIZUMAB 150 MG: 150 INJECTION, SOLUTION SUBCUTANEOUS at 11:40

## 2022-02-21 RX ADMIN — OMALIZUMAB 75 MG: 75 INJECTION, SOLUTION SUBCUTANEOUS at 11:40

## 2022-02-21 NOTE — PROGRESS NOTES
[facility] Progress Note    Date: 2022    Name: Hilaria Alvarez    MRN: [id number]         : 1960      Ms. Julia Miller arrived to [place] at [address] [place] [address], ambulatory and in no acute distress. Ms. Julia Miller was assessed and education was provided. Ms. Dariusz Kenney vitals were reviewed. Visit Vitals  /63 (BP 1 Location: Left upper arm, BP Patient Position: Sitting)   Pulse (!) 55   Temp 98.1 °F (36.7 °C)   Resp 18   SpO2 97%     Xolair 75mg was administered as ordered SQ in patient's Left upper arm ( left). Xolair 150mg was administered as ordered SQ in patient's Right upper arm ( right). Ms. Julia Miller tolerated well without complaints. Discharge/ follow-up instructions discussed w/ pt. Pt verbalized understanding. Pt armband removed & shredded. Ms. Julia Miller was discharged from [place] [address] in stable condition at 1150. She is to return on 3/7/22 at 1130 for her next appointment.     Penny Reilly RN  2022

## 2022-03-07 ENCOUNTER — HOSPITAL ENCOUNTER (OUTPATIENT)
Dept: INFUSION THERAPY | Age: 62
Discharge: HOME | End: 2022-03-07
Payer: MEDICAID

## 2022-03-07 VITALS
SYSTOLIC BLOOD PRESSURE: 138 MMHG | RESPIRATION RATE: 18 BRPM | DIASTOLIC BLOOD PRESSURE: 64 MMHG | OXYGEN SATURATION: 99 % | HEART RATE: 56 BPM | TEMPERATURE: 98.2 F

## 2022-03-07 DIAGNOSIS — J45.50 SEVERE PERSISTENT ASTHMA, UNSPECIFIED WHETHER COMPLICATED: Primary | ICD-10-CM

## 2022-03-07 PROCEDURE — 74011250636 HC RX REV CODE- 250/636: Performed by: INTERNAL MEDICINE

## 2022-03-07 PROCEDURE — 96372 THER/PROPH/DIAG INJ SC/IM: CPT

## 2022-03-07 RX ORDER — ONDANSETRON 2 MG/ML
8 INJECTION INTRAMUSCULAR; INTRAVENOUS AS NEEDED
Status: CANCELLED | OUTPATIENT
Start: 2022-03-21

## 2022-03-07 RX ORDER — ALBUTEROL SULFATE 0.83 MG/ML
2.5 SOLUTION RESPIRATORY (INHALATION) AS NEEDED
Status: CANCELLED
Start: 2022-03-21

## 2022-03-07 RX ORDER — ACETAMINOPHEN 325 MG/1
650 TABLET ORAL AS NEEDED
Status: CANCELLED
Start: 2022-03-21

## 2022-03-07 RX ORDER — DIPHENHYDRAMINE HYDROCHLORIDE 50 MG/ML
50 INJECTION, SOLUTION INTRAMUSCULAR; INTRAVENOUS AS NEEDED
Status: CANCELLED
Start: 2022-03-21

## 2022-03-07 RX ORDER — DIPHENHYDRAMINE HYDROCHLORIDE 50 MG/ML
25 INJECTION, SOLUTION INTRAMUSCULAR; INTRAVENOUS AS NEEDED
Status: CANCELLED
Start: 2022-03-21

## 2022-03-07 RX ORDER — EPINEPHRINE 1 MG/ML
0.3 INJECTION, SOLUTION, CONCENTRATE INTRAVENOUS AS NEEDED
Status: CANCELLED | OUTPATIENT
Start: 2022-03-21

## 2022-03-07 RX ORDER — HYDROCORTISONE SODIUM SUCCINATE 100 MG/2ML
100 INJECTION, POWDER, FOR SOLUTION INTRAMUSCULAR; INTRAVENOUS AS NEEDED
Status: CANCELLED | OUTPATIENT
Start: 2022-03-21

## 2022-03-07 RX ADMIN — OMALIZUMAB 75 MG: 75 INJECTION, SOLUTION SUBCUTANEOUS at 11:39

## 2022-03-07 RX ADMIN — OMALIZUMAB 150 MG: 150 INJECTION, SOLUTION SUBCUTANEOUS at 11:39

## 2022-03-07 NOTE — PROGRESS NOTES
[facility] Progress Note    Date: 2022    Name: Calvin Garcia    MRN: [id number]         : 1960      Ms. Julia Miller arrived to [facility] at 1130, ambulatory and in no acute distress. Ms. Julia Miller was assessed and education was provided. Ms. Reyes Alvarenga vitals were reviewed. Visit Vitals  /64 (BP 1 Location: Left upper arm, BP Patient Position: Sitting)   Pulse (!) 56   Temp 98.2 °F (36.8 °C)   Resp 18   SpO2 99%   Breastfeeding No     Xolair 75mg was administered as ordered SQ in patient's Left upper arm ( left). Xolair 150mg was administered as ordered SQ in patient's Right upper arm ( right). Ms. Julia Miller tolerated well without complaints. Discharge/ follow-up instructions discussed w/ pt. Pt verbalized understanding. Pt armband removed & shredded. Ms. Julia Miller was discharged from [place] [address] in stable condition at 1145. She is to return on 3/21/22 at 1130 for her next appointment.     Sera Williamson RN  2022

## 2022-03-18 PROBLEM — Z79.899 ON STATIN THERAPY DUE TO RISK OF FUTURE CARDIOVASCULAR EVENT: Status: ACTIVE | Noted: 2020-08-24

## 2022-03-18 PROBLEM — Z79.82 CURRENT USE OF ASPIRIN: Status: ACTIVE | Noted: 2020-08-24

## 2022-03-19 PROBLEM — Z79.01 ON CLOPIDOGREL THERAPY: Status: ACTIVE | Noted: 2020-08-24

## 2022-03-19 PROBLEM — E78.00 PURE HYPERCHOLESTEROLEMIA: Status: ACTIVE | Noted: 2020-08-19

## 2022-03-19 PROBLEM — G81.94 LEFT HEMIPARESIS (HCC): Status: ACTIVE | Noted: 2020-08-21

## 2022-03-19 PROBLEM — Z20.822 COVID-19 RULED OUT BY LABORATORY TESTING: Status: ACTIVE | Noted: 2020-08-26

## 2022-03-19 PROBLEM — I63.9 ACUTE ISCHEMIC STROKE (HCC): Status: ACTIVE | Noted: 2020-08-21

## 2022-03-19 PROBLEM — Z74.09 IMPAIRED MOBILITY AND ADLS: Status: ACTIVE | Noted: 2020-08-21

## 2022-03-19 PROBLEM — Z78.9 IMPAIRED MOBILITY AND ADLS: Status: ACTIVE | Noted: 2020-08-21

## 2022-03-20 PROBLEM — J45.50 SEVERE PERSISTENT ASTHMA: Status: ACTIVE | Noted: 2019-11-07

## 2022-03-21 ENCOUNTER — HOSPITAL ENCOUNTER (OUTPATIENT)
Dept: INFUSION THERAPY | Age: 62
Discharge: HOME | End: 2022-03-21
Payer: MEDICAID

## 2022-03-21 VITALS
SYSTOLIC BLOOD PRESSURE: 174 MMHG | HEART RATE: 59 BPM | DIASTOLIC BLOOD PRESSURE: 75 MMHG | RESPIRATION RATE: 18 BRPM | OXYGEN SATURATION: 99 % | TEMPERATURE: 98.2 F

## 2022-03-21 DIAGNOSIS — J45.50 SEVERE PERSISTENT ASTHMA, UNSPECIFIED WHETHER COMPLICATED: Primary | ICD-10-CM

## 2022-03-21 PROCEDURE — 96372 THER/PROPH/DIAG INJ SC/IM: CPT

## 2022-03-21 PROCEDURE — 74011250636 HC RX REV CODE- 250/636: Performed by: INTERNAL MEDICINE

## 2022-03-21 RX ORDER — EPINEPHRINE 1 MG/ML
0.3 INJECTION, SOLUTION, CONCENTRATE INTRAVENOUS AS NEEDED
Status: CANCELLED | OUTPATIENT
Start: 2022-04-04

## 2022-03-21 RX ORDER — DIPHENHYDRAMINE HYDROCHLORIDE 50 MG/ML
25 INJECTION, SOLUTION INTRAMUSCULAR; INTRAVENOUS AS NEEDED
Status: CANCELLED
Start: 2022-04-04

## 2022-03-21 RX ORDER — ACETAMINOPHEN 325 MG/1
650 TABLET ORAL AS NEEDED
Status: CANCELLED
Start: 2022-04-04

## 2022-03-21 RX ORDER — ALBUTEROL SULFATE 0.83 MG/ML
2.5 SOLUTION RESPIRATORY (INHALATION) AS NEEDED
Status: CANCELLED
Start: 2022-04-04

## 2022-03-21 RX ORDER — HYDROCORTISONE SODIUM SUCCINATE 100 MG/2ML
100 INJECTION, POWDER, FOR SOLUTION INTRAMUSCULAR; INTRAVENOUS AS NEEDED
Status: CANCELLED | OUTPATIENT
Start: 2022-04-04

## 2022-03-21 RX ORDER — DIPHENHYDRAMINE HYDROCHLORIDE 50 MG/ML
50 INJECTION, SOLUTION INTRAMUSCULAR; INTRAVENOUS AS NEEDED
Status: CANCELLED
Start: 2022-04-04

## 2022-03-21 RX ORDER — ONDANSETRON 2 MG/ML
8 INJECTION INTRAMUSCULAR; INTRAVENOUS AS NEEDED
Status: CANCELLED | OUTPATIENT
Start: 2022-04-04

## 2022-03-21 RX ADMIN — OMALIZUMAB 150 MG: 150 INJECTION, SOLUTION SUBCUTANEOUS at 11:24

## 2022-03-21 RX ADMIN — OMALIZUMAB 75 MG: 75 INJECTION, SOLUTION SUBCUTANEOUS at 11:24

## 2022-03-28 ENCOUNTER — HOSPITAL ENCOUNTER (OUTPATIENT)
Dept: BONE DENSITY | Age: 62
Discharge: HOME | End: 2022-03-28
Attending: INTERNAL MEDICINE
Payer: MEDICAID

## 2022-03-28 DIAGNOSIS — Z78.0 ASYMPTOMATIC MENOPAUSAL STATE: ICD-10-CM

## 2022-03-28 PROCEDURE — 77080 DXA BONE DENSITY AXIAL: CPT

## 2022-04-04 ENCOUNTER — HOSPITAL ENCOUNTER (OUTPATIENT)
Dept: INFUSION THERAPY | Age: 62
Discharge: HOME | End: 2022-04-04
Payer: MEDICAID

## 2022-04-04 VITALS
HEART RATE: 59 BPM | TEMPERATURE: 97.3 F | OXYGEN SATURATION: 99 % | DIASTOLIC BLOOD PRESSURE: 64 MMHG | SYSTOLIC BLOOD PRESSURE: 135 MMHG | RESPIRATION RATE: 18 BRPM

## 2022-04-04 DIAGNOSIS — J45.50 SEVERE PERSISTENT ASTHMA, UNSPECIFIED WHETHER COMPLICATED: Primary | ICD-10-CM

## 2022-04-04 PROCEDURE — 74011250636 HC RX REV CODE- 250/636: Performed by: INTERNAL MEDICINE

## 2022-04-04 PROCEDURE — 96372 THER/PROPH/DIAG INJ SC/IM: CPT

## 2022-04-04 RX ORDER — DIPHENHYDRAMINE HYDROCHLORIDE 50 MG/ML
25 INJECTION, SOLUTION INTRAMUSCULAR; INTRAVENOUS AS NEEDED
Status: CANCELLED
Start: 2022-04-18

## 2022-04-04 RX ORDER — HYDROCORTISONE SODIUM SUCCINATE 100 MG/2ML
100 INJECTION, POWDER, FOR SOLUTION INTRAMUSCULAR; INTRAVENOUS AS NEEDED
Status: CANCELLED | OUTPATIENT
Start: 2022-04-18

## 2022-04-04 RX ORDER — ONDANSETRON 2 MG/ML
8 INJECTION INTRAMUSCULAR; INTRAVENOUS AS NEEDED
Status: CANCELLED | OUTPATIENT
Start: 2022-04-18

## 2022-04-04 RX ORDER — EPINEPHRINE 1 MG/ML
0.3 INJECTION, SOLUTION, CONCENTRATE INTRAVENOUS AS NEEDED
Status: CANCELLED | OUTPATIENT
Start: 2022-04-18

## 2022-04-04 RX ORDER — ACETAMINOPHEN 325 MG/1
650 TABLET ORAL AS NEEDED
Status: CANCELLED
Start: 2022-04-18

## 2022-04-04 RX ORDER — ALBUTEROL SULFATE 0.83 MG/ML
2.5 SOLUTION RESPIRATORY (INHALATION) AS NEEDED
Status: CANCELLED
Start: 2022-04-18

## 2022-04-04 RX ORDER — DIPHENHYDRAMINE HYDROCHLORIDE 50 MG/ML
50 INJECTION, SOLUTION INTRAMUSCULAR; INTRAVENOUS AS NEEDED
Status: CANCELLED
Start: 2022-04-18

## 2022-04-04 RX ADMIN — OMALIZUMAB 150 MG: 150 INJECTION, SOLUTION SUBCUTANEOUS at 13:10

## 2022-04-04 RX ADMIN — OMALIZUMAB 75 MG: 75 INJECTION, SOLUTION SUBCUTANEOUS at 13:10

## 2022-04-04 NOTE — PROGRESS NOTES
[facility] Progress Note    Date: 2022    Name: Angelica Adan    MRN: [id number]         : 1960     Xolair Injection every 2 weeks    Ms. Julia Miller arrived to [facility] at . Ms. Julia Miller was assessed and education was provided. Ms. Chantale Springer vitals were reviewed. Visit Vitals  /64 (BP 1 Location: Left upper arm, BP Patient Position: Sitting)   Pulse (!) 59   Temp 97.3 °F (36.3 °C)   Resp 18   SpO2 99%   Breastfeeding No     Xolair 150 mg was administered as ordered SQ in patient's Right upper arm. Xolair 75 mg was administered as ordered SQ in patient's Left upper arm. Total 225 mg    Ms. Mahmood tolerated well without complaints. Patient armband removed and shredded. Ms. Julia Miller was discharged from [place] [address] in stable condition at 1315. She is to return on 2022 at 1300 for her next appointment.     Maximo Bautista  2022  1158

## 2022-04-18 ENCOUNTER — HOSPITAL ENCOUNTER (OUTPATIENT)
Dept: INFUSION THERAPY | Age: 62
Discharge: HOME | End: 2022-04-18
Payer: MEDICAID

## 2022-04-18 VITALS
HEART RATE: 64 BPM | SYSTOLIC BLOOD PRESSURE: 123 MMHG | DIASTOLIC BLOOD PRESSURE: 58 MMHG | RESPIRATION RATE: 18 BRPM | TEMPERATURE: 98.2 F | OXYGEN SATURATION: 100 %

## 2022-04-18 DIAGNOSIS — J45.50 SEVERE PERSISTENT ASTHMA, UNSPECIFIED WHETHER COMPLICATED: Primary | ICD-10-CM

## 2022-04-18 PROCEDURE — 74011250636 HC RX REV CODE- 250/636: Performed by: INTERNAL MEDICINE

## 2022-04-18 PROCEDURE — 96372 THER/PROPH/DIAG INJ SC/IM: CPT

## 2022-04-18 RX ORDER — EPINEPHRINE 1 MG/ML
0.3 INJECTION, SOLUTION, CONCENTRATE INTRAVENOUS AS NEEDED
Status: CANCELLED | OUTPATIENT
Start: 2022-05-02

## 2022-04-18 RX ORDER — DIPHENHYDRAMINE HYDROCHLORIDE 50 MG/ML
50 INJECTION, SOLUTION INTRAMUSCULAR; INTRAVENOUS AS NEEDED
Status: CANCELLED
Start: 2022-05-02

## 2022-04-18 RX ORDER — ONDANSETRON 2 MG/ML
8 INJECTION INTRAMUSCULAR; INTRAVENOUS AS NEEDED
Status: CANCELLED | OUTPATIENT
Start: 2022-05-02

## 2022-04-18 RX ORDER — ACETAMINOPHEN 325 MG/1
650 TABLET ORAL AS NEEDED
Status: CANCELLED
Start: 2022-05-02

## 2022-04-18 RX ORDER — ALBUTEROL SULFATE 0.83 MG/ML
2.5 SOLUTION RESPIRATORY (INHALATION) AS NEEDED
Status: CANCELLED
Start: 2022-05-02

## 2022-04-18 RX ORDER — HYDROCORTISONE SODIUM SUCCINATE 100 MG/2ML
100 INJECTION, POWDER, FOR SOLUTION INTRAMUSCULAR; INTRAVENOUS AS NEEDED
Status: CANCELLED | OUTPATIENT
Start: 2022-05-02

## 2022-04-18 RX ORDER — DIPHENHYDRAMINE HYDROCHLORIDE 50 MG/ML
25 INJECTION, SOLUTION INTRAMUSCULAR; INTRAVENOUS AS NEEDED
Status: CANCELLED
Start: 2022-05-02

## 2022-04-18 RX ADMIN — OMALIZUMAB 75 MG: 75 INJECTION, SOLUTION SUBCUTANEOUS at 13:26

## 2022-04-18 RX ADMIN — OMALIZUMAB 150 MG: 150 INJECTION, SOLUTION SUBCUTANEOUS at 13:26

## 2022-04-18 NOTE — PROGRESS NOTES
[facility] Progress Note    Date: 2022    Name: Lucie Lyons    MRN: [id number]         : 1960    Xolair Injection     Ms. Julia Miller arrived to [place] at 1320, ambulatory and in no acute distress. Ms. Julia Miller was assessed and education was provided. Ms. Azar Parry vitals were reviewed. Visit Vitals  BP (!) 123/58 (BP 1 Location: Left upper arm, BP Patient Position: Sitting)   Pulse 64   Temp 98.2 °F (36.8 °C)   Resp 18   SpO2 100%     Xolair 150 mg was administered as ordered SQ in patient's left upper arm. Xolair 75 mg was administered as ordered SQ in patient's right upper arm    Ms. Mahmood tolerated well without complaints. Discharge/ follow-up instructions discussed w/ pt. Pt verbalized understanding. Pt armband removed & shredded. Ms. Julia Miller was discharged from [place] [address] in stable condition at 1330. She is to return on 22 at 1300 for her next appointment.     Romeo Pendleton RN  2022

## 2022-05-02 ENCOUNTER — HOSPITAL ENCOUNTER (OUTPATIENT)
Dept: INFUSION THERAPY | Age: 62
End: 2022-05-02

## 2022-05-09 ENCOUNTER — HOSPITAL ENCOUNTER (OUTPATIENT)
Dept: INFUSION THERAPY | Age: 62
Discharge: HOME | End: 2022-05-09
Attending: INTERNAL MEDICINE
Payer: MEDICAID

## 2022-05-09 VITALS
SYSTOLIC BLOOD PRESSURE: 127 MMHG | HEART RATE: 71 BPM | OXYGEN SATURATION: 99 % | TEMPERATURE: 98.4 F | RESPIRATION RATE: 18 BRPM | DIASTOLIC BLOOD PRESSURE: 61 MMHG

## 2022-05-09 DIAGNOSIS — J45.50 SEVERE PERSISTENT ASTHMA, UNSPECIFIED WHETHER COMPLICATED: Primary | ICD-10-CM

## 2022-05-09 PROCEDURE — 74011250636 HC RX REV CODE- 250/636: Performed by: INTERNAL MEDICINE

## 2022-05-09 PROCEDURE — 96372 THER/PROPH/DIAG INJ SC/IM: CPT

## 2022-05-09 RX ORDER — DIPHENHYDRAMINE HYDROCHLORIDE 50 MG/ML
25 INJECTION, SOLUTION INTRAMUSCULAR; INTRAVENOUS AS NEEDED
Status: CANCELLED
Start: 2022-05-23

## 2022-05-09 RX ORDER — ALBUTEROL SULFATE 0.83 MG/ML
2.5 SOLUTION RESPIRATORY (INHALATION) AS NEEDED
Status: CANCELLED
Start: 2022-05-23

## 2022-05-09 RX ORDER — ONDANSETRON 2 MG/ML
8 INJECTION INTRAMUSCULAR; INTRAVENOUS AS NEEDED
Status: CANCELLED | OUTPATIENT
Start: 2022-05-23

## 2022-05-09 RX ORDER — HYDROCORTISONE SODIUM SUCCINATE 100 MG/2ML
100 INJECTION, POWDER, FOR SOLUTION INTRAMUSCULAR; INTRAVENOUS AS NEEDED
Status: CANCELLED | OUTPATIENT
Start: 2022-05-23

## 2022-05-09 RX ORDER — EPINEPHRINE 1 MG/ML
0.3 INJECTION, SOLUTION, CONCENTRATE INTRAVENOUS AS NEEDED
Status: CANCELLED | OUTPATIENT
Start: 2022-05-23

## 2022-05-09 RX ORDER — ACETAMINOPHEN 325 MG/1
650 TABLET ORAL AS NEEDED
Status: CANCELLED
Start: 2022-05-23

## 2022-05-09 RX ORDER — DIPHENHYDRAMINE HYDROCHLORIDE 50 MG/ML
50 INJECTION, SOLUTION INTRAMUSCULAR; INTRAVENOUS AS NEEDED
Status: CANCELLED
Start: 2022-05-23

## 2022-05-09 RX ADMIN — OMALIZUMAB 75 MG: 75 INJECTION, SOLUTION SUBCUTANEOUS at 13:10

## 2022-05-09 RX ADMIN — OMALIZUMAB 150 MG: 150 INJECTION, SOLUTION SUBCUTANEOUS at 13:10

## 2022-05-09 NOTE — PROGRESS NOTES
[facility] Progress Note    Date: May 9, 2022    Name: Kurt Santiago    MRN: [id number]         : 1960    Xolair Injection     Ms. Julia Miller arrived to [facility] at , ambulatory. Ms. Julia Miller was assessed and education was provided. Ms. Luciano Layton vitals were reviewed. Visit Vitals  /61 (BP 1 Location: Left upper arm, BP Patient Position: Sitting)   Pulse 71   Temp 98.4 °F (36.9 °C)   Resp 18   SpO2 99%   Breastfeeding No       Xolair 150 mg was administered as ordered SQ in patient's left upper arm and band aid applied. Xolair 75 mg was administered as ordered SQ in patient's right upper arm and band aid applied. Ms. Julia Miller tolerated well without complaints. Discharge/ follow-up instructions discussed w/ pt. Pt verbalized understanding. Pt armband removed & shredded. Ms. Julia Miller was discharged from [place] [address] in stable condition at 1315. She is to return on 22 at 1400 for her next appointment.     Brook Gilbert RN  May 9, 2022

## 2022-05-16 ENCOUNTER — APPOINTMENT (OUTPATIENT)
Dept: INFUSION THERAPY | Age: 62
End: 2022-05-16
Attending: INTERNAL MEDICINE

## 2022-05-23 ENCOUNTER — HOSPITAL ENCOUNTER (OUTPATIENT)
Dept: INFUSION THERAPY | Age: 62
Discharge: HOME | End: 2022-05-23
Attending: INTERNAL MEDICINE
Payer: MEDICAID

## 2022-05-23 VITALS
HEART RATE: 58 BPM | RESPIRATION RATE: 18 BRPM | WEIGHT: 183.9 LBS | SYSTOLIC BLOOD PRESSURE: 165 MMHG | TEMPERATURE: 98.3 F | OXYGEN SATURATION: 99 % | BODY MASS INDEX: 33.64 KG/M2 | DIASTOLIC BLOOD PRESSURE: 65 MMHG

## 2022-05-23 DIAGNOSIS — J45.50 SEVERE PERSISTENT ASTHMA, UNSPECIFIED WHETHER COMPLICATED: Primary | ICD-10-CM

## 2022-05-23 PROCEDURE — 96372 THER/PROPH/DIAG INJ SC/IM: CPT

## 2022-05-23 PROCEDURE — 74011250636 HC RX REV CODE- 250/636: Performed by: INTERNAL MEDICINE

## 2022-05-23 RX ORDER — HYDROCORTISONE SODIUM SUCCINATE 100 MG/2ML
100 INJECTION, POWDER, FOR SOLUTION INTRAMUSCULAR; INTRAVENOUS AS NEEDED
Status: CANCELLED | OUTPATIENT
Start: 2022-06-06

## 2022-05-23 RX ORDER — DIPHENHYDRAMINE HYDROCHLORIDE 50 MG/ML
50 INJECTION, SOLUTION INTRAMUSCULAR; INTRAVENOUS AS NEEDED
Status: CANCELLED
Start: 2022-06-06

## 2022-05-23 RX ORDER — ONDANSETRON 2 MG/ML
8 INJECTION INTRAMUSCULAR; INTRAVENOUS AS NEEDED
Status: CANCELLED | OUTPATIENT
Start: 2022-06-06

## 2022-05-23 RX ORDER — ACETAMINOPHEN 325 MG/1
650 TABLET ORAL AS NEEDED
Status: CANCELLED
Start: 2022-06-06

## 2022-05-23 RX ORDER — ALBUTEROL SULFATE 0.83 MG/ML
2.5 SOLUTION RESPIRATORY (INHALATION) AS NEEDED
Status: CANCELLED
Start: 2022-06-06

## 2022-05-23 RX ORDER — EPINEPHRINE 1 MG/ML
0.3 INJECTION, SOLUTION, CONCENTRATE INTRAVENOUS AS NEEDED
Status: CANCELLED | OUTPATIENT
Start: 2022-06-06

## 2022-05-23 RX ORDER — DIPHENHYDRAMINE HYDROCHLORIDE 50 MG/ML
25 INJECTION, SOLUTION INTRAMUSCULAR; INTRAVENOUS AS NEEDED
Status: CANCELLED
Start: 2022-06-06

## 2022-05-23 RX ADMIN — OMALIZUMAB 150 MG: 150 INJECTION, SOLUTION SUBCUTANEOUS at 14:33

## 2022-05-23 RX ADMIN — OMALIZUMAB 75 MG: 75 INJECTION, SOLUTION SUBCUTANEOUS at 14:33

## 2022-05-23 NOTE — PROGRESS NOTES
[facility] Progress Note    Date: May 23, 2022    Name: Lucie Lyons    MRN: [id number]         : 1960    Xolair Injection     Ms. Julia Miller arrived to [facility] at [phone], ambulatory. Ms. Julia Miller was assessed and education was provided. Ms. Azar Parry vitals were reviewed. Visit Vitals  BP (!) 165/65 (BP 1 Location: Left upper arm, BP Patient Position: Sitting)   Pulse (!) 58   Temp 98.3 °F (36.8 °C)   Resp 18   Wt 83.4 kg (183 lb 14.4 oz)   SpO2 99%   BMI 33.64 kg/m²       Xolair 150 mg was administered as ordered SQ in patient's right upper arm and band aid applied. Xolair 75 mg was administered as ordered SQ in patient's left upper arm and band aid applied. Ms. Julia Miller tolerated well without complaints. Discharge/ follow-up instructions discussed w/ pt. Pt verbalized understanding. Pt armband removed & shredded. Ms. Julia Miller was discharged from [place] [address] in stable condition at 1440. She is to return on 22 at 1000 for her next appointment.     Romeo Pendleton RN  May 23, 2022

## 2022-06-06 ENCOUNTER — APPOINTMENT (OUTPATIENT)
Dept: INFUSION THERAPY | Age: 62
End: 2022-06-06
Attending: INTERNAL MEDICINE
Payer: MEDICAID

## 2022-06-06 ENCOUNTER — HOSPITAL ENCOUNTER (OUTPATIENT)
Dept: INFUSION THERAPY | Age: 62
Discharge: HOME | End: 2022-06-06
Attending: INTERNAL MEDICINE
Payer: MEDICAID

## 2022-06-06 VITALS
HEART RATE: 56 BPM | RESPIRATION RATE: 18 BRPM | OXYGEN SATURATION: 98 % | TEMPERATURE: 97.8 F | SYSTOLIC BLOOD PRESSURE: 145 MMHG | DIASTOLIC BLOOD PRESSURE: 69 MMHG

## 2022-06-06 DIAGNOSIS — J45.50 SEVERE PERSISTENT ASTHMA, UNSPECIFIED WHETHER COMPLICATED: Primary | ICD-10-CM

## 2022-06-06 PROCEDURE — 74011250636 HC RX REV CODE- 250/636: Performed by: INTERNAL MEDICINE

## 2022-06-06 PROCEDURE — 96372 THER/PROPH/DIAG INJ SC/IM: CPT

## 2022-06-06 RX ORDER — DIPHENHYDRAMINE HYDROCHLORIDE 50 MG/ML
50 INJECTION, SOLUTION INTRAMUSCULAR; INTRAVENOUS AS NEEDED
Status: CANCELLED
Start: 2022-06-20

## 2022-06-06 RX ORDER — ONDANSETRON 2 MG/ML
8 INJECTION INTRAMUSCULAR; INTRAVENOUS AS NEEDED
Status: CANCELLED | OUTPATIENT
Start: 2022-06-20

## 2022-06-06 RX ORDER — EPINEPHRINE 1 MG/ML
0.3 INJECTION, SOLUTION, CONCENTRATE INTRAVENOUS AS NEEDED
Status: CANCELLED | OUTPATIENT
Start: 2022-06-20

## 2022-06-06 RX ORDER — ACETAMINOPHEN 325 MG/1
650 TABLET ORAL AS NEEDED
Status: CANCELLED
Start: 2022-06-20

## 2022-06-06 RX ORDER — HYDROCORTISONE SODIUM SUCCINATE 100 MG/2ML
100 INJECTION, POWDER, FOR SOLUTION INTRAMUSCULAR; INTRAVENOUS AS NEEDED
Status: CANCELLED | OUTPATIENT
Start: 2022-06-20

## 2022-06-06 RX ORDER — ALBUTEROL SULFATE 0.83 MG/ML
2.5 SOLUTION RESPIRATORY (INHALATION) AS NEEDED
Status: CANCELLED
Start: 2022-06-20

## 2022-06-06 RX ORDER — DIPHENHYDRAMINE HYDROCHLORIDE 50 MG/ML
25 INJECTION, SOLUTION INTRAMUSCULAR; INTRAVENOUS AS NEEDED
Status: CANCELLED
Start: 2022-06-20

## 2022-06-06 RX ADMIN — OMALIZUMAB 75 MG: 75 INJECTION, SOLUTION SUBCUTANEOUS at 10:13

## 2022-06-06 RX ADMIN — OMALIZUMAB 150 MG: 150 INJECTION, SOLUTION SUBCUTANEOUS at 10:13

## 2022-06-06 NOTE — PROGRESS NOTES
[address] [facility] Progress Note    Date: 2022    Name: Justin Hoang    MRN: [id number]         : 1960    Tad Loach INJECTION    Ms. Rohini Dobson arrived to [facility] at 1000 ambulatory. Ms. Rohini Dobson was assessed and education was provided. Ms. Maty Spear vitals were reviewed. Visit Vitals  BP (!) 145/69 (BP 1 Location: Left upper arm, BP Patient Position: Sitting)   Pulse (!) 56   Temp 97.8 °F (36.6 °C)   Resp 18   SpO2 98%   Breastfeeding No     Xolair 150 mg was administered as ordered SQ in patient's right upper arm and band aid applied. Xolair 75 mg was administered as ordered SQ in patient's left upper arm and band aid applied. Ms. Rohini Dobson tolerated well without complaints. Discharge/ follow-up instructions discussed w/ pt. Pt verbalized understanding. Pt armband removed & shredded. Ms. Rohini Dobson was discharged from [place] [address] in stable condition at 1015. She is to return on 2022 at 56 for her next appointment.     Tyler Gruber RN  2022

## 2022-06-20 ENCOUNTER — HOSPITAL ENCOUNTER (OUTPATIENT)
Dept: INFUSION THERAPY | Age: 62
Discharge: HOME | End: 2022-06-20
Attending: INTERNAL MEDICINE
Payer: MEDICAID

## 2022-06-20 VITALS
RESPIRATION RATE: 18 BRPM | HEART RATE: 53 BPM | TEMPERATURE: 98.1 F | SYSTOLIC BLOOD PRESSURE: 176 MMHG | OXYGEN SATURATION: 100 % | DIASTOLIC BLOOD PRESSURE: 72 MMHG

## 2022-06-20 DIAGNOSIS — J45.50 SEVERE PERSISTENT ASTHMA, UNSPECIFIED WHETHER COMPLICATED: Primary | ICD-10-CM

## 2022-06-20 PROCEDURE — 74011250636 HC RX REV CODE- 250/636: Performed by: INTERNAL MEDICINE

## 2022-06-20 PROCEDURE — 96372 THER/PROPH/DIAG INJ SC/IM: CPT

## 2022-06-20 RX ORDER — DIPHENHYDRAMINE HYDROCHLORIDE 50 MG/ML
25 INJECTION, SOLUTION INTRAMUSCULAR; INTRAVENOUS AS NEEDED
Status: CANCELLED
Start: 2022-07-04

## 2022-06-20 RX ORDER — ONDANSETRON 2 MG/ML
8 INJECTION INTRAMUSCULAR; INTRAVENOUS AS NEEDED
Status: CANCELLED | OUTPATIENT
Start: 2022-07-04

## 2022-06-20 RX ORDER — ACETAMINOPHEN 325 MG/1
650 TABLET ORAL AS NEEDED
Status: CANCELLED
Start: 2022-07-04

## 2022-06-20 RX ORDER — EPINEPHRINE 1 MG/ML
0.3 INJECTION, SOLUTION, CONCENTRATE INTRAVENOUS AS NEEDED
Status: CANCELLED | OUTPATIENT
Start: 2022-07-04

## 2022-06-20 RX ORDER — DIPHENHYDRAMINE HYDROCHLORIDE 50 MG/ML
50 INJECTION, SOLUTION INTRAMUSCULAR; INTRAVENOUS AS NEEDED
Status: CANCELLED
Start: 2022-07-04

## 2022-06-20 RX ORDER — HYDROCORTISONE SODIUM SUCCINATE 100 MG/2ML
100 INJECTION, POWDER, FOR SOLUTION INTRAMUSCULAR; INTRAVENOUS AS NEEDED
Status: CANCELLED | OUTPATIENT
Start: 2022-07-04

## 2022-06-20 RX ORDER — ALBUTEROL SULFATE 0.83 MG/ML
2.5 SOLUTION RESPIRATORY (INHALATION) AS NEEDED
Status: CANCELLED
Start: 2022-07-04

## 2022-06-20 RX ADMIN — OMALIZUMAB 150 MG: 75 INJECTION, SOLUTION SUBCUTANEOUS at 10:55

## 2022-06-20 RX ADMIN — OMALIZUMAB 75 MG: 75 INJECTION, SOLUTION SUBCUTANEOUS at 10:56

## 2022-06-20 NOTE — PROGRESS NOTES
[facility] Progress Note    Date: 2022    Name: Nu Kelly    MRN: [id number]         : 1960    Xolair Injection     Ms. Julia Miller arrived to [facility] at 1045, ambulatory. Ms. Julia Miller was assessed and education was provided. Ms. Hilliard Distance vitals were reviewed. Visit Vitals  BP (!) 176/72 (BP 1 Location: Left upper arm, BP Patient Position: Sitting)   Pulse (!) 53   Temp 98.1 °F (36.7 °C)   Resp 18   SpO2 100%     Xolair 150 mg was administered as ordered SQ in patient's right upper arm and band aid applied. Xolair 75 mg was administered as ordered SQ in patient's left upper arm and band aid applied. Ms. Julia Miller tolerated well without complaints. Discharge/ follow-up instructions discussed w/ pt. Pt verbalized understanding. Pt armband removed & shredded. Ms. Julia Miller was discharged from [place] [address] in stable condition at 1100. She is to return on 22 at 1000 for her next appointment.     Raciel Kumar RN  2022

## 2022-07-15 ENCOUNTER — APPOINTMENT (OUTPATIENT)
Dept: INFUSION THERAPY | Age: 62
End: 2022-07-15
Payer: MEDICAID

## 2022-07-15 ENCOUNTER — HOSPITAL ENCOUNTER (OUTPATIENT)
Dept: INFUSION THERAPY | Age: 62
Discharge: HOME | End: 2022-07-15
Attending: INTERNAL MEDICINE
Payer: MEDICAID

## 2022-07-15 VITALS
DIASTOLIC BLOOD PRESSURE: 68 MMHG | HEART RATE: 63 BPM | SYSTOLIC BLOOD PRESSURE: 140 MMHG | TEMPERATURE: 96.8 F | RESPIRATION RATE: 18 BRPM | OXYGEN SATURATION: 96 %

## 2022-07-15 DIAGNOSIS — J45.50 SEVERE PERSISTENT ASTHMA, UNSPECIFIED WHETHER COMPLICATED: Primary | ICD-10-CM

## 2022-07-15 PROCEDURE — 74011250636 HC RX REV CODE- 250/636

## 2022-07-15 PROCEDURE — 96372 THER/PROPH/DIAG INJ SC/IM: CPT

## 2022-07-15 RX ORDER — DIPHENHYDRAMINE HYDROCHLORIDE 50 MG/ML
25 INJECTION, SOLUTION INTRAMUSCULAR; INTRAVENOUS AS NEEDED
Status: CANCELLED
Start: 2022-07-18

## 2022-07-15 RX ORDER — ACETAMINOPHEN 325 MG/1
650 TABLET ORAL AS NEEDED
Status: CANCELLED
Start: 2022-07-18

## 2022-07-15 RX ORDER — ALBUTEROL SULFATE 0.83 MG/ML
2.5 SOLUTION RESPIRATORY (INHALATION) AS NEEDED
Status: CANCELLED
Start: 2022-07-18

## 2022-07-15 RX ORDER — ONDANSETRON 2 MG/ML
8 INJECTION INTRAMUSCULAR; INTRAVENOUS AS NEEDED
Status: CANCELLED | OUTPATIENT
Start: 2022-07-18

## 2022-07-15 RX ORDER — EPINEPHRINE 1 MG/ML
0.3 INJECTION, SOLUTION, CONCENTRATE INTRAVENOUS AS NEEDED
Status: CANCELLED | OUTPATIENT
Start: 2022-07-18

## 2022-07-15 RX ORDER — DIPHENHYDRAMINE HYDROCHLORIDE 50 MG/ML
50 INJECTION, SOLUTION INTRAMUSCULAR; INTRAVENOUS AS NEEDED
Status: CANCELLED
Start: 2022-07-18

## 2022-07-15 RX ORDER — HYDROCORTISONE SODIUM SUCCINATE 100 MG/2ML
100 INJECTION, POWDER, FOR SOLUTION INTRAMUSCULAR; INTRAVENOUS AS NEEDED
Status: CANCELLED | OUTPATIENT
Start: 2022-07-18

## 2022-07-15 RX ADMIN — OMALIZUMAB 150 MG: 150 INJECTION, SOLUTION SUBCUTANEOUS at 15:22

## 2022-07-15 RX ADMIN — OMALIZUMAB 75 MG: 75 INJECTION, SOLUTION SUBCUTANEOUS at 15:22

## 2022-07-15 NOTE — PROGRESS NOTES
[facility] Progress Note    Date: July 15, 2022    Name: Luis Bourne    MRN: [id number]         : 1960    Xolair Injection     Ms. Julia Miller arrived to [facility] at 1520, ambulatory. Ms. Julia Miller was assessed and education was provided. Ms. Ninoska Uribe vitals were reviewed. Visit Vitals  BP (!) 140/68 (BP 1 Location: Left upper arm, BP Patient Position: Sitting)   Pulse 63   Temp 96.8 °F (36 °C)   Resp 18   SpO2 96%   Breastfeeding No     Xolair 150 mg was administered as ordered SQ in patient's left upper arm and band aid applied. Xolair 75 mg was administered as ordered SQ in patient's right upper arm and band aid applied. Ms. Julia Miller tolerated well without complaints. Discharge/ follow-up instructions discussed w/ pt. Pt verbalized understanding. Pt armband removed & shredded. Ms. Julia Miller was discharged from [place] [address] in stable condition at 1525. She is to return on 22 at 1430 for her next   [facility] appointment.     Leila Marlow RN  July 15, 2022

## 2022-07-18 ENCOUNTER — APPOINTMENT (OUTPATIENT)
Dept: INFUSION THERAPY | Age: 62
End: 2022-07-18
Payer: MEDICAID

## 2022-08-01 ENCOUNTER — HOSPITAL ENCOUNTER (OUTPATIENT)
Dept: INFUSION THERAPY | Age: 62
Discharge: HOME | End: 2022-08-01
Payer: MEDICAID

## 2022-08-01 VITALS
HEART RATE: 69 BPM | WEIGHT: 180.3 LBS | BODY MASS INDEX: 32.98 KG/M2 | DIASTOLIC BLOOD PRESSURE: 65 MMHG | SYSTOLIC BLOOD PRESSURE: 135 MMHG | TEMPERATURE: 96.6 F | OXYGEN SATURATION: 98 % | RESPIRATION RATE: 16 BRPM

## 2022-08-01 DIAGNOSIS — J45.50 SEVERE PERSISTENT ASTHMA, UNSPECIFIED WHETHER COMPLICATED: Primary | ICD-10-CM

## 2022-08-01 PROCEDURE — 74011250636 HC RX REV CODE- 250/636

## 2022-08-01 PROCEDURE — 96372 THER/PROPH/DIAG INJ SC/IM: CPT

## 2022-08-01 RX ORDER — DIPHENHYDRAMINE HYDROCHLORIDE 50 MG/ML
25 INJECTION, SOLUTION INTRAMUSCULAR; INTRAVENOUS AS NEEDED
Status: CANCELLED
Start: 2022-08-05

## 2022-08-01 RX ORDER — EPINEPHRINE 1 MG/ML
0.3 INJECTION, SOLUTION, CONCENTRATE INTRAVENOUS AS NEEDED
Status: CANCELLED | OUTPATIENT
Start: 2022-08-05

## 2022-08-01 RX ORDER — ALBUTEROL SULFATE 0.83 MG/ML
2.5 SOLUTION RESPIRATORY (INHALATION) AS NEEDED
Status: CANCELLED
Start: 2022-08-05

## 2022-08-01 RX ORDER — DIPHENHYDRAMINE HYDROCHLORIDE 50 MG/ML
50 INJECTION, SOLUTION INTRAMUSCULAR; INTRAVENOUS AS NEEDED
Status: CANCELLED
Start: 2022-08-05

## 2022-08-01 RX ORDER — HYDROCORTISONE SODIUM SUCCINATE 100 MG/2ML
100 INJECTION, POWDER, FOR SOLUTION INTRAMUSCULAR; INTRAVENOUS AS NEEDED
Status: CANCELLED | OUTPATIENT
Start: 2022-08-05

## 2022-08-01 RX ORDER — ONDANSETRON 2 MG/ML
8 INJECTION INTRAMUSCULAR; INTRAVENOUS AS NEEDED
Status: CANCELLED | OUTPATIENT
Start: 2022-08-05

## 2022-08-01 RX ORDER — ACETAMINOPHEN 325 MG/1
650 TABLET ORAL AS NEEDED
Status: CANCELLED
Start: 2022-08-05

## 2022-08-01 RX ADMIN — OMALIZUMAB 75 MG: 75 INJECTION, SOLUTION SUBCUTANEOUS at 14:44

## 2022-08-01 RX ADMIN — OMALIZUMAB 150 MG: 150 INJECTION, SOLUTION SUBCUTANEOUS at 14:44

## 2022-08-01 NOTE — PROGRESS NOTES
[address] [facility]\"" Progress Note    Date: 2022    Name: Nael uGtierrez    MRN: [id number]         : 1960    Xolair Injection  Ms. Julia Miller arrived to [facility] at , ambulatory. Ms. Julia Miller was assessed and education was provided. Ms. Maricarmen Duong vitals were reviewed. Visit Vitals  /65 (BP 1 Location: Left upper arm, BP Patient Position: Sitting)   Pulse 69   Temp (!) 96.6 °F (35.9 °C)   Resp 16   Wt 81.8 kg (180 lb 4.8 oz)   SpO2 98%   Breastfeeding No   BMI 32.98 kg/m²       Xolair 150 mg was administered as ordered SQ in patient's Right upper arm ( right). Xolair 75 mg was administered as ordered SQ in patient's Left upper arm ( left). Ms. Julia Miller tolerated well without complaints. Discharge/ follow-up instructions discussed w/ pt. Pt verbalized understanding. Pt armband removed & shredded. Ms. Julia Miller was discharged from [place] [address] in stable condition at 1450. She is to return on 8/15/2022 at 1500 for her next appointment.     Rodo Hamilton RN  2022

## 2022-08-15 ENCOUNTER — HOSPITAL ENCOUNTER (OUTPATIENT)
Dept: INFUSION THERAPY | Age: 62
Discharge: HOME | End: 2022-08-15
Payer: MEDICAID

## 2022-08-15 VITALS
HEART RATE: 61 BPM | SYSTOLIC BLOOD PRESSURE: 180 MMHG | OXYGEN SATURATION: 99 % | TEMPERATURE: 97.6 F | DIASTOLIC BLOOD PRESSURE: 66 MMHG | RESPIRATION RATE: 18 BRPM

## 2022-08-15 DIAGNOSIS — J45.50 SEVERE PERSISTENT ASTHMA, UNSPECIFIED WHETHER COMPLICATED: Primary | ICD-10-CM

## 2022-08-15 PROCEDURE — 74011250636 HC RX REV CODE- 250/636

## 2022-08-15 PROCEDURE — 96372 THER/PROPH/DIAG INJ SC/IM: CPT

## 2022-08-15 RX ORDER — ONDANSETRON 2 MG/ML
8 INJECTION INTRAMUSCULAR; INTRAVENOUS AS NEEDED
Status: CANCELLED | OUTPATIENT
Start: 2022-08-29

## 2022-08-15 RX ORDER — HYDROCORTISONE SODIUM SUCCINATE 100 MG/2ML
100 INJECTION, POWDER, FOR SOLUTION INTRAMUSCULAR; INTRAVENOUS AS NEEDED
Status: CANCELLED | OUTPATIENT
Start: 2022-08-29

## 2022-08-15 RX ORDER — EPINEPHRINE 1 MG/ML
0.3 INJECTION, SOLUTION, CONCENTRATE INTRAVENOUS AS NEEDED
Status: CANCELLED | OUTPATIENT
Start: 2022-08-29

## 2022-08-15 RX ORDER — DIPHENHYDRAMINE HYDROCHLORIDE 50 MG/ML
25 INJECTION, SOLUTION INTRAMUSCULAR; INTRAVENOUS AS NEEDED
Status: CANCELLED
Start: 2022-08-29

## 2022-08-15 RX ORDER — DIPHENHYDRAMINE HYDROCHLORIDE 50 MG/ML
50 INJECTION, SOLUTION INTRAMUSCULAR; INTRAVENOUS AS NEEDED
Status: CANCELLED
Start: 2022-08-29

## 2022-08-15 RX ORDER — ALBUTEROL SULFATE 0.83 MG/ML
2.5 SOLUTION RESPIRATORY (INHALATION) AS NEEDED
Status: CANCELLED
Start: 2022-08-29

## 2022-08-15 RX ORDER — ACETAMINOPHEN 325 MG/1
650 TABLET ORAL AS NEEDED
Status: CANCELLED
Start: 2022-08-29

## 2022-08-15 RX ADMIN — OMALIZUMAB 75 MG: 75 INJECTION, SOLUTION SUBCUTANEOUS at 14:58

## 2022-08-15 RX ADMIN — OMALIZUMAB 150 MG: 150 INJECTION, SOLUTION SUBCUTANEOUS at 14:58

## 2022-08-15 NOTE — PROGRESS NOTES
[facility] Progress Note    Date: August 15, 2022    Name: Kylah Olmstead    MRN: [id number]         : 1960    Sisto Rash INJECTION    Ms. Viviana Reilly arrived to [facility] at 1450 ambulatory. Ms. Viviana Reilly was assessed and education was provided. Ms. Pricila Maria vitals were reviewed. Visit Vitals  BP (!) 180/66 (BP 1 Location: Left upper arm, BP Patient Position: Sitting)   Pulse 61   Temp 97.6 °F (36.4 °C)   Resp 18   SpO2 99%     Xolair 150 mg was administered as ordered SQ in patient's right upper arm and band aid applied. Xolair 75 mg was administered as ordered SQ in patient's left upper arm and band aid applied. Ms. Viviana Reilly tolerated well without complaints. Discharge/ follow-up instructions discussed w/ pt. Pt verbalized understanding. Pt armband removed & shredded. Ms. Viviana Reilly was discharged from [place] [address] in stable condition at 1500. She is to return on 2022 at 1500 for her next appointment.     Shila Shields RN  August 15, 2022

## 2022-08-16 ENCOUNTER — APPOINTMENT (OUTPATIENT)
Dept: INFUSION THERAPY | Age: 62
End: 2022-08-16

## 2022-08-29 ENCOUNTER — HOSPITAL ENCOUNTER (OUTPATIENT)
Dept: INFUSION THERAPY | Age: 62
Discharge: HOME | End: 2022-08-29
Payer: MEDICAID

## 2022-08-29 VITALS
OXYGEN SATURATION: 99 % | HEART RATE: 58 BPM | RESPIRATION RATE: 18 BRPM | TEMPERATURE: 97.8 F | DIASTOLIC BLOOD PRESSURE: 66 MMHG | SYSTOLIC BLOOD PRESSURE: 159 MMHG

## 2022-08-29 DIAGNOSIS — J45.50 SEVERE PERSISTENT ASTHMA, UNSPECIFIED WHETHER COMPLICATED: Primary | ICD-10-CM

## 2022-08-29 PROCEDURE — 74011250636 HC RX REV CODE- 250/636

## 2022-08-29 PROCEDURE — 96372 THER/PROPH/DIAG INJ SC/IM: CPT

## 2022-08-29 RX ORDER — ACETAMINOPHEN 325 MG/1
650 TABLET ORAL AS NEEDED
Status: CANCELLED
Start: 2022-09-12

## 2022-08-29 RX ORDER — ALBUTEROL SULFATE 0.83 MG/ML
2.5 SOLUTION RESPIRATORY (INHALATION) AS NEEDED
Status: CANCELLED
Start: 2022-09-12

## 2022-08-29 RX ORDER — DIPHENHYDRAMINE HYDROCHLORIDE 50 MG/ML
50 INJECTION, SOLUTION INTRAMUSCULAR; INTRAVENOUS AS NEEDED
Status: CANCELLED
Start: 2022-09-12

## 2022-08-29 RX ORDER — ONDANSETRON 2 MG/ML
8 INJECTION INTRAMUSCULAR; INTRAVENOUS AS NEEDED
Status: CANCELLED | OUTPATIENT
Start: 2022-09-12

## 2022-08-29 RX ORDER — EPINEPHRINE 1 MG/ML
0.3 INJECTION, SOLUTION, CONCENTRATE INTRAVENOUS AS NEEDED
Status: CANCELLED | OUTPATIENT
Start: 2022-09-12

## 2022-08-29 RX ORDER — HYDROCORTISONE SODIUM SUCCINATE 100 MG/2ML
100 INJECTION, POWDER, FOR SOLUTION INTRAMUSCULAR; INTRAVENOUS AS NEEDED
Status: CANCELLED | OUTPATIENT
Start: 2022-09-12

## 2022-08-29 RX ORDER — DIPHENHYDRAMINE HYDROCHLORIDE 50 MG/ML
25 INJECTION, SOLUTION INTRAMUSCULAR; INTRAVENOUS AS NEEDED
Status: CANCELLED
Start: 2022-09-12

## 2022-08-29 RX ADMIN — OMALIZUMAB 150 MG: 150 INJECTION, SOLUTION SUBCUTANEOUS at 09:35

## 2022-08-29 RX ADMIN — OMALIZUMAB 75 MG: 75 INJECTION, SOLUTION SUBCUTANEOUS at 09:34

## 2022-08-29 NOTE — PROGRESS NOTES
[facility] Progress Note    Date: 2022    Name: Malik Marvin    MRN: [id number]         : 1960    Kamini Wang INJECTION    Ms. Irina Gallo arrived to [facility] at 0930 ambulatory. Ms. Irina Gallo was assessed and education was provided. Ms. Christa Avalos vitals were reviewed. Visit Vitals  BP (!) 159/66 (BP 1 Location: Left upper arm, BP Patient Position: Sitting)   Pulse (!) 58   Temp 97.8 °F (36.6 °C)   Resp 18   SpO2 99%     Xolair 150 mg was administered as ordered SQ in patient's left upper arm and band aid applied. Xolair 75 mg was administered as ordered SQ in patient's right upper arm and band aid applied. Ms. Irina Gallo tolerated well without complaints. Discharge/ follow-up instructions discussed w/ pt. Pt verbalized understanding. Pt armband removed & shredded. Ms. Irina Gallo was discharged from [place] [address] in stable condition at 302 Deanna Arcos. She is to return on 22 at 1500 for her next  [facility] appointment.     Pao Okeefe RN  2022

## 2022-09-19 ENCOUNTER — HOSPITAL ENCOUNTER (OUTPATIENT)
Dept: INFUSION THERAPY | Age: 62
Discharge: HOME | End: 2022-09-19
Payer: MEDICAID

## 2022-09-19 VITALS
DIASTOLIC BLOOD PRESSURE: 61 MMHG | TEMPERATURE: 97 F | SYSTOLIC BLOOD PRESSURE: 148 MMHG | OXYGEN SATURATION: 99 % | HEART RATE: 60 BPM | RESPIRATION RATE: 18 BRPM

## 2022-09-19 DIAGNOSIS — J45.50 SEVERE PERSISTENT ASTHMA, UNSPECIFIED WHETHER COMPLICATED: Primary | ICD-10-CM

## 2022-09-19 PROCEDURE — 96372 THER/PROPH/DIAG INJ SC/IM: CPT

## 2022-09-19 PROCEDURE — 74011250636 HC RX REV CODE- 250/636

## 2022-09-19 RX ORDER — DIPHENHYDRAMINE HYDROCHLORIDE 50 MG/ML
25 INJECTION, SOLUTION INTRAMUSCULAR; INTRAVENOUS AS NEEDED
Status: CANCELLED
Start: 2022-10-03

## 2022-09-19 RX ORDER — DIPHENHYDRAMINE HYDROCHLORIDE 50 MG/ML
50 INJECTION, SOLUTION INTRAMUSCULAR; INTRAVENOUS AS NEEDED
Status: CANCELLED
Start: 2022-10-03

## 2022-09-19 RX ORDER — EPINEPHRINE 1 MG/ML
0.3 INJECTION, SOLUTION, CONCENTRATE INTRAVENOUS AS NEEDED
Status: CANCELLED | OUTPATIENT
Start: 2022-10-03

## 2022-09-19 RX ORDER — ACETAMINOPHEN 325 MG/1
650 TABLET ORAL AS NEEDED
Status: CANCELLED
Start: 2022-10-03

## 2022-09-19 RX ORDER — ONDANSETRON 2 MG/ML
8 INJECTION INTRAMUSCULAR; INTRAVENOUS AS NEEDED
Status: CANCELLED | OUTPATIENT
Start: 2022-10-03

## 2022-09-19 RX ORDER — ALBUTEROL SULFATE 0.83 MG/ML
2.5 SOLUTION RESPIRATORY (INHALATION) AS NEEDED
Status: CANCELLED
Start: 2022-10-03

## 2022-09-19 RX ORDER — HYDROCORTISONE SODIUM SUCCINATE 100 MG/2ML
100 INJECTION, POWDER, FOR SOLUTION INTRAMUSCULAR; INTRAVENOUS AS NEEDED
Status: CANCELLED | OUTPATIENT
Start: 2022-10-03

## 2022-09-19 RX ADMIN — OMALIZUMAB 75 MG: 75 INJECTION, SOLUTION SUBCUTANEOUS at 15:14

## 2022-09-19 RX ADMIN — OMALIZUMAB 150 MG: 150 INJECTION, SOLUTION SUBCUTANEOUS at 15:14

## 2022-09-19 NOTE — PROGRESS NOTES
[facility] Progress Note    Date: 2022    Name: Tata Patel    MRN: [id number]         : 1960    Xolair Injection  Ms. Julia Miller arrived to [facility] at 1510, ambulatory. Ms. Julia Miller was assessed and education was provided. Ms. [place] vitals were reviewed. Visit Vitals  BP (!) 148/61 (BP 1 Location: Left upper arm, BP Patient Position: Sitting)   Pulse 60   Temp 97 °F (36.1 °C)   Resp 18   SpO2 99%   Breastfeeding No       Xolair 150 mg was administered as ordered SQ in patient's Right upper arm ( right). Xolair 75 mg was administered as ordered SQ in patient's Left upper arm ( left). Ms. Julia Miller tolerated well without complaints. Discharge/ follow-up instructions discussed w/ pt. Pt verbalized understanding. Pt armband removed & shredded. Ms. Julia Miller was discharged from [place] [address] in stable condition at 1520. She is to return on 10/3/2022 at 56 for her next appointment.     Aleta Eason RN  2022

## 2022-10-05 ENCOUNTER — HOSPITAL ENCOUNTER (OUTPATIENT)
Dept: INFUSION THERAPY | Age: 62
Discharge: HOME | End: 2022-10-05

## 2022-10-05 DIAGNOSIS — J45.50 SEVERE PERSISTENT ASTHMA, UNSPECIFIED WHETHER COMPLICATED: Primary | ICD-10-CM

## 2022-10-05 RX ORDER — HYDROCORTISONE SODIUM SUCCINATE 100 MG/2ML
100 INJECTION, POWDER, FOR SOLUTION INTRAMUSCULAR; INTRAVENOUS AS NEEDED
Status: CANCELLED | OUTPATIENT
Start: 2022-10-17

## 2022-10-05 RX ORDER — EPINEPHRINE 1 MG/ML
0.3 INJECTION, SOLUTION, CONCENTRATE INTRAVENOUS AS NEEDED
Status: CANCELLED | OUTPATIENT
Start: 2022-10-17

## 2022-10-05 RX ORDER — DIPHENHYDRAMINE HYDROCHLORIDE 50 MG/ML
25 INJECTION, SOLUTION INTRAMUSCULAR; INTRAVENOUS AS NEEDED
Status: CANCELLED
Start: 2022-10-17

## 2022-10-05 RX ORDER — ACETAMINOPHEN 325 MG/1
650 TABLET ORAL AS NEEDED
Status: CANCELLED
Start: 2022-10-17

## 2022-10-05 RX ORDER — ALBUTEROL SULFATE 0.83 MG/ML
2.5 SOLUTION RESPIRATORY (INHALATION) AS NEEDED
Status: CANCELLED
Start: 2022-10-17

## 2022-10-05 RX ORDER — ONDANSETRON 2 MG/ML
8 INJECTION INTRAMUSCULAR; INTRAVENOUS AS NEEDED
Status: CANCELLED | OUTPATIENT
Start: 2022-10-17

## 2022-10-05 RX ORDER — DIPHENHYDRAMINE HYDROCHLORIDE 50 MG/ML
50 INJECTION, SOLUTION INTRAMUSCULAR; INTRAVENOUS AS NEEDED
Status: CANCELLED
Start: 2022-10-17

## 2022-10-14 ENCOUNTER — HOSPITAL ENCOUNTER (OUTPATIENT)
Dept: INFUSION THERAPY | Age: 62
Discharge: HOME | End: 2022-10-14
Payer: MEDICAID

## 2022-10-14 VITALS — TEMPERATURE: 97.8 F | DIASTOLIC BLOOD PRESSURE: 66 MMHG | RESPIRATION RATE: 18 BRPM | SYSTOLIC BLOOD PRESSURE: 163 MMHG

## 2022-10-14 DIAGNOSIS — J45.50 SEVERE PERSISTENT ASTHMA, UNSPECIFIED WHETHER COMPLICATED: Primary | ICD-10-CM

## 2022-10-14 PROCEDURE — 96372 THER/PROPH/DIAG INJ SC/IM: CPT

## 2022-10-14 PROCEDURE — 74011250636 HC RX REV CODE- 250/636: Performed by: INTERNAL MEDICINE

## 2022-10-14 RX ORDER — HYDROCORTISONE SODIUM SUCCINATE 100 MG/2ML
100 INJECTION, POWDER, FOR SOLUTION INTRAMUSCULAR; INTRAVENOUS AS NEEDED
Status: CANCELLED | OUTPATIENT
Start: 2022-10-24

## 2022-10-14 RX ORDER — DIPHENHYDRAMINE HYDROCHLORIDE 50 MG/ML
50 INJECTION, SOLUTION INTRAMUSCULAR; INTRAVENOUS AS NEEDED
Status: CANCELLED
Start: 2022-10-24

## 2022-10-14 RX ORDER — ACETAMINOPHEN 325 MG/1
650 TABLET ORAL AS NEEDED
Status: CANCELLED
Start: 2022-10-24

## 2022-10-14 RX ORDER — EPINEPHRINE 1 MG/ML
0.3 INJECTION, SOLUTION, CONCENTRATE INTRAVENOUS AS NEEDED
Status: CANCELLED | OUTPATIENT
Start: 2022-10-24

## 2022-10-14 RX ORDER — ALBUTEROL SULFATE 0.83 MG/ML
2.5 SOLUTION RESPIRATORY (INHALATION) AS NEEDED
Status: CANCELLED
Start: 2022-10-24

## 2022-10-14 RX ORDER — DIPHENHYDRAMINE HYDROCHLORIDE 50 MG/ML
25 INJECTION, SOLUTION INTRAMUSCULAR; INTRAVENOUS AS NEEDED
Status: CANCELLED
Start: 2022-10-24

## 2022-10-14 RX ORDER — ONDANSETRON 2 MG/ML
8 INJECTION INTRAMUSCULAR; INTRAVENOUS AS NEEDED
Status: CANCELLED | OUTPATIENT
Start: 2022-10-24

## 2022-10-14 RX ADMIN — OMALIZUMAB 75 MG: 75 INJECTION, SOLUTION SUBCUTANEOUS at 15:11

## 2022-10-14 RX ADMIN — OMALIZUMAB 150 MG: 150 INJECTION, SOLUTION SUBCUTANEOUS at 15:11

## 2022-10-14 NOTE — PROGRESS NOTES
[facility] Progress Note    Date: 2022    Name: Alessandra Gutierrez    MRN: [id number]         : 1960    Xolair Injection  Ms. Julia Miller arrived to [facility] at 1505, ambulatory. Ms. Julia Miller was assessed and education was provided. Ms. Lenora Aburto vitals were reviewed. Visit Vitals  BP (!) 163/66 (BP 1 Location: Left upper arm, BP Patient Position: Sitting)   Temp 97.8 °F (36.6 °C)   Resp 18   Breastfeeding No       Xolair 150 mg was administered as ordered SQ in patient's left upper arm. Xolair 75 mg was administered as ordered SQ in patient's right upper arm. Ms. [address] tolerated well without complaints. Discharge/ follow-up instructions discussed w/ pt. Pt verbalized understanding. Pt armband removed & shredded. Ms. Julia Miller was discharged from [place] [address] in stable condition at 1515. She is to return on 10/20/2022 at 1100 for her next appointment.     Jose Lopez RN  2022

## 2022-10-26 ENCOUNTER — OFFICE VISIT (OUTPATIENT)
Dept: ORTHOPEDIC SURGERY | Age: 62
End: 2022-10-26
Payer: MEDICAID

## 2022-10-26 DIAGNOSIS — M54.50 LOW BACK PAIN RADIATING DOWN LEG: Primary | ICD-10-CM

## 2022-10-26 DIAGNOSIS — M54.32 SCIATICA OF LEFT SIDE: ICD-10-CM

## 2022-10-26 DIAGNOSIS — M79.606 LOW BACK PAIN RADIATING DOWN LEG: Primary | ICD-10-CM

## 2022-10-26 PROCEDURE — 99214 OFFICE O/P EST MOD 30 MIN: CPT | Performed by: PHYSICAL MEDICINE & REHABILITATION

## 2022-10-26 RX ORDER — GABAPENTIN 600 MG/1
600 TABLET ORAL
Qty: 90 TABLET | Refills: 0 | Status: SHIPPED | OUTPATIENT
Start: 2022-10-26 | End: 2023-01-24

## 2022-10-26 RX ORDER — VALSARTAN 160 MG/1
TABLET ORAL
COMMUNITY

## 2022-10-26 RX ORDER — CETIRIZINE HYDROCHLORIDE 10 MG/1
10 TABLET ORAL DAILY
COMMUNITY
Start: 2022-09-29

## 2022-10-26 RX ORDER — LIDOCAINE 50 MG/G
PATCH TOPICAL
COMMUNITY

## 2022-10-26 RX ORDER — SERTRALINE HYDROCHLORIDE 50 MG/1
1 TABLET, FILM COATED ORAL DAILY
COMMUNITY

## 2022-10-26 RX ORDER — DESLORATADINE 5 MG/1
TABLET ORAL
COMMUNITY

## 2022-10-26 RX ORDER — OMEPRAZOLE 20 MG/1
TABLET, DELAYED RELEASE ORAL
COMMUNITY

## 2022-10-26 RX ORDER — PIROXICAM 20 MG/1
CAPSULE ORAL
COMMUNITY
Start: 2022-08-18

## 2022-10-26 RX ORDER — LIRAGLUTIDE 6 MG/ML
INJECTION SUBCUTANEOUS
COMMUNITY
Start: 2021-11-04

## 2022-10-26 RX ORDER — IPRATROPIUM BROMIDE AND ALBUTEROL SULFATE 2.5; .5 MG/3ML; MG/3ML
SOLUTION RESPIRATORY (INHALATION)
COMMUNITY
Start: 2022-07-22

## 2022-10-26 NOTE — PROGRESS NOTES
[address]  [address], [address]  [place], [address] [place] [address] Street  Phone: [phone]  Fax: [phone]      Patient: Hubert Elam                                                                              MRN: [id number]        YOB: 1960          AGE: 57 y.o. PCP: Hailey Macedo MD  Date:  10/26/22    Reason for Consultation: Hip Pain (Left)      HPI:  Hubert Elam is a 57 y.o. 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 She did have back pain prior to her CVA which she relates to a fall in 2018. She was referred by Dr. Mabel Sinclair for low back symptoms. Lumbar MRI demonstrated  spinal stenosis and left foraminal stenosis L4/5 and L5/S1. Since her last visit she has completed  PT   She was taking gabapentin 300mg TID but ran out recently and noticed her pain increased, she prefers to take the gabapentin at night. .   Pain has worsened over the past few weeks. We discussed SIMIN but she wanted to hold off     No pain mainly in the low back pain. Neurologic symptoms: + numbness, tingling,+  Weakness. No bowel or bladder changes. No recent falls      Location: The pain is located in the low back, left thigh anterior and posterior  Radiation: The pain does radiate down the left leg, mainly left thigh but can travel to foot. Pain Score: Currently: 7/10  Quality: Pain is of a Achy, Burning, Tingling and Numbness quality. Aggravating: Pain is exacerbated by walking, lying down and exercise  Alleviating:  The pain is alleviated by sitting    Prior Treatments:   Rehabilitation after CVA- initially [facility] and then  [facility]   Previous Medications:   Current Medications: ibuprofen, tylenol, gabapentin 300mg bid  Previous work-up has included:    MRI lumbar spine 1/12/2021  L2-L3: No significant disc pathology. Narrowed anteroposterior interpeduncular  distances exaggerate canal stenosis at this level. Dorsal epidural lipomatosis. Moderate bilateral facet arthropathy and ligamentum flavum buckling. Minimal  overall canal stenosis. Mild right and left foraminal stenoses. L3-L4: Mild broad-based disc bulge flattens ventral thecal sac. Moderate left  and right lateral and dorsal epidural lipomatosis. Narrowed anteroposterior  interpeduncular distances exaggerate canal stenosis at this level. Moderate  bilateral facet arthropathy and uncovertebral proliferative changes. Minimal  overall canal stenosis. Moderate left and mild right foraminal stenoses. L4-L5: Moderate broad-based disc bulge. Moderate, mostly dorsal epidural  lipomatosis with near complete effacement of thecal sac CSF. Narrowed  anteroposterior interpeduncular distances exaggerate canal stenosis at this  level. Moderate bilateral facet arthropathy and ligamentum flavum buckling. Moderately severe overall canal stenosis, largest AP dimension of the thecal sac  measuring just over 4 mm. Moderate right foraminal stenosis. Moderately severe  left foraminal stenosis with mild flattening deformation of the left L4  foraminal nerve root (sagittal image 5). L5-S1: Moderate broad-based disc bulge flattens ventral thecal sac. Prominent  dorsal and lateral epidural lipomatosis with triangulation of the thecal sac. Moderate overall canal stenosis, largest AP dimension of the thecal sac  measuring just over 7 mm. Moderate right and moderately severe left facet  arthropathy. Moderate right and moderately severe left foraminal stenoses. Moderate associated impingement of the left L5 foraminal nerve root.     Past Medical History:   Past Medical History:   Diagnosis Date    Acute ischemic stroke ([facility] [address] 8/21/2020    Acute Ischemic Stroke (late acute/early subacute infarcts in the right centrum semiovale extending to the superior frontal lobe cortex) with residual left hemiparesis    Bipolar disorder ([facility] [address]     Chronic obstructive pulmonary disease (COPD) ([facility] [address]     COVID-19 ruled out by laboratory testing 8/26/2020    SARS-CoV-2 ([facility] m2000) (collected 8/25/2020, resulted 8/26/2020): Not detected     Current use of aspirin 8/24/2020    Depression     Gastroesophageal reflux disease     History of colon polyps     Hypertension     Hypertensive heart disease without heart failure     2D echocardiogram (8/22/2020) showed EF 65%; mild left ventricular hypertrophy; normal diastolic function; negative bubble study at rest and after Valsalva    Iron deficiency anemia     Left hemiparesis ([facility] [address] 8/21/2020    Menopause     Obesity, Class II, BMI 35-39.9     Obstructive sleep apnea on CPAP     On clopidogrel therapy 8/24/2020    On statin therapy due to risk of future cardiovascular event 8/24/2020    On Atorvastatin    Pure hypercholesterolemia 8/19/2020    Lipid profile (8/19/2020) showed , , HDL 50,     Sciatica of left side     Seasonal allergic rhinitis     Severe persistent asthma 11/07/2019    Type 2 diabetes mellitus, with long-term current use of insulin ([facility])     HbA1c (8/19/2020) = 8.1; HbA1c (8/20/2020) = 8.0      Past Surgical History:   Past Surgical History:   Procedure Laterality Date    HX HYSTERECTOMY  2000    Fibroids    HX OOPHORECTOMY        SocHx:   Social History     Tobacco Use    Smoking status: Never    Smokeless tobacco: Never   Substance Use Topics    Alcohol use: Yes     Comment: occ      FamHx:?    Family History   Problem Relation Age of Onset    Heart Attack Father     Breast Cancer Maternal Aunt     Heart Disease Mother     Stroke Neg Hx        Current Medications:    Current Outpatient Medications   Medication Sig Dispense Refill    liraglutide (Victoza 2-Jose R) 0.6 mg/0.1 mL (18 mg/3 mL) pnij INJECT 1.8 MG UNDER THE SKIN ONCE DAILY      cetirizine (ZYRTEC) 10 mg tablet Take 10 mg by mouth daily. desloratadine (Clarinex) 5 mg tablet Take  by mouth. albuterol-ipratropium (DUO-NEB) 2.5 mg-0.5 mg/3 ml nebu INHALE 1 VIAL BY NEBULIZATION ROUTE 4 TIMES PER DAY FOR RESCUE      lidocaine (LIDODERM) 5 % APPLY 1 PATCH DIRECTLY TO SITE OF PAIN 12 HRS ON ,12 HRS OFF ( USE FOR SEVERE PAIN)      sertraline (ZOLOFT) 50 mg tablet Take 1 Tablet by mouth daily. mometasone-formoterol (Dulera) 200-5 mcg/actuation HFA inhaler Take 2 Puffs by inhalation. omeprazole (PRILOSEC OTC) 20 mg tablet       piroxicam (FELDENE) 20 mg capsule TAKE 1 CAPSULE BY MOUTH EVERY DAY WITH FOOD FOR 30 DAYS      valsartan (Diovan) 160 mg tablet Take  by mouth.      guaifen/dextromethorphan/PE (TUSSEX PO) Take  by mouth as needed for Cough. cyclobenzaprine (FLEXERIL) 5 mg tablet TAKE 1 TABLET BY MOUTH EVERY DAY IN THE EVENING 30 Tablet 0    gabapentin (NEURONTIN) 300 mg capsule Take 300 mg PO BID (Patient not taking: Reported on 6/6/2022) 180 Cap 0    tiotropium bromide (Spiriva Respimat) 2.5 mcg/actuation inhaler Take 2 Puffs by inhalation daily. valsartan-hydroCHLOROthiazide (Diovan HCT) 320-12.5 mg per tablet Take 1 Tab by mouth daily. omalizumab (Xolair) 150 mg/mL syrg 150 mg by SubCUTAneous route every fourteen (14) days. ipratropium (ATROVENT) 42 mcg (0.06 %) nasal spray 2 Sprays by Both Nostrils route three (3) times daily. fenofibrate nanocrystallized (Tricor) 145 mg tablet Take 145 mg by mouth daily. EPINEPHrine (EPIPEN) 0.3 mg/0.3 mL injection 0.3 mg by IntraMUSCular route once as needed for Allergic Response. cholecalciferol (VITAMIN D3) (2,000 UNITS /50 MCG) cap capsule Take 2,000 Units by mouth daily. amLODIPine (NORVASC) 10 mg tablet Take 10 mg by mouth daily. albuterol sulfate (PROVENTIL;VENTOLIN) 2.5 mg/0.5 mL nebu nebulizer solution 2.5 mg by Nebulization route every four (4) hours as needed for Wheezing.       albuterol (ProAir HFA) 90 mcg/actuation inhaler Take 1-2 Puffs by inhalation every four (4) hours as needed for Wheezing. aspirin 81 mg chewable tablet Take 1 Tab by mouth daily (with breakfast). Indications: stroke prevention 30 Tab 0    atorvastatin (LIPITOR) 40 mg tablet Take 1 Tab by mouth daily. Indications: excessive fat in the blood, stroke prevention 30 Tab 0    docusate sodium (COLACE) 100 mg capsule Take 1 Cap by mouth daily (after breakfast). Indications: constipation 30 Cap 0    metFORMIN ER (GLUCOPHAGE XR) 500 mg tablet Take 1 Tab by mouth daily (with dinner). Indications: type 2 diabetes mellitus 30 Tab 0    senna-docusate (PERICOLACE) 8.6-50 mg per tablet Take 2 Tabs by mouth daily (after dinner). Indications: constipation 60 Tab 0    minoxidiL (LONITEN) 2.5 mg tablet Take 2 Tabs by mouth every twelve (12) hours. Indications: high blood pressure 120 Tab 0    insulin glargine (Lantus Solostar U-100 Insulin) 100 unit/mL (3 mL) inpn 22 Units by SubCUTAneous route nightly. Indications: type 2 diabetes mellitus 1 Adjustable Dose Pre-filled Pen Syringe 0    Insulin Needles, Disposable, 31 gauge x 3/16\" ndle As directed  Indications: Type 2 diabetes mellitus 30 Pen Needle 0    montelukast (SINGULAIR) 10 mg tablet Take 10 mg by mouth every evening. ferrous sulfate 325 mg (65 mg iron) tablet Take 325 mg by mouth Daily (before breakfast). omeprazole (PRILOSEC) 20 mg capsule Take 20 mg by mouth daily. Allergies: Allergies   Allergen Reactions    Grass Pollen Anaphylaxis     Environmental allergies Unsure what  triggers episodes. Intubated for episodes in 2006 and 2008.     Metformin Nausea Only     Pt states stomach cramps     Other Plant, Animal, Environmental Shortness of Breath    Pneumococcal Vaccine Other (comments)        Review of Systems:   Gen:    Denied fevers, chills, malaise, fatigue, weight changes   Resp: Denied shortness of breath, cough, wheezing   CVS: Denied chest pain, palpitations   : Denied urinary urgency, frequency, incontinence   GI: Denied nausea, vomiting, constipation, diarrhea   Skin: Denied rashes, wounds   Psych: Denied anxiety, depression   Vasc: Denied claudication, ulcers   Hem: Denied easy bruising/bleeding   MSK: See HPI   Neuro: See HPI         Physical Exam     Vital Signs: There were no vitals taken for this visit. General: ??????? Well nourished and well developed female without any acute distress   Psychiatric: ?  Alert and oriented x 3 with normal mood    HEENT: ???????? Atraumatic   Respiratory:   Breathing non-labored and non dyspneic   CV: ???????????????? Peripheral pulses intact, no peripheral edema   Skin: ????????????? No rashes       Neurologic: ?? Sensation: normal and grossly intact thebilateral, upper extremity(s), lower extremity(s)   Strength: 5/5 in the bilateral, upper extremity(s), lower extremity(s) exceptweakness, left lower extremity HF 3/5, KE 3/5, DF 4/5, EHL 4/5  Reflexes: reveals 2+ symmetric DTRs throughout except  Brisk on left  Gait: antalgic unsteady gait    Musculoskeletal: Lumbar Exam     Inspection:   Alignment: Abnormal  Atrophy: None       Tenderness to Palpation:   Lumbar paraspinals Positive  Lumbar spinous processes Negative  SI Joint:  Positive  Gluteal:Positive   Greater trochanter: Positive      ROM:   Lumbar ROM: Abnormal pain with flexion and extension worse with flexon  Hip ROM: No reproduction of pain with movement     Special Tests      SLR: Positive left  RUTH: Negative  FADIR: Negative  Log Roll: Negative         Medical Decision Making:  MRI lumbar spine 2021    Images:MRI lumbar spine- rotary scoliosis, epidural lipomatosis, facet arthropathy L3/4 moderate Left foraminal stenosis  L4/5- moderate to severe spinal stenosis- moderate to severe left foraminal stenosis  L5/S1- moderate to severe central stenosis severe left foraminal stenosis      Labs:  HB A1C 7.5 12/2020       Assessment:   1. Spinal stenosis L4/5 and L5/S1  2.  Left foraminal stenosis at L4/5 and L5/S1  3. DM  4. Prior CVA with residual left sided weakness    Plan:      -Physical therapy -  Continue HEP   -Medications - change gabapentin 600mg qhs.  - flexeril for muscle spasm. Counseled regarding side effects and appropriate administration of medications.    -Diagnostics/Imaging -Reviewed MRI lumbars pine  -Injections -consider  referral to try left L4 and l5 SNRB (TF SIMIN) / Patient is diabetic and on ASA 81mg. Patient would like to wait for now  -Lifestyle - Encouraged regular exercise and weight loss  -Education - The patient's diagnosis, prognosis and treatment options were discussed today. All questions were answered.   F/U - in 3 months         [address] [facility]

## 2022-10-28 ENCOUNTER — HOSPITAL ENCOUNTER (OUTPATIENT)
Dept: INFUSION THERAPY | Age: 62
Discharge: HOME | End: 2022-10-28
Payer: MEDICAID

## 2022-10-28 VITALS
SYSTOLIC BLOOD PRESSURE: 157 MMHG | RESPIRATION RATE: 18 BRPM | TEMPERATURE: 96.9 F | DIASTOLIC BLOOD PRESSURE: 62 MMHG | HEART RATE: 56 BPM | OXYGEN SATURATION: 96 %

## 2022-10-28 DIAGNOSIS — J45.50 SEVERE PERSISTENT ASTHMA, UNSPECIFIED WHETHER COMPLICATED: Primary | ICD-10-CM

## 2022-10-28 PROCEDURE — 96372 THER/PROPH/DIAG INJ SC/IM: CPT

## 2022-10-28 PROCEDURE — 74011250636 HC RX REV CODE- 250/636: Performed by: INTERNAL MEDICINE

## 2022-10-28 RX ORDER — HYDROCORTISONE SODIUM SUCCINATE 100 MG/2ML
100 INJECTION, POWDER, FOR SOLUTION INTRAMUSCULAR; INTRAVENOUS AS NEEDED
Status: CANCELLED | OUTPATIENT
Start: 2022-11-11

## 2022-10-28 RX ORDER — DIPHENHYDRAMINE HYDROCHLORIDE 50 MG/ML
25 INJECTION, SOLUTION INTRAMUSCULAR; INTRAVENOUS AS NEEDED
Status: CANCELLED
Start: 2022-11-11

## 2022-10-28 RX ORDER — DIPHENHYDRAMINE HYDROCHLORIDE 50 MG/ML
50 INJECTION, SOLUTION INTRAMUSCULAR; INTRAVENOUS AS NEEDED
Status: CANCELLED
Start: 2022-11-11

## 2022-10-28 RX ORDER — EPINEPHRINE 1 MG/ML
0.3 INJECTION, SOLUTION, CONCENTRATE INTRAVENOUS AS NEEDED
Status: CANCELLED | OUTPATIENT
Start: 2022-11-11

## 2022-10-28 RX ORDER — ONDANSETRON 2 MG/ML
8 INJECTION INTRAMUSCULAR; INTRAVENOUS AS NEEDED
Status: CANCELLED | OUTPATIENT
Start: 2022-11-11

## 2022-10-28 RX ORDER — ACETAMINOPHEN 325 MG/1
650 TABLET ORAL AS NEEDED
Status: CANCELLED
Start: 2022-11-11

## 2022-10-28 RX ORDER — ALBUTEROL SULFATE 0.83 MG/ML
2.5 SOLUTION RESPIRATORY (INHALATION) AS NEEDED
Status: CANCELLED
Start: 2022-11-11

## 2022-10-28 RX ADMIN — OMALIZUMAB 75 MG: 75 INJECTION, SOLUTION SUBCUTANEOUS at 13:32

## 2022-10-28 RX ADMIN — OMALIZUMAB 150 MG: 150 INJECTION, SOLUTION SUBCUTANEOUS at 13:32

## 2022-10-28 NOTE — PROGRESS NOTES
[facility] Progress Note    Date: 2022    Name: Tanner Magallanes    MRN: [id number]         : 1960    Marnathan Knock INJECTION    Ms. Colin Mendoza arrived to [facility] at 1330 ambulatory. Ms. Colin Mendoza was assessed and education was provided. Ms. Tanmay Bethea vitals were reviewed. Visit Vitals  BP (!) 157/62 (BP 1 Location: Left upper arm, BP Patient Position: Sitting)   Pulse (!) 56   Temp 96.9 °F (36.1 °C)   Resp 18   SpO2 96%     Xolair 150 mg was administered as ordered SQ in patient's left upper arm and band aid applied. Xolair 75 mg was administered as ordered SQ in patient's right upper arm and band aid applied. Ms. Colin Mendoza tolerated well without complaints. Discharge/ follow-up instructions discussed w/ pt. Pt verbalized understanding. Pt armband removed & shredded. Ms. Colin Mendoza was discharged from [place] [address] in stable condition at 1340. She is to return on 2022 at 1400 for her next appointment.     Prince Lisa RN  2022

## 2022-11-11 ENCOUNTER — HOSPITAL ENCOUNTER (OUTPATIENT)
Dept: INFUSION THERAPY | Age: 62
Discharge: HOME | End: 2022-11-11
Payer: MEDICAID

## 2022-11-11 VITALS
OXYGEN SATURATION: 98 % | TEMPERATURE: 98 F | DIASTOLIC BLOOD PRESSURE: 68 MMHG | HEART RATE: 59 BPM | RESPIRATION RATE: 18 BRPM | SYSTOLIC BLOOD PRESSURE: 134 MMHG

## 2022-11-11 DIAGNOSIS — J45.50 SEVERE PERSISTENT ASTHMA, UNSPECIFIED WHETHER COMPLICATED: Primary | ICD-10-CM

## 2022-11-11 PROCEDURE — 96372 THER/PROPH/DIAG INJ SC/IM: CPT

## 2022-11-11 PROCEDURE — 74011250636 HC RX REV CODE- 250/636: Performed by: INTERNAL MEDICINE

## 2022-11-11 RX ORDER — DIPHENHYDRAMINE HYDROCHLORIDE 50 MG/ML
25 INJECTION, SOLUTION INTRAMUSCULAR; INTRAVENOUS AS NEEDED
Status: CANCELLED
Start: 2022-11-25

## 2022-11-11 RX ORDER — ACETAMINOPHEN 325 MG/1
650 TABLET ORAL AS NEEDED
Status: CANCELLED
Start: 2022-11-25

## 2022-11-11 RX ORDER — HYDROCORTISONE SODIUM SUCCINATE 100 MG/2ML
100 INJECTION, POWDER, FOR SOLUTION INTRAMUSCULAR; INTRAVENOUS AS NEEDED
Status: CANCELLED | OUTPATIENT
Start: 2022-11-25

## 2022-11-11 RX ORDER — EPINEPHRINE 1 MG/ML
0.3 INJECTION, SOLUTION, CONCENTRATE INTRAVENOUS AS NEEDED
Status: CANCELLED | OUTPATIENT
Start: 2022-11-25

## 2022-11-11 RX ORDER — ONDANSETRON 2 MG/ML
8 INJECTION INTRAMUSCULAR; INTRAVENOUS AS NEEDED
Status: CANCELLED | OUTPATIENT
Start: 2022-11-25

## 2022-11-11 RX ORDER — DIPHENHYDRAMINE HYDROCHLORIDE 50 MG/ML
50 INJECTION, SOLUTION INTRAMUSCULAR; INTRAVENOUS AS NEEDED
Status: CANCELLED
Start: 2022-11-25

## 2022-11-11 RX ORDER — ALBUTEROL SULFATE 0.83 MG/ML
2.5 SOLUTION RESPIRATORY (INHALATION) AS NEEDED
Status: CANCELLED
Start: 2022-11-25

## 2022-11-11 RX ADMIN — OMALIZUMAB 150 MG: 150 INJECTION, SOLUTION SUBCUTANEOUS at 10:44

## 2022-11-11 RX ADMIN — OMALIZUMAB 75 MG: 75 INJECTION, SOLUTION SUBCUTANEOUS at 10:44

## 2022-11-11 NOTE — PROGRESS NOTES
[facility] Progress Note    Date: 2022    Name: Hilaria Alvarez    MRN: [id number]         : 1960    Xolair Injection  Ms. Julia Miller arrived to [facility] at [phone], ambulatory. Ms. Julia Miller was assessed and education was provided. Ms. Dariusz Kenney vitals were reviewed. Visit Vitals  /68 (BP 1 Location: Left upper arm, BP Patient Position: Sitting)   Pulse (!) 59   Temp 98 °F (36.7 °C)   Resp 18   SpO2 98%   Breastfeeding No       Xolair 150 mg was administered as ordered SQ in patient's left upper arm. Xolair 75 mg was administered as ordered SQ in patient's right upper arm. Ms. [address] tolerated well without complaints. Discharge/ follow-up instructions discussed w/ pt. Pt verbalized understanding. Pt armband removed & shredded. Ms. Julia Miller was discharged from [place] [address] in stable condition at 1050. She is to return on 2022 at 0930 for her next appointment.     Humaira Mejia RN  2022

## 2022-11-25 ENCOUNTER — HOSPITAL ENCOUNTER (OUTPATIENT)
Dept: INFUSION THERAPY | Age: 62
Discharge: HOME | End: 2022-11-25
Payer: MEDICAID

## 2022-11-25 VITALS
RESPIRATION RATE: 18 BRPM | TEMPERATURE: 97.4 F | OXYGEN SATURATION: 97 % | HEART RATE: 56 BPM | SYSTOLIC BLOOD PRESSURE: 155 MMHG | DIASTOLIC BLOOD PRESSURE: 74 MMHG

## 2022-11-25 DIAGNOSIS — J45.50 SEVERE PERSISTENT ASTHMA, UNSPECIFIED WHETHER COMPLICATED: Primary | ICD-10-CM

## 2022-11-25 PROCEDURE — 96372 THER/PROPH/DIAG INJ SC/IM: CPT

## 2022-11-25 PROCEDURE — 74011250636 HC RX REV CODE- 250/636: Performed by: INTERNAL MEDICINE

## 2022-11-25 RX ORDER — DIPHENHYDRAMINE HYDROCHLORIDE 50 MG/ML
50 INJECTION, SOLUTION INTRAMUSCULAR; INTRAVENOUS AS NEEDED
Start: 2022-12-09

## 2022-11-25 RX ORDER — ALBUTEROL SULFATE 0.83 MG/ML
2.5 SOLUTION RESPIRATORY (INHALATION) AS NEEDED
Start: 2022-12-09

## 2022-11-25 RX ORDER — ONDANSETRON 2 MG/ML
8 INJECTION INTRAMUSCULAR; INTRAVENOUS AS NEEDED
OUTPATIENT
Start: 2022-12-09

## 2022-11-25 RX ORDER — DIPHENHYDRAMINE HYDROCHLORIDE 50 MG/ML
25 INJECTION, SOLUTION INTRAMUSCULAR; INTRAVENOUS AS NEEDED
Start: 2022-12-09

## 2022-11-25 RX ORDER — HYDROCORTISONE SODIUM SUCCINATE 100 MG/2ML
100 INJECTION, POWDER, FOR SOLUTION INTRAMUSCULAR; INTRAVENOUS AS NEEDED
OUTPATIENT
Start: 2022-12-09

## 2022-11-25 RX ORDER — EPINEPHRINE 1 MG/ML
0.3 INJECTION, SOLUTION, CONCENTRATE INTRAVENOUS AS NEEDED
OUTPATIENT
Start: 2022-12-09

## 2022-11-25 RX ORDER — ACETAMINOPHEN 325 MG/1
650 TABLET ORAL AS NEEDED
Start: 2022-12-09

## 2022-11-25 RX ADMIN — OMALIZUMAB 150 MG: 150 INJECTION, SOLUTION SUBCUTANEOUS at 09:38

## 2022-11-25 RX ADMIN — OMALIZUMAB 75 MG: 75 INJECTION, SOLUTION SUBCUTANEOUS at 09:38

## 2022-11-25 NOTE — PROGRESS NOTES
[facility] OPI Progress Note    Date: 2022    Name: Gabriella Reeves    MRN: [id number]         : 1960    Cora Franz INJECTION    Ms. Samantha Araujo arrived to [facility] at [facility] ambulatory. Ms. Samantha Araujo was assessed and education was provided. Ms. Kathy Martinez vitals were reviewed. Visit Vitals  BP (!) 155/74 (BP 1 Location: Left upper arm, BP Patient Position: Sitting)   Pulse (!) 56   Temp 97.4 °F (36.3 °C)   Resp 18   SpO2 97%     Xolair 150 mg was administered as ordered SQ in patient's left upper arm and band aid applied. Xolair 75 mg was administered as ordered SQ in patient's right upper arm and band aid applied. Ms. Samantha Araujo tolerated well without complaints. Discharge/ follow-up instructions discussed w/ pt. Pt verbalized understanding. Pt armband removed & shredded. Ms. Samantha Araujo was discharged from [place] [address] in stable condition at 46. She is to return on 2022 at 56 for her next appointment.     Roseanna Hammond RN  2022

## 2022-12-09 ENCOUNTER — HOSPITAL ENCOUNTER (OUTPATIENT)
Dept: INFUSION THERAPY | Age: 62
Discharge: HOME | End: 2022-12-09
Payer: MEDICAID

## 2022-12-09 VITALS
TEMPERATURE: 98 F | SYSTOLIC BLOOD PRESSURE: 176 MMHG | OXYGEN SATURATION: 56 % | RESPIRATION RATE: 18 BRPM | DIASTOLIC BLOOD PRESSURE: 76 MMHG | HEART RATE: 56 BPM

## 2022-12-09 DIAGNOSIS — J45.50 SEVERE PERSISTENT ASTHMA, UNSPECIFIED WHETHER COMPLICATED: Primary | ICD-10-CM

## 2022-12-09 PROCEDURE — 96372 THER/PROPH/DIAG INJ SC/IM: CPT

## 2022-12-09 PROCEDURE — 74011250636 HC RX REV CODE- 250/636: Performed by: INTERNAL MEDICINE

## 2022-12-09 RX ORDER — DIPHENHYDRAMINE HYDROCHLORIDE 50 MG/ML
25 INJECTION, SOLUTION INTRAMUSCULAR; INTRAVENOUS AS NEEDED
Start: 2022-12-23

## 2022-12-09 RX ORDER — ALBUTEROL SULFATE 0.83 MG/ML
2.5 SOLUTION RESPIRATORY (INHALATION) AS NEEDED
Start: 2022-12-23

## 2022-12-09 RX ORDER — HYDROCORTISONE SODIUM SUCCINATE 100 MG/2ML
100 INJECTION, POWDER, FOR SOLUTION INTRAMUSCULAR; INTRAVENOUS AS NEEDED
OUTPATIENT
Start: 2022-12-23

## 2022-12-09 RX ORDER — ONDANSETRON 2 MG/ML
8 INJECTION INTRAMUSCULAR; INTRAVENOUS AS NEEDED
OUTPATIENT
Start: 2022-12-23

## 2022-12-09 RX ORDER — EPINEPHRINE 1 MG/ML
0.3 INJECTION, SOLUTION, CONCENTRATE INTRAVENOUS AS NEEDED
OUTPATIENT
Start: 2022-12-23

## 2022-12-09 RX ORDER — ACETAMINOPHEN 325 MG/1
650 TABLET ORAL AS NEEDED
Start: 2022-12-23

## 2022-12-09 RX ORDER — DIPHENHYDRAMINE HYDROCHLORIDE 50 MG/ML
50 INJECTION, SOLUTION INTRAMUSCULAR; INTRAVENOUS AS NEEDED
Start: 2022-12-23

## 2022-12-09 RX ADMIN — OMALIZUMAB 150 MG: 150 INJECTION, SOLUTION SUBCUTANEOUS at 08:10

## 2022-12-09 RX ADMIN — OMALIZUMAB 75 MG: 75 INJECTION, SOLUTION SUBCUTANEOUS at 08:10

## 2022-12-09 NOTE — PROGRESS NOTES
[facility]IC Progress Note    Date: 2022    Name: Carlos Gross    MRN: [id number]         : 1960    Marina Akbar INJECTION    Ms. Juan David Mendez arrived to [facility] at [facility] ambulatory. Ms. Juan David Mendez was assessed and education was provided. Ms. Janette Meyers vitals were reviewed. Visit Vitals  BP (!) 176/76 (BP 1 Location: Right upper arm, BP Patient Position: Sitting)   Pulse (!) 56   Temp 98 °F (36.7 °C)   Resp 18   SpO2 (!) 56%     Xolair 150 mg was administered as ordered SQ in patient's right upper arm and band aid applied. Xolair 75 mg was administered as ordered SQ in patient's right upper arm and band aid applied. Ms. Juan David Mendez tolerated well without complaints. Discharge/ follow-up instructions discussed w/ pt. Pt verbalized understanding. Pt armband removed & shredded. Ms. Juan David Mendez was discharged from [place] [address] in stable condition at . [facility] 134. She is to return to [place] [address] on 2022 at 1000 for her next appointment.     Radha Abdalla RN  2022

## 2022-12-23 ENCOUNTER — APPOINTMENT (OUTPATIENT)
Dept: INFUSION THERAPY | Age: 62
End: 2022-12-23

## 2023-01-06 ENCOUNTER — HOSPITAL ENCOUNTER (OUTPATIENT)
Dept: INFUSION THERAPY | Age: 63
End: 2023-01-06
Payer: MEDICAID

## 2023-01-06 VITALS — TEMPERATURE: 98 F | OXYGEN SATURATION: 97 % | HEART RATE: 78 BPM | RESPIRATION RATE: 18 BRPM

## 2023-01-06 DIAGNOSIS — J45.50 SEVERE PERSISTENT ASTHMA, UNSPECIFIED WHETHER COMPLICATED: Primary | ICD-10-CM

## 2023-01-06 PROCEDURE — 74011250636 HC RX REV CODE- 250/636

## 2023-01-06 PROCEDURE — 96372 THER/PROPH/DIAG INJ SC/IM: CPT

## 2023-01-06 RX ORDER — HYDROCORTISONE SODIUM SUCCINATE 100 MG/2ML
100 INJECTION, POWDER, FOR SOLUTION INTRAMUSCULAR; INTRAVENOUS AS NEEDED
OUTPATIENT
Start: 2023-01-20

## 2023-01-06 RX ORDER — ALBUTEROL SULFATE 0.83 MG/ML
2.5 SOLUTION RESPIRATORY (INHALATION) AS NEEDED
Start: 2023-01-20

## 2023-01-06 RX ORDER — EPINEPHRINE 1 MG/ML
0.3 INJECTION, SOLUTION, CONCENTRATE INTRAVENOUS AS NEEDED
OUTPATIENT
Start: 2023-01-20

## 2023-01-06 RX ORDER — DIPHENHYDRAMINE HYDROCHLORIDE 50 MG/ML
50 INJECTION, SOLUTION INTRAMUSCULAR; INTRAVENOUS AS NEEDED
Start: 2023-01-20

## 2023-01-06 RX ORDER — ONDANSETRON 2 MG/ML
8 INJECTION INTRAMUSCULAR; INTRAVENOUS AS NEEDED
OUTPATIENT
Start: 2023-01-20

## 2023-01-06 RX ORDER — DIPHENHYDRAMINE HYDROCHLORIDE 50 MG/ML
25 INJECTION, SOLUTION INTRAMUSCULAR; INTRAVENOUS AS NEEDED
Start: 2023-01-20

## 2023-01-06 RX ORDER — ACETAMINOPHEN 325 MG/1
650 TABLET ORAL AS NEEDED
Start: 2023-01-20

## 2023-01-06 RX ADMIN — OMALIZUMAB 75 MG: 75 INJECTION, SOLUTION SUBCUTANEOUS at 14:29

## 2023-01-06 RX ADMIN — OMALIZUMAB 150 MG: 150 INJECTION, SOLUTION SUBCUTANEOUS at 14:30

## 2023-01-06 NOTE — PROGRESS NOTES
[facility]IC Progress Note    Date: 2023    Name: Moe Schreiber    MRN: [id number]         : 1960    Sujatha Barker INJECTION    Ms. Matthew Romero arrived to [facility] at [phone] ambulatory. No complaints or concerns voiced    Ms. Matthew Romero was assessed and education was provided. Ms. Cortes Sensing vitals were reviewed. Visit Vitals  Pulse 78   Temp 98 °F (36.7 °C)   Resp 18   SpO2 97%   Breastfeeding No     Xolair 75 mg was administered as ordered SQ in patient's right upper arm and band aid applied. Xolair 150 mg was administered as ordered SQ in patient's right upper arm and band aid applied. Ms. Matthew Romero tolerated well without complaints. Discharge/ follow-up instructions discussed w/ pt. Pt verbalized understanding. Pt armband removed & shredded. Ms. Matthew Romero was discharged from [place] [address] in stable condition at 1435. She is to return to [place] [address] on 23 at 1330 for her next appointment.     Dawn vAelar RN  2023

## 2023-01-18 RX ORDER — ACETAMINOPHEN 325 MG/1
650 TABLET ORAL
Status: CANCELLED | OUTPATIENT
Start: 2023-02-17

## 2023-01-18 RX ORDER — ONDANSETRON 2 MG/ML
8 INJECTION INTRAMUSCULAR; INTRAVENOUS
Status: CANCELLED | OUTPATIENT
Start: 2023-02-17

## 2023-01-18 RX ORDER — ALBUTEROL SULFATE 90 UG/1
4 AEROSOL, METERED RESPIRATORY (INHALATION) PRN
Status: CANCELLED | OUTPATIENT
Start: 2023-02-17

## 2023-01-18 RX ORDER — SODIUM CHLORIDE 9 MG/ML
INJECTION, SOLUTION INTRAVENOUS CONTINUOUS
Status: CANCELLED | OUTPATIENT
Start: 2023-02-17

## 2023-01-18 RX ORDER — EPINEPHRINE 1 MG/ML
0.3 INJECTION, SOLUTION, CONCENTRATE INTRAVENOUS PRN
Status: CANCELLED | OUTPATIENT
Start: 2023-02-17

## 2023-01-18 RX ORDER — DIPHENHYDRAMINE HYDROCHLORIDE 50 MG/ML
50 INJECTION INTRAMUSCULAR; INTRAVENOUS
Status: CANCELLED | OUTPATIENT
Start: 2023-02-17

## 2023-01-20 ENCOUNTER — HOSPITAL ENCOUNTER (OUTPATIENT)
Dept: INFUSION THERAPY | Age: 63
End: 2023-01-20
Payer: MEDICAID

## 2023-01-20 VITALS
SYSTOLIC BLOOD PRESSURE: 170 MMHG | HEART RATE: 66 BPM | TEMPERATURE: 98.1 F | DIASTOLIC BLOOD PRESSURE: 69 MMHG | RESPIRATION RATE: 18 BRPM | OXYGEN SATURATION: 97 %

## 2023-01-20 DIAGNOSIS — J45.50 SEVERE PERSISTENT ASTHMA, UNSPECIFIED WHETHER COMPLICATED: Primary | ICD-10-CM

## 2023-01-20 PROCEDURE — 74011250636 HC RX REV CODE- 250/636

## 2023-01-20 PROCEDURE — 96372 THER/PROPH/DIAG INJ SC/IM: CPT

## 2023-01-20 RX ORDER — DIPHENHYDRAMINE HYDROCHLORIDE 50 MG/ML
25 INJECTION, SOLUTION INTRAMUSCULAR; INTRAVENOUS AS NEEDED
Start: 2023-02-03

## 2023-01-20 RX ORDER — HYDROCORTISONE SODIUM SUCCINATE 100 MG/2ML
100 INJECTION, POWDER, FOR SOLUTION INTRAMUSCULAR; INTRAVENOUS AS NEEDED
OUTPATIENT
Start: 2023-02-03

## 2023-01-20 RX ORDER — ONDANSETRON 2 MG/ML
8 INJECTION INTRAMUSCULAR; INTRAVENOUS AS NEEDED
OUTPATIENT
Start: 2023-02-03

## 2023-01-20 RX ORDER — EPINEPHRINE 1 MG/ML
0.3 INJECTION, SOLUTION, CONCENTRATE INTRAVENOUS AS NEEDED
OUTPATIENT
Start: 2023-02-03

## 2023-01-20 RX ORDER — DIPHENHYDRAMINE HYDROCHLORIDE 50 MG/ML
50 INJECTION, SOLUTION INTRAMUSCULAR; INTRAVENOUS AS NEEDED
Start: 2023-02-03

## 2023-01-20 RX ORDER — ALBUTEROL SULFATE 0.83 MG/ML
2.5 SOLUTION RESPIRATORY (INHALATION) AS NEEDED
Start: 2023-02-03

## 2023-01-20 RX ORDER — ACETAMINOPHEN 325 MG/1
650 TABLET ORAL AS NEEDED
Start: 2023-02-03

## 2023-01-20 RX ADMIN — OMALIZUMAB 75 MG: 75 INJECTION, SOLUTION SUBCUTANEOUS at 13:58

## 2023-01-20 RX ADMIN — OMALIZUMAB 150 MG: 150 INJECTION, SOLUTION SUBCUTANEOUS at 13:58

## 2023-01-20 NOTE — PROGRESS NOTES
[facility] Progress Note    Date: 2023    Name: Juliet Luque    MRN: [id number]         : 1960    Reynaldo J Luis INJECTION    Ms. Magdiel Avelar arrived to [facility] at [phone] ambulatory. No complaints or concerns voiced    Ms. Magdiel Avelar was assessed and education was provided. Ms. Helm Shungnak vitals were reviewed. Visit Vitals  BP (!) 170/69 (BP 1 Location: Left upper arm, BP Patient Position: Sitting)   Pulse 66   Temp 98.1 °F (36.7 °C)   Resp 18   SpO2 97%     Xolair 75 mg was administered as ordered SQ in patient's left upper arm and band aid applied. Xolair 150 mg was administered as ordered SQ in patient's right upper arm and band aid applied. Ms. Magdiel Avelar tolerated well without complaints. Discharge/ follow-up instructions discussed w/ pt. Pt verbalized understanding. Pt armband removed & shredded. Ms. Magdiel Avelar was discharged from [place] [address] in stable condition at 1410. She is to return on 2/3/23 at 1300 for her next appointment.     Laruo Coombs RN  2023

## 2023-02-17 ENCOUNTER — HOSPITAL ENCOUNTER (OUTPATIENT)
Facility: HOSPITAL | Age: 63
Setting detail: INFUSION SERIES
End: 2023-02-17
Payer: MEDICAID

## 2023-02-17 ENCOUNTER — HOSPITAL ENCOUNTER (OUTPATIENT)
Dept: INFUSION THERAPY | Age: 63
End: 2023-02-17

## 2023-02-17 VITALS
SYSTOLIC BLOOD PRESSURE: 170 MMHG | HEART RATE: 78 BPM | RESPIRATION RATE: 18 BRPM | DIASTOLIC BLOOD PRESSURE: 68 MMHG | TEMPERATURE: 98.3 F | OXYGEN SATURATION: 96 %

## 2023-02-17 DIAGNOSIS — J45.50 SEVERE PERSISTENT ASTHMA, UNSPECIFIED WHETHER COMPLICATED: Primary | ICD-10-CM

## 2023-02-17 PROCEDURE — 6360000002 HC RX W HCPCS: Performed by: INTERNAL MEDICINE

## 2023-02-17 PROCEDURE — 96372 THER/PROPH/DIAG INJ SC/IM: CPT

## 2023-02-17 RX ORDER — ONDANSETRON 2 MG/ML
8 INJECTION INTRAMUSCULAR; INTRAVENOUS
Status: CANCELLED | OUTPATIENT
Start: 2023-03-03

## 2023-02-17 RX ORDER — METFORMIN HYDROCHLORIDE 500 MG/1
TABLET, EXTENDED RELEASE ORAL
COMMUNITY
Start: 2022-11-15 | End: 2023-04-14

## 2023-02-17 RX ORDER — LIRAGLUTIDE 6 MG/ML
INJECTION SUBCUTANEOUS
COMMUNITY
Start: 2022-11-17 | End: 2023-04-14

## 2023-02-17 RX ORDER — ALBUTEROL SULFATE 90 UG/1
1-2 AEROSOL, METERED RESPIRATORY (INHALATION) EVERY 4 HOURS PRN
COMMUNITY
Start: 2021-09-07

## 2023-02-17 RX ORDER — VALSARTAN 160 MG/1
TABLET ORAL
COMMUNITY
End: 2023-04-14

## 2023-02-17 RX ORDER — CLOPIDOGREL BISULFATE 75 MG/1
75 TABLET ORAL DAILY
COMMUNITY
Start: 2020-08-26

## 2023-02-17 RX ORDER — LIDOCAINE 50 MG/G
PATCH TOPICAL
COMMUNITY
Start: 2023-01-09 | End: 2023-04-14

## 2023-02-17 RX ORDER — FERROUS SULFATE 325(65) MG
TABLET ORAL
COMMUNITY
Start: 2022-12-23 | End: 2023-04-14

## 2023-02-17 RX ORDER — GLUCOSAM/CHON-MSM1/C/MANG/BOSW 500-416.6
TABLET ORAL
COMMUNITY
Start: 2022-12-05

## 2023-02-17 RX ORDER — ALBUTEROL SULFATE 90 UG/1
4 AEROSOL, METERED RESPIRATORY (INHALATION) PRN
Status: CANCELLED | OUTPATIENT
Start: 2023-03-03

## 2023-02-17 RX ORDER — CHOLECALCIFEROL (VITAMIN D3) 125 MCG
CAPSULE ORAL
COMMUNITY
Start: 2022-11-15 | End: 2023-04-14

## 2023-02-17 RX ORDER — AMLODIPINE BESYLATE 10 MG/1
TABLET ORAL
COMMUNITY
Start: 2022-12-23

## 2023-02-17 RX ORDER — FLUCONAZOLE 150 MG/1
TABLET ORAL
COMMUNITY
Start: 2023-01-06 | End: 2023-04-14

## 2023-02-17 RX ORDER — CALCIUM CITRATE/VITAMIN D3 200MG-6.25
TABLET ORAL
COMMUNITY
Start: 2022-11-15

## 2023-02-17 RX ORDER — VALSARTAN AND HYDROCHLOROTHIAZIDE 320; 12.5 MG/1; MG/1
TABLET, FILM COATED ORAL
COMMUNITY
Start: 2022-12-23 | End: 2023-02-25

## 2023-02-17 RX ORDER — CETIRIZINE HYDROCHLORIDE 10 MG/1
10 TABLET ORAL DAILY
COMMUNITY
Start: 2022-09-29

## 2023-02-17 RX ORDER — INSULIN GLARGINE 100 [IU]/ML
INJECTION, SOLUTION SUBCUTANEOUS
COMMUNITY
Start: 2022-11-15 | End: 2023-04-14

## 2023-02-17 RX ORDER — IPRATROPIUM BROMIDE 42 UG/1
2 SPRAY, METERED NASAL 3 TIMES DAILY
COMMUNITY
Start: 2020-08-12 | End: 2023-02-25

## 2023-02-17 RX ORDER — ASPIRIN 81 MG/1
81 TABLET, CHEWABLE ORAL
COMMUNITY
Start: 2020-08-26

## 2023-02-17 RX ORDER — CYCLOBENZAPRINE HCL 5 MG
TABLET ORAL
COMMUNITY
Start: 2021-07-06 | End: 2023-04-14

## 2023-02-17 RX ORDER — EPINEPHRINE 0.3 MG/.3ML
0.3 INJECTION SUBCUTANEOUS
COMMUNITY
Start: 2020-03-19

## 2023-02-17 RX ORDER — IPRATROPIUM BROMIDE AND ALBUTEROL SULFATE 2.5; .5 MG/3ML; MG/3ML
SOLUTION RESPIRATORY (INHALATION)
COMMUNITY
Start: 2022-07-22

## 2023-02-17 RX ORDER — EPINEPHRINE 1 MG/ML
0.3 INJECTION, SOLUTION, CONCENTRATE INTRAVENOUS PRN
Status: CANCELLED | OUTPATIENT
Start: 2023-03-03

## 2023-02-17 RX ORDER — SODIUM CHLORIDE 9 MG/ML
INJECTION, SOLUTION INTRAVENOUS CONTINUOUS
Status: CANCELLED | OUTPATIENT
Start: 2023-03-03

## 2023-02-17 RX ORDER — ONDANSETRON 4 MG/1
TABLET, ORALLY DISINTEGRATING ORAL
COMMUNITY
Start: 2022-11-14 | End: 2023-02-25

## 2023-02-17 RX ORDER — DESLORATADINE 5 MG/1
TABLET ORAL
COMMUNITY
End: 2023-04-14

## 2023-02-17 RX ORDER — MONTELUKAST SODIUM 10 MG/1
10 TABLET ORAL EVERY EVENING
COMMUNITY

## 2023-02-17 RX ORDER — AMOXICILLIN 250 MG
2 CAPSULE ORAL
COMMUNITY
Start: 2020-09-04 | End: 2023-02-25

## 2023-02-17 RX ORDER — FENOFIBRATE 145 MG/1
145 TABLET, COATED ORAL DAILY
COMMUNITY

## 2023-02-17 RX ORDER — OMEPRAZOLE 20 MG/1
20 CAPSULE, DELAYED RELEASE ORAL DAILY
COMMUNITY
Start: 2020-08-07

## 2023-02-17 RX ORDER — ATORVASTATIN CALCIUM 40 MG/1
TABLET, FILM COATED ORAL
COMMUNITY
Start: 2022-12-23 | End: 2023-04-14

## 2023-02-17 RX ORDER — DIPHENHYDRAMINE HYDROCHLORIDE 50 MG/ML
50 INJECTION INTRAMUSCULAR; INTRAVENOUS
Status: CANCELLED | OUTPATIENT
Start: 2023-03-03

## 2023-02-17 RX ORDER — METRONIDAZOLE 500 MG/1
TABLET ORAL
COMMUNITY
Start: 2022-11-14 | End: 2023-02-25

## 2023-02-17 RX ORDER — ACETAMINOPHEN 325 MG/1
650 TABLET ORAL
Status: CANCELLED | OUTPATIENT
Start: 2023-03-03

## 2023-02-17 RX ORDER — MINOXIDIL 2.5 MG/1
TABLET ORAL
COMMUNITY
Start: 2022-12-23

## 2023-02-17 RX ORDER — DOCUSATE SODIUM 100 MG/1
CAPSULE, LIQUID FILLED ORAL
COMMUNITY
Start: 2022-12-23

## 2023-02-17 RX ADMIN — OMALIZUMAB 225 MG: 150 INJECTION, SOLUTION SUBCUTANEOUS at 13:53

## 2023-02-17 NOTE — PROGRESS NOTES
[facility] Progress Note    Date: 2023    Name: Jennifer Delacruz    MRN: [id number]         : 1960    Xolair injection Q2 weeks    Ms. Pabon to [facility], ambulatory at 1330. Pt was assessed and education was provided. Patient denies any complaints or concerns. Patient states she has not taken her B/P medications yet today but will take when she gets home. Ms. Brian Mcwilliams vitals were reviewed. Vitals:    23 1330   BP: (!) 170/68   Pulse: 78   Resp: 18   Temp: 98.3 °F (36.8 °C)   SpO2: 96%     Omalizumab (Xolair) 225 mg injection total dose given in 2 separate injections as follows:  150 mg given SQ in RIGHT upper back of arm and 75 mg given SQ in LEFT upper back of arm per order. Band-aids applied. Ms. Estella Rushing tolerated injection, and had no complaints at this time. Patient armband removed and shredded. Ms. Estella Rushing was discharged from [place] [address] in stable condition at 1355. She is to return on 3/3/23 at 1330 for her next Xolair injection appointment.     [facility] CANTRELL RN  2023  2:42 PM

## 2023-02-24 ENCOUNTER — OFFICE VISIT (OUTPATIENT)
Age: 63
End: 2023-02-24

## 2023-02-24 VITALS
BODY MASS INDEX: 37.54 KG/M2 | HEIGHT: 62 IN | TEMPERATURE: 98 F | WEIGHT: 204 LBS | SYSTOLIC BLOOD PRESSURE: 133 MMHG | OXYGEN SATURATION: 97 % | DIASTOLIC BLOOD PRESSURE: 70 MMHG

## 2023-02-24 DIAGNOSIS — E11.9 TYPE 2 DIABETES MELLITUS WITHOUT COMPLICATION, WITH LONG-TERM CURRENT USE OF INSULIN (HCC): ICD-10-CM

## 2023-02-24 DIAGNOSIS — K21.9 GASTROESOPHAGEAL REFLUX DISEASE WITHOUT ESOPHAGITIS: ICD-10-CM

## 2023-02-24 DIAGNOSIS — E66.01 CLASS 2 SEVERE OBESITY DUE TO EXCESS CALORIES WITH SERIOUS COMORBIDITY AND BODY MASS INDEX (BMI) OF 37.0 TO 37.9 IN ADULT (HCC): ICD-10-CM

## 2023-02-24 DIAGNOSIS — Z86.73 HX OF STROKE WITHOUT RESIDUAL DEFICITS: ICD-10-CM

## 2023-02-24 DIAGNOSIS — Z01.818 PRE-OP TESTING: ICD-10-CM

## 2023-02-24 DIAGNOSIS — I11.9 HYPERTENSIVE HEART DISEASE WITHOUT HEART FAILURE: ICD-10-CM

## 2023-02-24 DIAGNOSIS — J44.9 CHRONIC OBSTRUCTIVE PULMONARY DISEASE, UNSPECIFIED COPD TYPE (HCC): ICD-10-CM

## 2023-02-24 DIAGNOSIS — Z71.89 ENCOUNTER FOR PRE-BARIATRIC SURGERY COUNSELING AND EDUCATION: Primary | ICD-10-CM

## 2023-02-24 DIAGNOSIS — M15.9 PRIMARY OSTEOARTHRITIS INVOLVING MULTIPLE JOINTS: ICD-10-CM

## 2023-02-24 DIAGNOSIS — Z01.818 PRE-OPERATIVE EXAMINATION: Primary | ICD-10-CM

## 2023-02-24 DIAGNOSIS — Z79.01 ON CLOPIDOGREL THERAPY: ICD-10-CM

## 2023-02-24 DIAGNOSIS — Z79.4 TYPE 2 DIABETES MELLITUS WITHOUT COMPLICATION, WITH LONG-TERM CURRENT USE OF INSULIN (HCC): ICD-10-CM

## 2023-02-24 NOTE — PROGRESS NOTES
Guero Taylor is a 60 y.o. female (: 1960) presenting to address:    Chief Complaint   Patient presents with    New Patient     Consult for Bariatric surgery/ Sleeve       Medication list and allergies have been reviewed with Guero Taylor and updated as of today's date. I have gone over all Medical, Surgical and Social History with Guero Brandon and updated/added the information accordingly.

## 2023-02-25 NOTE — PROGRESS NOTES
Bariatric Surgery Consultation    CC: Consultation regarding surgical treatment options for severe obesity and related comorbidities  Subjective:     Ms. Yecenia Lewis is a 60 y.o. obese  female with a Body mass index is 37.31 kg/m². They have been considering surgery for some time now. The patient presents to the clinic today to discuss surgical weight loss options. They have made multiple attempts at weight loss over the years without success. They have tried low-carb, low calorie, high-protein diets, exercise, OTC weight loss medications. Unfortunately, none of these have lead to meaningful, sustained weight loss. They have attended the bariatric seminar before the visit. Denies nausea, vomiting, dysphagia  Reports reflux     Sleep Study on 3/17/2021: Diagnosed with KAREN, utilizes BiPAP     Infusion center for Xolair administration   Pulmonary-COPD  GI for reflux-EGD on 2/28/2023, pending report  Endocrinology- DMII    Colonoscopy 7/27/2020-  Findings:  - Hemorrhoids were found on perianal exam.  - Multiple small and large-mouthed diverticula were found in the sigmoid colon, descending colon, splenic flexure,  transverse colon, hepatic flexure and ascending colon. There was no evidence of diverticular bleeding. Repeat in 10 years. Mammogram 1/13/2022-  No evidence for malignancy. Recommend routine annual followup. Hx of stroke in 2020- Report no residual deficits. Abd CT 2/6/2023  IMPRESSION:       1. Small hiatal hernia. 2. Left renal lesion likely representing cyst could be further evaluated with   ultrasound to help exclude solid lesion. 3. Hysterectomy. 4. Atherosclerotic vascular disease. 5. Colonic diverticulosis without evidence of diverticulitis. 6. Gastric lipoma at fundal wall superiorly. 7. High density within gastric fundus likely represents ingested material.   Active GI bleeding significantly less likely but correlation with clinical data   needed.    8. Degenerative disc disease lumbar spine. Abd US 12/30/2022  IMPRESSION        1. Simple bilateral renal cysts. 2. No hydronephrosis. Patient Active Problem List    Diagnosis Date Noted    Sciatica of left side     Type 2 diabetes mellitus, with long-term current use of insulin ([address]     COVID-19 ruled out by laboratory testing 08/26/2020    Gastroesophageal reflux disease     Seasonal allergic rhinitis     Obstructive sleep apnea on CPAP     Obesity, Class II, BMI 35-39.9     Bipolar disorder (HCC)     History of colon polyps     Iron deficiency anemia     Chronic obstructive pulmonary disease (COPD) (HCC)     Primary osteoarthritis involving multiple joints     Menopause     Depression     Hypertensive heart disease without heart failure     Current use of aspirin 08/24/2020    On statin therapy due to risk of future cardiovascular event 08/24/2020    On clopidogrel therapy 08/24/2020    Impaired mobility and ADLs 08/21/2020    Left hemiparesis ([address] 08/21/2020    Acute ischemic stroke ([address] 08/21/2020    Pure hypercholesterolemia 08/19/2020    Severe persistent asthma 11/07/2019      Past Medical History:   Diagnosis Date    Acute ischemic stroke ([address] 8/21/2020    Acute Ischemic Stroke (late acute/early subacute infarcts in the right centrum semiovale extending to the superior frontal lobe cortex) with residual left hemiparesis    Bipolar disorder ([address]     Chronic obstructive pulmonary disease (COPD) ([address]     COVID-19 ruled out by laboratory testing 8/26/2020    SARS-CoV-2 ([place]ott m2000) (collected 8/25/2020, resulted 8/26/2020):  Not detected     Current use of aspirin 8/24/2020    Depression     Gastroesophageal reflux disease     History of colon polyps     Hypertension     Hypertensive heart disease without heart failure     2D echocardiogram (8/22/2020) showed EF 65%; mild left ventricular hypertrophy; normal diastolic function; negative bubble study at rest and after Valsalva    Iron deficiency anemia     Left hemiparesis ([facility] 8/21/2020    Menopause     Obesity, Class II, BMI 35-39.9     Obstructive sleep apnea on CPAP     On clopidogrel therapy 8/24/2020    On statin therapy due to risk of future cardiovascular event 8/24/2020    On Atorvastatin    Pure hypercholesterolemia 8/19/2020    Lipid profile (8/19/2020) showed , , HDL 50,     Sciatica of left side     Seasonal allergic rhinitis     Severe persistent asthma 11/07/2019    Type 2 diabetes mellitus, with long-term current use of insulin (HCC)     HbA1c (8/19/2020) = 8.1; HbA1c (8/20/2020) = 8.0     Past Surgical History:   Procedure Laterality Date    COLONOSCOPY  07/27/2020    Procedure: COLONOSCOPY;  Surgeon: Amaris Pimentel MD    HYSTERECTOMY (CERVIX STATUS UNKNOWN)  2000    Fibroids    OVARY REMOVAL        Social History     Tobacco Use    Smoking status: Never    Smokeless tobacco: Never   Substance Use Topics    Alcohol use: Yes     Comment: Soc      Family History   Problem Relation Age of Onset    Heart Attack Father     Breast Cancer Maternal Aunt     Heart Disease Mother     Stroke Neg Hx       Prior to Admission medications    Medication Sig Start Date End Date Taking?  Authorizing Provider   albuterol sulfate HFA (PROVENTIL;VENTOLIN;PROAIR) 108 (90 Base) MCG/ACT inhaler Inhale 1-2 puffs into the lungs every 4 hours as needed 9/7/21  Yes Historical Provider, MD   amLODIPine (NORVASC) 10 MG tablet TAKE 1 TABLET BY MOUTH EVERY DAY FOR BLOOD PRESSURE 12/23/22  Yes Historical Provider, MD   aspirin 81 MG chewable tablet Take 81 mg by mouth daily (with breakfast) 8/26/20  Yes Historical Provider, MD   atorvastatin (LIPITOR) 40 MG tablet TAKE 1 TABLET BY MOUTH EVERY DAY 12/23/22  Yes Historical Provider, MD   cetirizine (ZYRTEC) 10 MG tablet Take 10 mg by mouth daily 9/29/22  Yes Historical Provider, MD   Cholecalciferol (VITAMIN D3) 50 MCG (2000 UT) TABS TAKE 1 TABLET BY MOUTH EVERY DAY 11/15/22  Yes Historical Provider, MD   clopidogrel (PLAVIX) 75 MG tablet Take 75 mg by mouth daily 8/26/20  Yes Historical Provider, MD   cyclobenzaprine (FLEXERIL) 5 MG tablet TAKE 1 TABLET BY MOUTH EVERY DAY IN THE EVENING 7/6/21  Yes Historical Provider, MD   desloratadine (CLARINEX) 5 MG tablet Take by mouth   Yes Historical Provider, MD   docusate sodium (COLACE) 100 MG capsule TAKE 1 CAPSULE BY MOUTH DAILY (AFTER BREAKFAST) FOR CONSTIPATION 30 12/23/22  Yes Historical Provider, MD   EPINEPHrine (EPIPEN) 0.3 MG/0.3ML SOAJ injection Inject 0.3 mg into the muscle once as needed 3/19/20  Yes Historical Provider, MD   fenofibrate (TRICOR) 145 MG tablet Take 145 mg by mouth daily   Yes Historical Provider, MD   ferrous sulfate (IRON 325) 325 (65 Fe) MG tablet TAKE 1 TABLET BY MOUTH EVERY DAY FOR 90 DAYS 12/23/22  Yes Historical Provider, MD   metFORMIN (GLUCOPHAGE-XR) 500 MG extended release tablet TAKE 1 TABLET BY MOUTH DAILY WITH FOOD 11/15/22  Yes Historical Provider, MD   TRUE METRIX BLOOD GLUCOSE TEST strip USE TO TEST 3 TIMES A DAY 11/15/22  Yes Historical Provider, MD   LANTUS SOLOSTAR 100 UNIT/ML injection pen INJECT 22 UNITS BY SUBCUTANEOUS ROUTE NIGHTLY.  INDICATIONS: TYPE 2 DIABETES MELLITUS 11/15/22  Yes Historical Provider, MD   ipratropium-albuterol (DUONEB) 0.5-2.5 (3) MG/3ML SOLN nebulizer solution INHALE 1 VIAL BY NEBULIZATION ROUTE 4 TIMES PER DAY FOR RESCUE 7/22/22  Yes Historical Provider, MD   TRUEplus Lancets 33G MISC USE TO TEST 3 TIMES A DAY 12/5/22  Yes Historical Provider, MD   lidocaine (LIDODERM) 5 % APPLY 1 PATCH DIRECTLY TO SITE OF PAIN 12 HRS ON ,12 HRS OFF ( USE FOR SEVERE PAIN) 1/9/23  Yes Historical Provider, MD   VICTOZA 18 MG/3ML SOPN SC injection INJECT 1.8 MG UNDER THE SKIN ONCE DAILY 11/17/22  Yes Historical Provider, MD   mometasone-formoterol (DULERA) 200-5 MCG/ACT inhaler Inhale 2 puffs into the lungs   Yes Historical Provider, MD   montelukast (SINGULAIR) 10 MG tablet Take 10 mg by mouth every evening   Yes Historical Provider, MD   omalizumab Lina Barillas) 150 MG/ML SOSY injection Inject 150 mg into the skin every 14 days   Yes Historical Provider, MD   omeprazole (PRILOSEC) 20 MG delayed release capsule Take 20 mg by mouth daily 8/7/20  Yes Historical Provider, MD   piroxicam (FELDENE) 20 MG capsule TAKE 1 CAPSULE BY MOUTH EVERY DAY WITH FOOD FOR 30 DAYS 8/18/22  Yes Historical Provider, MD   tiotropium (SPIRIVA RESPIMAT) 2.5 MCG/ACT AERS inhaler Inhale 2 puffs into the lungs daily 7/20/20  Yes Historical Provider, MD   valsartan (DIOVAN) 160 MG tablet Take by mouth   Yes Historical Provider, MD   fluconazole (DIFLUCAN) 150 MG tablet TAKE 1 TABLET BY MOUTH DAILY FOR 3 DAYS  Patient not taking: Reported on 2/24/2023 1/6/23   Historical Provider, MD   ipratropium (ATROVENT) 0.06 % nasal spray 2 sprays by Nasal route 3 times daily  Patient not taking: Reported on 2/24/2023 8/12/20   Historical Provider, MD   metroNIDAZOLE (FLAGYL) 500 MG tablet TAKE 1 TABLET BY MOUTH EVERY 8 HOURS FOR 5 DAYS. NO ALCOHOL  Patient not taking: Reported on 2/24/2023 11/14/22   Historical Provider, MD   minoxidil (LONITEN) 2.5 MG tablet TAKE 2 TABLETS BY MOUTH TWICE A DAY FOR BLOOD PRESSURE 12/23/22   Historical Provider, MD   ondansetron (ZOFRAN-ODT) 4 MG disintegrating tablet ALLOW 1 TABLET TO DISSOLVE ON TONGUE EVERY 8 HOURS AS NEEDED FOR NAUSEA/VOMITING. Patient not taking: Reported on 2/24/2023 11/14/22   Historical Provider, MD   senna-docusate (Juan Alejandre) 8.6-50 MG per tablet Take 2 tablets by mouth  Patient not taking: Reported on 2/24/2023 9/4/20   Historical Provider, MD   valsartan-hydroCHLOROthiazide (DIOVAN-HCT) 320-12.5 MG per tablet TAKE 1 TABLET BY MOUTH EVERY DAY IN THE MORNING  Patient not taking: Reported on 2/24/2023 12/23/22   Historical Provider, MD     Allergies   Allergen Reactions    Grass Pollen(K-O-R-T-Swt Mayo) Anaphylaxis     Environmental allergies Unsure what  triggers episodes.  Intubated for episodes in 2006 and 2008. Seasonal Shortness Of Breath    Pneumococcal Vaccine Other (See Comments)    Metformin Nausea And Vomiting and Nausea Only     Pt states stomach cramps   Pt states stomach cramps         Review of Systems:    Constitutional: No fever or chills  Neurologic: No headache  Eyes: No scleral icterus or irritated eyes  Nose: No nasal pain or drainage  Mouth: No oral lesions or sore throat  Cardiac: No palpations or chest pain  Pulmonary: No cough or shortness of breath  Gastrointestinal: No nausea, emesis, diarrhea, or constipation  Genitourinary: No dysuria  Musculoskeletal: No muscle or joint tenderness  Skin: No rashes or lesions  Psychiatric: No anxiety or depressed mood    Objective:     Physical Exam:  Vitals:    02/24/23 1147   BP: 133/70   Temp: 98 °F (36.7 °C)   SpO2: 97%     General: No acute distress, conversant  Eyes: PERRLA, no scleral icterus  HENT: Normocephalic without oral lesions  Neck: Trachea midline  Cardiac: Normal pulse rate and rhythm, no edema, capillary refill normal 1 second  Pulmonary: Symmetric chest rise with normal effort, clear to auscultation   GI: Soft, NT, ND, no hernia, no splenomegaly  Skin: Warm without rash  Extremities: No edema or joint stiffness, moves all extremities symmetrically, steady gait  Psych: Appropriate mood and affect    Assessment:     Severe obesity with comorbidities  Plan:   The patient presents today with severe obesity and obesity related risks/comorbidities as detailed above. The patient has made multiple unsuccessful attempts at weight loss as detailed above. The patient desires surgical weight loss for a better chance of lifelong weight control and control of co-morbidities. They have attended the bariatric seminar and meet the qualifications for surgery based on the NIH criteria. We have discussed the various surgical options including the sleeve gastrectomy, gastric bypass, duodenal switch/modified duodenal switch.  We also discussed non operative alternatives. The patient is currently interested in the sleeve gastrectomy. I believe they are a good candidate for this procedure. They will need an EGD to evaluate their anatomy pre operatively. H pylori antigen/breath test ordered as well. We have discussed the possible complications of bariatric surgery which include but are not limited to failed weight loss, weight regain, malnutrition, leak, bleed, stricture, gastric ulcer, gastric fistula, gastric bleed, gallstones, new or worsening gastric reflux, nausea, emesis, internal hernia, abdominal wall hernia, gastric perforation, need for revision / conversion / or reversal, pregnancy complications and loss, intestinal ischemia, post operative skin complications, possible thinning of their hair, bowel obstruction, dumping syndrome, wound infection, blood clots (DVT, mesenteric thrombus, pulmonary embolism), increased addictive tendency, risk of anesthesia, and death. The patient understands this is a life altering decision and will require compliance to the program for the remainder of their life in order to be monitored to avoid complication and ensure successful, sustained weight control. They will be placed on a lifelong low carbohydrate and low sugar diet, exercise, and vitamin regimen and will require frequent blood draws and office visits to ensure adequate nutrition and program compliance. Visits and follow up will be in compliance with the guidelines set forth by [facility]. I have specifically mentioned the need to avoid all personal and second-hand tobacco exposure, systemic steroids, and NSAIDS after any bariatric surgery to help avoid the above listed complications. The patient has expressed understanding of the above and would like to enroll in the program. The patient will be submitted for medical and psychological clearance along with establishing with our dietician and joining the pre / post operative support group. They will be screened for depression and sleep apnea and treated pre operatively if needed. After successful completion of the preoperative regimen the patient will be submitted for insurance approval and pending this will be scheduled for surgery. Tests/clearances ordered:  CBC, CMP, A1c, H pylori, Vitamin D, CXR, EKG, Iron, Ferritin, TSH, urine drug screen, B1, B12, folate   Nutrition, support group, PCP clearance, psychological clearance, GI clearance, Endocrinology clearance, Pulmonology clearance     All questions from the patient have been answered and they have demonstrated appropriate understanding of the process. Signed By: Marci ELIZABETH  [facility], [facility]  Bariatric Nurse Practitioner  [facility]    February 25, 2023

## 2023-03-03 ENCOUNTER — HOSPITAL ENCOUNTER (OUTPATIENT)
Facility: HOSPITAL | Age: 63
Setting detail: INFUSION SERIES
End: 2023-03-03
Payer: MEDICAID

## 2023-03-03 VITALS
TEMPERATURE: 97.7 F | HEART RATE: 60 BPM | DIASTOLIC BLOOD PRESSURE: 72 MMHG | RESPIRATION RATE: 18 BRPM | OXYGEN SATURATION: 98 % | SYSTOLIC BLOOD PRESSURE: 170 MMHG

## 2023-03-03 DIAGNOSIS — J45.50 SEVERE PERSISTENT ASTHMA, UNSPECIFIED WHETHER COMPLICATED: Primary | ICD-10-CM

## 2023-03-03 PROCEDURE — 6360000002 HC RX W HCPCS: Performed by: INTERNAL MEDICINE

## 2023-03-03 PROCEDURE — 96372 THER/PROPH/DIAG INJ SC/IM: CPT

## 2023-03-03 RX ORDER — ALBUTEROL SULFATE 90 UG/1
4 AEROSOL, METERED RESPIRATORY (INHALATION) PRN
Status: CANCELLED | OUTPATIENT
Start: 2023-03-17

## 2023-03-03 RX ORDER — SODIUM CHLORIDE 9 MG/ML
INJECTION, SOLUTION INTRAVENOUS CONTINUOUS
Status: CANCELLED | OUTPATIENT
Start: 2023-03-17

## 2023-03-03 RX ORDER — DIPHENHYDRAMINE HYDROCHLORIDE 50 MG/ML
50 INJECTION INTRAMUSCULAR; INTRAVENOUS
Status: CANCELLED | OUTPATIENT
Start: 2023-03-17

## 2023-03-03 RX ORDER — ONDANSETRON 2 MG/ML
8 INJECTION INTRAMUSCULAR; INTRAVENOUS
Status: CANCELLED | OUTPATIENT
Start: 2023-03-17

## 2023-03-03 RX ORDER — EPINEPHRINE 1 MG/ML
0.3 INJECTION, SOLUTION, CONCENTRATE INTRAVENOUS PRN
Status: CANCELLED | OUTPATIENT
Start: 2023-03-17

## 2023-03-03 RX ORDER — ACETAMINOPHEN 325 MG/1
650 TABLET ORAL
Status: CANCELLED | OUTPATIENT
Start: 2023-03-17

## 2023-03-03 RX ADMIN — OMALIZUMAB 225 MG: 150 INJECTION, SOLUTION SUBCUTANEOUS at 13:45

## 2023-03-03 NOTE — PROGRESS NOTES
[facility] Progress Note    Date: March 3, 2023    Name: Serene Camacho    MRN: [id number]         : 1960    Xolair injection Q2 weeks    Ms. Pabon to [place], ambulatory at 1340. Pt was assessed and education was provided. Patient denies any complaints or concerns. Patient states she has not taken her B/P medications yet today but will take when she gets home. Ms. Sonu Conrad vitals were reviewed. Vitals:    23 1342   BP: (!) 170/72   Pulse: 60   Resp: 18   Temp: 97.7 °F (36.5 °C)   SpO2: 98%     Omalizumab (Xolair) 225 mg injection total dose given in 2 separate injections as follows:  150 mg given SQ in LEFT upper back of arm and 75 mg given SQ in RIGHT upper back of arm per order. Band-aids applied. Ms. Phillip Alegria tolerated injection, and had no complaints at this time. Patient armband removed and shredded. Ms. Phillip Alegria was discharged from [place] [address] in stable condition at 1350. She is to return on 3/17/23 at 1330 for her next Xolair injection appointment.     Susan Page  March 3, 2023  1:49 PM

## 2023-03-06 ENCOUNTER — CLINICAL DOCUMENTATION (OUTPATIENT)
Facility: HOSPITAL | Age: 63
End: 2023-03-06

## 2023-03-06 NOTE — PROGRESS NOTES
[address] [place] [facility] [place] [address] [facility], [address]    Patient's Name: Mari Milian   Age: 60 y.o. YOB: 1960   Sex: female     Insurance:  [facility]           Session: 1 of 6     Surgeon: Dr. Rowdy Zimmerman  Date: 3/6/2023    Height: 5'2\"   Weight:    203      Lbs. BMI: 37   Pounds Lost since last month: 1                 Pounds Gained since last month: 0      Starting Weight: 204     Previous Months Weight: 204  Overall Pounds Lost: 1   Overall Pounds Gained: 0      Do you smoke? no    Alcohol intake:  Number of drinks at a time:  1  Number of times a week: 1-2    Patient has not attended a support group. Information was provided. Class Guidelines    Guidelines are reviewed with patient at the start of every class. 1. Patient understands that weight loss trial classes must be consecutive. Patient understands if they miss a class, it is their responsibility to contact me to reschedule class. I will reach out to patient after their first no show. 2.  Patient understands the expectations that weight maintenance/weight loss is expected during the classes. Failure to demonstrate changes may result in one extra month of weight loss trial, followed by going back to see the surgeon. Patient understands that they CAN NOT gain any weight during the weight loss trial.  Gaining weight will result in extra classes. 3. Patient is also instructed to be doing their labs, blood work, psych visit, support group and any other test that the surgeon has used while they are working on their weight loss trial.  4.  Patient was instructed to bring their blue binder to every class and appointment. Eating Habits and Behaviors    Today in class, we reviewed the key diet principles. I have talked to patient about pushing the fluid and working towards 64 ounces per day. Patient was given ideas of liquids that would be okay.   Patient was encouraged to cut out liquid calories, such as soda and sweet tea. We talked about the reasons that sugar sweetened beverages can promote weight gain. Sugar is highly palatable. Excessive consumption of sugar can trigger an exaggerated release of dopamine, which can promote a compulsive drive to consume more sugar sweetened beverages. Also, satiety is not reached with liquid calories the same way it does with solid calories. In class, we also talked about focusing on protein and low carbohydrates. Patient was encouraged during the weight loss trial to keep their carbohydrate less than 75 grams per day and their protein level at 60-80 grams per day. We talked about meal choices and snack ideas. Patient was given a packet on carbohydrate substitutions and recipe exchanges. This will allow them to still have some of the foods they enjoy, but a lower carbohydrate alternative. Patient's current diet habits include: Patient is eating 3 meals per day. Patient is eating protein and vegetables at meals. Patient states their portions are small. Patient is using a small saucer to eat on. Patient indicates they are eating out rarely. This includes fast food, carry out, and sit down restaurants. Patient indicates their indicate of bread, rice, pasta, crackers to be several times a day  Patient is snacking on popcorn and crackers. Sweet intake is sweet tea from [employer]. Patient will try to reduce the amount of sweet tea she drinks. I have talked to patient about the importance of focusing on protein at meals. We talked about trying to limit carbohydrates. Patient is drinking about 64 ounces of water and 12-16  ounces of sweet tea. Patient was encouraged to aim for 64 ounces daily. I talked about focusing on zero calorie liquids and not drinking calories and weaning from caffeine. Physical Activity/Exercise    Comments: We talked about exercise.   Patient was given reasons of why exercise is so important and how that can help with their long-term success. I have encouraged patient to get a support system to help with the activity. Patient is currently doing much walking for activity. She walks at [facility] and around her neighborhood. Behavior Modification       Comments:  During today's lesson, I also spent some time talking about behavior changes. I talked to patient about the importance of taking vitamins post op and we reviewed the vitamins that patients will be taking post op. Patient will hear this again at pre op class before surgery. Patient had the opportunity to ask questions about these vitamins that will be lifelong. Goals that patient wants to work on includes:  1. Decreasing Tea. 2. Decreasing popcorn.     Charla Vallejo, DANIELA    3/6/2023

## 2023-03-17 ENCOUNTER — HOSPITAL ENCOUNTER (OUTPATIENT)
Facility: HOSPITAL | Age: 63
Setting detail: INFUSION SERIES
End: 2023-03-17
Payer: MEDICAID

## 2023-03-17 VITALS
DIASTOLIC BLOOD PRESSURE: 73 MMHG | SYSTOLIC BLOOD PRESSURE: 188 MMHG | RESPIRATION RATE: 18 BRPM | TEMPERATURE: 97.8 F | HEART RATE: 61 BPM | OXYGEN SATURATION: 99 %

## 2023-03-17 DIAGNOSIS — J45.50 SEVERE PERSISTENT ASTHMA, UNSPECIFIED WHETHER COMPLICATED: Primary | ICD-10-CM

## 2023-03-17 PROCEDURE — 6360000002 HC RX W HCPCS: Performed by: INTERNAL MEDICINE

## 2023-03-17 PROCEDURE — 96372 THER/PROPH/DIAG INJ SC/IM: CPT

## 2023-03-17 RX ORDER — ALBUTEROL SULFATE 90 UG/1
4 AEROSOL, METERED RESPIRATORY (INHALATION) PRN
Status: CANCELLED | OUTPATIENT
Start: 2023-03-31

## 2023-03-17 RX ORDER — ACETAMINOPHEN 325 MG/1
650 TABLET ORAL
Status: CANCELLED | OUTPATIENT
Start: 2023-03-31

## 2023-03-17 RX ORDER — EPINEPHRINE 1 MG/ML
0.3 INJECTION, SOLUTION, CONCENTRATE INTRAVENOUS PRN
Status: CANCELLED | OUTPATIENT
Start: 2023-03-31

## 2023-03-17 RX ORDER — ONDANSETRON 2 MG/ML
8 INJECTION INTRAMUSCULAR; INTRAVENOUS
Status: CANCELLED | OUTPATIENT
Start: 2023-03-31

## 2023-03-17 RX ORDER — DIPHENHYDRAMINE HYDROCHLORIDE 50 MG/ML
50 INJECTION INTRAMUSCULAR; INTRAVENOUS
Status: CANCELLED | OUTPATIENT
Start: 2023-03-31

## 2023-03-17 RX ORDER — SODIUM CHLORIDE 9 MG/ML
INJECTION, SOLUTION INTRAVENOUS CONTINUOUS
Status: CANCELLED | OUTPATIENT
Start: 2023-03-31

## 2023-03-17 RX ADMIN — OMALIZUMAB 225 MG: 150 INJECTION, SOLUTION SUBCUTANEOUS at 13:49

## 2023-03-17 NOTE — PROGRESS NOTES
[facility] Progress Note    Date: 2023    Name: Ashia Trent    MRN: [id number]         : 1960    Xolair injection Q2 weeks    Ms. Pabon to [place], ambulatory at 1345. Pt was assessed and education was provided. Patient denies any complaints or concerns. Patient states she has not taken her B/P medications yet today but will take them when she gets home. Ms. Fady Sheridan vitals were reviewed. Vitals:    23 1344   BP: (!) 188/73   Pulse: 61   Resp: 18   Temp: 97.8 °F (36.6 °C)   SpO2: 99%     Omalizumab (Xolair) 225 mg injection total dose given in 2 separate injections as follows:  150 mg given SQ in right upper back of arm and 75 mg given SQ in left upper back of arm per order. Band-aids applied. Ms. Sarah Agustin tolerated injection, and had no complaints at this time. Patient armband removed and shredded. Ms. Sarah Agustin was discharged from [place] [address] in stable condition at 1355. She is to return on 3/31/23 at 1400 for her next Xolair injection appointment.     Daniela Gallo RN  2023  4:26 PM

## 2023-03-31 ENCOUNTER — HOSPITAL ENCOUNTER (OUTPATIENT)
Facility: HOSPITAL | Age: 63
Setting detail: INFUSION SERIES
End: 2023-03-31
Payer: MEDICAID

## 2023-03-31 VITALS
BODY MASS INDEX: 39.62 KG/M2 | SYSTOLIC BLOOD PRESSURE: 172 MMHG | HEART RATE: 68 BPM | WEIGHT: 216.6 LBS | DIASTOLIC BLOOD PRESSURE: 78 MMHG | TEMPERATURE: 97.8 F | OXYGEN SATURATION: 96 %

## 2023-03-31 DIAGNOSIS — J45.50 SEVERE PERSISTENT ASTHMA, UNSPECIFIED WHETHER COMPLICATED: Primary | ICD-10-CM

## 2023-03-31 PROCEDURE — 96372 THER/PROPH/DIAG INJ SC/IM: CPT

## 2023-03-31 PROCEDURE — 6360000002 HC RX W HCPCS: Performed by: INTERNAL MEDICINE

## 2023-03-31 RX ORDER — SODIUM CHLORIDE 9 MG/ML
INJECTION, SOLUTION INTRAVENOUS CONTINUOUS
Status: CANCELLED | OUTPATIENT
Start: 2023-04-14

## 2023-03-31 RX ORDER — EPINEPHRINE 1 MG/ML
0.3 INJECTION, SOLUTION, CONCENTRATE INTRAVENOUS PRN
Status: CANCELLED | OUTPATIENT
Start: 2023-04-14

## 2023-03-31 RX ORDER — DIPHENHYDRAMINE HYDROCHLORIDE 50 MG/ML
50 INJECTION INTRAMUSCULAR; INTRAVENOUS
Status: CANCELLED | OUTPATIENT
Start: 2023-04-14

## 2023-03-31 RX ORDER — ALBUTEROL SULFATE 90 UG/1
4 AEROSOL, METERED RESPIRATORY (INHALATION) PRN
Status: CANCELLED | OUTPATIENT
Start: 2023-04-14

## 2023-03-31 RX ORDER — ACETAMINOPHEN 325 MG/1
650 TABLET ORAL
Status: CANCELLED | OUTPATIENT
Start: 2023-04-14

## 2023-03-31 RX ORDER — ONDANSETRON 2 MG/ML
8 INJECTION INTRAMUSCULAR; INTRAVENOUS
Status: CANCELLED | OUTPATIENT
Start: 2023-04-14

## 2023-03-31 RX ADMIN — OMALIZUMAB 225 MG: 150 INJECTION, SOLUTION SUBCUTANEOUS at 11:00

## 2023-03-31 NOTE — PROGRESS NOTES
[facility] Progress Note    Date: 2023    Name: Wood Magaña    MRN: [id number]         : 1960    Xolair injection Q2 weeks    Ms. Pabon to [facility], ambulatory at 1055. Pt was assessed and education was provided. Patient denies any complaints or concerns. Patient states she has not taken her B/P medications yet today but will take them when she gets home. Ms. Darby Saini vitals were reviewed. Vitals:    23 1056   BP: (!) 172/78   Pulse: 68   Temp: 97.8 °F (36.6 °C)   SpO2: 96%     Omalizumab (Xolair) 225 mg injection total dose given in 2 separate injections as follows:  150 mg given SQ in left upper back of arm and 75 mg given SQ in right upper back of arm per order. Band-aids applied. Ms. Cate Rivas tolerated injection, and had no complaints at this time. Patient armband removed and shredded. Ms. Cate Rivas was discharged from [place] [address] in stable condition at 1105. She is to return on 23 at 1330 for her next Xolair injection appointment.     Corey Fink RN  2023  11:06 AM

## 2023-04-04 ENCOUNTER — CLINICAL DOCUMENTATION (OUTPATIENT)
Facility: HOSPITAL | Age: 63
End: 2023-04-04

## 2023-04-04 ENCOUNTER — HOSPITAL ENCOUNTER (OUTPATIENT)
Facility: HOSPITAL | Age: 63
Discharge: HOME | End: 2023-04-07

## 2023-04-04 NOTE — PROGRESS NOTES
Patient was given a very specific list of foods that they can eat, which included meat, fish, vegetables, eggs, cheese, fats, soy, and berries. Patient was also given a list of foods that should be avoided. These included sweets (candy, soda, baked goods, ice cream), and starches, including pasta, rice, crackers, chips, oatmeal, bread. We talked about appropriate protein-based snacks, including deli meat, low fat cheese, yogurt, hummus, small handful of almonds. We talked about working on sipping fluid throughout the day and working towards 64 ounces. I have recommended water, Crystal light, sugar free drinks, but eliminating soda, sweet tea, and fruit juices. I also gave a power point on ways to help with the metabolism. Some ways that can help boost one's metabolism include healthy snacking. Eating 6 small meals in the pre op phase can help keep the metabolism revved up. It is recommended to focus on protein-based snacks. Exercise is another way to increase resting metabolic rate. The more lean muscle one has, the more calories they will burn at rest.  Dehydration can slow down one's metabolism, as well. Patient is encouraged to keep sipping to work towards 64 ounces of fluid per day. Protein is another booster for metabolism. Foods high in carbohydrates and fat slow down the metabolism. Patient's current diet habits include: Patient is eating 2 meals a day. Patient states their portion sizes are a saucer of food. I have encouraged patient to use a smaller plate and fill up on water before meals to help cut down on portions. Patient is eating fast food: maybe once a week. Carry out intake is: never. Sit down restaurant intake is: twice a month. Patient's is not eating bread, rice, pasta, cereal, and other carbohydrates since 03/16/2023. Patient is snacking on chips, mangos and pineapples. .  This is being done 2 times a day. Patient's sweet intake is nothing at this time.   I have Yes

## 2023-04-14 ENCOUNTER — HOSPITAL ENCOUNTER (OUTPATIENT)
Facility: HOSPITAL | Age: 63
Setting detail: INFUSION SERIES
End: 2023-04-14
Payer: MEDICAID

## 2023-04-14 VITALS
TEMPERATURE: 97.9 F | DIASTOLIC BLOOD PRESSURE: 72 MMHG | RESPIRATION RATE: 18 BRPM | SYSTOLIC BLOOD PRESSURE: 152 MMHG | HEART RATE: 62 BPM | OXYGEN SATURATION: 96 %

## 2023-04-14 DIAGNOSIS — J45.50 SEVERE PERSISTENT ASTHMA, UNSPECIFIED WHETHER COMPLICATED: Primary | ICD-10-CM

## 2023-04-14 PROCEDURE — 96372 THER/PROPH/DIAG INJ SC/IM: CPT

## 2023-04-14 PROCEDURE — 6360000002 HC RX W HCPCS: Performed by: INTERNAL MEDICINE

## 2023-04-14 RX ORDER — LIDOCAINE 50 MG/G
1 PATCH TOPICAL EVERY 24 HOURS
COMMUNITY
Start: 2021-09-07

## 2023-04-14 RX ORDER — ONDANSETRON 2 MG/ML
8 INJECTION INTRAMUSCULAR; INTRAVENOUS
Status: CANCELLED | OUTPATIENT
Start: 2023-04-28

## 2023-04-14 RX ORDER — DIPHENHYDRAMINE HYDROCHLORIDE 50 MG/ML
50 INJECTION INTRAMUSCULAR; INTRAVENOUS
Status: CANCELLED | OUTPATIENT
Start: 2023-04-28

## 2023-04-14 RX ORDER — FENOFIBRATE 134 MG/1
1 CAPSULE ORAL DAILY
COMMUNITY
Start: 2023-03-14

## 2023-04-14 RX ORDER — GABAPENTIN 600 MG/1
1 TABLET ORAL
COMMUNITY
Start: 2023-01-25

## 2023-04-14 RX ORDER — ALBUTEROL SULFATE 90 UG/1
4 AEROSOL, METERED RESPIRATORY (INHALATION) PRN
Status: CANCELLED | OUTPATIENT
Start: 2023-04-28

## 2023-04-14 RX ORDER — EPINEPHRINE 1 MG/ML
0.3 INJECTION, SOLUTION, CONCENTRATE INTRAVENOUS PRN
Status: CANCELLED | OUTPATIENT
Start: 2023-04-28

## 2023-04-14 RX ORDER — ACETAMINOPHEN 325 MG/1
650 TABLET ORAL
Status: CANCELLED | OUTPATIENT
Start: 2023-04-28

## 2023-04-14 RX ORDER — SODIUM CHLORIDE 9 MG/ML
INJECTION, SOLUTION INTRAVENOUS CONTINUOUS
Status: CANCELLED | OUTPATIENT
Start: 2023-04-28

## 2023-04-14 RX ADMIN — OMALIZUMAB 225 MG: 150 INJECTION, SOLUTION SUBCUTANEOUS at 13:32

## 2023-04-14 NOTE — PROGRESS NOTES
\[facility]\"" Progress Note    Date: 2023    Name: Rahul Dover    MRN: [id number]         : 1960    Xolair injection Every 2 weeks    Ms. Pabon to [place], ambulatory at 1330. Pt was assessed and education was provided. Patient denies any complaints or concerns. Ms. Mateus Killian vitals were reviewed. Vitals:    23 1330   BP: (!) 152/72   Pulse: 62   Resp: 18   Temp: 97.9 °F (36.6 °C)   SpO2: 96%     Omalizumab (Xolair) 225 mg injection total dose given in 2 separate injections as follows:  150 mg given SQ in upper back of left arm and 75 mg given SQ in upper back of right arm per order. Band-aids applied. Ms. David Harmon tolerated injection, and had no complaints at this time. Patient armband removed and shredded. Ms. David Harmon was discharged from [place] [address] in stable condition at 1345. She is to return on 23 at 1330 for her next Xolair injection appointment.     Eleanor Regalado RN  2023  1:57 PM

## 2023-05-04 ENCOUNTER — CLINICAL DOCUMENTATION (OUTPATIENT)
Facility: HOSPITAL | Age: 63
End: 2023-05-04

## 2023-05-04 ENCOUNTER — HOSPITAL ENCOUNTER (OUTPATIENT)
Facility: HOSPITAL | Age: 63
Discharge: HOME | End: 2023-05-07

## 2023-05-04 NOTE — PROGRESS NOTES
[address] [facility] [address], [address]    Patient's Name: Donnis Aase   Age: 60 y.o. YOB: 1960   Sex: female        Insurance:  [facility]+            Session: 3 of  6      Surgeon:  Dr. Bonnie Palumbo    Height: 5' 2\"   Weight:    206      Lbs. BMI: 37   Pounds Lost since last month: 12#. Patient had much water weight after a reaction. Starting Weight: 204     Previous Months Weight: 218   Overall Pounds Gained: 2    Patient has not been to support group. Do you smoke? no    Alcohol intake:  Number of drinks at a time:  1-2  Number of times a week: 1    Class Guidelines    Guidelines are reviewed with patient at the start of every class. 1. Patient understands that weight loss trial classes must be consecutive. Patient understands if they miss a class, it is their responsibility to contact me to reschedule class. I will reach out to patient after their first no show. 2.  Patient understands the expectations that weight maintenance/weight loss is expected during the classes. Failure to demonstrate changes may result in one extra month of weight loss trial, followed by going back to see the surgeon. Patient understands that they CAN NOT gain any weight during the weight loss trial.  Gaining weight will result in extra classes. 3. Patient is also instructed to be doing their labs, blood work, psych visit, support group and any other test that the surgeon has used while they are working on their weight loss trial.  4.  Patient was instructed to bring their blue binder to every class and appointment. Other Pertinent Information: Patient had a reaction to seaweed and needed steroids. She had much fluid retention after reaction. Changes Made Since Last Class: No Crabs    Eating Habits and Behaviors      Today in class we talked about the key diet principles.   We start off each class

## 2023-05-12 ENCOUNTER — HOSPITAL ENCOUNTER (OUTPATIENT)
Facility: HOSPITAL | Age: 63
Setting detail: INFUSION SERIES
End: 2023-05-12
Payer: MEDICAID

## 2023-05-12 VITALS
TEMPERATURE: 97.7 F | RESPIRATION RATE: 18 BRPM | OXYGEN SATURATION: 97 % | SYSTOLIC BLOOD PRESSURE: 162 MMHG | DIASTOLIC BLOOD PRESSURE: 78 MMHG | HEART RATE: 58 BPM

## 2023-05-12 DIAGNOSIS — J45.50 SEVERE PERSISTENT ASTHMA, UNSPECIFIED WHETHER COMPLICATED: Primary | ICD-10-CM

## 2023-05-12 PROCEDURE — 6360000002 HC RX W HCPCS: Performed by: INTERNAL MEDICINE

## 2023-05-12 PROCEDURE — 96372 THER/PROPH/DIAG INJ SC/IM: CPT

## 2023-05-12 RX ORDER — DIPHENHYDRAMINE HYDROCHLORIDE 50 MG/ML
50 INJECTION INTRAMUSCULAR; INTRAVENOUS
Status: CANCELLED | OUTPATIENT
Start: 2023-05-26

## 2023-05-12 RX ORDER — ONDANSETRON 2 MG/ML
8 INJECTION INTRAMUSCULAR; INTRAVENOUS
Status: CANCELLED | OUTPATIENT
Start: 2023-05-26

## 2023-05-12 RX ORDER — SODIUM CHLORIDE 9 MG/ML
INJECTION, SOLUTION INTRAVENOUS CONTINUOUS
Status: CANCELLED | OUTPATIENT
Start: 2023-05-26

## 2023-05-12 RX ORDER — ACETAMINOPHEN 325 MG/1
650 TABLET ORAL
Status: CANCELLED | OUTPATIENT
Start: 2023-05-26

## 2023-05-12 RX ORDER — EPINEPHRINE 1 MG/ML
0.3 INJECTION, SOLUTION, CONCENTRATE INTRAVENOUS PRN
Status: CANCELLED | OUTPATIENT
Start: 2023-05-26

## 2023-05-12 RX ORDER — ALBUTEROL SULFATE 90 UG/1
4 AEROSOL, METERED RESPIRATORY (INHALATION) PRN
Status: CANCELLED | OUTPATIENT
Start: 2023-05-26

## 2023-05-12 RX ADMIN — OMALIZUMAB 225 MG: 150 INJECTION, SOLUTION SUBCUTANEOUS at 08:31

## 2023-05-12 NOTE — PROGRESS NOTES
\[facility]\"" Progress Note    Date: May 12, 2023    Name: Honey Foster    MRN: [id number]         : 1960    Xolair injection Q2 weeks    Ms. Pabon to [facility], ambulatory at [address] [place] [address]. Pt was assessed and education was provided. Patient denies any complaints or concerns. Patient states she has not taken her B/P medications yet today, denies any headache/vision changes. Ms. Gordon Hassan vitals were reviewed. Vitals:    23 0824   BP: (!) 162/78   Pulse: 58   Resp: 18   Temp: 97.7 °F (36.5 °C)   SpO2: 97%     Omalizumab (Xolair) 225 mg injection total dose given in 2 separate injections as follows:  150 mg given SQ in RIGHT upper back of arm and 75 mg given SQ in LEFT upper back of arm per order. Band-aids applied. Ms. Brandy Wallace tolerated injection, and had no complaints at this time. Patient armband removed and shredded. Ms. Brandy Wallace was discharged from [place] [address] in stable condition at 6239. She is to return on 2023 at 1500 for her next Xolair injection appointment.     David Adler RN  May 12, 2023  8:37 AM

## 2023-05-26 ENCOUNTER — OFFICE VISIT (OUTPATIENT)
Age: 63
End: 2023-05-26
Payer: MEDICAID

## 2023-05-26 ENCOUNTER — APPOINTMENT (OUTPATIENT)
Facility: HOSPITAL | Age: 63
End: 2023-05-26
Payer: MEDICAID

## 2023-05-26 ENCOUNTER — HOSPITAL ENCOUNTER (OUTPATIENT)
Facility: HOSPITAL | Age: 63
Setting detail: INFUSION SERIES
End: 2023-05-26
Payer: MEDICAID

## 2023-05-26 VITALS
BODY MASS INDEX: 36.99 KG/M2 | OXYGEN SATURATION: 96 % | HEIGHT: 62 IN | RESPIRATION RATE: 16 BRPM | SYSTOLIC BLOOD PRESSURE: 176 MMHG | TEMPERATURE: 97.8 F | HEART RATE: 73 BPM | WEIGHT: 201 LBS | DIASTOLIC BLOOD PRESSURE: 79 MMHG

## 2023-05-26 VITALS
OXYGEN SATURATION: 100 % | TEMPERATURE: 98.5 F | RESPIRATION RATE: 16 BRPM | HEART RATE: 65 BPM | SYSTOLIC BLOOD PRESSURE: 179 MMHG | DIASTOLIC BLOOD PRESSURE: 80 MMHG

## 2023-05-26 DIAGNOSIS — Z71.89 ENCOUNTER FOR PRE-BARIATRIC SURGERY COUNSELING AND EDUCATION: Primary | ICD-10-CM

## 2023-05-26 DIAGNOSIS — E66.01 CLASS 2 SEVERE OBESITY DUE TO EXCESS CALORIES WITH SERIOUS COMORBIDITY AND BODY MASS INDEX (BMI) OF 37.0 TO 37.9 IN ADULT (HCC): ICD-10-CM

## 2023-05-26 DIAGNOSIS — K21.9 GASTROESOPHAGEAL REFLUX DISEASE WITHOUT ESOPHAGITIS: ICD-10-CM

## 2023-05-26 DIAGNOSIS — J44.9 CHRONIC OBSTRUCTIVE PULMONARY DISEASE, UNSPECIFIED COPD TYPE (HCC): ICD-10-CM

## 2023-05-26 DIAGNOSIS — Z79.4 TYPE 2 DIABETES MELLITUS WITHOUT COMPLICATION, WITH LONG-TERM CURRENT USE OF INSULIN (HCC): ICD-10-CM

## 2023-05-26 DIAGNOSIS — J45.50 SEVERE PERSISTENT ASTHMA, UNSPECIFIED WHETHER COMPLICATED: Primary | ICD-10-CM

## 2023-05-26 DIAGNOSIS — E11.9 TYPE 2 DIABETES MELLITUS WITHOUT COMPLICATION, WITH LONG-TERM CURRENT USE OF INSULIN (HCC): ICD-10-CM

## 2023-05-26 DIAGNOSIS — Z86.73 HX OF STROKE WITHOUT RESIDUAL DEFICITS: ICD-10-CM

## 2023-05-26 DIAGNOSIS — I11.9 HYPERTENSIVE HEART DISEASE WITHOUT HEART FAILURE: ICD-10-CM

## 2023-05-26 DIAGNOSIS — M15.9 PRIMARY OSTEOARTHRITIS INVOLVING MULTIPLE JOINTS: ICD-10-CM

## 2023-05-26 PROCEDURE — 6360000002 HC RX W HCPCS: Performed by: INTERNAL MEDICINE

## 2023-05-26 PROCEDURE — 96372 THER/PROPH/DIAG INJ SC/IM: CPT

## 2023-05-26 PROCEDURE — 99214 OFFICE O/P EST MOD 30 MIN: CPT | Performed by: SURGERY

## 2023-05-26 RX ORDER — ALBUTEROL SULFATE 90 UG/1
4 AEROSOL, METERED RESPIRATORY (INHALATION) PRN
Status: CANCELLED | OUTPATIENT
Start: 2023-06-09

## 2023-05-26 RX ORDER — ACETAMINOPHEN 325 MG/1
650 TABLET ORAL
Status: CANCELLED | OUTPATIENT
Start: 2023-06-09

## 2023-05-26 RX ORDER — DIPHENHYDRAMINE HYDROCHLORIDE 50 MG/ML
50 INJECTION INTRAMUSCULAR; INTRAVENOUS
Status: CANCELLED | OUTPATIENT
Start: 2023-06-09

## 2023-05-26 RX ORDER — EPINEPHRINE 1 MG/ML
0.3 INJECTION, SOLUTION, CONCENTRATE INTRAVENOUS PRN
Status: CANCELLED | OUTPATIENT
Start: 2023-06-09

## 2023-05-26 RX ORDER — ONDANSETRON 2 MG/ML
8 INJECTION INTRAMUSCULAR; INTRAVENOUS
Status: CANCELLED | OUTPATIENT
Start: 2023-06-09

## 2023-05-26 RX ORDER — SODIUM CHLORIDE 9 MG/ML
INJECTION, SOLUTION INTRAVENOUS CONTINUOUS
Status: CANCELLED | OUTPATIENT
Start: 2023-06-09

## 2023-05-26 RX ADMIN — OMALIZUMAB 225 MG: 150 INJECTION, SOLUTION SUBCUTANEOUS at 10:56

## 2023-05-26 ASSESSMENT — PAIN - FUNCTIONAL ASSESSMENT: PAIN_FUNCTIONAL_ASSESSMENT: NONE - DENIES PAIN

## 2023-05-26 NOTE — PROGRESS NOTES
Bariatric Surgery Progress Note    CC: Preop appointment for bariatric surgery  Subjective:     Patient presents for a preop appointment for bariatric surgery. REVIEW:    Sleep Study on 3/17/2021: Diagnosed with KAREN, utilizes BiPAP   Infusion center for Xolair administration   Pulmonary-COPD    GI for reflux-EGD on 2/28/2023, pending report    Endocrinology- DMII     Colonoscopy 7/27/2020-  Findings:  - Hemorrhoids were found on perianal exam.  - Multiple small and large-mouthed diverticula were found in the sigmoid colon, descending colon, splenic flexure,  transverse colon, hepatic flexure and ascending colon. There was no evidence of diverticular bleeding. Repeat in 10 years. Mammogram 1/13/2022-  No evidence for malignancy. Recommend routine annual followup. Hx of stroke in 2020- Report no residual deficits. THINKS SHE HAS STOPPED PLAVIX     Abd CT 2/6/2023  IMPRESSION:       1. Small hiatal hernia. 2. Left renal lesion likely representing cyst could be further evaluated with   ultrasound to help exclude solid lesion. 3. Hysterectomy. 4. Atherosclerotic vascular disease. 5. Colonic diverticulosis without evidence of diverticulitis. 6. Gastric lipoma at fundal wall superiorly. 7. High density within gastric fundus likely represents ingested material.   Active GI bleeding significantly less likely but correlation with clinical data   needed. 8. Degenerative disc disease lumbar spine. Abd US 12/30/2022  IMPRESSION        1. Simple bilateral renal cysts. 2. No hydronephrosis.      Lab review:  H pylori negative  Lipid panel normal  CMP unconcerning other than hyperglycemia  A1c 7.6%  CBC PENDING  TSH PENDING  B1 PENDING  B12 PENDING  Vitamin D PENDING  Iron studies PENDING    Nutrition: Finished 3/6 required nutrition sessions and cleared by dietitian    Psych: PENDING mandatory pre-bariatric surgery psychological evaluation    PCP: Clearance letter/note

## 2023-05-26 NOTE — PROGRESS NOTES
[facility] Progress Note    Date: May 26, 2023    Name: Joseph Holden    MRN: [id number]         : 1960    Ms. Ashley Gutierrez arrived in the [facility] today at [phone], in stable condition, here for her XOLAIR INJECTIONS (EVERY 2 WEEKS). She was assessed and education was provided. Ms. Alok Macdonald vitals were reviewed. Vitals:    23 1045   BP: (!) 179/80   Pulse: 65   Resp: 16   Temp: 98.5 °F (36.9 °C)   SpO2: 100%       Ms. Ashley Gutierrez stated that she was doing well, and voiced no major complaints. And she also stated that she has been tolerating the XOLAIR injections well and without any problems. Omalizumab (XOLAIR) 225 mg TOTAL DOSE, was administered in 2 divided SQ injections at 1056 as follows: 150 mg SQ in the back of her RIGHT ARM, and 75 mg SQ in the back of her LEFT ARM. Ms. Ashley Gutierrez tolerated well and voiced no complaints. Ms. Ashley Gutierrez was discharged from [place] [address] in stable condition at 1105. She is to return in 2 weeks, on Friday, 23 at 1400, for her next appointment, for her next XOLAIR injection.      Rosario Huerta RN  May 26, 2023  10:55 AM

## 2023-05-26 NOTE — PROGRESS NOTES
Katelynn Weston is a 60 y.o. female (: 1960)     Chief Complaint   Patient presents with    Follow-up     Mid trial        Medication list and allergies have been reviewed with Katelynn Weston and updated as of today's date. I have gone over all Medical, Surgical and Social History with Katelynn Weston and updated/added the information accordingly. 1. Have you been to the ER, urgent care clinic since your last visit? Hospitalized since your last visit? No    2. Have you seen or consulted any other health care providers outside of the [address] [place] [address] since your last visit? Include any pap smears or colon screening.  No

## 2023-06-05 ENCOUNTER — TELEPHONE (OUTPATIENT)
Age: 63
End: 2023-06-05

## 2023-06-05 NOTE — TELEPHONE ENCOUNTER
The patient called requesting central scheduling number. She wanted to rescheduled her appt for UGI, because she stated that she didn't have anyone to go with her. By reviewing the patient's order, I realized that she got confused with the EGD that will need to be scheduled. I explained to her that she could keep her appt for UGI, no need to have a . Arrival time one hour earlier, check @ the registration desk for UGI, but first to proceed to do lab work, then UGI, and last, to do  EKG. Also, I explained to the patient that she will be informed about the appt for EGD, then, she will need a . I stated that we usually schedule those appts on the third Friday of the month. The patient verbalized understanding.      [facility]

## 2023-06-07 NOTE — PROGRESS NOTES
Problem: Self Care Deficits Care Plan (Adult)  Goal: *Therapy Goal (Edit Goal, Insert Text)  Description: Occupational Therapy Goals   Short term = Long Term Goals  Initiated 2020 and to be accomplished within 2 week(s) 09/10/2020  1. Pt will perform self-feeding with I.   2. Pt will perform grooming with I.  3. Pt will perform UB bathing with Mod I.  4. Pt will perform LB bathing with Mod I.  5. Pt will perform tub/shower transfer with Mod I.   6. Pt will perform UB dressing with I.  7. Pt will perform LB dressing with I.  8. Pt will perform toileting task with mod I.   9. Pt will perform toilet transfer with mod I. Outcome: Progressing Towards Goal  OCCUPATIONAL THERAPY TREATMENT    Patient: [facility]   60 y.o. Patient identified with name and : yes    Date: 2020    First Tx Session  Time In: 084  Time Out[de-identified] 4471    Second Tx Session  Time In: 1330  Time Out[de-identified] 1400    Diagnosis: Acute ischemic stroke [facility]) [I63.9]   Precautions: Fall  Chart, occupational therapy assessment, plan of care, and goals were reviewed. Pain:  Pt reports 0/10 pain or discomfort prior to treatment. Pt reports 0/10 pain or discomfort post treatment. Intervention Provided: N/A      SUBJECTIVE:   Patient stated I won't  the shower, I don't want to fall.     OBJECTIVE DATA SUMMARY:     THERAPEUTIC ACTIVITY Daily Assessment    theraputty with beads-with LUE to improve strength needed to perform shoe tying with independence. Clothespin tree with 1# wrist cuffs-- to improve BUE strength needed to perform kitchen task of item retrieval from upper cabinets. 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 IADL Daily Assessment    Laundry task- pt demonstrated ability to load top loading washer with close supervision while standing at washer.  Pt able to maintain balance Psychiatric Follow Up Progress Note     Patient: Lincoln Kenney Date: 2023   : 1962    60 year old male      This visit was performed:  [x] In person at [facility], [place], [place]  [] This visit was performed via  with patient's verbal consent.   Clinician Location:    Patient Location:    Lincoln is in [place] and identity has been established.   He was informed that consent to treat includes permission to submit charges to the applicable insurance on file.  Lincoln was advised regarding the potential risk inherent in video visits, as the assessment may be limited due to what can be seen on the screen which potentially results in an incomplete assessment; as well as either of us may discontinue the video visit if it is    Initial psychiatric evaluation:  2022  Previous psychiatric med trials:   • Nortriptyline  • Bupropion  • Fluoxetine  • Doxepin   • Diazepam  • Clonazepam  • Lorazepam     Significant Medical History:  DM, CAD, fibromyalgia  Current Psychiatric Providers:  • none    At last appointment on 2023: no changes    Chief complaint: \"not sleeping well, little motivation.\"  Reports some swirling thoughts    Interval History: Lincoln is a 60 year old White  male with diagnoses of MDD, JONNY, OCD who presents today reporting symptoms of lagging energy and poor sleep.   Patient is currently on   • Perphenazine 2mg prn  • doxepin. 1.5-3mg hs  • Bupropion  mg  • Clonazepam 0.5 mg 3 times daily p.r.n.  • Fluoxetine 20 mg in the morning, 10 mg after lunch    Current medication/s- Patient is taking as prescribed. Medication/s somewhat effective. Side effects: none. Missed doses: none.    Psychiatric Review of Symptoms:  Current symptoms have been stable for the last 4 months   Current mood: \"good.\"    Appetite: \"fine.\"    Energy: \"low.\"  Concentration: \"good.\" Motivation is \"low.\"   Denies anhedonia.   Sleep:  Obtains 4-6 hours per night with fragmented  sleep/middle insomnia.   Denies suicidal ideation, plan, or intent.    Denies homicidal ideation, plan, or intent..    ROS: .Denies fever, malaise, rash, diarrhea/constipation/nausea, joint or muscle pain, headaches, chest pain or SOB.    EXAM:   Vitals: There were no vitals taken for this visit.               Mental Status Exam  Appearance:  Well-groomed, good eye contact, appears stated age.   Behavior:  Calm, pleasant, interactive.   Gait:  Not assessed   Cooperativity:  Cooperative, forthcoming, appears reliable.    Speech:  Normal rate, tone and volume.   Language:  No abnormality noted.    Mood:  Noted in History of Present Illness.  Affect:  Mood-congruent, stable, normal range.  Thought Process:  Linear, logical, goal-directed.    Thought Content:  No overt delusions or abnormality noted.    Perception:  No hallucinations, not responding to internal stimuli.   Consciousness:  Awake and alert.   Orientation:  Oriented to person, place and time.   Musculoskeletal: No abnormal or involuntary muscle movements observed.  Memory:  No apparent impairments in short-term recall, no apparent impairments in long-term recall  Attention:  Good, no fluctuation or obvious deficit noted and able to follow the conversation.   Fund of Knowledge:  Consistent with education and experiences as evidenced by vocabulary.   Insight:  Good, based on appropriate recognition of impact of psychiatric symptoms and need for treatment.   Judgment:  Good, based on recent decisions.  Safety:  Noted in History of Present Illness.  Motivation to pursue treatment:  Good.    Suicide Risk Assessment:  Risk Factors: mood disorder, hopelessness, no children under 18 in the home and medical illness.  Protective Factors/Strengths: evidence of past coping strategies, Temple and/or spiritual beliefs, responsibility to children or beloved pets, no plan or intent, access to healthcare and able to make needs known.  Risk Level: low.  Safety Plan:  has  while pouring and adding laundry detergent to washer. FEEDING/EATING Daily Assessment    Feeding/Eating  Feeding/Eating Assistance: 0 (Not tested)     GROOMING Daily Assessment    Grooming  Grooming Assistance : 5 (Supervision)  Comments: distant supervision     UPPER BODY BATHING Daily Assessment    Upper Body Bathing  Bathing Assistance, Upper: 5 (Supervision)  Position Performed: Seated in chair  Adaptive Equipment: Grab bar;Tub bench  Comments: distant supervision (cued to not stand up)      LOWER BODY BATHING Daily Assessment    Lower Body Bathing  Bathing Assistance, Lower : 5 (Supervision)  Adaptive Equipment: Grab bar  Position Performed: Seated in chair  Adaptive Equipment: Grab bar;Tub bench  Comments: distant supervision     TOILETING Daily Assessment    Toileting  Toileting Assistance (FIM Score): 0 (Not tested)     UPPER BODY DRESSING Daily Assessment    Upper Body Dressing   Dressing Assistance : 5 (Supervision)  Comments: distant supervision     LOWER BODY DRESSING Daily Assessment    Lower Body Dressing   Dressing Assistance : 5 (Supervision)  Leg Crossed Method Used: No  Position Performed: Seated edge of bed  Comments: distant supervision     MOBILITY/TRANSFERS Daily Assessment    Functional Transfers  Amount of Assistance Required: 0 (Not tested)  Amount of Assistance Required: 5 (Supervision/setup)(distant supervision)  Adaptive Equipment: Tub transfer bench;Walker (comment)(rolling walker)       ASSESSMENT:  Pt progressing quickly with ADL independence and will continue to work on IADL independence since patient will be home alone when fiance returns to work. Pt able to demonstrate adequate safety awareness to allow for distant supervision during ADL tasks, will continue to work until patient is safe to allow for modified independence prior to discharge on 9/4/2020.    Progression toward goals:  [x]          Improving appropriately and progressing toward goals  []          Improving slowly and progressing toward goals  []          Not making progress toward goals and plan of care will be adjusted     PLAN:  Patient continues to benefit from skilled intervention to address the above impairments. Continue treatment per established plan of care. Discharge Recommendations:  Outpatient  Further Equipment Recommendations for Discharge:  transfer bench and rolling walker     Activity Tolerance:  good      Estimated LOS: 2 weeks    Please refer to the flowsheet for vital signs taken during this treatment. After treatment:   [x]  Patient left in no apparent distress sitting up in chair   []  Patient left in no apparent distress in bed  [x]  Call bell left within reach  []  Nursing notified  []  Caregiver present  []  Bed alarm activated    COMMUNICATION/EDUCATION:   [x] Home safety education was provided and the patient/caregiver indicated understanding. [] Patient/family have participated as able in goal setting and plan of care. [] Patient/family agree to work toward stated goals and plan of care. [] Patient understands intent and goals of therapy, but is neutral about his/her participation. [] Patient is unable to participate in goal setting and plan of care.       Radha Gaspar crisis resource information and safe people to whom he/she can reach out     Medication consent signed for new medication: Not needed   Controlled substance contract signed: Signed 2/1/2023  Last urine drug screen:   Next urine drug screen due: Ordered    Medication labs (lipid panel, TSH, A1c/glucose):    AIMS: Due  Not needed   Tx plan (every 6 visits or 90 days which ever is longer) signed on: 2/1/2023, Visit # 2      Assessment: Lincoln presents for management of diagnoses listed below.  He reports his energy is low, sleep is not consistently good, awakening during the night.  Sometimes experiences more ruminating thoughts, occasional nightmares.  He and his wife are anticipating traveling to [place] for about 1 month.  Will wean and stop doxepin and start clonidine, follow-up in 3 months    Diagnoses:   • Major Depressive Disorder, Recurrent, Moderate (F33.1)  • Obsessive-Compulsive Disorder (F42.9)  • Generalized Anxiety Disorder (F41.1)    Medication Options:    • Start clonidine to enhance sleep  • Discontinue doxepin    Medication Plan B:  Increase fluoxetine or bupropion    Medical Decision Making and Plan of Treatment:    Follow-up in 3 months  Continue bupropion  mg  Continue clonazepam 0.5 mg 3 times daily p.r.n.  Continue fluoxetine 20 mg in the morning, 10 mg after lunch  Continue perphenazine 2 mg daily  Discontinue doxepin- wean and stop  Start clonidine 0.1-0.2 mg at nighttime    Orders Placed:    Clonidine 0.1 mg, 1-2 tablets at nighttime, dispense 60 refill 3  Doxepin 3 mg, 1/2 tablet every other day for 4 days then discontinue, script not printed  Perphenazine 2 mg daily p.r.n., dispense 30 refill 5    Interventions:    Reviewed data collected during assessment process.    Discussed recommended medications and alternative options, risks, benefits and side effects.   Discussed adjunct /alternative treatments, including the role of psychotherapy in treating mental health illnesses.     Discussed collaborative treatment plan.     Praised patient's effort to seek help, extended hope for management and control of symptoms.      Follow-Up:  Patient will follow up with writer in 3 months.  Encouraged to call the office with any questions, comments, concerns or to reschedule an earlier appointment with writer.    Prep time:  10 min  Appointment time:   20 min  Documentation time:  15 min  Total time spent:   45 min    This information is confidential and disclosure without patient consent or statutory authorization is prohibited by law.    Cailin Ramirez RN, MSN, APNP, PMHNP-BC

## 2023-06-09 ENCOUNTER — HOSPITAL ENCOUNTER (OUTPATIENT)
Facility: HOSPITAL | Age: 63
Setting detail: INFUSION SERIES
End: 2023-06-09
Payer: MEDICAID

## 2023-06-09 VITALS
DIASTOLIC BLOOD PRESSURE: 81 MMHG | TEMPERATURE: 98.3 F | SYSTOLIC BLOOD PRESSURE: 168 MMHG | RESPIRATION RATE: 16 BRPM | HEART RATE: 61 BPM | OXYGEN SATURATION: 96 %

## 2023-06-09 DIAGNOSIS — J45.50 SEVERE PERSISTENT ASTHMA, UNSPECIFIED WHETHER COMPLICATED: Primary | ICD-10-CM

## 2023-06-09 PROCEDURE — 96372 THER/PROPH/DIAG INJ SC/IM: CPT

## 2023-06-09 PROCEDURE — 6360000002 HC RX W HCPCS: Performed by: INTERNAL MEDICINE

## 2023-06-09 RX ORDER — ONDANSETRON 2 MG/ML
8 INJECTION INTRAMUSCULAR; INTRAVENOUS
Status: CANCELLED | OUTPATIENT
Start: 2023-06-23

## 2023-06-09 RX ORDER — EPINEPHRINE 1 MG/ML
0.3 INJECTION, SOLUTION, CONCENTRATE INTRAVENOUS PRN
Status: CANCELLED | OUTPATIENT
Start: 2023-06-23

## 2023-06-09 RX ORDER — DIPHENHYDRAMINE HYDROCHLORIDE 50 MG/ML
50 INJECTION INTRAMUSCULAR; INTRAVENOUS
Status: CANCELLED | OUTPATIENT
Start: 2023-06-23

## 2023-06-09 RX ORDER — ACETAMINOPHEN 325 MG/1
650 TABLET ORAL
Status: CANCELLED | OUTPATIENT
Start: 2023-06-23

## 2023-06-09 RX ORDER — ALBUTEROL SULFATE 90 UG/1
4 AEROSOL, METERED RESPIRATORY (INHALATION) PRN
Status: CANCELLED | OUTPATIENT
Start: 2023-06-23

## 2023-06-09 RX ORDER — SODIUM CHLORIDE 9 MG/ML
INJECTION, SOLUTION INTRAVENOUS CONTINUOUS
Status: CANCELLED | OUTPATIENT
Start: 2023-06-23

## 2023-06-09 RX ADMIN — OMALIZUMAB 225 MG: 150 INJECTION, SOLUTION SUBCUTANEOUS at 11:35

## 2023-06-09 ASSESSMENT — PAIN - FUNCTIONAL ASSESSMENT: PAIN_FUNCTIONAL_ASSESSMENT: NONE - DENIES PAIN

## 2023-06-09 NOTE — PROGRESS NOTES
[facility] Progress Note    Date: 2023    Name: Georgina Banks    MRN: [id number]         : 1960    Ms. Ashley Gutierrez arrived in the [facility] today at 1120, in stable condition, here for her XOLAIR INJECTIONS (EVERY 2 WEEKS). She was assessed and education was provided. Ms. Osito Umanzor vitals were reviewed. Vitals:    23 1120   BP: (!) 168/81   Pulse: 61   Resp: 16   Temp: 98.3 °F (36.8 °C)   SpO2: 96%       Ms. Ashley Gutierrez stated that she was doing well, and voiced no major complaints. And she also stated that she has been tolerating the XOLAIR injections well and without any problems. Omalizumab (XOLAIR) 225 mg TOTAL DOSE, was administered in 2 divided SQ injections at 1135 as follows: 150 mg SQ in the back of her RIGHT ARM, and 75 mg SQ in the back of her LEFT ARM. Ms. Ashley Gutierrez tolerated well and voiced no complaints. Ms. Ashley Gutierrez was discharged from [place] [address] in stable condition at 1140. She is to return in 2 weeks, on Friday, 23 at 0930, for her next appointment, for her next XOLAIR injection.      Jacinda Quezada RN  2023  11:33 AM

## 2023-06-20 DIAGNOSIS — M54.32 SCIATICA, LEFT SIDE: ICD-10-CM

## 2023-06-20 DIAGNOSIS — M54.50 LOW BACK PAIN, UNSPECIFIED: ICD-10-CM

## 2023-06-21 RX ORDER — GABAPENTIN 600 MG/1
TABLET ORAL
Qty: 30 TABLET | OUTPATIENT
Start: 2023-06-21

## 2023-06-23 ENCOUNTER — HOSPITAL ENCOUNTER (OUTPATIENT)
Facility: HOSPITAL | Age: 63
End: 2023-06-23
Payer: MEDICAID

## 2023-06-23 ENCOUNTER — HOSPITAL ENCOUNTER (OUTPATIENT)
Facility: HOSPITAL | Age: 63
Setting detail: INFUSION SERIES
End: 2023-06-23
Payer: MEDICAID

## 2023-06-23 VITALS
HEART RATE: 70 BPM | OXYGEN SATURATION: 97 % | TEMPERATURE: 97.7 F | RESPIRATION RATE: 18 BRPM | SYSTOLIC BLOOD PRESSURE: 178 MMHG | DIASTOLIC BLOOD PRESSURE: 78 MMHG

## 2023-06-23 DIAGNOSIS — Z79.4 TYPE 2 DIABETES MELLITUS WITHOUT COMPLICATION, WITH LONG-TERM CURRENT USE OF INSULIN (HCC): ICD-10-CM

## 2023-06-23 DIAGNOSIS — Z86.73 HX OF STROKE WITHOUT RESIDUAL DEFICITS: ICD-10-CM

## 2023-06-23 DIAGNOSIS — Z01.818 PRE-OP TESTING: ICD-10-CM

## 2023-06-23 DIAGNOSIS — E66.01 CLASS 2 SEVERE OBESITY DUE TO EXCESS CALORIES WITH SERIOUS COMORBIDITY AND BODY MASS INDEX (BMI) OF 37.0 TO 37.9 IN ADULT (HCC): ICD-10-CM

## 2023-06-23 DIAGNOSIS — K21.9 GASTROESOPHAGEAL REFLUX DISEASE WITHOUT ESOPHAGITIS: ICD-10-CM

## 2023-06-23 DIAGNOSIS — I11.9 HYPERTENSIVE HEART DISEASE WITHOUT HEART FAILURE: ICD-10-CM

## 2023-06-23 DIAGNOSIS — J44.9 CHRONIC OBSTRUCTIVE PULMONARY DISEASE, UNSPECIFIED COPD TYPE (HCC): ICD-10-CM

## 2023-06-23 DIAGNOSIS — Z71.89 ENCOUNTER FOR PRE-BARIATRIC SURGERY COUNSELING AND EDUCATION: ICD-10-CM

## 2023-06-23 DIAGNOSIS — M15.9 PRIMARY OSTEOARTHRITIS INVOLVING MULTIPLE JOINTS: ICD-10-CM

## 2023-06-23 DIAGNOSIS — J45.50 SEVERE PERSISTENT ASTHMA, UNSPECIFIED WHETHER COMPLICATED: Primary | ICD-10-CM

## 2023-06-23 DIAGNOSIS — Z79.01 ON CLOPIDOGREL THERAPY: ICD-10-CM

## 2023-06-23 DIAGNOSIS — E11.9 TYPE 2 DIABETES MELLITUS WITHOUT COMPLICATION, WITH LONG-TERM CURRENT USE OF INSULIN (HCC): ICD-10-CM

## 2023-06-23 LAB
25(OH)D3 SERPL-MCNC: 32.5 NG/ML (ref 30–100)
ALBUMIN SERPL-MCNC: 3.5 G/DL (ref 3.4–5)
ALBUMIN/GLOB SERPL: 0.9 (ref 0.8–1.7)
ALP SERPL-CCNC: 56 U/L (ref 45–117)
ALT SERPL-CCNC: 18 U/L (ref 13–56)
ANION GAP SERPL CALC-SCNC: 5 MMOL/L (ref 3–18)
AST SERPL-CCNC: 9 U/L (ref 10–38)
BASOPHILS # BLD: 0.1 K/UL (ref 0–0.1)
BASOPHILS NFR BLD: 1 % (ref 0–2)
BILIRUB SERPL-MCNC: 0.4 MG/DL (ref 0.2–1)
BUN SERPL-MCNC: 16 MG/DL (ref 7–18)
BUN/CREAT SERPL: 20 (ref 12–20)
CALCIUM SERPL-MCNC: 9.4 MG/DL (ref 8.5–10.1)
CHLORIDE SERPL-SCNC: 107 MMOL/L (ref 100–111)
CO2 SERPL-SCNC: 28 MMOL/L (ref 21–32)
CREAT SERPL-MCNC: 0.81 MG/DL (ref 0.6–1.3)
DIFFERENTIAL METHOD BLD: ABNORMAL
EKG ATRIAL RATE: 54 BPM
EKG DIAGNOSIS: NORMAL
EKG P AXIS: 91 DEGREES
EKG P-R INTERVAL: 290 MS
EKG Q-T INTERVAL: 442 MS
EKG QRS DURATION: 106 MS
EKG QTC CALCULATION (BAZETT): 419 MS
EKG R AXIS: 45 DEGREES
EKG T AXIS: 103 DEGREES
EKG VENTRICULAR RATE: 54 BPM
EOSINOPHIL # BLD: 0.1 K/UL (ref 0–0.4)
EOSINOPHIL NFR BLD: 2 % (ref 0–5)
ERYTHROCYTE [DISTWIDTH] IN BLOOD BY AUTOMATED COUNT: 13.8 % (ref 11.6–14.5)
EST. AVERAGE GLUCOSE BLD GHB EST-MCNC: 192 MG/DL
FERRITIN SERPL-MCNC: 42 NG/ML (ref 8–388)
FOLATE SERPL-MCNC: 7.5 NG/ML (ref 3.1–17.5)
GLOBULIN SER CALC-MCNC: 3.8 G/DL (ref 2–4)
GLUCOSE SERPL-MCNC: 142 MG/DL (ref 74–99)
HBA1C MFR BLD: 8.3 % (ref 4.2–5.6)
HCT VFR BLD AUTO: 30.7 % (ref 35–45)
HGB BLD-MCNC: 9.7 G/DL (ref 12–16)
IMM GRANULOCYTES # BLD AUTO: 0 K/UL (ref 0–0.04)
IMM GRANULOCYTES NFR BLD AUTO: 0 % (ref 0–0.5)
IRON SERPL-MCNC: 50 UG/DL (ref 50–175)
LYMPHOCYTES # BLD: 1.3 K/UL (ref 0.9–3.6)
LYMPHOCYTES NFR BLD: 19 % (ref 21–52)
MCH RBC QN AUTO: 27.4 PG (ref 24–34)
MCHC RBC AUTO-ENTMCNC: 31.6 G/DL (ref 31–37)
MCV RBC AUTO: 86.7 FL (ref 78–100)
MONOCYTES # BLD: 0.7 K/UL (ref 0.05–1.2)
MONOCYTES NFR BLD: 10 % (ref 3–10)
NEUTS SEG # BLD: 4.6 K/UL (ref 1.8–8)
NEUTS SEG NFR BLD: 69 % (ref 40–73)
NRBC # BLD: 0 K/UL (ref 0–0.01)
NRBC BLD-RTO: 0 PER 100 WBC
PLATELET # BLD AUTO: 310 K/UL (ref 135–420)
PMV BLD AUTO: 10.9 FL (ref 9.2–11.8)
POTASSIUM SERPL-SCNC: 3.6 MMOL/L (ref 3.5–5.5)
PROT SERPL-MCNC: 7.3 G/DL (ref 6.4–8.2)
RBC # BLD AUTO: 3.54 M/UL (ref 4.2–5.3)
SODIUM SERPL-SCNC: 140 MMOL/L (ref 136–145)
TSH SERPL DL<=0.05 MIU/L-ACNC: 1.59 UIU/ML (ref 0.36–3.74)
VIT B12 SERPL-MCNC: 309 PG/ML (ref 211–911)
WBC # BLD AUTO: 6.7 K/UL (ref 4.6–13.2)

## 2023-06-23 PROCEDURE — 93005 ELECTROCARDIOGRAM TRACING: CPT

## 2023-06-23 PROCEDURE — 71046 X-RAY EXAM CHEST 2 VIEWS: CPT

## 2023-06-23 PROCEDURE — 83036 HEMOGLOBIN GLYCOSYLATED A1C: CPT

## 2023-06-23 PROCEDURE — 83540 ASSAY OF IRON: CPT

## 2023-06-23 PROCEDURE — 80053 COMPREHEN METABOLIC PANEL: CPT

## 2023-06-23 PROCEDURE — 6360000002 HC RX W HCPCS: Performed by: INTERNAL MEDICINE

## 2023-06-23 PROCEDURE — 84443 ASSAY THYROID STIM HORMONE: CPT

## 2023-06-23 PROCEDURE — 82306 VITAMIN D 25 HYDROXY: CPT

## 2023-06-23 PROCEDURE — 85025 COMPLETE CBC W/AUTO DIFF WBC: CPT

## 2023-06-23 PROCEDURE — 82746 ASSAY OF FOLIC ACID SERUM: CPT

## 2023-06-23 PROCEDURE — 36415 COLL VENOUS BLD VENIPUNCTURE: CPT

## 2023-06-23 PROCEDURE — 80307 DRUG TEST PRSMV CHEM ANLYZR: CPT

## 2023-06-23 PROCEDURE — 82607 VITAMIN B-12: CPT

## 2023-06-23 PROCEDURE — 84425 ASSAY OF VITAMIN B-1: CPT

## 2023-06-23 PROCEDURE — 82728 ASSAY OF FERRITIN: CPT

## 2023-06-23 PROCEDURE — 96372 THER/PROPH/DIAG INJ SC/IM: CPT

## 2023-06-23 RX ORDER — DIPHENHYDRAMINE HYDROCHLORIDE 50 MG/ML
50 INJECTION INTRAMUSCULAR; INTRAVENOUS
Status: CANCELLED | OUTPATIENT
Start: 2023-07-07

## 2023-06-23 RX ORDER — ONDANSETRON 2 MG/ML
8 INJECTION INTRAMUSCULAR; INTRAVENOUS
Status: CANCELLED | OUTPATIENT
Start: 2023-07-07

## 2023-06-23 RX ORDER — ALBUTEROL SULFATE 90 UG/1
4 AEROSOL, METERED RESPIRATORY (INHALATION) PRN
Status: CANCELLED | OUTPATIENT
Start: 2023-07-07

## 2023-06-23 RX ORDER — ACETAMINOPHEN 325 MG/1
650 TABLET ORAL
Status: CANCELLED | OUTPATIENT
Start: 2023-07-07

## 2023-06-23 RX ORDER — EPINEPHRINE 1 MG/ML
0.3 INJECTION, SOLUTION, CONCENTRATE INTRAVENOUS PRN
Status: CANCELLED | OUTPATIENT
Start: 2023-07-07

## 2023-06-23 RX ORDER — SODIUM CHLORIDE 9 MG/ML
INJECTION, SOLUTION INTRAVENOUS CONTINUOUS
Status: CANCELLED | OUTPATIENT
Start: 2023-07-07

## 2023-06-23 RX ADMIN — OMALIZUMAB 225 MG: 150 INJECTION, SOLUTION SUBCUTANEOUS at 08:09

## 2023-06-23 NOTE — PROGRESS NOTES
[facility] Progress Note    Date: 2023    Name: Honey Foster    MRN: [id number]         : 1960    Xolair injection Q2 weeks    Ms. Pabon to [facility], ambulatory at [address] [place] [address]. Pt was assessed and education was provided. Patient denies any complaints or concerns. Patient states she has not taken her B/P medications yet today, denies any headache/vision changes. Pt also reports she learned of a death this morning which is probably why her BP is high. Pt stated she will take her BP medication when she gets home & will monitor her BP throughout the day. Ms. Gordon Hassan vitals were reviewed. Vitals:    23 0804   BP: (!) 178/78   Pulse: 70   Resp: 18   Temp: 97.7 °F (36.5 °C)   SpO2: 97%     Omalizumab (Xolair) 225 mg injection total dose given in 2 separate injections as follows:  150 mg given SQ in RIGHT upper back of arm and 75 mg given SQ in LEFT upper back of arm per order. Band-aids applied. Ms. Brandy Wallace tolerated injection, and had no complaints at this time. Patient armband removed and shredded. Ms. Brandy Wallace was discharged from [place] [address] in stable condition at [facility] 134. She is to return on 2023 at 1400 for her next Xolair injection appointment.     Gardenia Ace  2023  8:15 AM

## 2023-06-24 LAB
AMPHETAMINES UR QL SCN: NEGATIVE NG/ML
BARBITURATES UR QL SCN: NEGATIVE NG/ML
BENZODIAZ UR QL SCN: NEGATIVE NG/ML
BUPRENORPHINE UR QL: NEGATIVE NG/ML
BZE UR QL SCN: POSITIVE NG/ML
CANNABINOIDS UR QL SCN: POSITIVE NG/ML
CREAT UR-MCNC: 72.1 MG/DL (ref 20–300)
LABORATORY COMMENT REPORT: ABNORMAL
METHADONE UR QL SCN: NEGATIVE NG/ML
OPIATES UR QL SCN: NEGATIVE NG/ML
OXYCODONE+OXYMORPHONE UR QL SCN: NEGATIVE NG/ML
PCP UR QL: NEGATIVE NG/ML
PH UR: 6.2 (ref 4.5–8.9)
PROPOXYPH UR QL SCN: NEGATIVE NG/ML

## 2023-06-26 LAB — VIT B1 BLD-SCNC: 87.9 NMOL/L (ref 66.5–200)

## 2023-07-06 ENCOUNTER — TELEPHONE (OUTPATIENT)
Age: 63
End: 2023-07-06

## 2023-07-06 NOTE — TELEPHONE ENCOUNTER
Pt made aware of recent lab results. Had inadvertently taken shots at a party containing the substances that showed positive. Denies any other use. Verbalized understanding that she may not continue the preoperative bariatric program at this time. Is currently on Ozempic doing well with weight loss and was already reconsidering having surgery.

## 2023-07-07 ENCOUNTER — HOSPITAL ENCOUNTER (OUTPATIENT)
Facility: HOSPITAL | Age: 63
Setting detail: INFUSION SERIES
End: 2023-07-07
Payer: MEDICAID

## 2023-07-14 ENCOUNTER — HOSPITAL ENCOUNTER (OUTPATIENT)
Facility: HOSPITAL | Age: 63
Setting detail: INFUSION SERIES
End: 2023-07-14
Payer: MEDICAID

## 2023-07-14 VITALS
OXYGEN SATURATION: 95 % | RESPIRATION RATE: 18 BRPM | HEART RATE: 76 BPM | DIASTOLIC BLOOD PRESSURE: 67 MMHG | TEMPERATURE: 97.9 F | SYSTOLIC BLOOD PRESSURE: 165 MMHG

## 2023-07-14 DIAGNOSIS — J45.50 SEVERE PERSISTENT ASTHMA, UNSPECIFIED WHETHER COMPLICATED: Primary | ICD-10-CM

## 2023-07-14 PROCEDURE — 6360000002 HC RX W HCPCS

## 2023-07-14 PROCEDURE — 96372 THER/PROPH/DIAG INJ SC/IM: CPT

## 2023-07-14 RX ORDER — DIPHENHYDRAMINE HYDROCHLORIDE 50 MG/ML
50 INJECTION INTRAMUSCULAR; INTRAVENOUS
Status: CANCELLED | OUTPATIENT
Start: 2023-07-21

## 2023-07-14 RX ORDER — ONDANSETRON 2 MG/ML
8 INJECTION INTRAMUSCULAR; INTRAVENOUS
Status: CANCELLED | OUTPATIENT
Start: 2023-07-21

## 2023-07-14 RX ORDER — EPINEPHRINE 1 MG/ML
0.3 INJECTION, SOLUTION, CONCENTRATE INTRAVENOUS PRN
Status: CANCELLED | OUTPATIENT
Start: 2023-07-21

## 2023-07-14 RX ORDER — SODIUM CHLORIDE 9 MG/ML
INJECTION, SOLUTION INTRAVENOUS CONTINUOUS
Status: CANCELLED | OUTPATIENT
Start: 2023-07-21

## 2023-07-14 RX ORDER — ALBUTEROL SULFATE 90 UG/1
4 AEROSOL, METERED RESPIRATORY (INHALATION) PRN
Status: CANCELLED | OUTPATIENT
Start: 2023-07-21

## 2023-07-14 RX ORDER — ACETAMINOPHEN 325 MG/1
650 TABLET ORAL
Status: CANCELLED | OUTPATIENT
Start: 2023-07-21

## 2023-07-14 RX ADMIN — OMALIZUMAB 225 MG: 150 INJECTION, SOLUTION SUBCUTANEOUS at 08:38

## 2023-07-28 ENCOUNTER — HOSPITAL ENCOUNTER (OUTPATIENT)
Facility: HOSPITAL | Age: 63
Setting detail: INFUSION SERIES
End: 2023-07-28
Payer: MEDICAID

## 2023-07-28 VITALS
TEMPERATURE: 98 F | OXYGEN SATURATION: 99 % | HEART RATE: 63 BPM | SYSTOLIC BLOOD PRESSURE: 162 MMHG | DIASTOLIC BLOOD PRESSURE: 77 MMHG | RESPIRATION RATE: 18 BRPM

## 2023-07-28 DIAGNOSIS — J45.50 SEVERE PERSISTENT ASTHMA, UNSPECIFIED WHETHER COMPLICATED: Primary | ICD-10-CM

## 2023-07-28 PROCEDURE — 6360000002 HC RX W HCPCS

## 2023-07-28 PROCEDURE — 96372 THER/PROPH/DIAG INJ SC/IM: CPT

## 2023-07-28 RX ORDER — SEMAGLUTIDE 0.68 MG/ML
1 INJECTION, SOLUTION SUBCUTANEOUS WEEKLY
COMMUNITY
Start: 2023-07-12

## 2023-07-28 RX ORDER — ALBUTEROL SULFATE 90 UG/1
4 AEROSOL, METERED RESPIRATORY (INHALATION) PRN
Status: CANCELLED | OUTPATIENT
Start: 2023-08-11

## 2023-07-28 RX ORDER — FLUCONAZOLE 150 MG/1
150 TABLET ORAL DAILY
COMMUNITY
Start: 2023-06-12

## 2023-07-28 RX ORDER — ONDANSETRON 2 MG/ML
8 INJECTION INTRAMUSCULAR; INTRAVENOUS
Status: CANCELLED | OUTPATIENT
Start: 2023-08-11

## 2023-07-28 RX ORDER — METHOCARBAMOL 500 MG/1
500 TABLET, FILM COATED ORAL 4 TIMES DAILY
COMMUNITY
Start: 2023-05-31

## 2023-07-28 RX ORDER — DIPHENHYDRAMINE HYDROCHLORIDE 50 MG/ML
50 INJECTION INTRAMUSCULAR; INTRAVENOUS
Status: CANCELLED | OUTPATIENT
Start: 2023-08-11

## 2023-07-28 RX ORDER — NETARSUDIL 0.2 MG/ML
1 SOLUTION/ DROPS OPHTHALMIC; TOPICAL DAILY
COMMUNITY
Start: 2023-07-14

## 2023-07-28 RX ORDER — SODIUM CHLORIDE 9 MG/ML
INJECTION, SOLUTION INTRAVENOUS CONTINUOUS
Status: CANCELLED | OUTPATIENT
Start: 2023-08-11

## 2023-07-28 RX ORDER — EPINEPHRINE 1 MG/ML
0.3 INJECTION, SOLUTION, CONCENTRATE INTRAVENOUS PRN
Status: CANCELLED | OUTPATIENT
Start: 2023-08-11

## 2023-07-28 RX ORDER — PSEUDOEPHED/ACETAMINOPH/DIPHEN 30MG-500MG
1 TABLET ORAL PRN
COMMUNITY
Start: 2023-05-31

## 2023-07-28 RX ORDER — ACETAMINOPHEN 325 MG/1
650 TABLET ORAL
Status: CANCELLED | OUTPATIENT
Start: 2023-08-11

## 2023-07-28 RX ORDER — CHOLECALCIFEROL (VITAMIN D3) 125 MCG
1 CAPSULE ORAL DAILY
COMMUNITY
Start: 2023-06-24

## 2023-07-28 RX ADMIN — OMALIZUMAB 225 MG: 150 INJECTION, SOLUTION SUBCUTANEOUS at 08:26

## 2023-08-10 RX ORDER — GABAPENTIN 600 MG/1
TABLET ORAL
Qty: 30 TABLET | OUTPATIENT
Start: 2023-08-10

## 2023-08-10 NOTE — TELEPHONE ENCOUNTER
Requested Prescriptions     Pending Prescriptions Disp Refills    gabapentin (NEURONTIN) 600 MG tablet [Pharmacy Med Name: GABAPENTIN 600 MG TABLET] 30 tablet      Sig: TAKE 1 TABLET BY MOUTH NIGHTLY FOR 90 DAYS. MAX DAILY AMOUNT: 600 MG. Patient was last seen on 5/25/21. Patient's next appointment None. Patient's choice of pharmacy [address], [place]. The refill request's last refill info on 10/26/22 Qty 90 w/0 refills.

## 2023-08-25 ENCOUNTER — HOSPITAL ENCOUNTER (OUTPATIENT)
Facility: HOSPITAL | Age: 63
Setting detail: INFUSION SERIES
End: 2023-08-25
Payer: MEDICAID

## 2023-08-25 VITALS
TEMPERATURE: 98.3 F | DIASTOLIC BLOOD PRESSURE: 74 MMHG | OXYGEN SATURATION: 96 % | SYSTOLIC BLOOD PRESSURE: 146 MMHG | RESPIRATION RATE: 20 BRPM | HEART RATE: 71 BPM

## 2023-08-25 DIAGNOSIS — J45.50 SEVERE PERSISTENT ASTHMA, UNSPECIFIED WHETHER COMPLICATED: Primary | ICD-10-CM

## 2023-08-25 PROCEDURE — 96372 THER/PROPH/DIAG INJ SC/IM: CPT

## 2023-08-25 PROCEDURE — 6360000002 HC RX W HCPCS

## 2023-08-25 RX ORDER — DIPHENHYDRAMINE HYDROCHLORIDE 50 MG/ML
50 INJECTION INTRAMUSCULAR; INTRAVENOUS
Status: CANCELLED | OUTPATIENT
Start: 2023-09-08

## 2023-08-25 RX ORDER — SODIUM CHLORIDE 9 MG/ML
INJECTION, SOLUTION INTRAVENOUS CONTINUOUS
Status: CANCELLED | OUTPATIENT
Start: 2023-09-08

## 2023-08-25 RX ORDER — ALBUTEROL SULFATE 90 UG/1
4 AEROSOL, METERED RESPIRATORY (INHALATION) PRN
Status: CANCELLED | OUTPATIENT
Start: 2023-09-08

## 2023-08-25 RX ORDER — ONDANSETRON 2 MG/ML
8 INJECTION INTRAMUSCULAR; INTRAVENOUS
Status: CANCELLED | OUTPATIENT
Start: 2023-09-08

## 2023-08-25 RX ORDER — EPINEPHRINE 1 MG/ML
0.3 INJECTION, SOLUTION, CONCENTRATE INTRAVENOUS PRN
Status: CANCELLED | OUTPATIENT
Start: 2023-09-08

## 2023-08-25 RX ORDER — ACETAMINOPHEN 325 MG/1
650 TABLET ORAL
Status: CANCELLED | OUTPATIENT
Start: 2023-09-08

## 2023-08-25 RX ADMIN — OMALIZUMAB 225 MG: 150 INJECTION, SOLUTION SUBCUTANEOUS at 08:42

## 2023-08-25 ASSESSMENT — PAIN - FUNCTIONAL ASSESSMENT: PAIN_FUNCTIONAL_ASSESSMENT: NONE - DENIES PAIN

## 2023-09-08 ENCOUNTER — HOSPITAL ENCOUNTER (OUTPATIENT)
Facility: HOSPITAL | Age: 63
Setting detail: INFUSION SERIES
End: 2023-09-08
Payer: MEDICAID

## 2023-09-08 VITALS
OXYGEN SATURATION: 96 % | DIASTOLIC BLOOD PRESSURE: 76 MMHG | SYSTOLIC BLOOD PRESSURE: 147 MMHG | RESPIRATION RATE: 18 BRPM | HEART RATE: 66 BPM | TEMPERATURE: 98.1 F

## 2023-09-08 DIAGNOSIS — J45.50 SEVERE PERSISTENT ASTHMA, UNSPECIFIED WHETHER COMPLICATED: Primary | ICD-10-CM

## 2023-09-08 PROCEDURE — 6360000002 HC RX W HCPCS

## 2023-09-08 PROCEDURE — 96372 THER/PROPH/DIAG INJ SC/IM: CPT

## 2023-09-08 RX ORDER — ALBUTEROL SULFATE 90 UG/1
4 AEROSOL, METERED RESPIRATORY (INHALATION) PRN
Status: CANCELLED | OUTPATIENT
Start: 2023-09-22

## 2023-09-08 RX ORDER — DIPHENHYDRAMINE HYDROCHLORIDE 50 MG/ML
50 INJECTION INTRAMUSCULAR; INTRAVENOUS
Status: CANCELLED | OUTPATIENT
Start: 2023-09-22

## 2023-09-08 RX ORDER — SODIUM CHLORIDE 9 MG/ML
INJECTION, SOLUTION INTRAVENOUS CONTINUOUS
Status: CANCELLED | OUTPATIENT
Start: 2023-09-22

## 2023-09-08 RX ORDER — EPINEPHRINE 1 MG/ML
0.3 INJECTION, SOLUTION, CONCENTRATE INTRAVENOUS PRN
Status: CANCELLED | OUTPATIENT
Start: 2023-09-22

## 2023-09-08 RX ORDER — ACETAMINOPHEN 325 MG/1
650 TABLET ORAL
Status: CANCELLED | OUTPATIENT
Start: 2023-09-22

## 2023-09-08 RX ORDER — ONDANSETRON 2 MG/ML
8 INJECTION INTRAMUSCULAR; INTRAVENOUS
Status: CANCELLED | OUTPATIENT
Start: 2023-09-22

## 2023-09-08 RX ADMIN — OMALIZUMAB 225 MG: 150 INJECTION, SOLUTION SUBCUTANEOUS at 09:52

## 2023-10-06 ENCOUNTER — HOSPITAL ENCOUNTER (OUTPATIENT)
Facility: HOSPITAL | Age: 63
Setting detail: INFUSION SERIES
End: 2023-10-06
Payer: MEDICAID

## 2023-10-06 VITALS
SYSTOLIC BLOOD PRESSURE: 136 MMHG | DIASTOLIC BLOOD PRESSURE: 76 MMHG | HEART RATE: 68 BPM | OXYGEN SATURATION: 97 % | RESPIRATION RATE: 18 BRPM | TEMPERATURE: 98.3 F

## 2023-10-06 DIAGNOSIS — J45.50 SEVERE PERSISTENT ASTHMA, UNSPECIFIED WHETHER COMPLICATED: Primary | ICD-10-CM

## 2023-10-06 PROCEDURE — 96372 THER/PROPH/DIAG INJ SC/IM: CPT

## 2023-10-06 PROCEDURE — 6360000002 HC RX W HCPCS: Performed by: INTERNAL MEDICINE

## 2023-10-06 RX ORDER — ONDANSETRON 2 MG/ML
8 INJECTION INTRAMUSCULAR; INTRAVENOUS
Status: CANCELLED | OUTPATIENT
Start: 2023-10-20

## 2023-10-06 RX ORDER — DIPHENHYDRAMINE HYDROCHLORIDE 50 MG/ML
50 INJECTION INTRAMUSCULAR; INTRAVENOUS
Status: CANCELLED | OUTPATIENT
Start: 2023-10-20

## 2023-10-06 RX ORDER — ACETAMINOPHEN 325 MG/1
650 TABLET ORAL
Status: CANCELLED | OUTPATIENT
Start: 2023-10-20

## 2023-10-06 RX ORDER — SODIUM CHLORIDE 9 MG/ML
INJECTION, SOLUTION INTRAVENOUS CONTINUOUS
Status: CANCELLED | OUTPATIENT
Start: 2023-10-20

## 2023-10-06 RX ORDER — EPINEPHRINE 1 MG/ML
0.3 INJECTION, SOLUTION, CONCENTRATE INTRAVENOUS PRN
Status: CANCELLED | OUTPATIENT
Start: 2023-10-20

## 2023-10-06 RX ORDER — ALBUTEROL SULFATE 90 UG/1
4 AEROSOL, METERED RESPIRATORY (INHALATION) PRN
Status: CANCELLED | OUTPATIENT
Start: 2023-10-20

## 2023-10-06 RX ADMIN — OMALIZUMAB 225 MG: 150 INJECTION, SOLUTION SUBCUTANEOUS at 10:13

## 2023-10-20 ENCOUNTER — HOSPITAL ENCOUNTER (OUTPATIENT)
Facility: HOSPITAL | Age: 63
Setting detail: INFUSION SERIES
End: 2023-10-20
Payer: MEDICAID

## 2023-10-20 VITALS
SYSTOLIC BLOOD PRESSURE: 146 MMHG | HEART RATE: 69 BPM | RESPIRATION RATE: 18 BRPM | OXYGEN SATURATION: 100 % | DIASTOLIC BLOOD PRESSURE: 72 MMHG | TEMPERATURE: 97.5 F

## 2023-10-20 DIAGNOSIS — J45.50 SEVERE PERSISTENT ASTHMA, UNSPECIFIED WHETHER COMPLICATED: Primary | ICD-10-CM

## 2023-10-20 PROCEDURE — 96372 THER/PROPH/DIAG INJ SC/IM: CPT

## 2023-10-20 PROCEDURE — 6360000002 HC RX W HCPCS: Performed by: INTERNAL MEDICINE

## 2023-10-20 RX ORDER — ONDANSETRON 2 MG/ML
8 INJECTION INTRAMUSCULAR; INTRAVENOUS
OUTPATIENT
Start: 2023-11-03

## 2023-10-20 RX ORDER — SODIUM CHLORIDE 9 MG/ML
INJECTION, SOLUTION INTRAVENOUS CONTINUOUS
OUTPATIENT
Start: 2023-11-03

## 2023-10-20 RX ORDER — EPINEPHRINE 1 MG/ML
0.3 INJECTION, SOLUTION, CONCENTRATE INTRAVENOUS PRN
OUTPATIENT
Start: 2023-11-03

## 2023-10-20 RX ORDER — DIPHENHYDRAMINE HYDROCHLORIDE 50 MG/ML
50 INJECTION INTRAMUSCULAR; INTRAVENOUS
OUTPATIENT
Start: 2023-11-03

## 2023-10-20 RX ORDER — ALBUTEROL SULFATE 90 UG/1
4 AEROSOL, METERED RESPIRATORY (INHALATION) PRN
OUTPATIENT
Start: 2023-11-03

## 2023-10-20 RX ORDER — ACETAMINOPHEN 325 MG/1
650 TABLET ORAL
OUTPATIENT
Start: 2023-11-03

## 2023-10-20 RX ADMIN — OMALIZUMAB 225 MG: 150 INJECTION, SOLUTION SUBCUTANEOUS at 12:13

## 2023-11-03 ENCOUNTER — HOSPITAL ENCOUNTER (OUTPATIENT)
Facility: HOSPITAL | Age: 63
Setting detail: INFUSION SERIES
End: 2023-11-03

## 2023-11-17 ENCOUNTER — HOSPITAL ENCOUNTER (OUTPATIENT)
Facility: HOSPITAL | Age: 63
Setting detail: INFUSION SERIES
End: 2023-11-17
Payer: MEDICAID

## 2023-11-17 VITALS
HEART RATE: 74 BPM | DIASTOLIC BLOOD PRESSURE: 72 MMHG | TEMPERATURE: 97.5 F | BODY MASS INDEX: 35.48 KG/M2 | OXYGEN SATURATION: 98 % | WEIGHT: 194 LBS | RESPIRATION RATE: 18 BRPM | SYSTOLIC BLOOD PRESSURE: 129 MMHG

## 2023-11-17 DIAGNOSIS — J45.50 SEVERE PERSISTENT ASTHMA, UNSPECIFIED WHETHER COMPLICATED: Primary | ICD-10-CM

## 2023-11-17 PROCEDURE — 6360000002 HC RX W HCPCS: Performed by: INTERNAL MEDICINE

## 2023-11-17 PROCEDURE — 96372 THER/PROPH/DIAG INJ SC/IM: CPT

## 2023-11-17 RX ORDER — ALBUTEROL SULFATE 90 UG/1
4 AEROSOL, METERED RESPIRATORY (INHALATION) PRN
Status: DISCONTINUED | OUTPATIENT
Start: 2023-11-17 | End: 2023-11-17 | Stop reason: CLARIF

## 2023-11-17 RX ORDER — DIPHENHYDRAMINE HYDROCHLORIDE 50 MG/ML
50 INJECTION INTRAMUSCULAR; INTRAVENOUS
Status: DISCONTINUED | OUTPATIENT
Start: 2023-11-17 | End: 2023-11-17 | Stop reason: CLARIF

## 2023-11-17 RX ORDER — EPINEPHRINE 1 MG/ML
0.3 INJECTION, SOLUTION, CONCENTRATE INTRAVENOUS PRN
OUTPATIENT
Start: 2023-12-01

## 2023-11-17 RX ORDER — ONDANSETRON 2 MG/ML
8 INJECTION INTRAMUSCULAR; INTRAVENOUS
Status: DISCONTINUED | OUTPATIENT
Start: 2023-11-17 | End: 2023-11-17 | Stop reason: CLARIF

## 2023-11-17 RX ORDER — SODIUM CHLORIDE 9 MG/ML
INJECTION, SOLUTION INTRAVENOUS CONTINUOUS
Status: DISCONTINUED | OUTPATIENT
Start: 2023-11-17 | End: 2023-11-17 | Stop reason: CLARIF

## 2023-11-17 RX ORDER — SODIUM CHLORIDE 9 MG/ML
INJECTION, SOLUTION INTRAVENOUS CONTINUOUS
OUTPATIENT
Start: 2023-12-01

## 2023-11-17 RX ORDER — ALBUTEROL SULFATE 90 UG/1
4 AEROSOL, METERED RESPIRATORY (INHALATION) PRN
OUTPATIENT
Start: 2023-12-01

## 2023-11-17 RX ORDER — ACETAMINOPHEN 325 MG/1
650 TABLET ORAL
Status: DISCONTINUED | OUTPATIENT
Start: 2023-11-17 | End: 2023-11-17 | Stop reason: CLARIF

## 2023-11-17 RX ORDER — DIPHENHYDRAMINE HYDROCHLORIDE 50 MG/ML
50 INJECTION INTRAMUSCULAR; INTRAVENOUS
OUTPATIENT
Start: 2023-12-01

## 2023-11-17 RX ORDER — ONDANSETRON 2 MG/ML
8 INJECTION INTRAMUSCULAR; INTRAVENOUS
OUTPATIENT
Start: 2023-12-01

## 2023-11-17 RX ORDER — ACETAMINOPHEN 325 MG/1
650 TABLET ORAL
OUTPATIENT
Start: 2023-12-01

## 2023-11-17 RX ORDER — EPINEPHRINE 1 MG/ML
0.3 INJECTION, SOLUTION, CONCENTRATE INTRAVENOUS PRN
Status: DISCONTINUED | OUTPATIENT
Start: 2023-11-17 | End: 2023-11-17 | Stop reason: CLARIF

## 2023-11-17 RX ADMIN — OMALIZUMAB 225 MG: 150 INJECTION, SOLUTION SUBCUTANEOUS at 13:20

## 2023-12-15 ENCOUNTER — HOSPITAL ENCOUNTER (OUTPATIENT)
Facility: HOSPITAL | Age: 63
Setting detail: INFUSION SERIES
End: 2023-12-15
Payer: MEDICAID

## 2023-12-15 VITALS
DIASTOLIC BLOOD PRESSURE: 61 MMHG | OXYGEN SATURATION: 99 % | HEART RATE: 70 BPM | TEMPERATURE: 97.1 F | RESPIRATION RATE: 16 BRPM | SYSTOLIC BLOOD PRESSURE: 115 MMHG

## 2023-12-15 DIAGNOSIS — J45.50 SEVERE PERSISTENT ASTHMA, UNSPECIFIED WHETHER COMPLICATED: Primary | ICD-10-CM

## 2023-12-15 PROCEDURE — 6360000002 HC RX W HCPCS

## 2023-12-15 PROCEDURE — 96372 THER/PROPH/DIAG INJ SC/IM: CPT

## 2023-12-15 RX ORDER — SODIUM CHLORIDE 9 MG/ML
INJECTION, SOLUTION INTRAVENOUS CONTINUOUS
OUTPATIENT
Start: 2023-12-29

## 2023-12-15 RX ORDER — ALBUTEROL SULFATE 90 UG/1
4 AEROSOL, METERED RESPIRATORY (INHALATION) PRN
OUTPATIENT
Start: 2023-12-29

## 2023-12-15 RX ORDER — EPINEPHRINE 1 MG/ML
0.3 INJECTION, SOLUTION, CONCENTRATE INTRAVENOUS PRN
OUTPATIENT
Start: 2023-12-29

## 2023-12-15 RX ORDER — DIPHENHYDRAMINE HYDROCHLORIDE 50 MG/ML
50 INJECTION INTRAMUSCULAR; INTRAVENOUS
OUTPATIENT
Start: 2023-12-29

## 2023-12-15 RX ORDER — ONDANSETRON 2 MG/ML
8 INJECTION INTRAMUSCULAR; INTRAVENOUS
OUTPATIENT
Start: 2023-12-29

## 2023-12-15 RX ORDER — ACETAMINOPHEN 325 MG/1
650 TABLET ORAL
OUTPATIENT
Start: 2023-12-29

## 2023-12-15 RX ADMIN — OMALIZUMAB 225 MG: 150 INJECTION, SOLUTION SUBCUTANEOUS at 09:42

## 2023-12-29 ENCOUNTER — HOSPITAL ENCOUNTER (OUTPATIENT)
Facility: HOSPITAL | Age: 63
Setting detail: INFUSION SERIES
End: 2023-12-29

## 2024-01-11 ENCOUNTER — HOSPITAL ENCOUNTER (OUTPATIENT)
Facility: HOSPITAL | Age: 64
Setting detail: INFUSION SERIES
End: 2024-01-11
Payer: MEDICAID

## 2024-01-11 VITALS
HEART RATE: 67 BPM | SYSTOLIC BLOOD PRESSURE: 111 MMHG | DIASTOLIC BLOOD PRESSURE: 69 MMHG | RESPIRATION RATE: 20 BRPM | TEMPERATURE: 97.5 F | OXYGEN SATURATION: 97 %

## 2024-01-11 DIAGNOSIS — J45.50 SEVERE PERSISTENT ASTHMA, UNSPECIFIED WHETHER COMPLICATED: Primary | ICD-10-CM

## 2024-01-11 PROCEDURE — 6360000002 HC RX W HCPCS: Performed by: INTERNAL MEDICINE

## 2024-01-11 PROCEDURE — 96372 THER/PROPH/DIAG INJ SC/IM: CPT

## 2024-01-11 RX ORDER — SODIUM CHLORIDE 9 MG/ML
INJECTION, SOLUTION INTRAVENOUS CONTINUOUS
OUTPATIENT
Start: 2024-01-25

## 2024-01-11 RX ORDER — DIPHENHYDRAMINE HYDROCHLORIDE 50 MG/ML
50 INJECTION INTRAMUSCULAR; INTRAVENOUS
OUTPATIENT
Start: 2024-01-25

## 2024-01-11 RX ORDER — EPINEPHRINE 1 MG/ML
0.3 INJECTION, SOLUTION, CONCENTRATE INTRAVENOUS PRN
OUTPATIENT
Start: 2024-01-25

## 2024-01-11 RX ORDER — ONDANSETRON 2 MG/ML
8 INJECTION INTRAMUSCULAR; INTRAVENOUS
OUTPATIENT
Start: 2024-01-25

## 2024-01-11 RX ORDER — ACETAMINOPHEN 325 MG/1
650 TABLET ORAL
OUTPATIENT
Start: 2024-01-25

## 2024-01-11 RX ORDER — ALBUTEROL SULFATE 90 UG/1
4 AEROSOL, METERED RESPIRATORY (INHALATION) PRN
OUTPATIENT
Start: 2024-01-25

## 2024-01-11 RX ADMIN — OMALIZUMAB 225 MG: 150 INJECTION, SOLUTION SUBCUTANEOUS at 10:58

## 2024-01-11 ASSESSMENT — PAIN SCALES - GENERAL: PAINLEVEL_OUTOF10: 6

## 2024-01-11 ASSESSMENT — PAIN DESCRIPTION - DESCRIPTORS: DESCRIPTORS: SORE

## 2024-01-11 ASSESSMENT — PAIN DESCRIPTION - LOCATION: LOCATION: RIB CAGE

## 2024-01-26 ENCOUNTER — HOSPITAL ENCOUNTER (OUTPATIENT)
Facility: HOSPITAL | Age: 64
Setting detail: INFUSION SERIES
End: 2024-01-26
Payer: MEDICAID

## 2024-01-26 VITALS
DIASTOLIC BLOOD PRESSURE: 70 MMHG | RESPIRATION RATE: 18 BRPM | HEART RATE: 68 BPM | TEMPERATURE: 98.2 F | OXYGEN SATURATION: 97 % | SYSTOLIC BLOOD PRESSURE: 137 MMHG

## 2024-01-26 DIAGNOSIS — J45.50 SEVERE PERSISTENT ASTHMA, UNSPECIFIED WHETHER COMPLICATED: Primary | ICD-10-CM

## 2024-01-26 PROCEDURE — 96372 THER/PROPH/DIAG INJ SC/IM: CPT

## 2024-01-26 PROCEDURE — 6360000002 HC RX W HCPCS: Performed by: INTERNAL MEDICINE

## 2024-01-26 RX ADMIN — OMALIZUMAB 225 MG: 150 INJECTION, SOLUTION SUBCUTANEOUS at 09:05

## 2024-01-26 NOTE — PROGRESS NOTES
[facility] Progress Note    Date: 2024    Name: Viki Pabon    MRN: [id number]         : 1960      Ms. Pabon arrived to [facility] at 0859.    Ms. Pabon was assessed and education was provided.     Ms. Pabon's vitals were reviewed.  Vitals:    24 0900   BP: 137/70   Pulse: 68   Resp: 18   Temp: 98.2 °F (36.8 °C)   SpO2: 97%     omalizumab (XOLAIR) 225 mg injection was administered as ordered SQ in patient's bilateral arms as two divided doses, 150 mg in the right upper arm and 75 mg in the left upper arm per patient request; both sites were dressed with a band-aid.    Ms. Pabon tolerated well without complaints.    Discharge/ follow-up instructions discussed with patient who verbalized understanding of all information received.    Patient's armband was removed & shredded.    [facility] at 0907.  She is to return on 24 at 0830 for her next appointment.    Lara Guevara RN  2024

## 2024-01-30 ENCOUNTER — OFFICE VISIT (OUTPATIENT)
Age: 64
End: 2024-01-30
Payer: MEDICAID

## 2024-01-30 VITALS — HEIGHT: 62 IN | WEIGHT: 197.2 LBS | BODY MASS INDEX: 36.29 KG/M2

## 2024-01-30 DIAGNOSIS — M54.32 SCIATICA, LEFT SIDE: Primary | ICD-10-CM

## 2024-01-30 DIAGNOSIS — G89.29 CHRONIC BILATERAL LOW BACK PAIN WITH LEFT-SIDED SCIATICA: ICD-10-CM

## 2024-01-30 DIAGNOSIS — M54.42 CHRONIC BILATERAL LOW BACK PAIN WITH LEFT-SIDED SCIATICA: ICD-10-CM

## 2024-01-30 PROCEDURE — 99214 OFFICE O/P EST MOD 30 MIN: CPT | Performed by: PHYSICAL MEDICINE & REHABILITATION

## 2024-01-30 RX ORDER — GABAPENTIN 600 MG/1
600 TABLET ORAL
Qty: 90 TABLET | Refills: 0 | Status: SHIPPED | OUTPATIENT
Start: 2024-01-30 | End: 2024-04-29

## 2024-01-30 RX ORDER — METRONIDAZOLE 500 MG/1
TABLET ORAL
COMMUNITY
Start: 2024-01-29 | End: 2024-02-05

## 2024-01-30 ASSESSMENT — PATIENT HEALTH QUESTIONNAIRE - PHQ9
SUM OF ALL RESPONSES TO PHQ9 QUESTIONS 1 & 2: 0
SUM OF ALL RESPONSES TO PHQ QUESTIONS 1-9: 0
SUM OF ALL RESPONSES TO PHQ QUESTIONS 1-9: 0
1. LITTLE INTEREST OR PLEASURE IN DOING THINGS: 0
SUM OF ALL RESPONSES TO PHQ QUESTIONS 1-9: 0
2. FEELING DOWN, DEPRESSED OR HOPELESS: 0
SUM OF ALL RESPONSES TO PHQ QUESTIONS 1-9: 0

## 2024-01-30 NOTE — PROGRESS NOTES
Viki Pabon presents today for   Chief Complaint   Patient presents with    Back Pain       Is someone accompanying this pt? no    Is the patient using any DME equipment during OV? no    Depression Screening:       No data to display                Learning Assessment:      Abuse Screening:       No data to display                Fall Risk      OPIOID RISK TOOL      Coordination of Care:  1. Have you been to the ER, urgent care clinic since your last visit? no  Hospitalized since your last visit? no    2. Have you seen or consulted any other health care providers outside of the [facility] since your last visit? no Include any pap smears or colon screening. no      
1 tablet by mouth daily for 3 days (Patient not taking: Reported on 8/25/2023)      OZEMPIC, 0.25 OR 0.5 MG/DOSE, 2 MG/3ML SOPN Inject 1 Dose into the skin once a week (Patient not taking: Reported on 1/30/2024)      Semaglutide (OZEMPIC, 0.25 OR 0.5 MG/DOSE, SC) Inject into the skin (Patient not taking: Reported on 1/30/2024)      gabapentin (NEURONTIN) 600 MG tablet Take 1 tablet by mouth nightly.      valsartan-hydroCHLOROthiazide (DIOVAN-HCT) 320-12.5 MG per tablet Take 1 tablet by mouth daily      aspirin 81 MG chewable tablet Take 1 tablet by mouth daily (with breakfast)      clopidogrel (PLAVIX) 75 MG tablet Take 1 tablet by mouth daily (Patient not taking: Reported on 12/19/2023)      EPINEPHrine (EPIPEN) 0.3 MG/0.3ML SOAJ injection Inject 0.3 mLs into the muscle once as needed      ipratropium-albuterol (DUONEB) 0.5-2.5 (3) MG/3ML SOLN nebulizer solution INHALE 1 VIAL BY NEBULIZATION ROUTE 4 TIMES PER DAY FOR RESCUE      minoxidil (LONITEN) 2.5 MG tablet TAKE 2 TABLETS BY MOUTH TWICE A DAY FOR BLOOD PRESSURE      omalizumab (XOLAIR) 150 MG/ML SOSY injection Inject 150 mg into the skin every 14 days (Patient not taking: Reported on 1/30/2024)      tiotropium (SPIRIVA RESPIMAT) 2.5 MCG/ACT AERS inhaler Inhale 2 puffs into the lungs daily (Patient not taking: Reported on 1/30/2024)       No current facility-administered medications for this visit.         Allergies   Allergen Reactions    Grass Pollen(K-O-R-T-Swt Mayo) Anaphylaxis     Environmental allergies Unsure what  triggers episodes. Intubated for episodes in 2006 and 2008.  Environmental allergies Unsure what  triggers episodes. Intubated for episodes in 2006 and 2008.    Seasonal Shortness Of Breath    Pneumococcal Vaccine Other (See Comments)    Metformin Nausea And Vomiting and Nausea Only     Pt states stomach cramps   Pt states stomach cramps                Physical Exam       Vital Signs:    There were no vitals taken for this visit.

## 2024-02-23 ENCOUNTER — HOSPITAL ENCOUNTER (OUTPATIENT)
Facility: HOSPITAL | Age: 64
Setting detail: INFUSION SERIES
End: 2024-02-23
Payer: MEDICAID

## 2024-02-23 VITALS
DIASTOLIC BLOOD PRESSURE: 68 MMHG | RESPIRATION RATE: 20 BRPM | HEART RATE: 63 BPM | TEMPERATURE: 97.5 F | SYSTOLIC BLOOD PRESSURE: 145 MMHG | OXYGEN SATURATION: 98 %

## 2024-02-23 DIAGNOSIS — J45.50 SEVERE PERSISTENT ASTHMA, UNSPECIFIED WHETHER COMPLICATED: Primary | ICD-10-CM

## 2024-02-23 PROCEDURE — 96372 THER/PROPH/DIAG INJ SC/IM: CPT

## 2024-02-23 PROCEDURE — 6360000002 HC RX W HCPCS: Performed by: INTERNAL MEDICINE

## 2024-02-23 RX ORDER — ALBUTEROL SULFATE 90 UG/1
4 AEROSOL, METERED RESPIRATORY (INHALATION) PRN
Status: CANCELLED | OUTPATIENT
Start: 2024-03-08

## 2024-02-23 RX ORDER — DIPHENHYDRAMINE HYDROCHLORIDE 50 MG/ML
50 INJECTION INTRAMUSCULAR; INTRAVENOUS
Status: CANCELLED | OUTPATIENT
Start: 2024-03-08

## 2024-02-23 RX ORDER — ONDANSETRON 2 MG/ML
8 INJECTION INTRAMUSCULAR; INTRAVENOUS
Status: CANCELLED | OUTPATIENT
Start: 2024-03-08

## 2024-02-23 RX ORDER — SODIUM CHLORIDE 9 MG/ML
INJECTION, SOLUTION INTRAVENOUS CONTINUOUS
Status: CANCELLED | OUTPATIENT
Start: 2024-03-08

## 2024-02-23 RX ORDER — EPINEPHRINE 1 MG/ML
0.3 INJECTION, SOLUTION, CONCENTRATE INTRAVENOUS PRN
Status: CANCELLED | OUTPATIENT
Start: 2024-03-08

## 2024-02-23 RX ORDER — ACETAMINOPHEN 325 MG/1
650 TABLET ORAL
Status: CANCELLED | OUTPATIENT
Start: 2024-03-08

## 2024-02-23 RX ADMIN — OMALIZUMAB 225 MG: 150 INJECTION, SOLUTION SUBCUTANEOUS at 11:40

## 2024-02-23 NOTE — PROGRESS NOTES
[facility] Progress Note    Date: 2024    Name: Viki Pabon    MRN: [id number]         : 1960    Ms. Pabon arrived in the [facility] today at 1125, in stable condition, here for her XOLAIR INJECTIONS (EVERY 2 WEEKS). She was assessed and education was provided.     Ms. Pabon's vitals were reviewed.  Vitals:    24 1125   BP: (!) 145/68   Pulse: 63   Resp: 20   Temp: 97.5 °F (36.4 °C)   SpO2: 98%       Ms. Pabon stated that she was doing well, and voiced no major complaints. And she also stated that she has been tolerating the XOLAIR injections well and without problems.      Omalizumab (XOLAIR) 225 mg TOTAL DOSE, was administered in 2 divided SQ injections at 1140 as follows: 150 mg SQ in the back of her RIGHT ARM, and 75 mg SQ in the back of her LEFT ARM.       Ms. Pabon tolerated the injections well and voiced no complaints.     Discharge instructions reviewed with patient and she expressed understanding. Patient's armband was removed and shredded.    [facility] P[facility], ambulatory and in stable condition at 1145.     She is to return in 2 weeks, on Friday, 3-08-24 at 1130, for her next XOLAIR injection(s)  .     Brina Middleton RN  2024  12:30 PM

## 2024-03-01 ENCOUNTER — APPOINTMENT (OUTPATIENT)
Facility: HOSPITAL | Age: 64
End: 2024-03-01
Payer: MEDICAID

## 2024-03-08 ENCOUNTER — HOSPITAL ENCOUNTER (OUTPATIENT)
Facility: HOSPITAL | Age: 64
Setting detail: INFUSION SERIES
End: 2024-03-08
Payer: MEDICAID

## 2024-03-08 VITALS
RESPIRATION RATE: 20 BRPM | SYSTOLIC BLOOD PRESSURE: 131 MMHG | HEART RATE: 69 BPM | OXYGEN SATURATION: 95 % | TEMPERATURE: 97.8 F | DIASTOLIC BLOOD PRESSURE: 74 MMHG

## 2024-03-08 DIAGNOSIS — J45.50 SEVERE PERSISTENT ASTHMA, UNSPECIFIED WHETHER COMPLICATED: Primary | ICD-10-CM

## 2024-03-08 PROCEDURE — 96372 THER/PROPH/DIAG INJ SC/IM: CPT

## 2024-03-08 PROCEDURE — 6360000002 HC RX W HCPCS: Performed by: INTERNAL MEDICINE

## 2024-03-08 RX ORDER — SODIUM CHLORIDE 9 MG/ML
INJECTION, SOLUTION INTRAVENOUS CONTINUOUS
OUTPATIENT
Start: 2024-03-22

## 2024-03-08 RX ORDER — EPINEPHRINE 1 MG/ML
0.3 INJECTION, SOLUTION, CONCENTRATE INTRAVENOUS PRN
OUTPATIENT
Start: 2024-03-22

## 2024-03-08 RX ORDER — DIPHENHYDRAMINE HYDROCHLORIDE 50 MG/ML
50 INJECTION INTRAMUSCULAR; INTRAVENOUS
OUTPATIENT
Start: 2024-03-22

## 2024-03-08 RX ORDER — ONDANSETRON 2 MG/ML
8 INJECTION INTRAMUSCULAR; INTRAVENOUS
OUTPATIENT
Start: 2024-03-22

## 2024-03-08 RX ORDER — ALBUTEROL SULFATE 90 UG/1
4 AEROSOL, METERED RESPIRATORY (INHALATION) PRN
OUTPATIENT
Start: 2024-03-22

## 2024-03-08 RX ORDER — ACETAMINOPHEN 325 MG/1
650 TABLET ORAL
OUTPATIENT
Start: 2024-03-22

## 2024-03-08 RX ADMIN — OMALIZUMAB 225 MG: 150 INJECTION, SOLUTION SUBCUTANEOUS at 13:55

## 2024-03-08 ASSESSMENT — PAIN - FUNCTIONAL ASSESSMENT: PAIN_FUNCTIONAL_ASSESSMENT: NONE - DENIES PAIN

## 2024-03-08 NOTE — PROGRESS NOTES
[facility] Progress Note    Date: 2024    Name: Viki Pabon    MRN: [id number]         : 1960    Ms. Pabon arrived in the [facility] today at 1345, in stable condition, here for her XOLAIR INJECTIONS (EVERY 2 WEEKS). She was assessed and education was provided.     Ms. Pabon's vitals were reviewed.  Vitals:    24 1345   BP: 131/74   Pulse: 69   Resp: 20   Temp: 97.8 °F (36.6 °C)   SpO2: 95%       Ms. Pabon stated that she was doing well, and voiced no major complaints. And she also stated that she has been tolerating the XOLAIR injections well and without any problems.      Omalizumab (XOLAIR) 225 mg TOTAL DOSE, was administered in 2 divided SQ injections at 1355 as follows: 150 mg SQ in the back of her RIGHT ARM, and 75 mg SQ in the back of her LEFT ARM.             Ms. Pabon tolerated well and voiced no complaints.     [facility]. Pab[facility] in [facility] condition at 1400.     She is to return in 2 weeks, on Friday, 3-22-24 at 1030, for her next appointment, for her next XOLAIR injection.     Debbie Wells RN  2024  1:52 PM

## 2024-04-08 ENCOUNTER — HOSPITAL ENCOUNTER (OUTPATIENT)
Facility: HOSPITAL | Age: 64
Setting detail: INFUSION SERIES
Discharge: HOME | End: 2024-04-11
Payer: MEDICAID

## 2024-04-08 VITALS
HEART RATE: 55 BPM | SYSTOLIC BLOOD PRESSURE: 158 MMHG | TEMPERATURE: 97.8 F | OXYGEN SATURATION: 96 % | DIASTOLIC BLOOD PRESSURE: 82 MMHG | RESPIRATION RATE: 18 BRPM

## 2024-04-08 DIAGNOSIS — J45.50 SEVERE PERSISTENT ASTHMA, UNSPECIFIED WHETHER COMPLICATED: Primary | ICD-10-CM

## 2024-04-08 PROCEDURE — 96372 THER/PROPH/DIAG INJ SC/IM: CPT

## 2024-04-08 PROCEDURE — 6360000002 HC RX W HCPCS

## 2024-04-08 RX ORDER — DIPHENHYDRAMINE HYDROCHLORIDE 50 MG/ML
50 INJECTION INTRAMUSCULAR; INTRAVENOUS
OUTPATIENT
Start: 2024-04-15

## 2024-04-08 RX ORDER — SODIUM CHLORIDE 9 MG/ML
INJECTION, SOLUTION INTRAVENOUS CONTINUOUS
OUTPATIENT
Start: 2024-04-15

## 2024-04-08 RX ORDER — ACETAMINOPHEN 325 MG/1
650 TABLET ORAL
OUTPATIENT
Start: 2024-04-15

## 2024-04-08 RX ORDER — ALBUTEROL SULFATE 90 UG/1
4 AEROSOL, METERED RESPIRATORY (INHALATION) PRN
OUTPATIENT
Start: 2024-04-15

## 2024-04-08 RX ORDER — ONDANSETRON 2 MG/ML
8 INJECTION INTRAMUSCULAR; INTRAVENOUS
OUTPATIENT
Start: 2024-04-15

## 2024-04-08 RX ORDER — EPINEPHRINE 1 MG/ML
0.3 INJECTION, SOLUTION, CONCENTRATE INTRAVENOUS PRN
OUTPATIENT
Start: 2024-04-15

## 2024-04-08 RX ADMIN — OMALIZUMAB 225 MG: 150 INJECTION, SOLUTION SUBCUTANEOUS at 10:45

## 2024-04-08 NOTE — PROGRESS NOTES
[facility] Progress Note    Date: 2024    Name: Viki Pabon    MRN: [id number]         : 1960      XOLAIR Q2 WEEKS      Ms. P[facility] at 10[facility]. Pt ambulatory without assist.    Ms. Pabon was assessed and education was provided.     Pt reports the Xolair injections along with her nebulizer treatments have been working well in controlling her asthma symptoms.    Ms. Pabon's vitals were reviewed.  Vitals:    24 1035   BP: (!) 158/82   Pulse: 55   Resp: 18   Temp: 97.8 °F (36.6 °C)   SpO2: 96%       Xolair 75mg SQ administered in patient's L upper arm & Xolair 150mg SQ administered in patient's R upper arm, for a total ordered dose of 225mg. Pt did not require band-aids.    Ms. Pabon tolerated well without complaints.    Discharge/ follow-up instructions discussed w/ pt. Pt verbalized understanding.    Pt armband removed & shredded.    [facility]. P[facility] condition at 1050. She is to return on 24 at 1000 for her next appointment.    Jodi Cox RN  2024

## 2024-04-22 ENCOUNTER — HOSPITAL ENCOUNTER (OUTPATIENT)
Facility: HOSPITAL | Age: 64
Setting detail: INFUSION SERIES
Discharge: HOME | End: 2024-04-25
Payer: MEDICAID

## 2024-04-22 VITALS
DIASTOLIC BLOOD PRESSURE: 87 MMHG | RESPIRATION RATE: 18 BRPM | HEART RATE: 67 BPM | TEMPERATURE: 98 F | SYSTOLIC BLOOD PRESSURE: 162 MMHG | OXYGEN SATURATION: 97 %

## 2024-04-22 DIAGNOSIS — J45.50 SEVERE PERSISTENT ASTHMA, UNSPECIFIED WHETHER COMPLICATED: Primary | ICD-10-CM

## 2024-04-22 PROCEDURE — 96372 THER/PROPH/DIAG INJ SC/IM: CPT

## 2024-04-22 PROCEDURE — 6360000002 HC RX W HCPCS

## 2024-04-22 RX ORDER — ALBUTEROL SULFATE 90 UG/1
4 AEROSOL, METERED RESPIRATORY (INHALATION) PRN
OUTPATIENT
Start: 2024-05-06

## 2024-04-22 RX ORDER — ONDANSETRON 2 MG/ML
8 INJECTION INTRAMUSCULAR; INTRAVENOUS
OUTPATIENT
Start: 2024-05-06

## 2024-04-22 RX ORDER — ACETAMINOPHEN 325 MG/1
650 TABLET ORAL
OUTPATIENT
Start: 2024-05-06

## 2024-04-22 RX ORDER — EPINEPHRINE 1 MG/ML
0.3 INJECTION, SOLUTION, CONCENTRATE INTRAVENOUS PRN
OUTPATIENT
Start: 2024-05-06

## 2024-04-22 RX ORDER — SODIUM CHLORIDE 9 MG/ML
INJECTION, SOLUTION INTRAVENOUS CONTINUOUS
OUTPATIENT
Start: 2024-05-06

## 2024-04-22 RX ORDER — DIPHENHYDRAMINE HYDROCHLORIDE 50 MG/ML
50 INJECTION INTRAMUSCULAR; INTRAVENOUS
OUTPATIENT
Start: 2024-05-06

## 2024-04-22 RX ADMIN — OMALIZUMAB 225 MG: 150 INJECTION, SOLUTION SUBCUTANEOUS at 10:15

## 2024-04-22 NOTE — PROGRESS NOTES
[facility] Progress Note    Date: 2024    Name: Viki Pabon    MRN: [id number]         : 1960      XOLAIR Q2 WEEKS      Ms. Pabon arrived to \[facility]\"" at 1010. Pt ambulatory without assist.    Ms. Pabon was assessed and education was provided.     Pt reports the Xolair injections along with her nebulizer treatments have been working well in controlling her asthma symptoms.    Ms. Pabon's vitals were reviewed.  Vitals:    24 1010   BP: (!) 162/87   Pulse: 67   Resp: 18   Temp: 98 °F (36.7 °C)   SpO2: 97%       Xolair 75mg SQ administered in patient's L upper arm & Xolair 150mg SQ administered in patient's R upper arm, for a total ordered dose of 225mg. Pt did not require band-aids.    Ms. Pabon tolerated well without complaints.    Discharge/ follow-up instructions discussed w/ pt. Pt verbalized understanding.    Pt armband removed & shredded.    Ms. Pabon [facility] in stable condition at 1020. She is to return on 24 at 0900 for her next appointment.    Jodi Cox RN  2024

## 2024-05-06 ENCOUNTER — HOSPITAL ENCOUNTER (OUTPATIENT)
Facility: HOSPITAL | Age: 64
Setting detail: INFUSION SERIES
Discharge: HOME | End: 2024-05-09
Payer: MEDICAID

## 2024-05-06 VITALS
RESPIRATION RATE: 18 BRPM | TEMPERATURE: 98 F | HEART RATE: 62 BPM | DIASTOLIC BLOOD PRESSURE: 67 MMHG | OXYGEN SATURATION: 98 % | SYSTOLIC BLOOD PRESSURE: 160 MMHG

## 2024-05-06 DIAGNOSIS — J45.50 SEVERE PERSISTENT ASTHMA, UNSPECIFIED WHETHER COMPLICATED: Primary | ICD-10-CM

## 2024-05-06 PROCEDURE — 96372 THER/PROPH/DIAG INJ SC/IM: CPT

## 2024-05-06 PROCEDURE — 6360000002 HC RX W HCPCS

## 2024-05-06 RX ORDER — ACETAMINOPHEN 325 MG/1
650 TABLET ORAL
OUTPATIENT
Start: 2024-05-20

## 2024-05-06 RX ORDER — EPINEPHRINE 1 MG/ML
0.3 INJECTION, SOLUTION, CONCENTRATE INTRAVENOUS PRN
OUTPATIENT
Start: 2024-05-20

## 2024-05-06 RX ORDER — SODIUM CHLORIDE 9 MG/ML
INJECTION, SOLUTION INTRAVENOUS CONTINUOUS
OUTPATIENT
Start: 2024-05-20

## 2024-05-06 RX ORDER — DIPHENHYDRAMINE HYDROCHLORIDE 50 MG/ML
50 INJECTION INTRAMUSCULAR; INTRAVENOUS
OUTPATIENT
Start: 2024-05-20

## 2024-05-06 RX ORDER — ALBUTEROL SULFATE 90 UG/1
4 AEROSOL, METERED RESPIRATORY (INHALATION) PRN
OUTPATIENT
Start: 2024-05-20

## 2024-05-06 RX ORDER — ONDANSETRON 2 MG/ML
8 INJECTION INTRAMUSCULAR; INTRAVENOUS
OUTPATIENT
Start: 2024-05-20

## 2024-05-06 RX ADMIN — OMALIZUMAB 225 MG: 150 INJECTION, SOLUTION SUBCUTANEOUS at 09:56

## 2024-05-06 NOTE — PROGRESS NOTES
[facility] Progress Note    Date: May 6, 2024    Name: Viki Pabon    MRN: [id number]         : 1960      XOLAIR Q2 WEEKS      Ms. Pabon arrived to [facility] at 0950. Pt ambulatory without assist.    Ms. Pabon was assessed and education was provided.     Pt reports the Xolair injections along with her nebulizer treatments have been working well in controlling her asthma symptoms.    Ms. Pabon's vitals were reviewed.  Vitals:    24 0950   BP: (!) 160/67   Pulse: 62   Resp: 18   Temp: 98 °F (36.7 °C)   SpO2: 98%       Xolair 75mg SQ administered in patient's L upper arm & Xolair 150mg SQ administered in patient's R upper arm, for a total ordered dose of 225mg. Pt declined bandaids.    Ms. Pabon tolerated well without complaints.    Discharge/ follow-up instructions discussed w/ pt. Pt verbalized understanding.    Pt armband removed & shredded.    Ms. Pabon was [facility] in stable condition at 1000. She is to return on 24 at 1330 for her next appointment.    Callie Douglas RN  May 6, 2024

## 2024-05-16 ENCOUNTER — OFFICE VISIT (OUTPATIENT)
Age: 64
End: 2024-05-16
Payer: MEDICAID

## 2024-05-16 VITALS — HEIGHT: 63 IN | WEIGHT: 193 LBS | BODY MASS INDEX: 34.2 KG/M2

## 2024-05-16 DIAGNOSIS — M54.42 CHRONIC BILATERAL LOW BACK PAIN WITH LEFT-SIDED SCIATICA: ICD-10-CM

## 2024-05-16 DIAGNOSIS — G89.29 CHRONIC BILATERAL LOW BACK PAIN WITH LEFT-SIDED SCIATICA: ICD-10-CM

## 2024-05-16 DIAGNOSIS — M54.32 SCIATICA, LEFT SIDE: ICD-10-CM

## 2024-05-16 PROCEDURE — 99213 OFFICE O/P EST LOW 20 MIN: CPT | Performed by: PHYSICAL MEDICINE & REHABILITATION

## 2024-05-16 RX ORDER — GABAPENTIN 600 MG/1
600 TABLET ORAL
Qty: 90 TABLET | Refills: 1 | Status: SHIPPED | OUTPATIENT
Start: 2024-05-16 | End: 2024-08-14

## 2024-05-16 ASSESSMENT — PATIENT HEALTH QUESTIONNAIRE - PHQ9
SUM OF ALL RESPONSES TO PHQ QUESTIONS 1-9: 0
SUM OF ALL RESPONSES TO PHQ QUESTIONS 1-9: 0
1. LITTLE INTEREST OR PLEASURE IN DOING THINGS: NOT AT ALL
SUM OF ALL RESPONSES TO PHQ QUESTIONS 1-9: 0
SUM OF ALL RESPONSES TO PHQ QUESTIONS 1-9: 0

## 2024-05-16 NOTE — PROGRESS NOTES
Viki Pabon presents today for   Chief Complaint   Patient presents with    Back Pain     lumbar       Is someone accompanying this pt? no    Is the patient using any DME equipment during OV? no    Depression Screening:       No data to display                Learning Assessment:  Failed to redirect to the Timeline version of the [employer] SmartLink.    Abuse Screening:       No data to display                Fall Risk  Failed to redirect to the Timeline version of the [employer] Smart[employer].    OPIOID RISK TOOL  Failed to redirect to the Timeline version of the [employer] Smart[employer].    Coordination of Care:  1. Have you been to the ER, urgent care clinic since your last visit? no  Hospitalized since your last visit? no    2. Have you seen or consulted any other health care providers outside of the [facility] since your last visit? no Include any pap smears or colon screening. no      
No significant disc pathology. Narrowed anteroposterior interpeduncular   distances exaggerate canal stenosis at this level. Dorsal epidural lipomatosis.   Moderate bilateral facet arthropathy and ligamentum flavum buckling. Minimal   overall canal stenosis. Mild right and left foraminal stenoses.       L3-L4: Mild broad-based disc bulge flattens ventral thecal sac. Moderate left   and right lateral and dorsal epidural lipomatosis. Narrowed anteroposterior   interpeduncular distances exaggerate canal stenosis at this level. Moderate   bilateral facet arthropathy and uncovertebral proliferative changes. Minimal   overall canal stenosis. Moderate left and mild right foraminal stenoses.       L4-L5: Moderate broad-based disc bulge. Moderate, mostly dorsal epidural   lipomatosis with near complete effacement of thecal sac CSF. Narrowed   anteroposterior interpeduncular distances exaggerate canal stenosis at this   level. Moderate bilateral facet arthropathy and ligamentum flavum buckling.   Moderately severe overall canal stenosis, largest AP dimension of the thecal sac   measuring just over 4 mm. Moderate right foraminal stenosis. Moderately severe   left foraminal stenosis with mild flattening deformation of the left L4   foraminal nerve root (sagittal image 5).       L5-S1: Moderate broad-based disc bulge flattens ventral thecal sac. Prominent   dorsal and lateral epidural lipomatosis with triangulation of the thecal sac.   Moderate overall canal stenosis, largest AP dimension of the thecal sac   measuring just over 7 mm. Moderate right and moderately severe left facet   arthropathy. Moderate right and moderately severe left foraminal stenoses.   Moderate associated impingement of the left L5 foraminal nerve root.      Past Medical History:      Past Medical History:   Diagnosis Date    Acute ischemic stroke (HCC) 8/21/2020    Acute Ischemic Stroke (late acute/early subacute infarcts in the right centrum semiovale

## 2024-05-20 ENCOUNTER — APPOINTMENT (OUTPATIENT)
Facility: HOSPITAL | Age: 64
End: 2024-05-20
Payer: MEDICAID

## 2024-05-24 ENCOUNTER — HOSPITAL ENCOUNTER (OUTPATIENT)
Facility: HOSPITAL | Age: 64
Setting detail: INFUSION SERIES
End: 2024-05-24
Payer: MEDICAID

## 2024-05-24 VITALS
RESPIRATION RATE: 16 BRPM | DIASTOLIC BLOOD PRESSURE: 93 MMHG | TEMPERATURE: 98 F | OXYGEN SATURATION: 97 % | SYSTOLIC BLOOD PRESSURE: 167 MMHG | HEART RATE: 62 BPM

## 2024-05-24 DIAGNOSIS — J45.50 SEVERE PERSISTENT ASTHMA, UNSPECIFIED WHETHER COMPLICATED: Primary | ICD-10-CM

## 2024-05-24 PROCEDURE — 6360000002 HC RX W HCPCS

## 2024-05-24 PROCEDURE — 96372 THER/PROPH/DIAG INJ SC/IM: CPT

## 2024-05-24 RX ORDER — EPINEPHRINE 1 MG/ML
0.3 INJECTION, SOLUTION, CONCENTRATE INTRAVENOUS PRN
OUTPATIENT
Start: 2024-06-03

## 2024-05-24 RX ORDER — DIPHENHYDRAMINE HYDROCHLORIDE 50 MG/ML
50 INJECTION INTRAMUSCULAR; INTRAVENOUS
OUTPATIENT
Start: 2024-06-03

## 2024-05-24 RX ORDER — ALBUTEROL SULFATE 90 UG/1
4 AEROSOL, METERED RESPIRATORY (INHALATION) PRN
OUTPATIENT
Start: 2024-06-03

## 2024-05-24 RX ORDER — ACETAMINOPHEN 325 MG/1
650 TABLET ORAL
OUTPATIENT
Start: 2024-06-03

## 2024-05-24 RX ORDER — ORAL SEMAGLUTIDE 14 MG/1
TABLET ORAL DAILY
COMMUNITY

## 2024-05-24 RX ORDER — ONDANSETRON 2 MG/ML
8 INJECTION INTRAMUSCULAR; INTRAVENOUS
OUTPATIENT
Start: 2024-06-03

## 2024-05-24 RX ORDER — SODIUM CHLORIDE 9 MG/ML
INJECTION, SOLUTION INTRAVENOUS CONTINUOUS
OUTPATIENT
Start: 2024-06-03

## 2024-05-24 RX ADMIN — OMALIZUMAB 225 MG: 150 INJECTION, SOLUTION SUBCUTANEOUS at 13:10

## 2024-05-24 ASSESSMENT — PAIN - FUNCTIONAL ASSESSMENT: PAIN_FUNCTIONAL_ASSESSMENT: NONE - DENIES PAIN

## 2024-05-24 NOTE — PROGRESS NOTES
[facility] Progress Note    Date: May 24, 2024    Name: Viki Pabon    MRN: [id number]         : 1960    Ms. Pabon arrived in the [facility] today at 1255, in stable condition, here for her XOLAIR INJECTIONS (EVERY 2 WEEKS). She was assessed and education was provided.     Ms. Pabon's vitals were reviewed.  Vitals:    24 1255   BP: (!) 167/93   Pulse: 62   Resp: 16   Temp: 98 °F (36.7 °C)   SpO2: 97%       Ms. Pabon stated that she was doing well, and voiced no major complaints. And she also stated that she has been tolerating the XOLAIR injections well and without any problems.      Also, she denied being on any current antibiotic therapy, and denied having any current signs of infection.           Omalizumab (XOLAIR) 225 mg TOTAL DOSE, was administered in 2 divided SQ injections at 1310 as follows: 150 mg SQ in the back of her RIGHT ARM, and 75 mg SQ in the back of her LEFT ARM.             Ms. Pabon tolerated well and voiced no complaints.     [facility]. Pab[facility] in [facility] condition at 1315.     She is to return in 2 weeks, on Friday, 24 at 1400, for her next appointment, for her next XOLAIR injection.     Debbie Wells RN  May 24, 2024  1:07 PM

## 2024-06-07 ENCOUNTER — HOSPITAL ENCOUNTER (OUTPATIENT)
Facility: HOSPITAL | Age: 64
Setting detail: INFUSION SERIES
End: 2024-06-07
Payer: MEDICAID

## 2024-06-07 VITALS
OXYGEN SATURATION: 98 % | HEART RATE: 69 BPM | RESPIRATION RATE: 16 BRPM | SYSTOLIC BLOOD PRESSURE: 161 MMHG | TEMPERATURE: 99.5 F | DIASTOLIC BLOOD PRESSURE: 84 MMHG

## 2024-06-07 DIAGNOSIS — J45.50 SEVERE PERSISTENT ASTHMA, UNSPECIFIED WHETHER COMPLICATED: Primary | ICD-10-CM

## 2024-06-07 PROCEDURE — 6360000002 HC RX W HCPCS

## 2024-06-07 PROCEDURE — 96372 THER/PROPH/DIAG INJ SC/IM: CPT

## 2024-06-07 RX ORDER — ACETAMINOPHEN 325 MG/1
650 TABLET ORAL
Status: CANCELLED | OUTPATIENT
Start: 2024-06-21

## 2024-06-07 RX ORDER — EPINEPHRINE 1 MG/ML
0.3 INJECTION, SOLUTION, CONCENTRATE INTRAVENOUS PRN
Status: CANCELLED | OUTPATIENT
Start: 2024-06-21

## 2024-06-07 RX ORDER — DIPHENHYDRAMINE HYDROCHLORIDE 50 MG/ML
50 INJECTION INTRAMUSCULAR; INTRAVENOUS
Status: CANCELLED | OUTPATIENT
Start: 2024-06-21

## 2024-06-07 RX ORDER — ALBUTEROL SULFATE 90 UG/1
4 AEROSOL, METERED RESPIRATORY (INHALATION) PRN
Status: CANCELLED | OUTPATIENT
Start: 2024-06-21

## 2024-06-07 RX ORDER — ONDANSETRON 2 MG/ML
8 INJECTION INTRAMUSCULAR; INTRAVENOUS
Status: CANCELLED | OUTPATIENT
Start: 2024-06-21

## 2024-06-07 RX ORDER — SODIUM CHLORIDE 9 MG/ML
INJECTION, SOLUTION INTRAVENOUS CONTINUOUS
Status: CANCELLED | OUTPATIENT
Start: 2024-06-21

## 2024-06-07 RX ADMIN — OMALIZUMAB 225 MG: 150 INJECTION, SOLUTION SUBCUTANEOUS at 14:28

## 2024-06-07 NOTE — PROGRESS NOTES
hospitals Progress Note    Date: 2024    Name: Viki Pabon    MRN: [id number]         : 1960    Ms. Pabon arrived in the hospitals today at 1420, in stable condition, here for her XOLAIR INJECTIONS (EVERY 2 WEEKS). She was assessed and education was provided.     Ms. Pabon's vitals were reviewed.  Patient Vitals for the past 12 hrs:   Temp Pulse Resp BP SpO2   24 1420 99.5 °F (37.5 °C) 69 16 (!) 161/84 98 %        Ms. Pabon stated that she was doing well, and voiced no major complaints. And she also stated that she has been tolerating the XOLAIR injections well and without any problems.      Also, she denied being on any current antibiotic therapy, and denied having any current signs of infection.           Omalizumab (XOLAIR) 225 mg TOTAL DOSE, was administered in 2 divided SQ injections at 1428 as follows: 150 mg SQ in the back of her LEFT ARM, and 75 mg SQ in the back of her RIGHT ARM.             Ms. Pabon tolerated well and voiced no complaints.     [facility]. Pabon [facility] in stable condition at 1435.     She is to return in 2 weeks, on Friday, 24 at 1030, for her next appointment, for her next XOLAIR injection.     Callie Douglas RN  2024  2:27 PM

## 2024-06-21 ENCOUNTER — HOSPITAL ENCOUNTER (OUTPATIENT)
Facility: HOSPITAL | Age: 64
Setting detail: INFUSION SERIES
End: 2024-06-21
Payer: MEDICAID

## 2024-06-21 VITALS
RESPIRATION RATE: 16 BRPM | DIASTOLIC BLOOD PRESSURE: 66 MMHG | SYSTOLIC BLOOD PRESSURE: 173 MMHG | OXYGEN SATURATION: 98 % | TEMPERATURE: 97.9 F | HEART RATE: 57 BPM

## 2024-06-21 DIAGNOSIS — J45.50 SEVERE PERSISTENT ASTHMA, UNSPECIFIED WHETHER COMPLICATED: Primary | ICD-10-CM

## 2024-06-21 PROCEDURE — 6360000002 HC RX W HCPCS

## 2024-06-21 PROCEDURE — 96372 THER/PROPH/DIAG INJ SC/IM: CPT

## 2024-06-21 RX ORDER — EPINEPHRINE 1 MG/ML
0.3 INJECTION, SOLUTION, CONCENTRATE INTRAVENOUS PRN
OUTPATIENT
Start: 2024-07-05

## 2024-06-21 RX ORDER — ACETAMINOPHEN 325 MG/1
650 TABLET ORAL
OUTPATIENT
Start: 2024-07-05

## 2024-06-21 RX ORDER — ONDANSETRON 2 MG/ML
8 INJECTION INTRAMUSCULAR; INTRAVENOUS
OUTPATIENT
Start: 2024-07-05

## 2024-06-21 RX ORDER — ALBUTEROL SULFATE 90 UG/1
4 AEROSOL, METERED RESPIRATORY (INHALATION) PRN
OUTPATIENT
Start: 2024-07-05

## 2024-06-21 RX ORDER — DIPHENHYDRAMINE HYDROCHLORIDE 50 MG/ML
50 INJECTION INTRAMUSCULAR; INTRAVENOUS
OUTPATIENT
Start: 2024-07-05

## 2024-06-21 RX ORDER — SODIUM CHLORIDE 9 MG/ML
INJECTION, SOLUTION INTRAVENOUS CONTINUOUS
OUTPATIENT
Start: 2024-07-05

## 2024-06-21 RX ADMIN — OMALIZUMAB 225 MG: 150 INJECTION, SOLUTION SUBCUTANEOUS at 10:54

## 2024-06-21 NOTE — PROGRESS NOTES
[facility] Progress Note    Date: 2024    Name: Viki Pabon    MRN: [id number]         : 1960    Ms. Pabon arrived in the [facility] today at 1030, in stable condition, here for her XOLAIR INJECTIONS (EVERY 2 WEEKS). She was assessed and education was provided.     Ms. Pabon's vitals were reviewed.  Vitals:    24 1030   BP: (!) 173/66   Pulse: 57   Resp: 16   Temp: 97.9 °F (36.6 °C)   SpO2: 98%       Ms. Pabon stated that she was doing well, and voiced no major complaints. And she also stated that she has been tolerating the XOLAIR injections well and without problems.      Omalizumab (XOLAIR) 225 mg TOTAL DOSE, was administered in 2 divided SQ injections at 1054 as follows: 150 mg SQ in the back of her RIGHT ARM, and 75 mg SQ in the back of her LEFT ARM.       Ms. Pabon tolerated the injections well and voiced no complaints.     Discharge instructions reviewed with patient and she expressed understanding. Patient's armband was removed and shredded.    [facility]. Pab[facility], ambulatory and in stable condition at 1100.     She is to return in 2 weeks, on Friday, 24 at 1100, for her next XOLAIR injection(s)  .     Brina Middleton RN  2024  6:33 PM

## 2024-07-05 ENCOUNTER — APPOINTMENT (OUTPATIENT)
Facility: HOSPITAL | Age: 64
End: 2024-07-05
Payer: MEDICAID

## 2024-07-12 ENCOUNTER — HOSPITAL ENCOUNTER (OUTPATIENT)
Facility: HOSPITAL | Age: 64
Setting detail: INFUSION SERIES
End: 2024-07-12
Payer: MEDICAID

## 2024-07-12 VITALS
RESPIRATION RATE: 16 BRPM | SYSTOLIC BLOOD PRESSURE: 172 MMHG | TEMPERATURE: 97.7 F | DIASTOLIC BLOOD PRESSURE: 77 MMHG | OXYGEN SATURATION: 100 % | HEART RATE: 67 BPM

## 2024-07-12 DIAGNOSIS — J45.50 SEVERE PERSISTENT ASTHMA, UNSPECIFIED WHETHER COMPLICATED: Primary | ICD-10-CM

## 2024-07-12 PROCEDURE — 96372 THER/PROPH/DIAG INJ SC/IM: CPT

## 2024-07-12 PROCEDURE — 6360000002 HC RX W HCPCS: Performed by: INTERNAL MEDICINE

## 2024-07-12 RX ORDER — SODIUM CHLORIDE 9 MG/ML
INJECTION, SOLUTION INTRAVENOUS CONTINUOUS
OUTPATIENT
Start: 2024-07-19

## 2024-07-12 RX ORDER — EPINEPHRINE 1 MG/ML
0.3 INJECTION, SOLUTION, CONCENTRATE INTRAVENOUS PRN
OUTPATIENT
Start: 2024-07-19

## 2024-07-12 RX ORDER — ONDANSETRON 2 MG/ML
8 INJECTION INTRAMUSCULAR; INTRAVENOUS
OUTPATIENT
Start: 2024-07-19

## 2024-07-12 RX ORDER — ACETAMINOPHEN 325 MG/1
650 TABLET ORAL
OUTPATIENT
Start: 2024-07-19

## 2024-07-12 RX ORDER — DIPHENHYDRAMINE HYDROCHLORIDE 50 MG/ML
50 INJECTION INTRAMUSCULAR; INTRAVENOUS
OUTPATIENT
Start: 2024-07-19

## 2024-07-12 RX ORDER — ALBUTEROL SULFATE 90 UG/1
4 AEROSOL, METERED RESPIRATORY (INHALATION) PRN
OUTPATIENT
Start: 2024-07-19

## 2024-07-12 RX ADMIN — OMALIZUMAB 225 MG: 150 INJECTION, SOLUTION SUBCUTANEOUS at 10:40

## 2024-07-12 NOTE — PROGRESS NOTES
[facility] Progress Note    Date: 2024    Name: Viki Pabon    MRN: [id number]         : 1960      XOLAIR Q2 WEEKS      Ms. Pabon arrived to [facility] at 1030. Pt ambulatory without assist.    Ms. Pabon was assessed and education was provided.     Ms. Pabon's vitals were reviewed.  Vitals:    24 1030   BP: (!) 172/77   Pulse: 67   Resp: 16   Temp: 97.7 °F (36.5 °C)   SpO2: 100%       Xolair 75mg SQ administered in patient's L upper arm & Xolair 150mg SQ administered in patient's R upper arm, for a total ordered dose of 225mg. Pt declined/ did not require band-aids.    Ms. Pabon tolerated well without complaints.    Discharge/ follow-up instructions discussed w/ pt. Pt verbalized understanding.    Pt armband removed & shredded.    Ms. Pabon was [facility] in stable condition at 1045. She is to return on 24 at 1000 for her next appointment.    Jodi Cox RN  2024

## 2024-07-16 ENCOUNTER — HOSPITAL ENCOUNTER (EMERGENCY)
Facility: HOSPITAL | Age: 64
Discharge: HOME | End: 2024-07-16
Payer: MEDICAID

## 2024-07-16 VITALS
DIASTOLIC BLOOD PRESSURE: 69 MMHG | SYSTOLIC BLOOD PRESSURE: 165 MMHG | BODY MASS INDEX: 35.7 KG/M2 | HEART RATE: 63 BPM | TEMPERATURE: 98.5 F | OXYGEN SATURATION: 99 % | WEIGHT: 194 LBS | RESPIRATION RATE: 18 BRPM | HEIGHT: 62 IN

## 2024-07-16 DIAGNOSIS — M54.41 ACUTE RIGHT-SIDED BACK PAIN WITH SCIATICA: Primary | ICD-10-CM

## 2024-07-16 PROCEDURE — 96372 THER/PROPH/DIAG INJ SC/IM: CPT

## 2024-07-16 PROCEDURE — 6370000000 HC RX 637 (ALT 250 FOR IP): Performed by: NURSE PRACTITIONER

## 2024-07-16 PROCEDURE — 6360000002 HC RX W HCPCS: Performed by: NURSE PRACTITIONER

## 2024-07-16 PROCEDURE — 99284 EMERGENCY DEPT VISIT MOD MDM: CPT

## 2024-07-16 RX ORDER — PREDNISONE 20 MG/1
40 TABLET ORAL
Status: COMPLETED | OUTPATIENT
Start: 2024-07-16 | End: 2024-07-16

## 2024-07-16 RX ORDER — METHOCARBAMOL 500 MG/1
1000 TABLET, FILM COATED ORAL ONCE
Status: COMPLETED | OUTPATIENT
Start: 2024-07-16 | End: 2024-07-16

## 2024-07-16 RX ORDER — LIDOCAINE 50 MG/G
1 PATCH TOPICAL EVERY 24 HOURS
Qty: 30 PATCH | Refills: 0 | Status: SHIPPED | OUTPATIENT
Start: 2024-07-16 | End: 2024-08-15

## 2024-07-16 RX ORDER — METHOCARBAMOL 500 MG/1
500 TABLET, FILM COATED ORAL 4 TIMES DAILY PRN
Qty: 40 TABLET | Refills: 0 | Status: SHIPPED | OUTPATIENT
Start: 2024-07-16

## 2024-07-16 RX ORDER — KETOROLAC TROMETHAMINE 15 MG/ML
30 INJECTION, SOLUTION INTRAMUSCULAR; INTRAVENOUS ONCE
Status: COMPLETED | OUTPATIENT
Start: 2024-07-16 | End: 2024-07-16

## 2024-07-16 RX ORDER — NAPROXEN SODIUM 550 MG/1
550 TABLET ORAL 2 TIMES DAILY WITH MEALS
Qty: 30 TABLET | Refills: 0 | Status: SHIPPED | OUTPATIENT
Start: 2024-07-16

## 2024-07-16 RX ADMIN — PREDNISONE 40 MG: 20 TABLET ORAL at 18:20

## 2024-07-16 RX ADMIN — METHOCARBAMOL 1000 MG: 500 TABLET ORAL at 18:18

## 2024-07-16 RX ADMIN — KETOROLAC TROMETHAMINE 30 MG: 15 INJECTION, SOLUTION INTRAMUSCULAR; INTRAVENOUS at 18:21

## 2024-07-16 ASSESSMENT — PAIN SCALES - GENERAL
PAINLEVEL_OUTOF10: 10
PAINLEVEL_OUTOF10: 10
PAINLEVEL_OUTOF10: 6

## 2024-07-16 ASSESSMENT — PAIN - FUNCTIONAL ASSESSMENT
PAIN_FUNCTIONAL_ASSESSMENT: 0-10
PAIN_FUNCTIONAL_ASSESSMENT: PREVENTS OR INTERFERES SOME ACTIVE ACTIVITIES AND ADLS

## 2024-07-16 ASSESSMENT — ENCOUNTER SYMPTOMS
SHORTNESS OF BREATH: 0
GASTROINTESTINAL NEGATIVE: 1
BACK PAIN: 1

## 2024-07-16 ASSESSMENT — PAIN DESCRIPTION - DESCRIPTORS: DESCRIPTORS: ACHING;SHARP

## 2024-07-16 ASSESSMENT — PAIN DESCRIPTION - LOCATION: LOCATION: BACK

## 2024-07-16 ASSESSMENT — PAIN DESCRIPTION - ORIENTATION: ORIENTATION: RIGHT

## 2024-07-16 NOTE — ED TRIAGE NOTES
Pt reports with right flank pain and lower back pain. 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

## 2024-07-16 NOTE — DISCHARGE INSTRUCTIONS
Use lots of heat and stretching.  Sleep on the left side with 2 pillows between legs.  Take medication as prescribed. Follow-up with your primary care physician within 2 days for reassessment. Bring the results from this visit with you for their review. Return to the ED immediately for any new, worsening, or persistent symptoms, including fever, vomiting, chest pain, shortness of breath, or any other medical concerns.

## 2024-07-16 NOTE — ED PROVIDER NOTES
EMERGENCY DEPARTMENT HISTORY AND PHYSICAL EXAM    6:58 PM      Date: 7/16/2024  Patient Name: Viki Pabon    History of Presenting Illness     Chief Complaint   Patient presents with    Back Pain    Flank Pain         History Provided By: Patient and medical chart review.    Additional History (Context): Viki Pabon is a 63 y.o. female with   Past Medical History:   Diagnosis Date    Acute ischemic stroke ([facility]) 8/21/2020    Acute Ischemic Stroke (late acute/early subacute infarcts in the right centrum semiovale extending to the superior frontal lobe cortex) with residual left hemiparesis    Bipolar disorder ([facility])     Chronic back pain     Chronic obstructive pulmonary disease (COPD) ([facility])     COVID-19 ruled out by laboratory testing 8/26/2020    SARS-CoV-2 ([facility] m2000) (collected 8/25/2020, resulted 8/26/2020): Not detected     Depression     Gastroesophageal reflux disease     History of colon polyps     Hypertension     Iron deficiency anemia     Left hemiparesis ([facility]) 8/21/2020    Menopause     Obesity, Class II, BMI 35-39.9     Obstructive sleep apnea on CPAP     On statin therapy due to risk of future cardiovascular event 8/24/2020    On Atorvastatin    Pure hypercholesterolemia 8/19/2020    Lipid profile (8/19/2020) showed , , HDL 50,     Sciatica of left side     Seasonal allergic rhinitis     Severe persistent asthma 11/07/2019    Type 2 diabetes mellitus, with long-term current use of insulin ([facility])     HbA1c (8/19/2020) = 8.1; HbA1c (8/20/2020) = 8.0   who presents with complaints of right low back pain down to her right mid thigh.  States this started yesterday but was not as bad.  States this morning when she woke up that is when it really got bad.  States pain is worse with movement and changing positions. Pt denies fever/chills, recent injury/trauma. Denies any urinary/bowel incontinence/retention, or saddle anesthesia.  Patient denies any urinary symptoms.

## 2024-07-19 ENCOUNTER — APPOINTMENT (OUTPATIENT)
Facility: HOSPITAL | Age: 64
End: 2024-07-19
Payer: MEDICAID

## 2024-07-26 ENCOUNTER — HOSPITAL ENCOUNTER (OUTPATIENT)
Facility: HOSPITAL | Age: 64
Setting detail: INFUSION SERIES
End: 2024-07-26
Payer: MEDICAID

## 2024-07-26 VITALS
OXYGEN SATURATION: 95 % | SYSTOLIC BLOOD PRESSURE: 172 MMHG | HEART RATE: 67 BPM | RESPIRATION RATE: 16 BRPM | DIASTOLIC BLOOD PRESSURE: 73 MMHG | TEMPERATURE: 97.8 F

## 2024-07-26 DIAGNOSIS — J45.50 SEVERE PERSISTENT ASTHMA, UNSPECIFIED WHETHER COMPLICATED: Primary | ICD-10-CM

## 2024-07-26 PROCEDURE — 96372 THER/PROPH/DIAG INJ SC/IM: CPT

## 2024-07-26 PROCEDURE — 6360000002 HC RX W HCPCS: Performed by: INTERNAL MEDICINE

## 2024-07-26 RX ORDER — ALBUTEROL SULFATE 90 UG/1
4 AEROSOL, METERED RESPIRATORY (INHALATION) PRN
Status: CANCELLED | OUTPATIENT
Start: 2024-08-09

## 2024-07-26 RX ORDER — EPINEPHRINE 1 MG/ML
0.3 INJECTION, SOLUTION, CONCENTRATE INTRAVENOUS PRN
Status: CANCELLED | OUTPATIENT
Start: 2024-08-09

## 2024-07-26 RX ORDER — ACETAMINOPHEN 325 MG/1
650 TABLET ORAL
Status: CANCELLED | OUTPATIENT
Start: 2024-08-09

## 2024-07-26 RX ORDER — ONDANSETRON 2 MG/ML
8 INJECTION INTRAMUSCULAR; INTRAVENOUS
Status: CANCELLED | OUTPATIENT
Start: 2024-08-09

## 2024-07-26 RX ORDER — DIPHENHYDRAMINE HYDROCHLORIDE 50 MG/ML
50 INJECTION INTRAMUSCULAR; INTRAVENOUS
Status: CANCELLED | OUTPATIENT
Start: 2024-08-09

## 2024-07-26 RX ORDER — SODIUM CHLORIDE 9 MG/ML
INJECTION, SOLUTION INTRAVENOUS CONTINUOUS
Status: CANCELLED | OUTPATIENT
Start: 2024-08-09

## 2024-07-26 RX ADMIN — OMALIZUMAB 225 MG: 150 INJECTION, SOLUTION SUBCUTANEOUS at 10:09

## 2024-07-26 NOTE — PROGRESS NOTES
[facility] Progress Note    Date: 2024    Name: Viki Pabon    MRN: [id number]         : 1960      XOLAIR Q2 WEEKS      Ms. Pabon arrived to [facility] at 1005. Pt ambulatory without assist.    Ms. Pabon was assessed and education was provided.     Ms. Pabon's vitals were reviewed.  Vitals:    24 1007   BP: (!) 172/73   Pulse: 67   Resp: 16   Temp: 97.8 °F (36.6 °C)   SpO2: 95%     Xolair 75mg SQ administered in patient's R upper arm & Xolair 150mg SQ administered in patient's L upper arm, for a total ordered dose of 225mg. Band-aids applied.    Ms. Pabon tolerated well without complaints.    Discharge/ follow-up instructions discussed w/ pt. Pt verbalized understanding.    Pt armband removed & shredded.    [facility]. Pabon [facility] in [facility] condition at 1015. She is to return on 8/3/24 at 1000 for her next Xolair appointment.    Ciera Plummer RN  2024

## 2024-08-09 ENCOUNTER — HOSPITAL ENCOUNTER (OUTPATIENT)
Facility: HOSPITAL | Age: 64
Setting detail: INFUSION SERIES
End: 2024-08-09
Payer: MEDICAID

## 2024-08-09 VITALS
RESPIRATION RATE: 16 BRPM | SYSTOLIC BLOOD PRESSURE: 164 MMHG | TEMPERATURE: 98.9 F | HEART RATE: 60 BPM | OXYGEN SATURATION: 96 % | DIASTOLIC BLOOD PRESSURE: 73 MMHG

## 2024-08-09 DIAGNOSIS — J45.50 SEVERE PERSISTENT ASTHMA, UNSPECIFIED WHETHER COMPLICATED: Primary | ICD-10-CM

## 2024-08-09 PROCEDURE — 6360000002 HC RX W HCPCS: Performed by: INTERNAL MEDICINE

## 2024-08-09 PROCEDURE — 96372 THER/PROPH/DIAG INJ SC/IM: CPT

## 2024-08-09 RX ORDER — SODIUM CHLORIDE 9 MG/ML
INJECTION, SOLUTION INTRAVENOUS CONTINUOUS
Status: CANCELLED | OUTPATIENT
Start: 2024-08-23

## 2024-08-09 RX ORDER — ACETAMINOPHEN 325 MG/1
650 TABLET ORAL
Status: CANCELLED | OUTPATIENT
Start: 2024-08-23

## 2024-08-09 RX ORDER — DIPHENHYDRAMINE HYDROCHLORIDE 50 MG/ML
50 INJECTION INTRAMUSCULAR; INTRAVENOUS
Status: CANCELLED | OUTPATIENT
Start: 2024-08-23

## 2024-08-09 RX ORDER — ALBUTEROL SULFATE 90 UG/1
4 AEROSOL, METERED RESPIRATORY (INHALATION) PRN
Status: CANCELLED | OUTPATIENT
Start: 2024-08-23

## 2024-08-09 RX ORDER — EPINEPHRINE 1 MG/ML
0.3 INJECTION, SOLUTION, CONCENTRATE INTRAVENOUS PRN
Status: CANCELLED | OUTPATIENT
Start: 2024-08-23

## 2024-08-09 RX ORDER — ONDANSETRON 2 MG/ML
8 INJECTION INTRAMUSCULAR; INTRAVENOUS
Status: CANCELLED | OUTPATIENT
Start: 2024-08-23

## 2024-08-09 RX ADMIN — OMALIZUMAB 225 MG: 150 INJECTION, SOLUTION SUBCUTANEOUS at 10:20

## 2024-08-09 ASSESSMENT — PAIN - FUNCTIONAL ASSESSMENT: PAIN_FUNCTIONAL_ASSESSMENT: NONE - DENIES PAIN

## 2024-08-09 NOTE — PROGRESS NOTES
[facility] Progress Note    Date: 2024    Name: Viki Pabon    MRN: [id number]         : 1960    Ms. Pabon arrived in the [facility] today at 1005, in stable condition, here for her XOLAIR INJECTIONS (EVERY 2 WEEKS). She was assessed and education was provided.     Ms. Pabon's vitals were reviewed.  Vitals:    24 1005   BP: (!) 164/73   Pulse: 60   Resp: 16   Temp: 98.9 °F (37.2 °C)   SpO2: 96%       Ms. Pabon stated that she was doing well, and voiced no major complaints. And she also stated that she has been tolerating the XOLAIR injections well and without any problems.      Also, she denied being on any current antibiotic therapy, and denied having any current signs of infection.           Omalizumab (XOLAIR) 225 mg TOTAL DOSE, was administered in 2 divided SQ injections at 1020 as follows: 150 mg SQ in the back of her RIGHT ARM, and 75 mg SQ in the back of her LEFT ARM.             Ms. Pabon tolerated well and voiced no complaints.     [facility] condition at 1025.     She is to return in 2 weeks, on Friday, 24 at 1100, for her next appointment, for her next XOLAIR injection.     Debbie Wells RN  2024  10:18 AM

## 2024-08-23 ENCOUNTER — HOSPITAL ENCOUNTER (OUTPATIENT)
Dept: WOMENS IMAGING | Facility: HOSPITAL | Age: 64
End: 2024-08-23
Payer: MEDICAID

## 2024-08-23 ENCOUNTER — HOSPITAL ENCOUNTER (OUTPATIENT)
Facility: HOSPITAL | Age: 64
Setting detail: INFUSION SERIES
End: 2024-08-23
Payer: MEDICAID

## 2024-08-23 VITALS
SYSTOLIC BLOOD PRESSURE: 164 MMHG | OXYGEN SATURATION: 96 % | HEART RATE: 71 BPM | TEMPERATURE: 97.1 F | RESPIRATION RATE: 16 BRPM | DIASTOLIC BLOOD PRESSURE: 73 MMHG

## 2024-08-23 DIAGNOSIS — Z12.31 VISIT FOR SCREENING MAMMOGRAM: ICD-10-CM

## 2024-08-23 DIAGNOSIS — J45.50 SEVERE PERSISTENT ASTHMA, UNSPECIFIED WHETHER COMPLICATED: Primary | ICD-10-CM

## 2024-08-23 PROCEDURE — 6360000002 HC RX W HCPCS: Performed by: INTERNAL MEDICINE

## 2024-08-23 PROCEDURE — 77063 BREAST TOMOSYNTHESIS BI: CPT

## 2024-08-23 PROCEDURE — 96372 THER/PROPH/DIAG INJ SC/IM: CPT

## 2024-08-23 PROCEDURE — 77067 SCR MAMMO BI INCL CAD: CPT

## 2024-08-23 RX ORDER — ALBUTEROL SULFATE 90 UG/1
4 AEROSOL, METERED RESPIRATORY (INHALATION) PRN
OUTPATIENT
Start: 2024-09-06

## 2024-08-23 RX ORDER — EPINEPHRINE 1 MG/ML
0.3 INJECTION, SOLUTION, CONCENTRATE INTRAVENOUS PRN
OUTPATIENT
Start: 2024-09-06

## 2024-08-23 RX ORDER — ONDANSETRON 2 MG/ML
8 INJECTION INTRAMUSCULAR; INTRAVENOUS
OUTPATIENT
Start: 2024-09-06

## 2024-08-23 RX ORDER — SODIUM CHLORIDE 9 MG/ML
INJECTION, SOLUTION INTRAVENOUS CONTINUOUS
OUTPATIENT
Start: 2024-09-06

## 2024-08-23 RX ORDER — ACETAMINOPHEN 325 MG/1
650 TABLET ORAL
OUTPATIENT
Start: 2024-09-06

## 2024-08-23 RX ORDER — DIPHENHYDRAMINE HYDROCHLORIDE 50 MG/ML
50 INJECTION INTRAMUSCULAR; INTRAVENOUS
OUTPATIENT
Start: 2024-09-06

## 2024-08-23 RX ADMIN — OMALIZUMAB 225 MG: 150 INJECTION, SOLUTION SUBCUTANEOUS at 11:11

## 2024-08-23 ASSESSMENT — PAIN - FUNCTIONAL ASSESSMENT: PAIN_FUNCTIONAL_ASSESSMENT: NONE - DENIES PAIN

## 2024-08-23 NOTE — PROGRESS NOTES
[facility] Progress Note    Date: 2024    Name: Viki Pabon    MRN: [id number]         : 1960    Ms. Pabon arrived in the [facility] today at 1005, in stable condition, here for her XOLAIR INJECTIONS (EVERY 2 WEEKS). She was assessed and education was provided.     Ms. Pabon's vitals were reviewed.  Vitals:    24 1101   BP: (!) 164/73   Pulse: 71   Resp: 16   Temp: 97.1 °F (36.2 °C)   SpO2: 96%       Patient states that she tolerates the XOLAIR injections well and without any problems.    Also, she denied being on any current antibiotic therapy, and denied having any current signs of infection.         Omalizumab (XOLAIR) 225 mg TOTAL DOSE, was administered in 2 divided SQ injections as follows:  75mg L arm  150mg R arm        Ms. Pabon tolerated well and voiced no complaints. Band aid placed to injection sites.    [facility]. Pab[facility] in stable condition at 1115.     She is to return in 2 weeks, on 24 at 1300, for her next appointment, for her next XOLAIR injection.     Regi Barrow RN  2024  11:09 AM

## 2024-09-20 ENCOUNTER — HOSPITAL ENCOUNTER (OUTPATIENT)
Facility: HOSPITAL | Age: 64
Setting detail: INFUSION SERIES
Discharge: HOME | End: 2024-09-23
Payer: MEDICAID

## 2024-09-20 VITALS
RESPIRATION RATE: 16 BRPM | OXYGEN SATURATION: 98 % | TEMPERATURE: 97.5 F | DIASTOLIC BLOOD PRESSURE: 74 MMHG | HEART RATE: 75 BPM | SYSTOLIC BLOOD PRESSURE: 166 MMHG

## 2024-09-20 DIAGNOSIS — J45.50 SEVERE PERSISTENT ASTHMA, UNSPECIFIED WHETHER COMPLICATED: Primary | ICD-10-CM

## 2024-09-20 PROCEDURE — 6360000002 HC RX W HCPCS: Performed by: INTERNAL MEDICINE

## 2024-09-20 PROCEDURE — 96372 THER/PROPH/DIAG INJ SC/IM: CPT

## 2024-09-20 RX ORDER — ONDANSETRON 2 MG/ML
8 INJECTION INTRAMUSCULAR; INTRAVENOUS
OUTPATIENT
Start: 2024-10-04

## 2024-09-20 RX ORDER — ALBUTEROL SULFATE 90 UG/1
4 INHALANT RESPIRATORY (INHALATION) PRN
OUTPATIENT
Start: 2024-10-04

## 2024-09-20 RX ORDER — SODIUM CHLORIDE 9 MG/ML
INJECTION, SOLUTION INTRAVENOUS CONTINUOUS
OUTPATIENT
Start: 2024-10-04

## 2024-09-20 RX ORDER — DIPHENHYDRAMINE HYDROCHLORIDE 50 MG/ML
50 INJECTION INTRAMUSCULAR; INTRAVENOUS
OUTPATIENT
Start: 2024-10-04

## 2024-09-20 RX ORDER — ACETAMINOPHEN 325 MG/1
650 TABLET ORAL
OUTPATIENT
Start: 2024-10-04

## 2024-09-20 RX ORDER — EPINEPHRINE 1 MG/ML
0.3 INJECTION, SOLUTION, CONCENTRATE INTRAVENOUS PRN
OUTPATIENT
Start: 2024-10-04

## 2024-09-20 RX ADMIN — OMALIZUMAB 225 MG: 150 INJECTION, SOLUTION SUBCUTANEOUS at 13:13

## 2024-09-20 ASSESSMENT — PAIN - FUNCTIONAL ASSESSMENT: PAIN_FUNCTIONAL_ASSESSMENT: 0-10

## 2024-09-20 ASSESSMENT — PAIN DESCRIPTION - DESCRIPTORS: DESCRIPTORS: SHARP;STABBING

## 2024-09-20 NOTE — PROGRESS NOTES
[facility] Progress Note    Date: 2024    Name: Viki Pabon    MRN: [id number]         : 1960    Ms. Pabon arrived in the [facility] today at 1300, in stable condition, here for her XOLAIR INJECTIONS (EVERY 2 WEEKS). She was assessed and education was provided.     Ms. Pabon's vitals were reviewed.  Vitals:    24 1300   Pulse: 75   Resp: 16   Temp: 97.5 °F (36.4 °C)   SpO2: 98%       Patient states that she tolerates the XOLAIR injections well and without any problems.    Also, she denied being on any current antibiotic therapy, and denied having any current signs of infection.       Omalizumab (XOLAIR) 225 mg TOTAL DOSE, was administered in 2 divided SQ injections as follows:  75mg R arm  150mg L arm      Ms. Pabon tolerated well and voiced no complaints. Band aid placed to injection sites.    [facility]. Pabon [facility] in stable condition at 1320.     She is to return on 10/11/24 at 1300, for her next appointment, for her next XOLAIR injection.     Regi Barrow RN  2024  1:08 PM

## 2024-10-11 ENCOUNTER — HOSPITAL ENCOUNTER (OUTPATIENT)
Facility: HOSPITAL | Age: 64
Setting detail: INFUSION SERIES
Discharge: HOME | End: 2024-10-14
Payer: MEDICAID

## 2024-10-11 VITALS
OXYGEN SATURATION: 96 % | RESPIRATION RATE: 16 BRPM | DIASTOLIC BLOOD PRESSURE: 75 MMHG | HEART RATE: 64 BPM | TEMPERATURE: 98.1 F | SYSTOLIC BLOOD PRESSURE: 148 MMHG

## 2024-10-11 DIAGNOSIS — J45.50 SEVERE PERSISTENT ASTHMA, UNSPECIFIED WHETHER COMPLICATED: Primary | ICD-10-CM

## 2024-10-11 PROCEDURE — 96372 THER/PROPH/DIAG INJ SC/IM: CPT

## 2024-10-11 PROCEDURE — 6360000002 HC RX W HCPCS: Performed by: INTERNAL MEDICINE

## 2024-10-11 RX ORDER — DIPHENHYDRAMINE HYDROCHLORIDE 50 MG/ML
50 INJECTION INTRAMUSCULAR; INTRAVENOUS
OUTPATIENT
Start: 2024-10-18

## 2024-10-11 RX ORDER — SODIUM CHLORIDE 9 MG/ML
INJECTION, SOLUTION INTRAVENOUS CONTINUOUS
OUTPATIENT
Start: 2024-10-18

## 2024-10-11 RX ORDER — ALBUTEROL SULFATE 90 UG/1
4 INHALANT RESPIRATORY (INHALATION) PRN
OUTPATIENT
Start: 2024-10-18

## 2024-10-11 RX ORDER — ACETAMINOPHEN 325 MG/1
650 TABLET ORAL
OUTPATIENT
Start: 2024-10-18

## 2024-10-11 RX ORDER — EPINEPHRINE 1 MG/ML
0.3 INJECTION, SOLUTION, CONCENTRATE INTRAVENOUS PRN
OUTPATIENT
Start: 2024-10-18

## 2024-10-11 RX ORDER — ONDANSETRON 2 MG/ML
8 INJECTION INTRAMUSCULAR; INTRAVENOUS
OUTPATIENT
Start: 2024-10-18

## 2024-10-11 RX ADMIN — OMALIZUMAB 225 MG: 150 INJECTION, SOLUTION SUBCUTANEOUS at 12:40

## 2024-10-11 ASSESSMENT — PAIN - FUNCTIONAL ASSESSMENT: PAIN_FUNCTIONAL_ASSESSMENT: NONE - DENIES PAIN

## 2024-10-11 NOTE — PROGRESS NOTES
[facility] Progress Note    Date: 2024    Name: Viki Pabon    MRN: [id number]         : 1960    Ms. Pabon arrived in the [facility] today at 1225, in stable condition, here for her XOLAIR INJECTIONS (EVERY 2 WEEKS). She was assessed and education was provided.     Ms. Pabon's vitals were reviewed.  Vitals:    10/11/24 1225   BP: (!) 148/75   Pulse: 64   Resp: 16   Temp: 98.1 °F (36.7 °C)   SpO2: 96%       Ms. Pabon stated that she was doing well, and voiced no major complaints. And she also stated that she has been tolerating the XOLAIR injections well and without any problems.      Also, she denied being on any current antibiotic therapy, and denied having any current signs of infection.           Omalizumab (XOLAIR) 225 mg TOTAL DOSE, was administered in 2 divided SQ injections at 1240 as follows: 150 mg SQ in the back of her RIGHT ARM, and 75 mg SQ in the back of her LEFT ARM.             Ms. Pabon tolerated well and voiced no complaints.     Ms. Pabon was [facility] in stable condition at 1245.     She is to return in 2 weeks, on Friday, 10-25-24 at 1330, for her next appointment, for her next XOLAIR injection.     Debbie Wells RN  2024  12:38 PM

## 2024-10-25 ENCOUNTER — HOSPITAL ENCOUNTER (OUTPATIENT)
Facility: HOSPITAL | Age: 64
Setting detail: INFUSION SERIES
End: 2024-10-25

## 2024-10-28 ENCOUNTER — HOSPITAL ENCOUNTER (OUTPATIENT)
Facility: HOSPITAL | Age: 64
Setting detail: INFUSION SERIES
Discharge: HOME | End: 2024-10-31
Payer: MEDICAID

## 2024-10-28 VITALS
DIASTOLIC BLOOD PRESSURE: 69 MMHG | OXYGEN SATURATION: 100 % | TEMPERATURE: 97.4 F | RESPIRATION RATE: 18 BRPM | SYSTOLIC BLOOD PRESSURE: 168 MMHG | HEART RATE: 56 BPM

## 2024-10-28 DIAGNOSIS — J45.50 SEVERE PERSISTENT ASTHMA, UNSPECIFIED WHETHER COMPLICATED: Primary | ICD-10-CM

## 2024-10-28 PROCEDURE — 96372 THER/PROPH/DIAG INJ SC/IM: CPT

## 2024-10-28 PROCEDURE — 6360000002 HC RX W HCPCS: Performed by: INTERNAL MEDICINE

## 2024-10-28 RX ORDER — DIPHENHYDRAMINE HYDROCHLORIDE 50 MG/ML
50 INJECTION INTRAMUSCULAR; INTRAVENOUS
OUTPATIENT
Start: 2024-11-04

## 2024-10-28 RX ORDER — DULAGLUTIDE 0.75 MG/.5ML
0.75 INJECTION, SOLUTION SUBCUTANEOUS
COMMUNITY
Start: 2024-10-16

## 2024-10-28 RX ORDER — EPINEPHRINE 1 MG/ML
0.3 INJECTION, SOLUTION, CONCENTRATE INTRAVENOUS PRN
OUTPATIENT
Start: 2024-11-04

## 2024-10-28 RX ORDER — SODIUM CHLORIDE 9 MG/ML
INJECTION, SOLUTION INTRAVENOUS CONTINUOUS
OUTPATIENT
Start: 2024-11-04

## 2024-10-28 RX ORDER — ALBUTEROL SULFATE 90 UG/1
4 INHALANT RESPIRATORY (INHALATION) PRN
OUTPATIENT
Start: 2024-11-04

## 2024-10-28 RX ORDER — ACETAMINOPHEN 325 MG/1
650 TABLET ORAL
OUTPATIENT
Start: 2024-11-04

## 2024-10-28 RX ORDER — ONDANSETRON 2 MG/ML
8 INJECTION INTRAMUSCULAR; INTRAVENOUS
OUTPATIENT
Start: 2024-11-04

## 2024-10-28 RX ADMIN — OMALIZUMAB 225 MG: 150 INJECTION, SOLUTION SUBCUTANEOUS at 11:22

## 2024-10-28 NOTE — PROGRESS NOTES
[facility] Progress Note    Date: 2024    Name: Viki Pabon    MRN: [id number]         : 1960    Ms. Pabon arrived in the [facility] today at 1110, in stable condition, here for her XOLAIR INJECTIONS (EVERY 2 WEEKS). She was assessed and education was provided.     Ms. Pabon's vitals were reviewed.  Vitals:    10/28/24 1111   BP: (!) 168/69   Pulse: 56   Resp: 18   Temp: 97.4 °F (36.3 °C)   SpO2: 100%       Ms. Pabon stated that she was doing well, and voiced no major complaints. And she also stated that she has been tolerating the XOLAIR injections well and without any problems.      Also, she denied being on any current antibiotic therapy, and denied having any current signs of infection.           Omalizumab (XOLAIR) 225 mg TOTAL DOSE, was administered in 2 divided SQ injections as follows: 150 mg SQ in the back of her LEFT ARM, and 75 mg SQ in the back of her RIGHT ARM.           Ms. Pabon tolerated well and voiced no complaints.     Ms. Pabon was [facility] in stable condition at 1130.     She is to return in 2 weeks, on 24 at 1130, for her next appointment, for her next XOLAIR injection.     An Mckinney RN  2024  11:32 AM

## 2024-11-08 ENCOUNTER — APPOINTMENT (OUTPATIENT)
Facility: HOSPITAL | Age: 64
End: 2024-11-08
Payer: MEDICAID

## 2024-11-11 ENCOUNTER — HOSPITAL ENCOUNTER (OUTPATIENT)
Facility: HOSPITAL | Age: 64
Setting detail: INFUSION SERIES
Discharge: HOME | End: 2024-11-14
Payer: MEDICARE

## 2024-11-11 VITALS
HEART RATE: 62 BPM | OXYGEN SATURATION: 95 % | DIASTOLIC BLOOD PRESSURE: 63 MMHG | RESPIRATION RATE: 16 BRPM | SYSTOLIC BLOOD PRESSURE: 146 MMHG | TEMPERATURE: 98.5 F

## 2024-11-11 DIAGNOSIS — J45.50 SEVERE PERSISTENT ASTHMA, UNSPECIFIED WHETHER COMPLICATED: Primary | ICD-10-CM

## 2024-11-11 PROCEDURE — 96372 THER/PROPH/DIAG INJ SC/IM: CPT

## 2024-11-11 PROCEDURE — 6360000002 HC RX W HCPCS: Performed by: INTERNAL MEDICINE

## 2024-11-11 RX ORDER — ALBUTEROL SULFATE 90 UG/1
4 INHALANT RESPIRATORY (INHALATION) PRN
OUTPATIENT
Start: 2024-11-25

## 2024-11-11 RX ORDER — ONDANSETRON 2 MG/ML
8 INJECTION INTRAMUSCULAR; INTRAVENOUS
OUTPATIENT
Start: 2024-11-25

## 2024-11-11 RX ORDER — ACETAMINOPHEN 325 MG/1
650 TABLET ORAL
OUTPATIENT
Start: 2024-11-25

## 2024-11-11 RX ORDER — DIPHENHYDRAMINE HYDROCHLORIDE 50 MG/ML
50 INJECTION INTRAMUSCULAR; INTRAVENOUS
OUTPATIENT
Start: 2024-11-25

## 2024-11-11 RX ORDER — HYDROCORTISONE SODIUM SUCCINATE 100 MG/2ML
100 INJECTION INTRAMUSCULAR; INTRAVENOUS
OUTPATIENT
Start: 2024-11-25

## 2024-11-11 RX ORDER — SODIUM CHLORIDE 9 MG/ML
INJECTION, SOLUTION INTRAVENOUS CONTINUOUS
OUTPATIENT
Start: 2024-11-25

## 2024-11-11 RX ORDER — EPINEPHRINE 1 MG/ML
0.3 INJECTION, SOLUTION, CONCENTRATE INTRAVENOUS PRN
OUTPATIENT
Start: 2024-11-25

## 2024-11-11 RX ADMIN — OMALIZUMAB 225 MG: 150 INJECTION, SOLUTION SUBCUTANEOUS at 11:48

## 2024-11-11 NOTE — PROGRESS NOTES
[facility] Progress Note    Date: 2024    Name: Viki Pabon    MRN: [id number]         : 1960    Ms. Pabon arrived in the [facility] today at 1120, ambulatory and in stable condition, here for her XOLAIR INJECTIONS (EVERY 2 WEEKS). She was assessed and education was provided.     Ms. Pabon's vitals were reviewed.  Vitals:    24 1120   BP: (!) 146/63   Pulse: 62   Resp: 16   Temp: 98.5 °F (36.9 °C)   SpO2: 95%       Ms. Pabon stated that she was doing well, and voiced no major complaints. And she also stated that she has been tolerating the XOLAIR injections well and without problems.      Omalizumab (XOLAIR) 225 mg TOTAL DOSE, was administered in 2 divided SQ injections at 1135 as follows: 150 mg SQ in the back of her RIGHT ARM, and 75 mg SQ in the back of her LEFT ARM.       Ms. Pabon tolerated the injections well and voiced no complaints.     Discharge instructions reviewed with patient and she expressed understanding. Patient's armband was removed and shredded.    [facility]. Pab[facility], ambulatory and in stable condition at 1145.     She is to return in 2 weeks, on Monday, 24 at 1130, for her next XOLAIR injection(s).  .     Brina Middleton RN  2024

## 2024-11-25 ENCOUNTER — HOSPITAL ENCOUNTER (OUTPATIENT)
Facility: HOSPITAL | Age: 64
Setting detail: INFUSION SERIES
Discharge: HOME | End: 2024-11-28
Payer: MEDICARE

## 2024-11-25 VITALS
SYSTOLIC BLOOD PRESSURE: 91 MMHG | TEMPERATURE: 97.1 F | DIASTOLIC BLOOD PRESSURE: 62 MMHG | HEART RATE: 71 BPM | OXYGEN SATURATION: 97 % | RESPIRATION RATE: 16 BRPM

## 2024-11-25 DIAGNOSIS — J45.50 SEVERE PERSISTENT ASTHMA, UNSPECIFIED WHETHER COMPLICATED: Primary | ICD-10-CM

## 2024-11-25 PROCEDURE — 6360000002 HC RX W HCPCS: Performed by: INTERNAL MEDICINE

## 2024-11-25 PROCEDURE — 96372 THER/PROPH/DIAG INJ SC/IM: CPT

## 2024-11-25 RX ORDER — ONDANSETRON 2 MG/ML
8 INJECTION INTRAMUSCULAR; INTRAVENOUS
OUTPATIENT
Start: 2024-12-09

## 2024-11-25 RX ORDER — DIPHENHYDRAMINE HYDROCHLORIDE 50 MG/ML
50 INJECTION INTRAMUSCULAR; INTRAVENOUS
OUTPATIENT
Start: 2024-12-09

## 2024-11-25 RX ORDER — EPINEPHRINE 1 MG/ML
0.3 INJECTION, SOLUTION, CONCENTRATE INTRAVENOUS PRN
OUTPATIENT
Start: 2024-12-09

## 2024-11-25 RX ORDER — SODIUM CHLORIDE 9 MG/ML
INJECTION, SOLUTION INTRAVENOUS CONTINUOUS
OUTPATIENT
Start: 2024-12-09

## 2024-11-25 RX ORDER — ACETAMINOPHEN 325 MG/1
650 TABLET ORAL
OUTPATIENT
Start: 2024-12-09

## 2024-11-25 RX ORDER — HYDROCORTISONE SODIUM SUCCINATE 100 MG/2ML
100 INJECTION INTRAMUSCULAR; INTRAVENOUS
OUTPATIENT
Start: 2024-12-09

## 2024-11-25 RX ORDER — ALBUTEROL SULFATE 90 UG/1
4 INHALANT RESPIRATORY (INHALATION) PRN
OUTPATIENT
Start: 2024-12-09

## 2024-11-25 RX ADMIN — OMALIZUMAB 225 MG: 150 INJECTION, SOLUTION SUBCUTANEOUS at 11:37

## 2024-11-25 NOTE — PROGRESS NOTES
[facility] Progress Note    Date: 2024    Name: Viki Pabon    MRN: [id number]         : 1960    Ms. Pabon arrived in the [facility] today at 1130, in stable condition, here for her XOLAIR INJECTIONS (EVERY 2 WEEKS). She was assessed and education was provided.     Ms. Pabon's vitals were reviewed.  Vitals:    24 1132   BP: 91/62   Pulse: 71   Resp: 16   Temp: 97.1 °F (36.2 °C)   SpO2: 97%       Ms. Pabon stated that she was doing well, and voiced no major complaints. And she also stated that she has been tolerating the XOLAIR injections well and without any problems.      Also, she denied being on any current antibiotic therapy, and denied having any current signs of infection.         Omalizumab (XOLAIR) 225 mg TOTAL DOSE, was administered in 2 divided SQ injections as follows: 150 mg SQ in the back of her LEFT ARM, and 75 mg SQ in the back of her RIGHT ARM.         Ms. Pabon tolerated well and voiced no complaints. Discharge instructions reviewed with patient and she verbalized understanding. Armband removed and shredded.     [facility]. Pab[facility] in stable condition at 1145.     She is to return in 2 weeks, on 24 at 1130, for her next XOLAIR injection appointment.     An Mckinney RN  2024  11:50 AM

## 2024-11-29 ENCOUNTER — APPOINTMENT (OUTPATIENT)
Facility: HOSPITAL | Age: 64
End: 2024-11-29
Payer: MEDICARE

## 2024-11-29 ENCOUNTER — HOSPITAL ENCOUNTER (EMERGENCY)
Facility: HOSPITAL | Age: 64
Discharge: HOME | End: 2024-11-29
Payer: MEDICARE

## 2024-11-29 VITALS
RESPIRATION RATE: 17 BRPM | TEMPERATURE: 98.7 F | SYSTOLIC BLOOD PRESSURE: 207 MMHG | BODY MASS INDEX: 37.73 KG/M2 | WEIGHT: 205 LBS | OXYGEN SATURATION: 98 % | HEIGHT: 62 IN | DIASTOLIC BLOOD PRESSURE: 87 MMHG | HEART RATE: 60 BPM

## 2024-11-29 DIAGNOSIS — M54.50 ACUTE MIDLINE LOW BACK PAIN WITHOUT SCIATICA: ICD-10-CM
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
## 2024-11-29 PROCEDURE — 71250 CT THORAX DX C-: CPT

## 2024-11-29 PROCEDURE — 96372 THER/PROPH/DIAG INJ SC/IM: CPT

## 2024-11-29 PROCEDURE — 99284 EMERGENCY DEPT VISIT MOD MDM: CPT

## 2024-11-29 PROCEDURE — 6370000000 HC RX 637 (ALT 250 FOR IP): Performed by: NURSE PRACTITIONER

## 2024-11-29 PROCEDURE — 70450 CT HEAD/BRAIN W/O DYE: CPT

## 2024-11-29 PROCEDURE — 6360000002 HC RX W HCPCS: Performed by: NURSE PRACTITIONER

## 2024-11-29 PROCEDURE — 72125 CT NECK SPINE W/O DYE: CPT

## 2024-11-29 RX ORDER — OXYCODONE AND ACETAMINOPHEN 5; 325 MG/1; MG/1
1 TABLET ORAL
Status: COMPLETED | OUTPATIENT
Start: 2024-11-29 | End: 2024-11-29

## 2024-11-29 RX ORDER — NAPROXEN 500 MG/1
500 TABLET ORAL 2 TIMES DAILY WITH MEALS
Qty: 20 TABLET | Refills: 0 | Status: SHIPPED | OUTPATIENT
Start: 2024-11-29 | End: 2024-11-29 | Stop reason: SINTOL

## 2024-11-29 RX ORDER — KETOROLAC TROMETHAMINE 15 MG/ML
15 INJECTION, SOLUTION INTRAMUSCULAR; INTRAVENOUS ONCE
Status: COMPLETED | OUTPATIENT
Start: 2024-11-29 | End: 2024-11-29

## 2024-11-29 RX ORDER — HYDROCODONE BITARTRATE AND ACETAMINOPHEN 5; 325 MG/1; MG/1
1 TABLET ORAL EVERY 6 HOURS PRN
Qty: 12 TABLET | Refills: 0 | Status: SHIPPED | OUTPATIENT
Start: 2024-11-29 | End: 2024-12-02

## 2024-11-29 RX ADMIN — KETOROLAC TROMETHAMINE 15 MG: 15 INJECTION, SOLUTION INTRAMUSCULAR; INTRAVENOUS at 14:06

## 2024-11-29 RX ADMIN — OXYCODONE HYDROCHLORIDE AND ACETAMINOPHEN 1 TABLET: 5; 325 TABLET ORAL at 14:06

## 2024-11-29 ASSESSMENT — PAIN DESCRIPTION - LOCATION
LOCATION: BACK
LOCATION: BACK;SACRUM

## 2024-11-29 ASSESSMENT — PAIN SCALES - GENERAL
PAINLEVEL_OUTOF10: 10
PAINLEVEL_OUTOF10: 10

## 2024-11-29 ASSESSMENT — PAIN DESCRIPTION - DESCRIPTORS
DESCRIPTORS: ACHING
DESCRIPTORS: ACHING;SHARP

## 2024-11-29 ASSESSMENT — PAIN DESCRIPTION - ORIENTATION
ORIENTATION: LOWER
ORIENTATION: MID

## 2024-11-29 ASSESSMENT — PAIN - FUNCTIONAL ASSESSMENT
PAIN_FUNCTIONAL_ASSESSMENT: 0-10
PAIN_FUNCTIONAL_ASSESSMENT: ACTIVITIES ARE NOT PREVENTED

## 2024-11-29 NOTE — ED TRIAGE NOTES
Pt ambulatory to triage, stated that she slipped on the floor and fell on her buttocks and hit her head yesterday , denies LOC, not in the blood thinners .    show

## 2024-11-29 NOTE — DISCHARGE INSTRUCTIONS
Thank you for allow me to take care of you today.    Please be mindful of the pain medications I prescribed for you may be sedating.  Please do not take any other medications that make you sleepy, drive, or operate heavy machinery while on these medications.    Please follow-up with primary care provider for the following CT scan findings: Small sliding hiatal hernia. Fatty lesion within the proximal  stomach measuring up to 2.0 cm, suggestive of a lipoma.    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 Mild anterolisthesis at C4 3-C4  and C4-C5. Kyphosis in the cervical spine centered at the level of C4-5. No  acute fracture or traumatic subluxation.

## 2024-11-29 NOTE — ED PROVIDER NOTES
tablet  Commonly known as: GLUCOPHAGE-XR     methocarbamol 500 MG tablet  Commonly known as: ROBAXIN     minoxidil 2.5 MG tablet  Commonly known as: LONITEN     mometasone-formoterol 200-5 MCG/ACT inhaler  Commonly known as: DULERA     montelukast 10 MG tablet  Commonly known as: SINGULAIR     naproxen sodium 550 MG tablet  Commonly known as: ANAPROX  Take 1 tablet by mouth 2 times daily (with meals)     omalizumab 150 MG/ML Sosy injection  Commonly known as: XOLAIR     omeprazole 20 MG delayed release capsule  Commonly known as: PRILOSEC     ophthalmic solution Rhopressa 0.02 % Soln  Generic drug: Netarsudil Dimesylate     piroxicam 20 MG capsule  Commonly known as: FELDENE     Rybelsus 14 MG Tabs  Generic drug: Semaglutide     tiotropium 2.5 MCG/ACT Aers inhaler  Commonly known as: SPIRIVA RESPIMAT     True Metrix Blood Glucose Test strip  Generic drug: blood glucose test strips     Trulicity 0.75 MG/0.5ML Soaj SC injection  Generic drug: dulaglutide     valsartan-hydroCHLOROthiazide 320-12.5 MG per tablet  Commonly known as: DIOVAN-HCT     * vitamin D 50 MCG (2000 UT) Caps capsule  Commonly known as: CHOLECALCIFEROL     * Vitamin D3 50 MCG (2000 UT) Tabs           * This list has 4 medication(s) that are the same as other medications prescribed for you. Read the directions carefully, and ask your doctor or other care provider to review them with you.                   Where to Get Your Medications        These medications were sent to [facility]/pharmacy #[id number] - [place], [place] - [address] [place] [address] - P [phone] - F [phone]  [address] [place] [id number]      Phone: [phone]   HYDROcodone-acetaminophen 5-325 MG per tablet         I am the Primary Clinician of Record.       (Please note that parts of this dictation were completed with voice recognition software. Quite often unanticipated grammatical, syntax, homophones, and other interpretive errors are inadvertently transcribed by the

## 2024-12-09 ENCOUNTER — HOSPITAL ENCOUNTER (OUTPATIENT)
Facility: HOSPITAL | Age: 64
Setting detail: INFUSION SERIES
Discharge: HOME | End: 2024-12-12
Payer: MEDICARE

## 2024-12-09 VITALS
HEART RATE: 72 BPM | SYSTOLIC BLOOD PRESSURE: 179 MMHG | RESPIRATION RATE: 18 BRPM | OXYGEN SATURATION: 97 % | DIASTOLIC BLOOD PRESSURE: 74 MMHG | TEMPERATURE: 98.1 F

## 2024-12-09 DIAGNOSIS — J45.50 SEVERE PERSISTENT ASTHMA, UNSPECIFIED WHETHER COMPLICATED: Primary | ICD-10-CM

## 2024-12-09 PROCEDURE — 6360000002 HC RX W HCPCS: Performed by: INTERNAL MEDICINE

## 2024-12-09 PROCEDURE — 96372 THER/PROPH/DIAG INJ SC/IM: CPT

## 2024-12-09 RX ORDER — ONDANSETRON 2 MG/ML
8 INJECTION INTRAMUSCULAR; INTRAVENOUS
OUTPATIENT
Start: 2024-12-23

## 2024-12-09 RX ORDER — DIPHENHYDRAMINE HYDROCHLORIDE 50 MG/ML
50 INJECTION INTRAMUSCULAR; INTRAVENOUS
OUTPATIENT
Start: 2024-12-23

## 2024-12-09 RX ORDER — ALBUTEROL SULFATE 90 UG/1
4 INHALANT RESPIRATORY (INHALATION) PRN
OUTPATIENT
Start: 2024-12-23

## 2024-12-09 RX ORDER — SODIUM CHLORIDE 9 MG/ML
INJECTION, SOLUTION INTRAVENOUS CONTINUOUS
OUTPATIENT
Start: 2024-12-23

## 2024-12-09 RX ORDER — ACETAMINOPHEN 325 MG/1
650 TABLET ORAL
OUTPATIENT
Start: 2024-12-23

## 2024-12-09 RX ORDER — HYDROCORTISONE SODIUM SUCCINATE 100 MG/2ML
100 INJECTION INTRAMUSCULAR; INTRAVENOUS
OUTPATIENT
Start: 2024-12-23

## 2024-12-09 RX ORDER — EPINEPHRINE 1 MG/ML
0.3 INJECTION, SOLUTION, CONCENTRATE INTRAVENOUS PRN
OUTPATIENT
Start: 2024-12-23

## 2024-12-09 RX ADMIN — OMALIZUMAB 225 MG: 150 INJECTION, SOLUTION SUBCUTANEOUS at 11:39

## 2024-12-09 NOTE — PROGRESS NOTES
[facility] Progress Note    Date: 2024    Name: Viki Pabon    MRN: [id number]         : 1960    Ms. Pabon arrived in the [facility] today at 1125, in stable condition, here for her XOLAIR INJECTIONS (EVERY 2 WEEKS). She was assessed and education was provided.     Ms. Pabon's vitals were reviewed.  Vitals:    24 1129   BP: (!) 179/74   Pulse: 72   Resp: 18   Temp: 98.1 °F (36.7 °C)   SpO2: 97%     BP noted to be elevated. Patient states this isn't too high for her and it can run that high at times. She denies feeling any adverse effects from elevated BP.    Ms. Pabon stated that she was doing well, and voiced no major complaints. And she also stated that she has been tolerating the XOLAIR injections well and without any problems.       Also, she denied being on any current antibiotic therapy, and denied having any current signs of infection.         Omalizumab (XOLAIR) 225 mg TOTAL DOSE, was administered in 2 divided SQ injections as follows: 150 mg SQ in the back of her RIGHT ARM, and 75 mg SQ in the back of her LEFT ARM.         Ms. Pabon tolerated well and voiced no complaints. Discharge instructions reviewed with patient and she verbalized understanding. Armband removed and shredded.     [facility] P[facility] in [facility] condition at 1145.     She is to return in 2 weeks, on 24 at 1100, for her next XOLAIR injection appointment.     An Mckinney RN  2024  12:13 PM

## 2024-12-23 ENCOUNTER — HOSPITAL ENCOUNTER (OUTPATIENT)
Facility: HOSPITAL | Age: 64
Setting detail: INFUSION SERIES
Discharge: HOME | End: 2024-12-26
Payer: MEDICARE

## 2024-12-23 VITALS
TEMPERATURE: 97.9 F | DIASTOLIC BLOOD PRESSURE: 80 MMHG | RESPIRATION RATE: 18 BRPM | SYSTOLIC BLOOD PRESSURE: 172 MMHG | HEART RATE: 69 BPM | OXYGEN SATURATION: 97 %

## 2024-12-23 DIAGNOSIS — J45.50 SEVERE PERSISTENT ASTHMA, UNSPECIFIED WHETHER COMPLICATED: Primary | ICD-10-CM

## 2024-12-23 PROCEDURE — 96372 THER/PROPH/DIAG INJ SC/IM: CPT

## 2024-12-23 PROCEDURE — 6360000002 HC RX W HCPCS: Performed by: INTERNAL MEDICINE

## 2024-12-23 RX ORDER — ACETAMINOPHEN 325 MG/1
650 TABLET ORAL
OUTPATIENT
Start: 2025-01-06

## 2024-12-23 RX ORDER — EPINEPHRINE 1 MG/ML
0.3 INJECTION, SOLUTION, CONCENTRATE INTRAVENOUS PRN
OUTPATIENT
Start: 2025-01-06

## 2024-12-23 RX ORDER — SODIUM CHLORIDE 9 MG/ML
INJECTION, SOLUTION INTRAVENOUS CONTINUOUS
OUTPATIENT
Start: 2025-01-06

## 2024-12-23 RX ORDER — ONDANSETRON 2 MG/ML
8 INJECTION INTRAMUSCULAR; INTRAVENOUS
OUTPATIENT
Start: 2025-01-06

## 2024-12-23 RX ORDER — ALBUTEROL SULFATE 90 UG/1
4 INHALANT RESPIRATORY (INHALATION) PRN
OUTPATIENT
Start: 2025-01-06

## 2024-12-23 RX ORDER — DIPHENHYDRAMINE HYDROCHLORIDE 50 MG/ML
50 INJECTION INTRAMUSCULAR; INTRAVENOUS
OUTPATIENT
Start: 2025-01-06

## 2024-12-23 RX ORDER — HYDROCORTISONE SODIUM SUCCINATE 100 MG/2ML
100 INJECTION INTRAMUSCULAR; INTRAVENOUS
OUTPATIENT
Start: 2025-01-06

## 2024-12-23 RX ADMIN — OMALIZUMAB 225 MG: 150 INJECTION, SOLUTION SUBCUTANEOUS at 11:10

## 2024-12-23 NOTE — PROGRESS NOTES
[facility] Progress Note    Date: 2024    Name: Viki Pabon    MRN: [id number]         : 1960    Ms. Pabon arrived in the [facility] today at 1100, in stable condition, here for her XOLAIR INJECTIONS (EVERY 2 WEEKS). She was assessed and education was provided.     Ms. Pabon's vitals were reviewed.  Vitals:    24 1102   BP: (!) 172/80   Pulse: 69   Resp: 18   Temp: 97.9 °F (36.6 °C)   SpO2: 97%     BP noted to be elevated. Patient states this isn't too high for her and it can run that high at times. She denies feeling any adverse effects from elevated BP.    Ms. Pabon stated that she was doing well, and voiced no major complaints. And she also stated that she has been tolerating the XOLAIR injections well and without any problems.       Also, she denied being on any current antibiotic therapy, and denied having any current signs of infection.         Omalizumab (XOLAIR) 225 mg TOTAL DOSE, was administered in 2 divided SQ injections as follows: 150 mg SQ in the back of her LEFT ARM, and 75 mg SQ in the back of her RIGHT ARM.         Ms. Pabon tolerated well and voiced no complaints. Discharge instructions reviewed with patient and she verbalized understanding. Armband removed and shredded.     Ms. Pabon was [facility] in stable condition at 1120.     She is to return in 2 weeks, on 25 at 1100, for her next XOLAIR injection appointment.     An Mckinney RN  2024  11:57 AM

## 2025-01-06 ENCOUNTER — HOSPITAL ENCOUNTER (OUTPATIENT)
Facility: HOSPITAL | Age: 65
Setting detail: INFUSION SERIES
End: 2025-01-06
Payer: MEDICARE

## 2025-01-06 ENCOUNTER — HOSPITAL ENCOUNTER (OUTPATIENT)
Facility: HOSPITAL | Age: 65
Setting detail: INFUSION SERIES
Discharge: HOME | End: 2025-01-09
Payer: MEDICARE

## 2025-01-06 VITALS
RESPIRATION RATE: 18 BRPM | HEART RATE: 62 BPM | DIASTOLIC BLOOD PRESSURE: 75 MMHG | OXYGEN SATURATION: 98 % | TEMPERATURE: 97.9 F | SYSTOLIC BLOOD PRESSURE: 175 MMHG

## 2025-01-06 DIAGNOSIS — J45.50 SEVERE PERSISTENT ASTHMA, UNSPECIFIED WHETHER COMPLICATED: Primary | ICD-10-CM

## 2025-01-06 PROCEDURE — 6360000002 HC RX W HCPCS: Performed by: INTERNAL MEDICINE

## 2025-01-06 PROCEDURE — 96372 THER/PROPH/DIAG INJ SC/IM: CPT

## 2025-01-06 RX ORDER — EPINEPHRINE 1 MG/ML
0.3 INJECTION, SOLUTION, CONCENTRATE INTRAVENOUS PRN
OUTPATIENT
Start: 2025-01-20

## 2025-01-06 RX ORDER — SODIUM CHLORIDE 9 MG/ML
INJECTION, SOLUTION INTRAVENOUS CONTINUOUS
OUTPATIENT
Start: 2025-01-20

## 2025-01-06 RX ORDER — ACETAMINOPHEN 325 MG/1
650 TABLET ORAL
OUTPATIENT
Start: 2025-01-20

## 2025-01-06 RX ORDER — DIPHENHYDRAMINE HYDROCHLORIDE 50 MG/ML
50 INJECTION INTRAMUSCULAR; INTRAVENOUS
OUTPATIENT
Start: 2025-01-20

## 2025-01-06 RX ORDER — ALBUTEROL SULFATE 90 UG/1
4 INHALANT RESPIRATORY (INHALATION) PRN
OUTPATIENT
Start: 2025-01-20

## 2025-01-06 RX ORDER — HYDROCORTISONE SODIUM SUCCINATE 100 MG/2ML
100 INJECTION INTRAMUSCULAR; INTRAVENOUS
OUTPATIENT
Start: 2025-01-20

## 2025-01-06 RX ORDER — ONDANSETRON 2 MG/ML
8 INJECTION INTRAMUSCULAR; INTRAVENOUS
OUTPATIENT
Start: 2025-01-20

## 2025-01-06 RX ADMIN — OMALIZUMAB 225 MG: 150 INJECTION, SOLUTION SUBCUTANEOUS at 11:06

## 2025-01-06 NOTE — PROGRESS NOTES
[facility] Progress Note    Date: 2025    Name: Viki Pabon    MRN: [id number]         : 1960    Ms. Pabon arrived in the [facility] today at 1055, in stable condition, here for her XOLAIR INJECTIONS (EVERY 2 WEEKS). She was assessed and education was provided.     Ms. Pabon's vitals were reviewed.  Vitals:    25 1058   BP: (!) 175/75   Pulse: 62   Resp: 18   Temp: 97.9 °F (36.6 °C)   SpO2: 98%     BP noted to be elevated. Patient states this isn't too high for her and it can run that high at times. She denies feeling any adverse effects from elevated BP.    Ms. Pabon stated that she was doing well, and voiced no major complaints. And she also stated that she has been tolerating the XOLAIR injections well and without any problems.       Also, she denied being on any current antibiotic therapy, and denied having any current signs of infection.         Omalizumab (XOLAIR) 225 mg TOTAL DOSE, was administered in 2 divided SQ injections as follows: 150 mg SQ in the back of her RIGHT ARM, and 75 mg SQ in the back of her LEFT ARM.         Ms. Pabon tolerated well and voiced no complaints. Discharge instructions reviewed with patient and she verbalized understanding. Armband removed and shredded.     Ms. Pabon was [facility] in stable condition at 1110.     She is to return in 2 weeks, on 25 at 1100, for her next XOLAIR injection appointment.     An Mckinney RN  2025  11:20 AM

## 2025-01-07 ENCOUNTER — APPOINTMENT (OUTPATIENT)
Facility: HOSPITAL | Age: 65
End: 2025-01-07
Payer: MEDICARE

## 2025-01-20 ENCOUNTER — HOSPITAL ENCOUNTER (OUTPATIENT)
Facility: HOSPITAL | Age: 65
Setting detail: INFUSION SERIES
End: 2025-01-20

## 2025-01-22 ENCOUNTER — HOSPITAL ENCOUNTER (OUTPATIENT)
Facility: HOSPITAL | Age: 65
Setting detail: INFUSION SERIES
End: 2025-01-22

## 2025-01-28 ENCOUNTER — HOSPITAL ENCOUNTER (OUTPATIENT)
Facility: HOSPITAL | Age: 65
Setting detail: INFUSION SERIES
Discharge: HOME | End: 2025-01-31
Payer: MEDICARE

## 2025-01-28 VITALS
RESPIRATION RATE: 20 BRPM | SYSTOLIC BLOOD PRESSURE: 173 MMHG | DIASTOLIC BLOOD PRESSURE: 71 MMHG | OXYGEN SATURATION: 95 % | TEMPERATURE: 98.2 F | HEART RATE: 73 BPM

## 2025-01-28 DIAGNOSIS — J45.50 SEVERE PERSISTENT ASTHMA, UNSPECIFIED WHETHER COMPLICATED: Primary | ICD-10-CM

## 2025-01-28 PROCEDURE — 6360000002 HC RX W HCPCS: Performed by: INTERNAL MEDICINE

## 2025-01-28 PROCEDURE — 96372 THER/PROPH/DIAG INJ SC/IM: CPT

## 2025-01-28 RX ORDER — EPINEPHRINE 1 MG/ML
0.3 INJECTION, SOLUTION, CONCENTRATE INTRAVENOUS PRN
OUTPATIENT
Start: 2025-02-03

## 2025-01-28 RX ORDER — SODIUM CHLORIDE 9 MG/ML
INJECTION, SOLUTION INTRAVENOUS CONTINUOUS
OUTPATIENT
Start: 2025-02-03

## 2025-01-28 RX ORDER — ALBUTEROL SULFATE 90 UG/1
4 INHALANT RESPIRATORY (INHALATION) PRN
OUTPATIENT
Start: 2025-02-03

## 2025-01-28 RX ORDER — ACETAMINOPHEN 325 MG/1
650 TABLET ORAL
OUTPATIENT
Start: 2025-02-03

## 2025-01-28 RX ORDER — ONDANSETRON 2 MG/ML
8 INJECTION INTRAMUSCULAR; INTRAVENOUS
OUTPATIENT
Start: 2025-02-03

## 2025-01-28 RX ORDER — DIPHENHYDRAMINE HYDROCHLORIDE 50 MG/ML
50 INJECTION INTRAMUSCULAR; INTRAVENOUS
OUTPATIENT
Start: 2025-02-03

## 2025-01-28 RX ORDER — HYDROCORTISONE SODIUM SUCCINATE 100 MG/2ML
100 INJECTION INTRAMUSCULAR; INTRAVENOUS
OUTPATIENT
Start: 2025-02-03

## 2025-01-28 RX ADMIN — OMALIZUMAB 225 MG: 150 INJECTION, SOLUTION SUBCUTANEOUS at 10:35

## 2025-01-28 ASSESSMENT — PAIN DESCRIPTION - DESCRIPTORS: DESCRIPTORS: ACHING

## 2025-01-28 ASSESSMENT — PAIN - FUNCTIONAL ASSESSMENT: PAIN_FUNCTIONAL_ASSESSMENT: 0-10

## 2025-01-28 NOTE — PROGRESS NOTES
[facility] Progress Note    Date: 2025    Name: Viki Pabon    MRN: [id number]         : 1960    Ms. Pabon arrived in the [facility] today at 1020, in stable condition, here for her XOLAIR INJECTIONS (EVERY 2 WEEKS). She was assessed and education was provided.     Ms. Pabon's vitals were reviewed.  Vitals:    25 1020   BP: (!) 173/71   Pulse: 73   Resp: 20   Temp: 98.2 °F (36.8 °C)   SpO2: 95%       Ms. Pabon stated that she was doing well, and voiced no major complaints or concerns, other than some right buttock pain that she says has been occurring intermittently since her fall in 2024.     And she also stated that she has been tolerating the XOLAIR injections well and without any problems.      Also, she denied being on any current antibiotic therapy, and denied having any current signs of infection.           Omalizumab (XOLAIR) 225 mg TOTAL DOSE, was administered in 2 divided SQ injections at 1035 as follows: 150 mg SQ in the back of her RIGHT ARM, and 75 mg SQ in the back of her LEFT ARM.             Ms. Pabon tolerated well and voiced no complaints.     Ms. Pabon was [facility] in stable condition at 1040.     She is to return in 2 weeks, on Monday, 2-10-25 at 1130, for her next appointment, for her next XOLAIR injection.     Debbie Wells RN  2025  10:34 AM

## 2025-02-03 ENCOUNTER — APPOINTMENT (OUTPATIENT)
Facility: HOSPITAL | Age: 65
End: 2025-02-03
Payer: MEDICARE

## 2025-02-10 ENCOUNTER — HOSPITAL ENCOUNTER (OUTPATIENT)
Facility: HOSPITAL | Age: 65
Setting detail: INFUSION SERIES
Discharge: HOME | End: 2025-02-13
Payer: MEDICARE

## 2025-02-10 VITALS
DIASTOLIC BLOOD PRESSURE: 73 MMHG | TEMPERATURE: 98.5 F | OXYGEN SATURATION: 97 % | SYSTOLIC BLOOD PRESSURE: 190 MMHG | HEART RATE: 65 BPM | RESPIRATION RATE: 18 BRPM

## 2025-02-10 DIAGNOSIS — J45.50 SEVERE PERSISTENT ASTHMA, UNSPECIFIED WHETHER COMPLICATED (HCC): Primary | ICD-10-CM

## 2025-02-10 PROCEDURE — 96372 THER/PROPH/DIAG INJ SC/IM: CPT

## 2025-02-10 PROCEDURE — 6360000002 HC RX W HCPCS: Performed by: INTERNAL MEDICINE

## 2025-02-10 RX ORDER — SODIUM CHLORIDE 9 MG/ML
INJECTION, SOLUTION INTRAVENOUS CONTINUOUS
OUTPATIENT
Start: 2025-02-24

## 2025-02-10 RX ORDER — DIPHENHYDRAMINE HYDROCHLORIDE 50 MG/ML
50 INJECTION INTRAMUSCULAR; INTRAVENOUS
OUTPATIENT
Start: 2025-02-24

## 2025-02-10 RX ORDER — ACETAMINOPHEN 325 MG/1
650 TABLET ORAL
OUTPATIENT
Start: 2025-02-24

## 2025-02-10 RX ORDER — ALBUTEROL SULFATE 90 UG/1
4 INHALANT RESPIRATORY (INHALATION) PRN
OUTPATIENT
Start: 2025-02-24

## 2025-02-10 RX ORDER — HYDROCORTISONE SODIUM SUCCINATE 100 MG/2ML
100 INJECTION INTRAMUSCULAR; INTRAVENOUS
OUTPATIENT
Start: 2025-02-24

## 2025-02-10 RX ORDER — ONDANSETRON 2 MG/ML
8 INJECTION INTRAMUSCULAR; INTRAVENOUS
OUTPATIENT
Start: 2025-02-24

## 2025-02-10 RX ORDER — EPINEPHRINE 1 MG/ML
0.3 INJECTION, SOLUTION, CONCENTRATE INTRAVENOUS PRN
OUTPATIENT
Start: 2025-02-24

## 2025-02-10 RX ADMIN — OMALIZUMAB 225 MG: 150 INJECTION, SOLUTION SUBCUTANEOUS at 11:48

## 2025-02-10 ASSESSMENT — PAIN DESCRIPTION - DESCRIPTORS: DESCRIPTORS: ACHING

## 2025-02-10 ASSESSMENT — PAIN DESCRIPTION - LOCATION: LOCATION: BUTTOCKS

## 2025-02-10 ASSESSMENT — PAIN SCALES - GENERAL: PAINLEVEL_OUTOF10: 7

## 2025-02-10 ASSESSMENT — PAIN DESCRIPTION - ORIENTATION: ORIENTATION: RIGHT

## 2025-02-10 ASSESSMENT — PAIN DESCRIPTION - PAIN TYPE: TYPE: CHRONIC PAIN

## 2025-02-10 NOTE — PROGRESS NOTES
[facility] Progress Note    Date: February 10, 2025    Name: Viki Pabon    MRN: [id number]         : 1960      XOLAIR Q2 WEEKS      Ms. Pabon was roomed for her [facility] appt at 1135. Pt ambulatory without assist.    Ms. Pabon was assessed and education was provided.     Ms. Pabon's vitals were reviewed.  Vitals:    02/10/25 1135   BP: (!) 190/73   Pulse: 65   Resp: 18   Temp: 98.5 °F (36.9 °C)   SpO2: 97%       In regards to pt's elevated BP, pt reports she believes this has a lot to do with her sciatica pain. She reports she had an office visit with another provider last week and her SBP was in the 200s and she reports she has an appt coming up with her orthopedic doctor to address the pain.    Xolair 75mg SQ administered in patient's L upper arm & Xolair 150mg SQ administered in patient's R upper arm, for a total ordered dose of 225mg. Pt declined/ did not require band-aids.    Ms. Pabon tolerated well without complaints.    Discharge/ follow-up instructions discussed w/ pt. Pt verbalized understanding.    Pt armband removed & shredded.    [facility]. Pab[facility] in stable condition at 1150. She is to return on 25 at 1100 for her next appointment.    Jodi Cox RN  February 10, 2025

## 2025-02-14 NOTE — PROGRESS NOTES
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    She did have back pain prior to her CVA which she relates to a fall in 2018.   She was referred by Dr. Ohara for low back symptoms.    Lumbar MRI demonstrated  spinal stenosis and left foraminal stenosis L4/5  and L5/S1.    She completed a course of PT.      She is taking gabapentin 600mg at night as needed and uses Voltaren gel.    We discussed  JESSICA but she wanted to hold off.   Overall she is doing well and has noticed increased strength in her left lower extremity.    PLAN: Refill Gabapentin 600mg QHS; Flexeril PRN; Refill Voltaren Gel; Consider left L4, L5 SNRBs      Viki Pabon is a 64 y.o. female was seen today for follow up. Pt c/o lumbar pain radiating into right thigh. Pt notes she had a bad fall during . Pt denies limited standing tolerance. Pt notes her Gabapentin 600mg QHS is not helping like it previously did. Pt notes she is on ozempic for DM.     Pain Score:  10 - Worst pain ever/10     PmHx: HTN, DM, KAREN, COPD, CVA (2020, left side weakness)     reviewed.    REVIEW OF SYSTEMS  Review of Systems   Constitutional:  Negative for fever and unexpected weight change.   HENT:  Negative for trouble swallowing.    Eyes:  Negative for visual disturbance.   Respiratory:  Positive for shortness of breath. Negative for wheezing.    Gastrointestinal:  Negative for nausea and vomiting.   Genitourinary:  Negative for enuresis.   Musculoskeletal:  Positive for back pain.   Neurological:  Negative for dizziness and headaches.   Psychiatric/Behavioral:  Positive for sleep disturbance. The patient is

## 2025-02-18 ENCOUNTER — OFFICE VISIT (OUTPATIENT)
Age: 65
End: 2025-02-18
Payer: MEDICARE

## 2025-02-18 VITALS — HEIGHT: 62 IN | BODY MASS INDEX: 38.72 KG/M2 | RESPIRATION RATE: 18 BRPM | WEIGHT: 210.4 LBS

## 2025-02-18 DIAGNOSIS — M48.062 SPINAL STENOSIS OF LUMBAR REGION WITH NEUROGENIC CLAUDICATION: Primary | ICD-10-CM

## 2025-02-18 DIAGNOSIS — M47.816 LUMBAR FACET ARTHROPATHY: ICD-10-CM

## 2025-02-18 DIAGNOSIS — M54.50 LUMBAR PAIN: ICD-10-CM

## 2025-02-18 DIAGNOSIS — M79.18 CHRONIC PRIMARY MUSCULOSKELETAL PAIN: ICD-10-CM

## 2025-02-18 DIAGNOSIS — G89.29 CHRONIC PRIMARY MUSCULOSKELETAL PAIN: ICD-10-CM

## 2025-02-18 PROCEDURE — 99214 OFFICE O/P EST MOD 30 MIN: CPT | Performed by: PHYSICAL MEDICINE & REHABILITATION

## 2025-02-18 PROCEDURE — 1123F ACP DISCUSS/DSCN MKR DOCD: CPT | Performed by: PHYSICAL MEDICINE & REHABILITATION

## 2025-02-18 RX ORDER — PREGABALIN 150 MG/1
150 CAPSULE ORAL 2 TIMES DAILY
Qty: 60 CAPSULE | Refills: 5 | Status: SHIPPED | OUTPATIENT
Start: 2025-02-18 | End: 2025-08-17

## 2025-02-18 RX ORDER — MELOXICAM 15 MG/1
15 TABLET ORAL DAILY
Qty: 30 TABLET | Refills: 5 | Status: SHIPPED | OUTPATIENT
Start: 2025-02-18 | End: 2025-08-17

## 2025-02-18 ASSESSMENT — ENCOUNTER SYMPTOMS
TROUBLE SWALLOWING: 0
WHEEZING: 0
VOMITING: 0
BACK PAIN: 1
NAUSEA: 0
SHORTNESS OF BREATH: 1

## 2025-02-18 NOTE — PATIENT INSTRUCTIONS
Kermit Waldrop's Big three exercises link:  [email or web address]

## 2025-02-24 ENCOUNTER — HOSPITAL ENCOUNTER (OUTPATIENT)
Facility: HOSPITAL | Age: 65
Setting detail: INFUSION SERIES
Discharge: HOME | End: 2025-02-27
Payer: MEDICARE

## 2025-02-24 VITALS
OXYGEN SATURATION: 96 % | HEART RATE: 62 BPM | DIASTOLIC BLOOD PRESSURE: 76 MMHG | RESPIRATION RATE: 18 BRPM | TEMPERATURE: 97.4 F | SYSTOLIC BLOOD PRESSURE: 188 MMHG

## 2025-02-24 DIAGNOSIS — J45.50 SEVERE PERSISTENT ASTHMA, UNSPECIFIED WHETHER COMPLICATED (HCC): Primary | ICD-10-CM

## 2025-02-24 PROCEDURE — 96372 THER/PROPH/DIAG INJ SC/IM: CPT

## 2025-02-24 PROCEDURE — 6360000002 HC RX W HCPCS: Performed by: INTERNAL MEDICINE

## 2025-02-24 RX ORDER — EPINEPHRINE 1 MG/ML
0.3 INJECTION, SOLUTION, CONCENTRATE INTRAVENOUS PRN
OUTPATIENT
Start: 2025-03-10

## 2025-02-24 RX ORDER — DIPHENHYDRAMINE HYDROCHLORIDE 50 MG/ML
50 INJECTION INTRAMUSCULAR; INTRAVENOUS
OUTPATIENT
Start: 2025-03-10

## 2025-02-24 RX ORDER — ACETAMINOPHEN 325 MG/1
650 TABLET ORAL
OUTPATIENT
Start: 2025-03-10

## 2025-02-24 RX ORDER — HYDROCORTISONE SODIUM SUCCINATE 100 MG/2ML
100 INJECTION INTRAMUSCULAR; INTRAVENOUS
OUTPATIENT
Start: 2025-03-10

## 2025-02-24 RX ORDER — SODIUM CHLORIDE 9 MG/ML
INJECTION, SOLUTION INTRAVENOUS CONTINUOUS
OUTPATIENT
Start: 2025-03-10

## 2025-02-24 RX ORDER — ALBUTEROL SULFATE 90 UG/1
4 INHALANT RESPIRATORY (INHALATION) PRN
OUTPATIENT
Start: 2025-03-10

## 2025-02-24 RX ORDER — ONDANSETRON 2 MG/ML
8 INJECTION INTRAMUSCULAR; INTRAVENOUS
OUTPATIENT
Start: 2025-03-10

## 2025-02-24 RX ADMIN — OMALIZUMAB 225 MG: 150 INJECTION, SOLUTION SUBCUTANEOUS at 10:48

## 2025-02-24 NOTE — PROGRESS NOTES
[facility] Progress Note    Date: 2025    Name: Viki Pabon    MRN: [id number]         : 1960    Ms. Pabon arrived in the [facility] ambulatory at 1045, here for her XOLAIR INJECTIONS (EVERY 2 WEEKS). No complaints or concerns voiced.    She was assessed and education was provided. States she is tolerating xolair injections well. Denies recent/current infection or use of ABX.    Ms. Pabon's vitals were reviewed.  Vitals:    25 1045   BP: (!) 188/76   Pulse: 62   Resp: 18   Temp: 97.4 °F (36.3 °C)   SpO2: 96%       Omalizumab (XOLAIR) 225 mg TOTAL DOSE, was administered in 2 divided SQ injections as follows: 150 mg SQ in the back of her left ARM, and 75 mg SQ in the back of her right ARM. No irritation or bleeding at sites. Declined bandaids.    Ms. Pabon tolerated well and voiced no complaints.     Reviewed discharge instructions with patient. She verbalized understanding.    Pt armband removed and shredded.    [facility]. Pabon [facility] in stable condition at 1055. She is to return on 3/10/25 at 1100, for her next appointment of  XOLAIR injection.     ALICIA LOPEZ RN  2025  11:07 AM

## 2025-03-10 ENCOUNTER — HOSPITAL ENCOUNTER (OUTPATIENT)
Facility: HOSPITAL | Age: 65
Setting detail: INFUSION SERIES
End: 2025-03-10

## 2025-03-24 ENCOUNTER — HOSPITAL ENCOUNTER (OUTPATIENT)
Facility: HOSPITAL | Age: 65
Setting detail: INFUSION SERIES
Discharge: HOME | End: 2025-03-27
Payer: MEDICARE

## 2025-03-24 VITALS
SYSTOLIC BLOOD PRESSURE: 122 MMHG | HEART RATE: 73 BPM | RESPIRATION RATE: 20 BRPM | DIASTOLIC BLOOD PRESSURE: 75 MMHG | TEMPERATURE: 98.9 F | OXYGEN SATURATION: 95 %

## 2025-03-24 DIAGNOSIS — J45.50 SEVERE PERSISTENT ASTHMA, UNSPECIFIED WHETHER COMPLICATED (HCC): Primary | ICD-10-CM

## 2025-03-24 PROCEDURE — 96372 THER/PROPH/DIAG INJ SC/IM: CPT

## 2025-03-24 PROCEDURE — 6360000002 HC RX W HCPCS: Performed by: INTERNAL MEDICINE

## 2025-03-24 RX ORDER — ONDANSETRON 2 MG/ML
8 INJECTION INTRAMUSCULAR; INTRAVENOUS
OUTPATIENT
Start: 2025-04-07

## 2025-03-24 RX ORDER — HYDROCORTISONE SODIUM SUCCINATE 100 MG/2ML
100 INJECTION INTRAMUSCULAR; INTRAVENOUS
OUTPATIENT
Start: 2025-04-07

## 2025-03-24 RX ORDER — SODIUM CHLORIDE 9 MG/ML
INJECTION, SOLUTION INTRAVENOUS CONTINUOUS
OUTPATIENT
Start: 2025-04-07

## 2025-03-24 RX ORDER — EPINEPHRINE 1 MG/ML
0.3 INJECTION, SOLUTION, CONCENTRATE INTRAVENOUS PRN
OUTPATIENT
Start: 2025-04-07

## 2025-03-24 RX ORDER — ALBUTEROL SULFATE 90 UG/1
4 INHALANT RESPIRATORY (INHALATION) PRN
OUTPATIENT
Start: 2025-04-07

## 2025-03-24 RX ORDER — ACETAMINOPHEN 325 MG/1
650 TABLET ORAL
OUTPATIENT
Start: 2025-04-07

## 2025-03-24 RX ORDER — DIPHENHYDRAMINE HYDROCHLORIDE 50 MG/ML
50 INJECTION, SOLUTION INTRAMUSCULAR; INTRAVENOUS
OUTPATIENT
Start: 2025-04-07

## 2025-03-24 RX ADMIN — OMALIZUMAB 225 MG: 150 INJECTION, SOLUTION SUBCUTANEOUS at 11:40

## 2025-03-24 ASSESSMENT — PAIN - FUNCTIONAL ASSESSMENT: PAIN_FUNCTIONAL_ASSESSMENT: NONE - DENIES PAIN

## 2025-03-24 NOTE — PROGRESS NOTES
[facility] Progress Note    Date: 2025    Name: Viki Pabon    MRN: [id number]         : 1960    Ms. Pabon arrived in the [facility] today at 1130, in stable condition, here for her XOLAIR INJECTIONS (EVERY 2 WEEKS). She was assessed and education was provided.     Ms. Pabon's vitals were reviewed.  Vitals:    25 1130   BP: 122/75   Pulse: 73   Resp: 20   Temp: 98.9 °F (37.2 °C)   SpO2: 95%       Ms. Pabon stated that she was doing well, and voiced no major complaints or concerns.     And she also stated that she has been tolerating the XOLAIR injections well and without any problems.      Also, she denied being on any current antibiotic therapy, and denied having any current signs of infection.           Omalizumab (XOLAIR) 225 mg TOTAL DOSE, was administered in 2 divided SQ injections at 1140 as follows: 150 mg SQ in the back of her RIGHT ARM, and 75 mg SQ in the back of her LEFT ARM.             Ms. Pabon tolerated well and voiced no complaints.     Ms. Pabon was [facility] in stable condition at 1145.     She is to return IN 2 WEEKS, on Monday, 25 at 1130, for her next appointment, for her next XOLAIR injection.     Debbie Wells RN  2025  11:37 AM

## 2025-04-21 ENCOUNTER — HOSPITAL ENCOUNTER (OUTPATIENT)
Facility: HOSPITAL | Age: 65
Setting detail: INFUSION SERIES
Discharge: HOME | End: 2025-04-24
Payer: MEDICARE

## 2025-04-21 VITALS
SYSTOLIC BLOOD PRESSURE: 150 MMHG | DIASTOLIC BLOOD PRESSURE: 84 MMHG | RESPIRATION RATE: 17 BRPM | TEMPERATURE: 98.2 F | HEART RATE: 64 BPM | OXYGEN SATURATION: 99 %

## 2025-04-21 DIAGNOSIS — J45.50 SEVERE PERSISTENT ASTHMA, UNSPECIFIED WHETHER COMPLICATED (HCC): Primary | ICD-10-CM

## 2025-04-21 PROCEDURE — 96372 THER/PROPH/DIAG INJ SC/IM: CPT

## 2025-04-21 PROCEDURE — 6360000002 HC RX W HCPCS: Performed by: INTERNAL MEDICINE

## 2025-04-21 RX ORDER — SODIUM CHLORIDE 9 MG/ML
INJECTION, SOLUTION INTRAVENOUS CONTINUOUS
OUTPATIENT
Start: 2025-05-05

## 2025-04-21 RX ORDER — ALBUTEROL SULFATE 90 UG/1
4 INHALANT RESPIRATORY (INHALATION) PRN
OUTPATIENT
Start: 2025-05-05

## 2025-04-21 RX ORDER — HYDROCORTISONE SODIUM SUCCINATE 100 MG/2ML
100 INJECTION INTRAMUSCULAR; INTRAVENOUS
OUTPATIENT
Start: 2025-05-05

## 2025-04-21 RX ORDER — DIPHENHYDRAMINE HYDROCHLORIDE 50 MG/ML
50 INJECTION, SOLUTION INTRAMUSCULAR; INTRAVENOUS
OUTPATIENT
Start: 2025-05-05

## 2025-04-21 RX ORDER — EPINEPHRINE 1 MG/ML
0.3 INJECTION, SOLUTION, CONCENTRATE INTRAVENOUS PRN
OUTPATIENT
Start: 2025-05-05

## 2025-04-21 RX ORDER — ACETAMINOPHEN 325 MG/1
650 TABLET ORAL
OUTPATIENT
Start: 2025-05-05

## 2025-04-21 RX ORDER — ONDANSETRON 2 MG/ML
8 INJECTION INTRAMUSCULAR; INTRAVENOUS
OUTPATIENT
Start: 2025-05-05

## 2025-04-21 RX ADMIN — OMALIZUMAB 225 MG: 150 INJECTION, SOLUTION SUBCUTANEOUS at 11:50

## 2025-04-21 ASSESSMENT — PAIN DESCRIPTION - DESCRIPTORS: DESCRIPTORS: ACHING

## 2025-04-21 ASSESSMENT — PAIN - FUNCTIONAL ASSESSMENT: PAIN_FUNCTIONAL_ASSESSMENT: 0-10

## 2025-04-29 ENCOUNTER — OFFICE VISIT (OUTPATIENT)
Age: 65
End: 2025-04-29
Payer: MEDICARE

## 2025-04-29 VITALS
DIASTOLIC BLOOD PRESSURE: 84 MMHG | RESPIRATION RATE: 18 BRPM | SYSTOLIC BLOOD PRESSURE: 138 MMHG | HEIGHT: 62 IN | BODY MASS INDEX: 38.48 KG/M2 | HEART RATE: 74 BPM

## 2025-04-29 DIAGNOSIS — G89.29 CHRONIC PRIMARY MUSCULOSKELETAL PAIN: ICD-10-CM

## 2025-04-29 DIAGNOSIS — M54.50 LUMBAR PAIN: ICD-10-CM

## 2025-04-29 DIAGNOSIS — M47.816 LUMBAR FACET ARTHROPATHY: ICD-10-CM

## 2025-04-29 DIAGNOSIS — M79.18 CHRONIC PRIMARY MUSCULOSKELETAL PAIN: ICD-10-CM

## 2025-04-29 DIAGNOSIS — M48.062 SPINAL STENOSIS OF LUMBAR REGION WITH NEUROGENIC CLAUDICATION: Primary | ICD-10-CM

## 2025-04-29 PROCEDURE — 99213 OFFICE O/P EST LOW 20 MIN: CPT | Performed by: PHYSICAL MEDICINE & REHABILITATION

## 2025-04-29 PROCEDURE — 1123F ACP DISCUSS/DSCN MKR DOCD: CPT | Performed by: PHYSICAL MEDICINE & REHABILITATION

## 2025-04-29 RX ORDER — PREGABALIN 150 MG/1
150 CAPSULE ORAL 2 TIMES DAILY
Qty: 180 CAPSULE | Refills: 1 | Status: SHIPPED | OUTPATIENT
Start: 2025-04-29 | End: 2025-10-26

## 2025-04-29 ASSESSMENT — ENCOUNTER SYMPTOMS
WHEEZING: 0
BACK PAIN: 1
SHORTNESS OF BREATH: 1
TROUBLE SWALLOWING: 0
NAUSEA: 0
VOMITING: 0

## 2025-04-29 NOTE — PROGRESS NOTES
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    She did have back pain prior to her CVA which she relates to a fall in 2018.   She was referred by Dr. Ohara for low back symptoms.    Lumbar MRI demonstrated  spinal stenosis and left foraminal stenosis L4/5  and L5/S1.    She completed a course of PT.      She is taking gabapentin 600mg at night as needed and uses Voltaren gel.    We discussed  JESSICA but she wanted to hold off.   Overall she is doing well and has noticed increased strength in her left lower extremity.    PLAN: Refill Gabapentin 600mg QHS; Flexeril PRN; Refill Voltaren Gel; Consider left L4, L5 SNRBs    25  Pt c/o lumbar pain radiating into right thigh. Pt notes she had a bad fall during . Pt denies limited standing tolerance.   Pt notes her Gabapentin 600mg QHS is not helping like it previously did.   Pt notes she is on ozempic for DM.   PLAN: Trial Lyrica 150mg BID; Mobic 15mg QD PRN      Viki Pabon is a 64 y.o. female was seen today for follow up. Pt started Lyrica 150mg QHS with significant benefit. Pt notes she does not wake up with pain anymore. Pt notes pain is exacerbated with prolonged standing. Pt uses foot cream from podiatrist for neuropathy with benefit. Pt notes she also rubs it on her back with benefit.    Pain Score:   810    PmHx: HTN, DM, KAREN, COPD, CVA (2020, left side weakness)     reviewed.    REVIEW OF SYSTEMS  Review of Systems   Constitutional:  Negative for fever and unexpected weight change.   HENT:  Negative for trouble swallowing.    Eyes:  Negative for visual disturbance.   Respiratory:

## 2025-05-05 ENCOUNTER — HOSPITAL ENCOUNTER (OUTPATIENT)
Facility: HOSPITAL | Age: 65
Setting detail: INFUSION SERIES
Discharge: HOME | End: 2025-05-08
Payer: MEDICARE

## 2025-05-05 VITALS
RESPIRATION RATE: 20 BRPM | TEMPERATURE: 98.2 F | HEART RATE: 61 BPM | DIASTOLIC BLOOD PRESSURE: 79 MMHG | OXYGEN SATURATION: 99 % | SYSTOLIC BLOOD PRESSURE: 150 MMHG

## 2025-05-05 DIAGNOSIS — J45.50 SEVERE PERSISTENT ASTHMA, UNSPECIFIED WHETHER COMPLICATED (HCC): Primary | ICD-10-CM

## 2025-05-05 PROCEDURE — 96372 THER/PROPH/DIAG INJ SC/IM: CPT

## 2025-05-05 PROCEDURE — 6360000002 HC RX W HCPCS: Performed by: INTERNAL MEDICINE

## 2025-05-05 RX ORDER — ONDANSETRON 2 MG/ML
8 INJECTION INTRAMUSCULAR; INTRAVENOUS
OUTPATIENT
Start: 2025-05-19

## 2025-05-05 RX ORDER — ACETAMINOPHEN 325 MG/1
650 TABLET ORAL
OUTPATIENT
Start: 2025-05-19

## 2025-05-05 RX ORDER — EPINEPHRINE 1 MG/ML
0.3 INJECTION, SOLUTION, CONCENTRATE INTRAVENOUS PRN
OUTPATIENT
Start: 2025-05-19

## 2025-05-05 RX ORDER — SODIUM CHLORIDE 9 MG/ML
INJECTION, SOLUTION INTRAVENOUS CONTINUOUS
OUTPATIENT
Start: 2025-05-19

## 2025-05-05 RX ORDER — HYDROCORTISONE SODIUM SUCCINATE 100 MG/2ML
100 INJECTION INTRAMUSCULAR; INTRAVENOUS
OUTPATIENT
Start: 2025-05-19

## 2025-05-05 RX ORDER — DIPHENHYDRAMINE HYDROCHLORIDE 50 MG/ML
50 INJECTION, SOLUTION INTRAMUSCULAR; INTRAVENOUS
OUTPATIENT
Start: 2025-05-19

## 2025-05-05 RX ORDER — ALBUTEROL SULFATE 90 UG/1
4 INHALANT RESPIRATORY (INHALATION) PRN
OUTPATIENT
Start: 2025-05-19

## 2025-05-05 RX ADMIN — OMALIZUMAB 225 MG: 150 INJECTION, SOLUTION SUBCUTANEOUS at 11:16

## 2025-05-05 ASSESSMENT — PAIN DESCRIPTION - ORIENTATION: ORIENTATION: RIGHT;LEFT

## 2025-05-05 ASSESSMENT — PAIN DESCRIPTION - PAIN TYPE: TYPE: ACUTE PAIN

## 2025-05-05 ASSESSMENT — PAIN SCALES - GENERAL: PAINLEVEL_OUTOF10: 5

## 2025-05-05 ASSESSMENT — PAIN DESCRIPTION - DESCRIPTORS: DESCRIPTORS: SORE

## 2025-05-05 ASSESSMENT — PAIN DESCRIPTION - LOCATION: LOCATION: BUTTOCKS;LEG

## 2025-05-05 NOTE — PROGRESS NOTES
[facility] Progress Note    Date: May 5, 2025    Name: Viki Pabon    MRN: [id number]         : 1960      XOLAIR Q2 WEEKS      [facility] Pt ambulatory without assist.    Ms. Pabon was assessed and education was provided.     Ms. Pabon's vitals were reviewed.  Vitals:    25 1105   BP: (!) 150/79   Pulse: 61   Resp: 20   Temp: 98.2 °F (36.8 °C)   SpO2: 99%       Xolair 75mg SQ administered in patient's L upper arm & Xolair 150mg SQ administered in patient's R upper arm, for a total ordered dose of 225mg. Pt declined/ did not require band-aids.    Ms. Pabon tolerated well without complaints.    Discharge/ follow-up instructions discussed w/ pt. Pt verbalized understanding.    Pt armband removed & shredded.    [facility] condition at 1120. She is to return on 25 at 1100 for her next appointment.    Jodi Cox RN  May 5, 2025

## 2025-05-19 ENCOUNTER — HOSPITAL ENCOUNTER (OUTPATIENT)
Facility: HOSPITAL | Age: 65
Setting detail: INFUSION SERIES
End: 2025-05-19

## 2025-06-02 ENCOUNTER — HOSPITAL ENCOUNTER (OUTPATIENT)
Facility: HOSPITAL | Age: 65
Setting detail: INFUSION SERIES
Discharge: HOME | End: 2025-06-05
Payer: MEDICARE

## 2025-06-02 VITALS
DIASTOLIC BLOOD PRESSURE: 74 MMHG | OXYGEN SATURATION: 98 % | HEART RATE: 63 BPM | SYSTOLIC BLOOD PRESSURE: 177 MMHG | RESPIRATION RATE: 18 BRPM | TEMPERATURE: 97.9 F

## 2025-06-02 DIAGNOSIS — J45.50 SEVERE PERSISTENT ASTHMA, UNSPECIFIED WHETHER COMPLICATED (HCC): Primary | ICD-10-CM

## 2025-06-02 PROCEDURE — 96372 THER/PROPH/DIAG INJ SC/IM: CPT

## 2025-06-02 PROCEDURE — 6360000002 HC RX W HCPCS: Performed by: INTERNAL MEDICINE

## 2025-06-02 RX ORDER — ONDANSETRON 2 MG/ML
8 INJECTION INTRAMUSCULAR; INTRAVENOUS
OUTPATIENT
Start: 2025-06-16

## 2025-06-02 RX ORDER — EPINEPHRINE 1 MG/ML
0.3 INJECTION, SOLUTION, CONCENTRATE INTRAVENOUS PRN
OUTPATIENT
Start: 2025-06-16

## 2025-06-02 RX ORDER — HYDROCORTISONE SODIUM SUCCINATE 100 MG/2ML
100 INJECTION INTRAMUSCULAR; INTRAVENOUS
OUTPATIENT
Start: 2025-06-16

## 2025-06-02 RX ORDER — SODIUM CHLORIDE 9 MG/ML
INJECTION, SOLUTION INTRAVENOUS CONTINUOUS
OUTPATIENT
Start: 2025-06-16

## 2025-06-02 RX ORDER — DIPHENHYDRAMINE HYDROCHLORIDE 50 MG/ML
50 INJECTION, SOLUTION INTRAMUSCULAR; INTRAVENOUS
OUTPATIENT
Start: 2025-06-16

## 2025-06-02 RX ORDER — ACETAMINOPHEN 325 MG/1
650 TABLET ORAL
OUTPATIENT
Start: 2025-06-16

## 2025-06-02 RX ORDER — ALBUTEROL SULFATE 90 UG/1
4 INHALANT RESPIRATORY (INHALATION) PRN
OUTPATIENT
Start: 2025-06-16

## 2025-06-02 RX ADMIN — OMALIZUMAB 225 MG: 150 INJECTION, SOLUTION SUBCUTANEOUS at 11:11

## 2025-06-02 ASSESSMENT — PAIN - FUNCTIONAL ASSESSMENT: PAIN_FUNCTIONAL_ASSESSMENT: 0-10

## 2025-06-02 ASSESSMENT — PAIN DESCRIPTION - DESCRIPTORS: DESCRIPTORS: SORE

## 2025-06-02 NOTE — PROGRESS NOTES
\[facility]\"" Progress Note    Date: 2025    Name: Viki Pabon    MRN: [id number]         : 1960    Ms. Pabon arrived in the \[facility]\"" today at 1105 in stable condition, here for her XOLAIR INJECTIONS (EVERY 2 WEEKS). She was assessed and education was provided.     Ms. Pabon's vitals were reviewed.  Vitals:    25 1106   BP: (!) 177/74   Pulse: 63   Resp: 18   Temp: 97.9 °F (36.6 °C)   SpO2: 98%     Pt stated that she has been tolerating the XOLAIR injections well and without any problems.    Also, she denied being on any current antibiotic therapy, and denied having any current signs of infection.     Omalizumab (XOLAIR) 225 mg TOTAL DOSE was administered in 2 divided SQ injections as follows:  150 mg SQ in the back of her LEFT ARM   75 mg SQ in the back of her RIGHT ARM.       Ms. Pabon tolerated well and voiced no complaints.     Ms. Pabon was [facility] in stable condition at 1115.     She is to return in 2 weeks on 25 at 1000 for her next XOLAIR injection.     Regi Barrow RN  2025  1:24 PM

## 2025-06-16 ENCOUNTER — HOSPITAL ENCOUNTER (OUTPATIENT)
Facility: HOSPITAL | Age: 65
Setting detail: INFUSION SERIES
Discharge: HOME | End: 2025-06-19
Payer: MEDICARE

## 2025-06-16 VITALS
OXYGEN SATURATION: 95 % | RESPIRATION RATE: 20 BRPM | DIASTOLIC BLOOD PRESSURE: 76 MMHG | SYSTOLIC BLOOD PRESSURE: 164 MMHG | HEART RATE: 63 BPM | TEMPERATURE: 98.4 F

## 2025-06-16 DIAGNOSIS — J45.50 SEVERE PERSISTENT ASTHMA, UNSPECIFIED WHETHER COMPLICATED (HCC): Primary | ICD-10-CM

## 2025-06-16 PROCEDURE — 96372 THER/PROPH/DIAG INJ SC/IM: CPT

## 2025-06-16 PROCEDURE — 6360000002 HC RX W HCPCS: Performed by: INTERNAL MEDICINE

## 2025-06-16 RX ORDER — ACETAMINOPHEN 325 MG/1
650 TABLET ORAL
OUTPATIENT
Start: 2025-06-30

## 2025-06-16 RX ORDER — ALBUTEROL SULFATE 90 UG/1
4 INHALANT RESPIRATORY (INHALATION) PRN
OUTPATIENT
Start: 2025-06-30

## 2025-06-16 RX ORDER — EPINEPHRINE 1 MG/ML
0.3 INJECTION, SOLUTION, CONCENTRATE INTRAVENOUS PRN
OUTPATIENT
Start: 2025-06-30

## 2025-06-16 RX ORDER — DIPHENHYDRAMINE HYDROCHLORIDE 50 MG/ML
50 INJECTION, SOLUTION INTRAMUSCULAR; INTRAVENOUS
OUTPATIENT
Start: 2025-06-30

## 2025-06-16 RX ORDER — ONDANSETRON 2 MG/ML
8 INJECTION INTRAMUSCULAR; INTRAVENOUS
OUTPATIENT
Start: 2025-06-30

## 2025-06-16 RX ORDER — HYDROCORTISONE SODIUM SUCCINATE 100 MG/2ML
100 INJECTION INTRAMUSCULAR; INTRAVENOUS
OUTPATIENT
Start: 2025-06-30

## 2025-06-16 RX ORDER — SODIUM CHLORIDE 9 MG/ML
INJECTION, SOLUTION INTRAVENOUS CONTINUOUS
OUTPATIENT
Start: 2025-06-30

## 2025-06-16 RX ADMIN — OMALIZUMAB 225 MG: 150 INJECTION, SOLUTION SUBCUTANEOUS at 10:20

## 2025-06-16 ASSESSMENT — PAIN - FUNCTIONAL ASSESSMENT: PAIN_FUNCTIONAL_ASSESSMENT: NONE - DENIES PAIN

## 2025-06-16 NOTE — PROGRESS NOTES
[facility] Progress Note    Date: 2025    Name: Viki Pabon    MRN: [id number]         : 1960    Ms. Pabon arrived in the [facility] today at 1010, ambulatory and in stable condition, here for her XOLAIR INJECTIONS (EVERY 2 WEEKS). She was assessed and education was provided.     Ms. Pabon's vitals were reviewed.  Vitals:    25 1010   BP: (!) 164/76   Pulse: 63   Resp: 20   Temp: 98.4 °F (36.9 °C)   SpO2: 95%       Ms. Pabon stated that she was doing well, and voiced no major complaints or concerns.     And she also stated that she has been tolerating the XOLAIR injections well and without any problems.      Also, she denied being on any current antibiotic therapy, and denied having any current signs of infection.           Omalizumab (XOLAIR) 225 mg TOTAL DOSE, was administered in 2 divided SQ injections at 1020 as follows: 150 mg SQ in the back of her RIGHT ARM, and 75 mg SQ in the back of her LEFT ARM.             Ms. Pabon tolerated well and voiced no complaints.     Ms. Pabon was [facility] in stable condition at 1025.     She is to return IN 2 WEEKS, on Monday, 25 at 1000, for her next appointment, for her next XOLAIR injections.     Debbie Wells RN  2025  10:20 AM

## 2025-06-30 ENCOUNTER — HOSPITAL ENCOUNTER (OUTPATIENT)
Facility: HOSPITAL | Age: 65
Setting detail: INFUSION SERIES
Discharge: HOME | End: 2025-07-03
Payer: MEDICARE

## 2025-06-30 VITALS
DIASTOLIC BLOOD PRESSURE: 73 MMHG | RESPIRATION RATE: 17 BRPM | TEMPERATURE: 98.4 F | SYSTOLIC BLOOD PRESSURE: 147 MMHG | HEART RATE: 64 BPM | OXYGEN SATURATION: 98 %

## 2025-06-30 DIAGNOSIS — J45.50 SEVERE PERSISTENT ASTHMA, UNSPECIFIED WHETHER COMPLICATED (HCC): Primary | ICD-10-CM

## 2025-06-30 PROCEDURE — 96372 THER/PROPH/DIAG INJ SC/IM: CPT

## 2025-06-30 PROCEDURE — 6360000002 HC RX W HCPCS: Performed by: NURSE PRACTITIONER

## 2025-06-30 RX ORDER — SODIUM CHLORIDE 9 MG/ML
INJECTION, SOLUTION INTRAVENOUS CONTINUOUS
OUTPATIENT
Start: 2025-07-14

## 2025-06-30 RX ORDER — ONDANSETRON 2 MG/ML
8 INJECTION INTRAMUSCULAR; INTRAVENOUS
OUTPATIENT
Start: 2025-07-14

## 2025-06-30 RX ORDER — DIPHENHYDRAMINE HYDROCHLORIDE 50 MG/ML
50 INJECTION, SOLUTION INTRAMUSCULAR; INTRAVENOUS
OUTPATIENT
Start: 2025-07-14

## 2025-06-30 RX ORDER — ACETAMINOPHEN 325 MG/1
650 TABLET ORAL
OUTPATIENT
Start: 2025-07-14

## 2025-06-30 RX ORDER — HYDROCORTISONE SODIUM SUCCINATE 100 MG/2ML
100 INJECTION INTRAMUSCULAR; INTRAVENOUS
OUTPATIENT
Start: 2025-07-14

## 2025-06-30 RX ORDER — ALBUTEROL SULFATE 90 UG/1
4 INHALANT RESPIRATORY (INHALATION) PRN
OUTPATIENT
Start: 2025-07-14

## 2025-06-30 RX ORDER — EPINEPHRINE 1 MG/ML
0.3 INJECTION, SOLUTION, CONCENTRATE INTRAVENOUS PRN
OUTPATIENT
Start: 2025-07-14

## 2025-06-30 RX ADMIN — OMALIZUMAB 225 MG: 150 INJECTION, SOLUTION SUBCUTANEOUS at 10:17

## 2025-06-30 ASSESSMENT — PAIN - FUNCTIONAL ASSESSMENT: PAIN_FUNCTIONAL_ASSESSMENT: NONE - DENIES PAIN

## 2025-06-30 NOTE — PROGRESS NOTES
[facility] Progress Note    Date: 2025    Name: Viki Pabon    MRN: [id number]         : 1960    Ms. Pabon arrived in the [facility] today at 1005, ambulatory and in stable condition, here for her XOLAIR INJECTIONS (EVERY 2 WEEKS). She was assessed and education was provided.     Ms. Pabon's vitals were reviewed.  Vitals:    25 1008   BP: (!) 147/73   Pulse: 64   Resp: 17   Temp: 98.4 °F (36.9 °C)   SpO2: 98%     Patient reports she continues to tolerate Xolair well without any issues. Also, she denied being on any current antibiotic therapy, and denied having any current signs of infection.     Omalizumab (XOLAIR) 225 mg TOTAL DOSE was administered in 2 divided SQ injections as follows: 150 mg SQ in the back of her LEFT ARM, and 75 mg SQ in the back of her LEFT ARM.     Ms. Pabon tolerated well and voiced no complaints.     Ms. Pabon was [facility] in stable condition at 1020.     She is to return IN 2 WEEKS on 25 at 1000 for her next XOLAIR injections.     Regi Barrow RN  2025  10:14 AM

## 2025-07-14 ENCOUNTER — HOSPITAL ENCOUNTER (OUTPATIENT)
Facility: HOSPITAL | Age: 65
Setting detail: INFUSION SERIES
Discharge: HOME | End: 2025-07-17
Payer: MEDICARE

## 2025-07-14 VITALS
TEMPERATURE: 98.4 F | DIASTOLIC BLOOD PRESSURE: 74 MMHG | RESPIRATION RATE: 16 BRPM | SYSTOLIC BLOOD PRESSURE: 153 MMHG | HEART RATE: 71 BPM | OXYGEN SATURATION: 99 %

## 2025-07-14 DIAGNOSIS — J45.50 SEVERE PERSISTENT ASTHMA, UNSPECIFIED WHETHER COMPLICATED (HCC): Primary | ICD-10-CM

## 2025-07-14 PROCEDURE — 96372 THER/PROPH/DIAG INJ SC/IM: CPT

## 2025-07-14 PROCEDURE — 6360000002 HC RX W HCPCS: Performed by: NURSE PRACTITIONER

## 2025-07-14 RX ORDER — SODIUM CHLORIDE 9 MG/ML
INJECTION, SOLUTION INTRAVENOUS CONTINUOUS
OUTPATIENT
Start: 2025-07-28

## 2025-07-14 RX ORDER — ONDANSETRON 2 MG/ML
8 INJECTION INTRAMUSCULAR; INTRAVENOUS
OUTPATIENT
Start: 2025-07-28

## 2025-07-14 RX ORDER — ACETAMINOPHEN 325 MG/1
650 TABLET ORAL
OUTPATIENT
Start: 2025-07-28

## 2025-07-14 RX ORDER — EPINEPHRINE 1 MG/ML
0.3 INJECTION, SOLUTION, CONCENTRATE INTRAVENOUS PRN
OUTPATIENT
Start: 2025-07-28

## 2025-07-14 RX ORDER — HYDROCORTISONE SODIUM SUCCINATE 100 MG/2ML
100 INJECTION INTRAMUSCULAR; INTRAVENOUS
OUTPATIENT
Start: 2025-07-28

## 2025-07-14 RX ORDER — DIPHENHYDRAMINE HYDROCHLORIDE 50 MG/ML
50 INJECTION, SOLUTION INTRAMUSCULAR; INTRAVENOUS
OUTPATIENT
Start: 2025-07-28

## 2025-07-14 RX ORDER — ALBUTEROL SULFATE 90 UG/1
4 INHALANT RESPIRATORY (INHALATION) PRN
OUTPATIENT
Start: 2025-07-28

## 2025-07-14 RX ADMIN — OMALIZUMAB 225 MG: 150 INJECTION, SOLUTION SUBCUTANEOUS at 10:20

## 2025-07-14 ASSESSMENT — PAIN - FUNCTIONAL ASSESSMENT: PAIN_FUNCTIONAL_ASSESSMENT: NONE - DENIES PAIN

## 2025-07-14 NOTE — PROGRESS NOTES
[facility] Progress Note    Date: 2025    Name: Viki Pabon    MRN: [id number]         : 1960    Ms. Pabon arrived in the [facility] today at 1005, ambulatory and in stable condition, here for her XOLAIR INJECTIONS (EVERY 2 WEEKS). She was assessed and education was provided.     Ms. Pabon's vitals were reviewed.  Vitals:    25 1005   BP: (!) 153/74   Pulse: 71   Resp: 16   Temp: 98.4 °F (36.9 °C)   SpO2: 99%       Ms. Pabon stated that she was doing well, and voiced no major complaints or concerns.     And she also stated that she has been tolerating the XOLAIR injections well and without any problems.      Also, she denied being on any current antibiotic therapy, and denied having any current signs of infection.           Omalizumab (XOLAIR) 225 mg TOTAL DOSE, was administered in 2 divided SQ injections at 1020 as follows: 150 mg SQ in the back of her RIGHT ARM, and 75 mg SQ in the back of her LEFT ARM.             Ms. Pabon tolerated well and voiced no complaints.     Ms. Pabon was [facility] in stable condition at 1025.     She is to return IN 2 WEEKS, on Monday, 25 at 0930, for her next appointment, for her next XOLAIR injections.     Debbie Wells RN  2025  10:18 AM

## 2025-07-25 ENCOUNTER — TRANSCRIBE ORDERS (OUTPATIENT)
Facility: HOSPITAL | Age: 65
End: 2025-07-25

## 2025-07-25 DIAGNOSIS — Z12.31 ENCOUNTER FOR SCREENING MAMMOGRAM FOR MALIGNANT NEOPLASM OF BREAST: Primary | ICD-10-CM

## 2025-07-28 ENCOUNTER — HOSPITAL ENCOUNTER (OUTPATIENT)
Facility: HOSPITAL | Age: 65
Setting detail: INFUSION SERIES
Discharge: HOME | End: 2025-07-31
Payer: MEDICARE

## 2025-07-28 VITALS
DIASTOLIC BLOOD PRESSURE: 66 MMHG | OXYGEN SATURATION: 99 % | RESPIRATION RATE: 16 BRPM | SYSTOLIC BLOOD PRESSURE: 127 MMHG | HEART RATE: 69 BPM | TEMPERATURE: 97.8 F

## 2025-07-28 DIAGNOSIS — J45.50 SEVERE PERSISTENT ASTHMA, UNSPECIFIED WHETHER COMPLICATED (HCC): Primary | ICD-10-CM

## 2025-07-28 PROCEDURE — 6360000002 HC RX W HCPCS: Performed by: NURSE PRACTITIONER

## 2025-07-28 PROCEDURE — 96372 THER/PROPH/DIAG INJ SC/IM: CPT

## 2025-07-28 RX ORDER — ONDANSETRON 2 MG/ML
8 INJECTION INTRAMUSCULAR; INTRAVENOUS
OUTPATIENT
Start: 2025-08-11

## 2025-07-28 RX ORDER — HYDROCORTISONE SODIUM SUCCINATE 100 MG/2ML
100 INJECTION INTRAMUSCULAR; INTRAVENOUS
OUTPATIENT
Start: 2025-08-11

## 2025-07-28 RX ORDER — SODIUM CHLORIDE 9 MG/ML
INJECTION, SOLUTION INTRAVENOUS CONTINUOUS
OUTPATIENT
Start: 2025-08-11

## 2025-07-28 RX ORDER — DIPHENHYDRAMINE HYDROCHLORIDE 50 MG/ML
50 INJECTION, SOLUTION INTRAMUSCULAR; INTRAVENOUS
OUTPATIENT
Start: 2025-08-11

## 2025-07-28 RX ORDER — ALBUTEROL SULFATE 90 UG/1
4 INHALANT RESPIRATORY (INHALATION) PRN
OUTPATIENT
Start: 2025-08-11

## 2025-07-28 RX ORDER — ACETAMINOPHEN 325 MG/1
650 TABLET ORAL
OUTPATIENT
Start: 2025-08-11

## 2025-07-28 RX ORDER — EPINEPHRINE 1 MG/ML
0.3 INJECTION, SOLUTION, CONCENTRATE INTRAVENOUS PRN
OUTPATIENT
Start: 2025-08-11

## 2025-07-28 RX ADMIN — OMALIZUMAB 225 MG: 150 INJECTION, SOLUTION SUBCUTANEOUS at 09:40

## 2025-07-28 ASSESSMENT — PAIN - FUNCTIONAL ASSESSMENT: PAIN_FUNCTIONAL_ASSESSMENT: NONE - DENIES PAIN

## 2025-07-28 NOTE — PROGRESS NOTES
[facility] Progress Note    Date: 2025    Name: Viki Pabon    MRN: [id number]         : 1960    Ms. Pabon arrived in the [facility] today at 0930 in stable condition, here for her XOLAIR INJECTIONS (EVERY 2 WEEKS). She was assessed and education was provided.     Ms. Pabon's vitals were reviewed.  Vitals:    25 0930   BP: 127/66   Pulse: 69   Resp: 16   Temp: 97.8 °F (36.6 °C)   SpO2: 99%     Pt stated that she has been tolerating the XOLAIR injections well and without any problems.    Also, she denied being on any current antibiotic therapy, and denied having any current signs of infection.     Omalizumab (XOLAIR) 225 mg TOTAL DOSE was administered in 2 divided SQ injections as follows:  150 mg SQ in the back of her LEFT ARM   75 mg SQ in the back of her RIGHT ARM.       Ms. Pabon tolerated well and voiced no complaints.     Ms. Pabon was [facility] in stable condition at 0945.     She is to return in 2 weeks on 25 at 1030 for her next XOLAIR injection.     Humaira Dailey RN  2025  10:52 AM

## 2025-07-29 ENCOUNTER — TRANSCRIBE ORDERS (OUTPATIENT)
Facility: HOSPITAL | Age: 65
End: 2025-07-29

## 2025-07-29 DIAGNOSIS — Z78.0 ASYMPTOMATIC MENOPAUSAL STATE: Primary | ICD-10-CM

## 2025-08-11 ENCOUNTER — HOSPITAL ENCOUNTER (OUTPATIENT)
Facility: HOSPITAL | Age: 65
Setting detail: INFUSION SERIES
Discharge: HOME | End: 2025-08-14
Payer: MEDICARE

## 2025-08-11 VITALS
RESPIRATION RATE: 16 BRPM | TEMPERATURE: 97.5 F | SYSTOLIC BLOOD PRESSURE: 126 MMHG | BODY MASS INDEX: 33.45 KG/M2 | DIASTOLIC BLOOD PRESSURE: 73 MMHG | WEIGHT: 182.9 LBS | HEART RATE: 68 BPM | OXYGEN SATURATION: 95 %

## 2025-08-11 DIAGNOSIS — J45.50 SEVERE PERSISTENT ASTHMA, UNSPECIFIED WHETHER COMPLICATED (HCC): Primary | ICD-10-CM

## 2025-08-11 PROCEDURE — 96372 THER/PROPH/DIAG INJ SC/IM: CPT

## 2025-08-11 PROCEDURE — 6360000002 HC RX W HCPCS: Performed by: NURSE PRACTITIONER

## 2025-08-11 RX ORDER — DIPHENHYDRAMINE HYDROCHLORIDE 50 MG/ML
50 INJECTION, SOLUTION INTRAMUSCULAR; INTRAVENOUS
OUTPATIENT
Start: 2025-08-25

## 2025-08-11 RX ORDER — ONDANSETRON 2 MG/ML
8 INJECTION INTRAMUSCULAR; INTRAVENOUS
OUTPATIENT
Start: 2025-08-25

## 2025-08-11 RX ORDER — SODIUM CHLORIDE 9 MG/ML
INJECTION, SOLUTION INTRAVENOUS CONTINUOUS
OUTPATIENT
Start: 2025-08-25

## 2025-08-11 RX ORDER — ALBUTEROL SULFATE 90 UG/1
4 INHALANT RESPIRATORY (INHALATION) PRN
OUTPATIENT
Start: 2025-08-25

## 2025-08-11 RX ORDER — EPINEPHRINE 1 MG/ML
0.3 INJECTION, SOLUTION, CONCENTRATE INTRAVENOUS PRN
OUTPATIENT
Start: 2025-08-25

## 2025-08-11 RX ORDER — ACETAMINOPHEN 325 MG/1
650 TABLET ORAL
OUTPATIENT
Start: 2025-08-25

## 2025-08-11 RX ORDER — HYDROCORTISONE SODIUM SUCCINATE 100 MG/2ML
100 INJECTION INTRAMUSCULAR; INTRAVENOUS
OUTPATIENT
Start: 2025-08-25

## 2025-08-11 RX ADMIN — OMALIZUMAB 225 MG: 150 INJECTION, SOLUTION SUBCUTANEOUS at 10:38

## 2025-08-11 ASSESSMENT — PAIN SCALES - GENERAL: PAINLEVEL_OUTOF10: 0

## 2025-08-25 ENCOUNTER — HOSPITAL ENCOUNTER (OUTPATIENT)
Facility: HOSPITAL | Age: 65
Setting detail: INFUSION SERIES
Discharge: HOME | End: 2025-08-28
Payer: MEDICARE

## 2025-08-25 VITALS
OXYGEN SATURATION: 98 % | SYSTOLIC BLOOD PRESSURE: 176 MMHG | DIASTOLIC BLOOD PRESSURE: 80 MMHG | HEART RATE: 61 BPM | RESPIRATION RATE: 16 BRPM | TEMPERATURE: 98.5 F

## 2025-08-25 DIAGNOSIS — J45.50 SEVERE PERSISTENT ASTHMA, UNSPECIFIED WHETHER COMPLICATED (HCC): Primary | ICD-10-CM

## 2025-08-25 PROCEDURE — 6360000002 HC RX W HCPCS: Performed by: NURSE PRACTITIONER

## 2025-08-25 PROCEDURE — 96372 THER/PROPH/DIAG INJ SC/IM: CPT

## 2025-08-25 RX ORDER — DIPHENHYDRAMINE HYDROCHLORIDE 50 MG/ML
50 INJECTION, SOLUTION INTRAMUSCULAR; INTRAVENOUS
OUTPATIENT
Start: 2025-09-08

## 2025-08-25 RX ORDER — SODIUM CHLORIDE 9 MG/ML
INJECTION, SOLUTION INTRAVENOUS CONTINUOUS
OUTPATIENT
Start: 2025-09-08

## 2025-08-25 RX ORDER — HYDROCORTISONE SODIUM SUCCINATE 100 MG/2ML
100 INJECTION INTRAMUSCULAR; INTRAVENOUS
OUTPATIENT
Start: 2025-09-08

## 2025-08-25 RX ORDER — EPINEPHRINE 1 MG/ML
0.3 INJECTION, SOLUTION, CONCENTRATE INTRAVENOUS PRN
OUTPATIENT
Start: 2025-09-08

## 2025-08-25 RX ORDER — ONDANSETRON 2 MG/ML
8 INJECTION INTRAMUSCULAR; INTRAVENOUS
OUTPATIENT
Start: 2025-09-08

## 2025-08-25 RX ORDER — ACETAMINOPHEN 325 MG/1
650 TABLET ORAL
OUTPATIENT
Start: 2025-09-08

## 2025-08-25 RX ORDER — ALBUTEROL SULFATE 90 UG/1
4 INHALANT RESPIRATORY (INHALATION) PRN
OUTPATIENT
Start: 2025-09-08

## 2025-08-25 RX ADMIN — OMALIZUMAB 225 MG: 150 INJECTION, SOLUTION SUBCUTANEOUS at 10:41

## 2025-08-25 ASSESSMENT — PAIN SCALES - GENERAL: PAINLEVEL_OUTOF10: 0

## 2025-08-27 ENCOUNTER — HOSPITAL ENCOUNTER (OUTPATIENT)
Dept: WOMENS IMAGING | Facility: HOSPITAL | Age: 65
Discharge: HOME | End: 2025-08-30
Payer: MEDICARE

## 2025-08-27 ENCOUNTER — HOSPITAL ENCOUNTER (OUTPATIENT)
Facility: HOSPITAL | Age: 65
Discharge: HOME | End: 2025-08-30
Payer: MEDICARE

## 2025-08-27 DIAGNOSIS — Z12.31 ENCOUNTER FOR SCREENING MAMMOGRAM FOR MALIGNANT NEOPLASM OF BREAST: ICD-10-CM

## 2025-08-27 DIAGNOSIS — Z78.0 ASYMPTOMATIC MENOPAUSAL STATE: ICD-10-CM

## 2025-08-27 PROCEDURE — 77080 DXA BONE DENSITY AXIAL: CPT

## 2025-08-27 PROCEDURE — 77063 BREAST TOMOSYNTHESIS BI: CPT
